# Patient Record
Sex: MALE | Race: WHITE | Employment: FULL TIME | URBAN - METROPOLITAN AREA
[De-identification: names, ages, dates, MRNs, and addresses within clinical notes are randomized per-mention and may not be internally consistent; named-entity substitution may affect disease eponyms.]

---

## 2017-01-18 PROBLEM — Z96.641 S/P HIP REPLACEMENT, RIGHT: Status: ACTIVE | Noted: 2017-01-18

## 2017-01-18 PROBLEM — M70.61 GREATER TROCHANTERIC BURSITIS, RIGHT: Status: ACTIVE | Noted: 2017-01-18

## 2017-02-16 PROBLEM — Z96.641 S/P HIP REPLACEMENT, RIGHT: Status: RESOLVED | Noted: 2017-01-18 | Resolved: 2017-02-16

## 2017-02-18 PROCEDURE — 81001 URINALYSIS AUTO W/SCOPE: CPT | Performed by: INTERNAL MEDICINE

## 2017-04-14 PROBLEM — M25.551 HIP PAIN, RIGHT: Status: ACTIVE | Noted: 2017-04-14

## 2018-02-15 PROBLEM — D69.6 THROMBOCYTOPENIA: Status: ACTIVE | Noted: 2018-02-15

## 2018-02-15 PROBLEM — IMO0002 TOBACCO-RELATED DISORDER: Status: ACTIVE | Noted: 2018-02-15

## 2018-02-15 PROBLEM — M25.551 HIP PAIN, RIGHT: Status: RESOLVED | Noted: 2017-04-14 | Resolved: 2018-02-15

## 2018-02-15 PROBLEM — R73.01 IFG (IMPAIRED FASTING GLUCOSE): Status: ACTIVE | Noted: 2018-02-15

## 2018-02-15 PROBLEM — D69.6 THROMBOCYTOPENIA (HCC): Status: ACTIVE | Noted: 2018-02-15

## 2018-04-24 PROBLEM — M51.36 LUMBAR DEGENERATIVE DISC DISEASE: Status: ACTIVE | Noted: 2018-04-24

## 2018-04-24 PROBLEM — M51.369 LUMBAR DEGENERATIVE DISC DISEASE: Status: ACTIVE | Noted: 2018-04-24

## 2018-05-03 PROBLEM — M54.16 LUMBAR RADICULITIS: Status: ACTIVE | Noted: 2018-05-03

## 2019-03-01 PROCEDURE — 81001 URINALYSIS AUTO W/SCOPE: CPT | Performed by: INTERNAL MEDICINE

## 2019-03-07 PROBLEM — M51.369 LUMBAR DEGENERATIVE DISC DISEASE: Status: RESOLVED | Noted: 2018-04-24 | Resolved: 2019-03-07

## 2019-03-07 PROBLEM — M51.36 LUMBAR DEGENERATIVE DISC DISEASE: Status: RESOLVED | Noted: 2018-04-24 | Resolved: 2019-03-07

## 2019-03-25 PROBLEM — N18.30 CKD (CHRONIC KIDNEY DISEASE) STAGE 3, GFR 30-59 ML/MIN (HCC): Status: ACTIVE | Noted: 2019-03-25

## 2020-03-18 PROBLEM — M70.61 GREATER TROCHANTERIC BURSITIS, RIGHT: Status: RESOLVED | Noted: 2017-01-18 | Resolved: 2020-03-18

## 2020-03-18 PROBLEM — M54.16 LUMBAR RADICULITIS: Status: RESOLVED | Noted: 2018-05-03 | Resolved: 2020-03-18

## 2020-03-18 PROBLEM — I77.9 CAROTID ARTERY DISEASE (HCC): Status: ACTIVE | Noted: 2020-03-18

## 2020-03-18 PROBLEM — I70.0 AORTO-ILIAC ATHEROSCLEROSIS (HCC): Status: ACTIVE | Noted: 2020-03-18

## 2020-03-18 PROBLEM — I70.8 AORTO-ILIAC ATHEROSCLEROSIS: Status: ACTIVE | Noted: 2020-03-18

## 2020-03-18 PROBLEM — I70.8 AORTO-ILIAC ATHEROSCLEROSIS (HCC): Status: ACTIVE | Noted: 2020-03-18

## 2020-03-18 PROBLEM — I77.9 CAROTID ARTERY DISEASE: Status: ACTIVE | Noted: 2020-03-18

## 2020-03-18 PROBLEM — I70.0 AORTO-ILIAC ATHEROSCLEROSIS: Status: ACTIVE | Noted: 2020-03-18

## 2021-05-12 PROBLEM — N18.30 STAGE 3 CHRONIC KIDNEY DISEASE, UNSPECIFIED WHETHER STAGE 3A OR 3B CKD (HCC): Status: ACTIVE | Noted: 2021-05-12

## 2021-05-12 PROBLEM — I77.9 CAROTID ARTERY DISEASE (HCC): Status: RESOLVED | Noted: 2020-03-18 | Resolved: 2021-05-12

## 2021-05-12 PROBLEM — I77.9 CAROTID ARTERY DISEASE: Status: RESOLVED | Noted: 2020-03-18 | Resolved: 2021-05-12

## 2021-05-12 PROBLEM — N18.30 CKD (CHRONIC KIDNEY DISEASE) STAGE 3, GFR 30-59 ML/MIN (HCC): Status: RESOLVED | Noted: 2019-03-25 | Resolved: 2021-05-12

## 2021-05-12 PROBLEM — I65.23 ATHEROSCLEROSIS OF BOTH CAROTID ARTERIES: Status: ACTIVE | Noted: 2021-05-12

## 2021-05-25 ENCOUNTER — HOSPITAL ENCOUNTER (OUTPATIENT)
Age: 71
Discharge: HOME | End: 2021-05-25
Payer: MEDICARE

## 2021-05-25 ENCOUNTER — APPOINTMENT (OUTPATIENT)
Dept: GENERAL RADIOLOGY | Age: 71
End: 2021-05-25
Attending: NURSE PRACTITIONER
Payer: MEDICARE

## 2021-05-25 VITALS
OXYGEN SATURATION: 99 % | RESPIRATION RATE: 14 BRPM | DIASTOLIC BLOOD PRESSURE: 77 MMHG | SYSTOLIC BLOOD PRESSURE: 159 MMHG | HEART RATE: 80 BPM | BODY MASS INDEX: 26.41 KG/M2 | TEMPERATURE: 98 F | WEIGHT: 195 LBS | HEIGHT: 72 IN

## 2021-05-25 DIAGNOSIS — S99.921A INJURY OF RIGHT FOOT, INITIAL ENCOUNTER: Primary | ICD-10-CM

## 2021-05-25 DIAGNOSIS — S82.61XA CLOSED AVULSION FRACTURE OF LATERAL MALLEOLUS OF RIGHT FIBULA, INITIAL ENCOUNTER: ICD-10-CM

## 2021-05-25 PROCEDURE — 73610 X-RAY EXAM OF ANKLE: CPT | Performed by: NURSE PRACTITIONER

## 2021-05-25 PROCEDURE — 73630 X-RAY EXAM OF FOOT: CPT | Performed by: NURSE PRACTITIONER

## 2021-05-25 PROCEDURE — 29515 APPLICATION SHORT LEG SPLINT: CPT | Performed by: NURSE PRACTITIONER

## 2021-05-25 RX ORDER — HYDROCODONE BITARTRATE AND ACETAMINOPHEN 5; 325 MG/1; MG/1
1-2 TABLET ORAL EVERY 6 HOURS PRN
Qty: 15 TABLET | Refills: 0 | Status: SHIPPED | OUTPATIENT
Start: 2021-05-25 | End: 2021-06-01

## 2021-05-25 NOTE — ED QUICK NOTES
Computer not working in the room. Obtained information from patient verbally and checked it to the computer.

## 2021-05-25 NOTE — ED PROVIDER NOTES
Patient Seen in: Immediate [address]      History   Patient presents with:  Fall  Leg or Foot Injury    Stated Complaint: fell at home / right foot    HPI/Subjective:    This 58-year-old male presents to immediate care for right ankle and foot Physical Exam     ED Triage Vitals [05/25/21 0825]   /77   Pulse 80   Resp 14   Temp 98 °F (36.7 °C)   Temp src Temporal   SpO2 99 %   O2 Device None (Room air)       Current:/77   Pulse 80   Temp 98 °F (36.7 °C) (Temporal)   Resp 14   Ht 182. 9 ankle mortise is intact. The subtalar joint is intact. There is joint space narrowing and marginal osteophyte formation at the tibiotalar joint consistent with osteoarthritis. CONCLUSION:  1.  Small ossicle adjacent to the inferior tip of fibul applied by immediate care staff. Location: left lower extremity   procedure: The area of the splint was appropriately positioned. Post-procedure: Good position. Capillary refill less than 2 seconds. Sensation remains intact.   Patient tolerated the proc

## 2021-05-25 NOTE — ED INITIAL ASSESSMENT (HPI)
Patient here for evaluation of right foot and ankle pain x 2 days following a fall getting up from the couch. He states his great toe caught on the carpet and he twisted the foot/ankle and fell onto carpet.  He has been icing the foot but has not taken anyt

## 2021-12-26 ENCOUNTER — HOSPITAL ENCOUNTER (OUTPATIENT)
Age: 71
Discharge: HOME | End: 2021-12-26
Payer: MEDICARE

## 2021-12-26 ENCOUNTER — APPOINTMENT (OUTPATIENT)
Dept: GENERAL RADIOLOGY | Age: 71
End: 2021-12-26
Attending: NURSE PRACTITIONER
Payer: MEDICARE

## 2021-12-26 VITALS
OXYGEN SATURATION: 99 % | TEMPERATURE: 97 F | DIASTOLIC BLOOD PRESSURE: 73 MMHG | RESPIRATION RATE: 17 BRPM | HEIGHT: 72 IN | WEIGHT: 195 LBS | BODY MASS INDEX: 26.41 KG/M2 | SYSTOLIC BLOOD PRESSURE: 154 MMHG | HEART RATE: 65 BPM

## 2021-12-26 DIAGNOSIS — M79.646 FINGER PAIN: ICD-10-CM

## 2021-12-26 DIAGNOSIS — L03.011 CELLULITIS OF FINGER OF RIGHT HAND: Primary | ICD-10-CM

## 2021-12-26 PROCEDURE — 73140 X-RAY EXAM OF FINGER(S): CPT | Performed by: NURSE PRACTITIONER

## 2021-12-26 PROCEDURE — 99213 OFFICE O/P EST LOW 20 MIN: CPT | Performed by: NURSE PRACTITIONER

## 2021-12-26 RX ORDER — METHYLPREDNISOLONE 4 MG/1
TABLET ORAL
Qty: 1 EACH | Refills: 0 | Status: SHIPPED | OUTPATIENT
Start: 2021-12-26

## 2021-12-26 RX ORDER — CEPHALEXIN 500 MG/1
500 CAPSULE ORAL 4 TIMES DAILY
Qty: 40 CAPSULE | Refills: 0 | Status: SHIPPED | OUTPATIENT
Start: 2021-12-26 | End: 2022-01-05

## 2021-12-26 NOTE — ED PROVIDER NOTES
Patient Seen in: Immediate [address] [place] [address]      History   Patient presents with:  Pain    Stated Complaint: finger pain/problem    Subjective:   HPI  Patient is a 29-year-old male past medical history of hypertension, hyperlipidemia, gout, osteoart Social History    Tobacco Use      Smoking status: Current Every Day Smoker        Packs/day: 1.00        Years: 35.00        Pack years: 35        Types: Cigarettes      Smokeless tobacco: Never Used    Vaping Use      Vaping Use: Never use refill takes less than 2 seconds. Coloration: Skin is not pale. Findings: No erythema or rash. Neurological:      Mental Status: He is alert.    Psychiatric:         Mood and Affect: Mood normal.         Behavior: Behavior normal. immediate medical attention for fever worsening symptoms. I also discussed with patient that radiate allergy finding of a hyperdense focus at the medial margin of the right    Impression; cellulitis -possibly secondary to a paronychia that has extended.

## 2023-03-02 ENCOUNTER — HOSPITAL ENCOUNTER (OUTPATIENT)
Age: 73
Discharge: HOME | End: 2023-03-02
Payer: MEDICARE

## 2023-03-02 VITALS
TEMPERATURE: 98 F | RESPIRATION RATE: 18 BRPM | HEART RATE: 62 BPM | SYSTOLIC BLOOD PRESSURE: 137 MMHG | OXYGEN SATURATION: 99 % | DIASTOLIC BLOOD PRESSURE: 71 MMHG

## 2023-03-02 DIAGNOSIS — H61.23 BILATERAL IMPACTED CERUMEN: Primary | ICD-10-CM

## 2023-03-02 PROCEDURE — 99213 OFFICE O/P EST LOW 20 MIN: CPT | Performed by: NURSE PRACTITIONER

## 2023-03-02 NOTE — ED INITIAL ASSESSMENT (HPI)
Pt c/o bilateral ear clogging and stuffiness for 2 days. Pt states he tried hydrogen peroxide no relief.

## 2024-01-01 ENCOUNTER — TELEPHONE (OUTPATIENT)
Age: 74
End: 2024-01-01

## 2024-05-13 ENCOUNTER — HOSPITAL ENCOUNTER (INPATIENT)
Facility: HOSPITAL | Age: 74
LOS: 2 days | Discharge: HOME | DRG: 809 | End: 2024-05-15
Attending: EMERGENCY MEDICINE | Admitting: HOSPITALIST

## 2024-05-13 DIAGNOSIS — D61.818 PANCYTOPENIA (HCC): Primary | ICD-10-CM

## 2024-05-13 LAB
ALBUMIN SERPL-MCNC: 3.8 G/DL (ref 3.4–5)
ALBUMIN/GLOB SERPL: 1.4 {RATIO} (ref 1–2)
ALP LIVER SERPL-CCNC: 106 U/L
ALT SERPL-CCNC: 16 U/L
ANION GAP SERPL CALC-SCNC: 6 MMOL/L (ref 0–18)
ANTIBODY SCREEN: NEGATIVE
APTT PPP: 34.1 SECONDS (ref 23–36)
AST SERPL-CCNC: 13 U/L (ref 15–37)
BASOPHILS # BLD AUTO: 0.01 X10(3) UL (ref 0–0.2)
BASOPHILS NFR BLD AUTO: 0.3 %
BILIRUB SERPL-MCNC: 0.4 MG/DL (ref 0.1–2)
BUN BLD-MCNC: 53 MG/DL (ref 9–23)
CALCIUM BLD-MCNC: 9.4 MG/DL (ref 8.5–10.1)
CHLORIDE SERPL-SCNC: 119 MMOL/L (ref 98–112)
CO2 SERPL-SCNC: 19 MMOL/L (ref 21–32)
CREAT BLD-MCNC: 2.37 MG/DL
EGFRCR SERPLBLD CKD-EPI 2021: 28 ML/MIN/1.73M2 (ref 60–?)
EOSINOPHIL # BLD AUTO: 0.05 X10(3) UL (ref 0–0.7)
EOSINOPHIL NFR BLD AUTO: 1.7 %
ERYTHROCYTE [DISTWIDTH] IN BLOOD BY AUTOMATED COUNT: 19.8 %
GLOBULIN PLAS-MCNC: 2.8 G/DL (ref 2.8–4.4)
GLUCOSE BLD-MCNC: 98 MG/DL (ref 70–99)
HCT VFR BLD AUTO: 16.6 %
HGB BLD-MCNC: 5.3 G/DL
IMM GRANULOCYTES # BLD AUTO: 0.01 X10(3) UL (ref 0–1)
IMM GRANULOCYTES NFR BLD: 0.3 %
INR BLD: 1.17 (ref 0.8–1.2)
LYMPHOCYTES # BLD AUTO: 1.34 X10(3) UL (ref 1–4)
LYMPHOCYTES NFR BLD AUTO: 44.7 %
MCH RBC QN AUTO: 34.2 PG (ref 26–34)
MCHC RBC AUTO-ENTMCNC: 31.9 G/DL (ref 31–37)
MCV RBC AUTO: 107.1 FL
MONOCYTES # BLD AUTO: 0.08 X10(3) UL (ref 0.1–1)
MONOCYTES NFR BLD AUTO: 2.7 %
NEUTROPHILS # BLD AUTO: 1.51 X10 (3) UL (ref 1.5–7.7)
NEUTROPHILS # BLD AUTO: 1.51 X10(3) UL (ref 1.5–7.7)
NEUTROPHILS NFR BLD AUTO: 50.3 %
NT-PROBNP SERPL-MCNC: 1563 PG/ML (ref ?–125)
OSMOLALITY SERPL CALC.SUM OF ELEC: 312 MOSM/KG (ref 275–295)
PLATELET # BLD AUTO: 25 10(3)UL (ref 150–450)
PLATELET MORPHOLOGY: NORMAL
PLATELETS.RETICULATED NFR BLD AUTO: 45.4 % (ref 0–7)
POTASSIUM SERPL-SCNC: 4.9 MMOL/L (ref 3.5–5.1)
PROT SERPL-MCNC: 6.6 G/DL (ref 6.4–8.2)
PROTHROMBIN TIME: 15 SECONDS (ref 11.6–14.8)
RBC # BLD AUTO: 1.55 X10(6)UL
RH BLOOD TYPE: NEGATIVE
SODIUM SERPL-SCNC: 144 MMOL/L (ref 136–145)
TROPONIN I SERPL HS-MCNC: 8 NG/L
WBC # BLD AUTO: 3 X10(3) UL (ref 4–11)

## 2024-05-13 PROCEDURE — 80053 COMPREHEN METABOLIC PANEL: CPT

## 2024-05-13 PROCEDURE — 85730 THROMBOPLASTIN TIME PARTIAL: CPT | Performed by: EMERGENCY MEDICINE

## 2024-05-13 PROCEDURE — 84484 ASSAY OF TROPONIN QUANT: CPT | Performed by: EMERGENCY MEDICINE

## 2024-05-13 PROCEDURE — 86850 RBC ANTIBODY SCREEN: CPT | Performed by: EMERGENCY MEDICINE

## 2024-05-13 PROCEDURE — 85610 PROTHROMBIN TIME: CPT | Performed by: EMERGENCY MEDICINE

## 2024-05-13 PROCEDURE — 86920 COMPATIBILITY TEST SPIN: CPT

## 2024-05-13 PROCEDURE — 86901 BLOOD TYPING SEROLOGIC RH(D): CPT | Performed by: EMERGENCY MEDICINE

## 2024-05-13 PROCEDURE — 36415 COLL VENOUS BLD VENIPUNCTURE: CPT

## 2024-05-13 PROCEDURE — 93005 ELECTROCARDIOGRAM TRACING: CPT

## 2024-05-13 PROCEDURE — 36430 TRANSFUSION BLD/BLD COMPNT: CPT

## 2024-05-13 PROCEDURE — 86900 BLOOD TYPING SEROLOGIC ABO: CPT | Performed by: EMERGENCY MEDICINE

## 2024-05-13 PROCEDURE — 30233N1 TRANSFUSION OF NONAUTOLOGOUS RED BLOOD CELLS INTO PERIPHERAL VEIN, PERCUTANEOUS APPROACH: ICD-10-PCS | Performed by: HOSPITALIST

## 2024-05-13 PROCEDURE — 99285 EMERGENCY DEPT VISIT HI MDM: CPT

## 2024-05-13 PROCEDURE — 83880 ASSAY OF NATRIURETIC PEPTIDE: CPT | Performed by: EMERGENCY MEDICINE

## 2024-05-13 PROCEDURE — 93010 ELECTROCARDIOGRAM REPORT: CPT

## 2024-05-13 PROCEDURE — 85025 COMPLETE CBC W/AUTO DIFF WBC: CPT

## 2024-05-13 RX ORDER — SODIUM CHLORIDE 9 MG/ML
INJECTION, SOLUTION INTRAVENOUS CONTINUOUS
Status: DISCONTINUED | OUTPATIENT
Start: 2024-05-13 | End: 2024-05-15

## 2024-05-13 RX ORDER — SODIUM CHLORIDE 9 MG/ML
INJECTION, SOLUTION INTRAVENOUS ONCE
Status: DISCONTINUED | OUTPATIENT
Start: 2024-05-13 | End: 2024-05-14

## 2024-05-13 RX ORDER — POLYETHYLENE GLYCOL 3350 17 G/17G
17 POWDER, FOR SOLUTION ORAL DAILY PRN
Status: DISCONTINUED | OUTPATIENT
Start: 2024-05-13 | End: 2024-05-15

## 2024-05-13 RX ORDER — METOCLOPRAMIDE HYDROCHLORIDE 5 MG/ML
5 INJECTION INTRAMUSCULAR; INTRAVENOUS EVERY 8 HOURS PRN
Status: DISCONTINUED | OUTPATIENT
Start: 2024-05-13 | End: 2024-05-15

## 2024-05-13 RX ORDER — METOPROLOL SUCCINATE 50 MG/1
50 TABLET, EXTENDED RELEASE ORAL DAILY
COMMUNITY

## 2024-05-13 RX ORDER — SENNOSIDES 8.6 MG
17.2 TABLET ORAL NIGHTLY PRN
Status: DISCONTINUED | OUTPATIENT
Start: 2024-05-13 | End: 2024-05-15

## 2024-05-13 RX ORDER — ONDANSETRON 2 MG/ML
4 INJECTION INTRAMUSCULAR; INTRAVENOUS EVERY 6 HOURS PRN
Status: DISCONTINUED | OUTPATIENT
Start: 2024-05-13 | End: 2024-05-15

## 2024-05-13 RX ORDER — ACETAMINOPHEN 500 MG
500 TABLET ORAL EVERY 4 HOURS PRN
Status: DISCONTINUED | OUTPATIENT
Start: 2024-05-13 | End: 2024-05-15

## 2024-05-13 RX ORDER — BISACODYL 10 MG
10 SUPPOSITORY, RECTAL RECTAL
Status: DISCONTINUED | OUTPATIENT
Start: 2024-05-13 | End: 2024-05-15

## 2024-05-13 RX ORDER — ENEMA 19; 7 G/133ML; G/133ML
1 ENEMA RECTAL ONCE AS NEEDED
Status: DISCONTINUED | OUTPATIENT
Start: 2024-05-13 | End: 2024-05-15

## 2024-05-13 RX ORDER — MELATONIN
3 NIGHTLY PRN
Status: DISCONTINUED | OUTPATIENT
Start: 2024-05-13 | End: 2024-05-15

## 2024-05-13 NOTE — ED QUICK NOTES
Orders for admission, patient is aware of plan and ready to go upstairs. Any questions, please call ED RN Bibi at extension [phone]    Vaccinated? N/a  Type of COVID test sent:  COVID Suspicion level:       Titratable drug(s) infusing:   Rate: n/a    LOC at time of transport: A&Ox4    Other pertinent information: Per MD, okay to transport if blood not available     CIWA score=  NIH score=

## 2024-05-13 NOTE — H&P
.CC:   Chief Complaint   Patient presents with    Difficulty Breathing    Abnormal Labs        PCP: Salvador Cohn MD    History of Present Illness: Patient is a 73 year old male with PMH sig for gout on allopurinol, htn, ckd who presented with abn labs including low counts on cbc.   Pt began to notice worsening sob with exertion, fatigue that started about 2 months ago and has been progressive. No abd pain, no n/v/d/c, no urinary complaints. No melena/rectal bleeding. No fevers/chills/night sweats. Does think he has lost about 25 lbs over past 1.5 years but does note significant stress with taking care of his wife.       PMH  Past Medical History:    Atherosclerosis of abdominal aorta (HCC)    Cancer (HCC)    skin cancer-squamous cell    Dermatitis    Resolved per note 3/7/19     Essential hypertension    Gout    Gout, unspecified    Resolved as stable per note 3/7/19    Greater trochanteric bursitis, right    Resolved last addressed 9/4/14    High cholesterol    HTN (hypertension)    Hyperlipidemia    Lumbar radiculitis    Resolved per note 3/7/19    Osteoarthritis    right hip    Status post total replacement of right hip        PSH  Past Surgical History:   Procedure Laterality Date    Colonoscopy      Colonoscopy N/A 6/27/2016    Procedure: COLONOSCOPY;  Surgeon: Elvis Warren MD;  Location:  ENDOSCOPY    Hip replacement surgery      Other      wisdom teeth extraction        ALL:  Allergies   Allergen Reactions    Radiology Contrast Iodinated Dyes HIVES        Home Medications:  No outpatient medications have been marked as taking for the 5/13/24 encounter (Hospital Encounter).         Soc Hx  Social History     Tobacco Use    Smoking status: Every Day     Current packs/day: 1.00     Average packs/day: 1 pack/day for 35.0 years (35.0 ttl pk-yrs)     Types: Cigarettes    Smokeless tobacco: Never   Substance Use Topics    Alcohol use: Yes     Alcohol/week: 0.0 standard drinks of alcohol     Comment:  rarely        Fam Hx  Family History   Problem Relation Age of Onset    Cancer Other     Stroke Other     Stroke Mother        Review of Systems  Comprehensive ROS reviewed and negative except for what's stated above.       OBJECTIVE:  /32   Pulse 59   Temp 98.1 °F (36.7 °C) (Oral)   Resp 22   Ht 6' (1.829 m)   Wt 175 lb (79.4 kg)   SpO2 100%   BMI 23.73 kg/m²     Gen- NAD, appears stated age  HEENT- NCAT, anicteric sclera, MMM, OP clear  Lymph- no cervical LAD  CV- RRR no murmurs.+LLE >right  Lungs- CTAB, good respiratory effort  Abd- soft, ntnd, no organomegaly, BS+  Derm- no rashes  MSK- good muscle strength and tone, no joint swelling  Neuro- A&OX3, no focal deficits      Diagnostic Data:    CBC/Chem  Recent Labs   Lab 05/13/24  1632   WBC 3.0*   HGB 5.3*   .1*   INR 1.17       Recent Labs   Lab 05/13/24  1632      K 4.9   *   CO2 19.0*   BUN 53*   CREATSERUM 2.37*   GLU 98   CA 9.4       Recent Labs   Lab 05/13/24  1632   ALT 16   AST 13*   ALB 3.8       No results for input(s): \"TROP\" in the last 168 hours.    Additional Diagnostics: personally reviewed EKG-  Nsr, no st/t abn      ASSESSMENT / PLAN:     72 yo man with h/o ckd, htn, gout who presents with worsening pancytopenia, has had progressively worsening sob.     Dyspnea/sob  - suspect progressive sx are related to worsening anemia  - lung exam clear, EKG okay. Bnp modestly elevated but could relate to ckd  - consider further cardiopulmonary w/u pending clinical picture    Pancytopenia  - concerning for bone marrow process, hematology consulted, likely bone marrow biopsy tomorrow per ED discussion with them.  - transfuse 2 units prbc and repeat hgb  - had previous w/u as outpt by hematology including CT CAP, multiple lab tests. Protein-albumin gap normal.    Lit on ckd 3  - sees nephrology as outpt, consider their involvement here  - possibly prerenal darrius in setting of severe anemia  - transfuse and reassess    Unequal leg  swelling L>R  - check LE dopplers    SCDs only due to thrombocytopenia.        Diandra Shah MD  [facility] Hospitalist  Pager [phone]  Answering Service number: [phone]

## 2024-05-13 NOTE — ED INITIAL ASSESSMENT (HPI)
C/o fatigue and SOB w/exertion; pt's PCP through [facility] told pt to come in for some abnormal labs; pt not sure which lab was low; suspects Hgb

## 2024-05-13 NOTE — ED PROVIDER NOTES
Patient Seen in: [facility] Emergency Department      History     Chief Complaint   Patient presents with    Difficulty Breathing    Abnormal Labs     Stated Complaint: SOB and weakness, 99% on RA    Subjective:   HPI    73-year-old male with a past medical history as below including hypertension, hyperlipidemia, gout and CKD presents due to abnormal labs with low hemoglobin drawn 2 days ago.  Patient states he saw his PCP for his annual physical and noted that he has been having generalized fatigue and increasing dyspnea on exertion over the past few weeks to months.  He states he gets short of breath and developed some chest tightness going up a flight of stairs.  He states he feels fatigued but denies feeling dizzy or lightheaded.  He denies any rectal bleeding or melena.  Denies abdominal pain, nausea or vomiting.  Denies fever or chills.    Objective:   Past Medical History:    Atherosclerosis of abdominal aorta (HCC)    Cancer (HCC)    skin cancer-squamous cell    Dermatitis    Resolved per note 3/7/19     Essential hypertension    Gout    Gout, unspecified    Resolved as stable per note 3/7/19    Greater trochanteric bursitis, right    Resolved last addressed 9/4/14    High cholesterol    HTN (hypertension)    Hyperlipidemia    Lumbar radiculitis    Resolved per note 3/7/19    Osteoarthritis    right hip    Status post total replacement of right hip              No pertinent past surgical history.              No pertinent social history.            Review of Systems    Positive for stated complaint: SOB and weakness, 99% on RA  Other systems are as noted in HPI.  Constitutional and vital signs reviewed.      All other systems reviewed and negative except as noted above.    Physical Exam     ED Triage Vitals [05/13/24 1618]   /66   Pulse 63   Resp 16   Temp 98.1 °F (36.7 °C)   Temp src Oral   SpO2 100 %   O2 Device None (Room air)       Current Vitals:   Vital Signs  BP: 111/32  Pulse: 57  Resp:  18  Temp: 98.1 °F (36.7 °C)  Temp src: Oral  MAP (mmHg): (!) 53    Oxygen Therapy  SpO2: 100 %  O2 Device: None (Room air)            Physical Exam  Vitals and nursing note reviewed.   Constitutional:       General: He is not in acute distress.     Appearance: He is well-developed. He is not ill-appearing.   HENT:      Head: Normocephalic and atraumatic.      Mouth/Throat:      Mouth: Mucous membranes are moist.   Eyes:      General: No scleral icterus.     Extraocular Movements: Extraocular movements intact.   Cardiovascular:      Rate and Rhythm: Normal rate and regular rhythm.   Pulmonary:      Effort: Pulmonary effort is normal.      Breath sounds: Normal breath sounds.   Abdominal:      Palpations: Abdomen is soft.      Tenderness: There is no abdominal tenderness.   Musculoskeletal:      Cervical back: Neck supple.   Skin:     General: Skin is warm and dry.      Capillary Refill: Capillary refill takes less than 2 seconds.   Neurological:      Mental Status: He is alert and oriented to person, place, and time.      GCS: GCS eye subscore is 4. GCS verbal subscore is 5. GCS motor subscore is 6.   Psychiatric:         Mood and Affect: Mood normal.         Behavior: Behavior normal.               ED Course     Labs Reviewed   COMP METABOLIC PANEL (14) - Abnormal; Notable for the following components:       Result Value    Chloride 119 (*)     CO2 19.0 (*)     BUN 53 (*)     Creatinine 2.37 (*)     Calculated Osmolality 312 (*)     eGFR-Cr 28 (*)     AST 13 (*)     All other components within normal limits   PROTHROMBIN TIME (PT) - Abnormal; Notable for the following components:    PT 15.0 (*)     All other components within normal limits   PRO BETA NATRIURETIC PEPTIDE - Abnormal; Notable for the following components:    Pro-Beta Natriuretic Peptide 1,563 (*)     All other components within normal limits   RBC MORPHOLOGY SCAN - Abnormal; Notable for the following components:    RBC Morphology See morphology below  (*)     All other components within normal limits   CBC W/ DIFFERENTIAL - Abnormal; Notable for the following components:    WBC 3.0 (*)     RBC 1.55 (*)     HGB 5.3 (*)     HCT 16.6 (*)     PLT 25.0 (*)     Immature Platelet Fraction 45.4 (*)     .1 (*)     MCH 34.2 (*)     Monocyte Absolute 0.08 (*)     All other components within normal limits   PTT, ACTIVATED - Normal   TROPONIN I HIGH SENSITIVITY - Normal   CBC WITH DIFFERENTIAL WITH PLATELET    Narrative:     The following orders were created for panel order CBC With Differential With Platelet.  Procedure                               Abnormality         Status                     ---------                               -----------         ------                     CBC W/ DIFFERENTIAL[[id number]]          Abnormal            Final result                 Please view results for these tests on the individual orders.   SCAN SLIDE   TYPE AND SCREEN    Narrative:     The following orders were created for panel order Type and screen.  Procedure                               Abnormality         Status                     ---------                               -----------         ------                     ABORH (Blood Type)[[id number]]                               Final result               Antibody Screen[[id number]]                                  Final result                 Please view results for these tests on the individual orders.   ABORH (BLOOD TYPE)   ANTIBODY SCREEN   PREPARE RBC   RAINBOW DRAW LAVENDER   RAINBOW DRAW LIGHT GREEN   RAINBOW DRAW BLUE   RAINBOW DRAW GOLD     EKG    Rate, intervals and axes as noted on EKG Report.  Rate: 68  Rhythm: Sinus Rhythm  Reading: Normal sinus rhythm, no ST/T wave changes                            MDM   73-year-old male with a past medical history as below including hypertension, hyperlipidemia, gout and CKD presents due to abnormal labs with low hemoglobin drawn 2 days ago.     Chart review shows patient saw  his PCP on 5/11/2024 when he noted increasing fatigue.  Labs show WBC 2.87, hemoglobin 5.1 and platelet count of 28    Differential includes but is not limited to pancytopenia, aplastic anemia, MDS,  autoimmune disorder, malignancy    CBC today are similar to previous with pancytopenia.  Labs also show worsening of CKD.  Will admit for PRBC transfusion and further workup.  Discussed with hematology Dr. Coombs who recommends keeping patient n.p.o. after midnight for possible bone marrow biopsy tomorrow.  Discussed with hospitalist Dr. Carrera.      Admission disposition: 5/13/2024  5:36 PM                                        Medical Decision Making  Amount and/or Complexity of Data Reviewed  External Data Reviewed: labs and notes.     Details: See MDM  Labs: ordered. Decision-making details documented in ED Course.  Discussion of management or test interpretation with external provider(s): Heme-onc, hospitalist    Risk  Decision regarding hospitalization.        Disposition and Plan     Clinical Impression:  1. Pancytopenia (HCC)         Disposition:  Admit  5/13/2024  5:36 pm    Follow-up:  No follow-up provider specified.        Medications Prescribed:  Current Discharge Medication List                            Hospital Problems       Present on Admission  Date Reviewed: 2/8/2022            ICD-10-CM Noted POA    * (Principal) Pancytopenia (HCC) D61.818 5/13/2024 Unknown

## 2024-05-14 ENCOUNTER — APPOINTMENT (OUTPATIENT)
Dept: ULTRASOUND IMAGING | Facility: HOSPITAL | Age: 74
DRG: 809 | End: 2024-05-14
Attending: STUDENT IN AN ORGANIZED HEALTH CARE EDUCATION/TRAINING PROGRAM

## 2024-05-14 ENCOUNTER — APPOINTMENT (OUTPATIENT)
Dept: CT IMAGING | Facility: HOSPITAL | Age: 74
DRG: 809 | End: 2024-05-14
Attending: INTERNAL MEDICINE

## 2024-05-14 ENCOUNTER — APPOINTMENT (OUTPATIENT)
Dept: ULTRASOUND IMAGING | Facility: HOSPITAL | Age: 74
DRG: 809 | End: 2024-05-14
Attending: HOSPITALIST

## 2024-05-14 LAB
ANION GAP SERPL CALC-SCNC: 7 MMOL/L (ref 0–18)
ATRIAL RATE: 68 BPM
BASOPHILS # BLD AUTO: 0.02 X10(3) UL (ref 0–0.2)
BASOPHILS NFR BLD AUTO: 0.8 %
BILIRUB UR QL STRIP.AUTO: NEGATIVE
BUN BLD-MCNC: 53 MG/DL (ref 9–23)
CALCIUM BLD-MCNC: 8.9 MG/DL (ref 8.5–10.1)
CHLORIDE SERPL-SCNC: 117 MMOL/L (ref 98–112)
CLARITY UR REFRACT.AUTO: CLEAR
CO2 SERPL-SCNC: 20 MMOL/L (ref 21–32)
COLOR UR AUTO: COLORLESS
CREAT BLD-MCNC: 2.17 MG/DL
CREAT UR-SCNC: 63.3 MG/DL
EGFRCR SERPLBLD CKD-EPI 2021: 31 ML/MIN/1.73M2 (ref 60–?)
EOSINOPHIL # BLD AUTO: 0.1 X10(3) UL (ref 0–0.7)
EOSINOPHIL NFR BLD AUTO: 4 %
ERYTHROCYTE [DISTWIDTH] IN BLOOD BY AUTOMATED COUNT: 19.8 %
GLUCOSE BLD-MCNC: 104 MG/DL (ref 70–99)
GLUCOSE UR STRIP.AUTO-MCNC: NORMAL MG/DL
HCT VFR BLD AUTO: 20.7 %
HCT VFR BLD AUTO: 20.9 %
HCT VFR BLD AUTO: 22.9 %
HGB BLD-MCNC: 6.8 G/DL
HGB BLD-MCNC: 6.8 G/DL
HGB BLD-MCNC: 7.9 G/DL
IMM GRANULOCYTES # BLD AUTO: 0.01 X10(3) UL (ref 0–1)
IMM GRANULOCYTES NFR BLD: 0.4 %
KETONES UR STRIP.AUTO-MCNC: NEGATIVE MG/DL
LEUKOCYTE ESTERASE UR QL STRIP.AUTO: NEGATIVE
LYMPHOCYTES # BLD AUTO: 1.06 X10(3) UL (ref 1–4)
LYMPHOCYTES NFR BLD AUTO: 42.7 %
MCH RBC QN AUTO: 32.5 PG (ref 26–34)
MCHC RBC AUTO-ENTMCNC: 32.5 G/DL (ref 31–37)
MCV RBC AUTO: 100 FL
MONOCYTES # BLD AUTO: 0.06 X10(3) UL (ref 0.1–1)
MONOCYTES NFR BLD AUTO: 2.4 %
NEUTROPHILS # BLD AUTO: 1.23 X10 (3) UL (ref 1.5–7.7)
NEUTROPHILS # BLD AUTO: 1.23 X10(3) UL (ref 1.5–7.7)
NEUTROPHILS NFR BLD AUTO: 49.7 %
NITRITE UR QL STRIP.AUTO: NEGATIVE
OSMOLALITY SERPL CALC.SUM OF ELEC: 313 MOSM/KG (ref 275–295)
OSMOLALITY UR: 535 MOSM/KG (ref 300–1300)
P AXIS: 55 DEGREES
P-R INTERVAL: 138 MS
PH UR STRIP.AUTO: 5.5 [PH] (ref 5–8)
PLATELET # BLD AUTO: 15 10(3)UL (ref 150–450)
POTASSIUM SERPL-SCNC: 4.2 MMOL/L (ref 3.5–5.1)
PROT UR STRIP.AUTO-MCNC: NEGATIVE MG/DL
Q-T INTERVAL: 388 MS
QRS DURATION: 78 MS
QTC CALCULATION (BEZET): 412 MS
R AXIS: 62 DEGREES
RBC # BLD AUTO: 2.09 X10(6)UL
RBC UR QL AUTO: NEGATIVE
SODIUM SERPL-SCNC: 110 MMOL/L
SODIUM SERPL-SCNC: 144 MMOL/L (ref 136–145)
SP GR UR STRIP.AUTO: 1.01 (ref 1–1.03)
T AXIS: 27 DEGREES
UROBILINOGEN UR STRIP.AUTO-MCNC: NORMAL MG/DL
VENTRICULAR RATE: 68 BPM
WBC # BLD AUTO: 2.5 X10(3) UL (ref 4–11)

## 2024-05-14 PROCEDURE — 88342 IMHCHEM/IMCYTCHM 1ST ANTB: CPT | Performed by: INTERNAL MEDICINE

## 2024-05-14 PROCEDURE — 85014 HEMATOCRIT: CPT | Performed by: HOSPITALIST

## 2024-05-14 PROCEDURE — 88313 SPECIAL STAINS GROUP 2: CPT | Performed by: INTERNAL MEDICINE

## 2024-05-14 PROCEDURE — 88305 TISSUE EXAM BY PATHOLOGIST: CPT | Performed by: INTERNAL MEDICINE

## 2024-05-14 PROCEDURE — 81003 URINALYSIS AUTO W/O SCOPE: CPT | Performed by: STUDENT IN AN ORGANIZED HEALTH CARE EDUCATION/TRAINING PROGRAM

## 2024-05-14 PROCEDURE — 88264 CHROMOSOME ANALYSIS 20-25: CPT | Performed by: INTERNAL MEDICINE

## 2024-05-14 PROCEDURE — 77012 CT SCAN FOR NEEDLE BIOPSY: CPT | Performed by: INTERNAL MEDICINE

## 2024-05-14 PROCEDURE — 82570 ASSAY OF URINE CREATININE: CPT | Performed by: STUDENT IN AN ORGANIZED HEALTH CARE EDUCATION/TRAINING PROGRAM

## 2024-05-14 PROCEDURE — 88184 FLOWCYTOMETRY/ TC 1 MARKER: CPT | Performed by: RADIOLOGY

## 2024-05-14 PROCEDURE — 07DR3ZX EXTRACTION OF ILIAC BONE MARROW, PERCUTANEOUS APPROACH, DIAGNOSTIC: ICD-10-PCS | Performed by: RADIOLOGY

## 2024-05-14 PROCEDURE — 38222 DX BONE MARROW BX & ASPIR: CPT | Performed by: INTERNAL MEDICINE

## 2024-05-14 PROCEDURE — 83935 ASSAY OF URINE OSMOLALITY: CPT | Performed by: STUDENT IN AN ORGANIZED HEALTH CARE EDUCATION/TRAINING PROGRAM

## 2024-05-14 PROCEDURE — 80048 BASIC METABOLIC PNL TOTAL CA: CPT | Performed by: HOSPITALIST

## 2024-05-14 PROCEDURE — 88185 FLOWCYTOMETRY/TC ADD-ON: CPT | Performed by: RADIOLOGY

## 2024-05-14 PROCEDURE — 85018 HEMOGLOBIN: CPT | Performed by: HOSPITALIST

## 2024-05-14 PROCEDURE — 76770 US EXAM ABDO BACK WALL COMP: CPT | Performed by: STUDENT IN AN ORGANIZED HEALTH CARE EDUCATION/TRAINING PROGRAM

## 2024-05-14 PROCEDURE — 079T3ZX DRAINAGE OF BONE MARROW, PERCUTANEOUS APPROACH, DIAGNOSTIC: ICD-10-PCS | Performed by: RADIOLOGY

## 2024-05-14 PROCEDURE — 99152 MOD SED SAME PHYS/QHP 5/>YRS: CPT | Performed by: INTERNAL MEDICINE

## 2024-05-14 PROCEDURE — 36430 TRANSFUSION BLD/BLD COMPNT: CPT

## 2024-05-14 PROCEDURE — 85025 COMPLETE CBC W/AUTO DIFF WBC: CPT | Performed by: HOSPITALIST

## 2024-05-14 PROCEDURE — 81455 SO/HL 51/>GSAP DNA/DNA&RNA: CPT | Performed by: INTERNAL MEDICINE

## 2024-05-14 PROCEDURE — 88314 HISTOCHEMICAL STAINS ADD-ON: CPT | Performed by: INTERNAL MEDICINE

## 2024-05-14 PROCEDURE — 84300 ASSAY OF URINE SODIUM: CPT | Performed by: STUDENT IN AN ORGANIZED HEALTH CARE EDUCATION/TRAINING PROGRAM

## 2024-05-14 PROCEDURE — 88291 CYTO/MOLECULAR REPORT: CPT | Performed by: INTERNAL MEDICINE

## 2024-05-14 PROCEDURE — 88237 TISSUE CULTURE BONE MARROW: CPT | Performed by: INTERNAL MEDICINE

## 2024-05-14 PROCEDURE — 93971 EXTREMITY STUDY: CPT | Performed by: HOSPITALIST

## 2024-05-14 PROCEDURE — 85097 BONE MARROW INTERPRETATION: CPT | Performed by: INTERNAL MEDICINE

## 2024-05-14 PROCEDURE — 88341 IMHCHEM/IMCYTCHM EA ADD ANTB: CPT | Performed by: INTERNAL MEDICINE

## 2024-05-14 RX ORDER — METOPROLOL SUCCINATE 50 MG/1
50 TABLET, EXTENDED RELEASE ORAL
Status: DISCONTINUED | OUTPATIENT
Start: 2024-05-14 | End: 2024-05-15

## 2024-05-14 RX ORDER — ALLOPURINOL 100 MG/1
100 TABLET ORAL NIGHTLY
Status: DISCONTINUED | OUTPATIENT
Start: 2024-05-14 | End: 2024-05-15

## 2024-05-14 RX ORDER — SODIUM CHLORIDE 9 MG/ML
INJECTION, SOLUTION INTRAVENOUS CONTINUOUS
Status: DISCONTINUED | OUTPATIENT
Start: 2024-05-14 | End: 2024-05-14 | Stop reason: HOSPADM

## 2024-05-14 RX ORDER — NALOXONE HYDROCHLORIDE 0.4 MG/ML
80 INJECTION, SOLUTION INTRAMUSCULAR; INTRAVENOUS; SUBCUTANEOUS AS NEEDED
Status: DISCONTINUED | OUTPATIENT
Start: 2024-05-14 | End: 2024-05-14 | Stop reason: HOSPADM

## 2024-05-14 RX ORDER — MIDAZOLAM HYDROCHLORIDE 1 MG/ML
1 INJECTION INTRAMUSCULAR; INTRAVENOUS EVERY 5 MIN PRN
Status: DISCONTINUED | OUTPATIENT
Start: 2024-05-14 | End: 2024-05-14 | Stop reason: HOSPADM

## 2024-05-14 RX ORDER — FLUMAZENIL 0.1 MG/ML
0.2 INJECTION INTRAVENOUS AS NEEDED
Status: DISCONTINUED | OUTPATIENT
Start: 2024-05-14 | End: 2024-05-14 | Stop reason: HOSPADM

## 2024-05-14 RX ORDER — SODIUM CHLORIDE 9 MG/ML
INJECTION, SOLUTION INTRAVENOUS ONCE
Status: DISCONTINUED | OUTPATIENT
Start: 2024-05-14 | End: 2024-05-14

## 2024-05-14 RX ORDER — ATORVASTATIN CALCIUM 10 MG/1
10 TABLET, FILM COATED ORAL
Status: DISCONTINUED | OUTPATIENT
Start: 2024-05-14 | End: 2024-05-15

## 2024-05-14 RX ORDER — MIDAZOLAM HYDROCHLORIDE 1 MG/ML
INJECTION INTRAMUSCULAR; INTRAVENOUS
Status: COMPLETED
Start: 2024-05-14 | End: 2024-05-14

## 2024-05-14 NOTE — H&P
[facility]   part of [facility]   History & Physical    Guy White Patient Status:  Inpatient    1950 MRN [id number]   Location [facility] 0SW-A Attending Diandra Shah MD   Hosp Day # 1 PCP Salvador Cohn MD     Admitting Diagnosis:   Pancytopenia    History of Present Illness:   72 yo M pancytopenia presents for bone marrow biopsy    History   Past Medical History:  Past Medical History:    Atherosclerosis of abdominal aorta (HCC)    Cancer (HCC)    skin cancer-squamous cell    Dermatitis    Resolved per note 3/7/19     Essential hypertension    Gout    Gout, unspecified    Resolved as stable per note 3/7/19    Greater trochanteric bursitis, right    Resolved last addressed 14    High cholesterol    HTN (hypertension)    Hyperlipidemia    Lumbar radiculitis    Resolved per note 3/7/19    Osteoarthritis    right hip    Status post total replacement of right hip       Past Surgical History:  Past Surgical History:   Procedure Laterality Date    Colonoscopy      Colonoscopy N/A 2016    Procedure: COLONOSCOPY;  Surgeon: Elvis Warren MD;  Location:  ENDOSCOPY    Hip replacement surgery      Other      wisdom teeth extraction       Social History:  Social History     Tobacco Use    Smoking status: Every Day     Current packs/day: 1.00     Average packs/day: 1 pack/day for 35.0 years (35.0 ttl pk-yrs)     Types: Cigarettes    Smokeless tobacco: Never   Substance Use Topics    Alcohol use: Yes     Alcohol/week: 0.0 standard drinks of alcohol     Comment: rarely        Family History:  Family History   Problem Relation Age of Onset    Cancer Other     Stroke Other     Stroke Mother        Allergies/Medications:   Allergies:  Allergies   Allergen Reactions    Radiology Contrast Iodinated Dyes HIVES       Medications:  No current outpatient medications on file.    Physical Exam & Review of Systems:   Physical Exam:    /48 (BP Location: Right arm)   Pulse 60   Temp 98.3  °F (36.8 °C) (Oral)   Resp 17   Ht 72.01\"   Wt 175 lb   SpO2 97%   BMI 23.73 kg/m²     General: NAD  Neck: No JVD  Lungs: CTA bilat  Heart: RRR, S1, S2  Abdomen: Soft, NT/ND, BS+x4  Extremities: Warm, dry, no LE edema bilat  Pulses: 2+ bilat DP    Results:   Labs:  Recent Labs   Lab 05/13/24  1632 05/14/24  0218 05/14/24  0733   RBC 1.55* 2.09*  --    HGB 5.3* 6.8*  6.8* 7.9*   HCT 16.6* 20.9*  20.7* 22.9*   .1* 100.0  --    MCH 34.2* 32.5  --    MCHC 31.9 32.5  --    RDW 19.8 19.8  --    NEPRELIM 1.51 1.23*  --    WBC 3.0* 2.5*  --    PLT 25.0* 15.0*  --      Recent Labs   Lab 05/13/24 1632   PTP 15.0*   INR 1.17   PTT 34.1     Recent Labs   Lab 05/13/24  1632 05/14/24  0218   GLU 98 104*   BUN 53* 53*   CREATSERUM 2.37* 2.17*   CA 9.4 8.9    144   K 4.9 4.2   * 117*   CO2 19.0* 20.0*       Assessment/Plan:   Impression: 72 yo M w/ pancytopenia    I have discussed with the patient and/or legal representative the potential benefits, risks, and side effects of this procedure, the likelihood of the patient achieving goals; and the potential problems that might occur during recuperation.  I discussed reasonable alternatives to the procedure, including risks, benefits and side effects related to the alternatives, and risks related to not receiving this procedure.      Recommendations: CT guided bone marrow biopsy    Jayesh Gamez MD  5/14/2024  12:39 PM

## 2024-05-14 NOTE — IMAGING NOTE
Received pt to CT1.  Pt verified name and  as well as allergies.  Pt states NPO since MN.  Pt does not take blood thinners.  Lab results of PT/INR and PLT reviewed.  Informed consent obtained by Dr. Gamez.  Pt positioned prone on scanner.  CRM and pulse ox monitoring applied, alarms enabled. Sterile prep and 1% lido to area by Dr. Gamez. Specimens obtained and handed off to path tech.  Neopsorin/tegaderm dressing applied to site. CDI with drop of blood noted to site.  Pt positioned supine on bed.  Pt awake and alert, conversing with this RN and in no apparent distress.  SBAR given to inpatient RN.  Pt transported to room 0020 with belongings. Pt accompanied by this RN and transporter.

## 2024-05-14 NOTE — PROGRESS NOTES
.[facility] Hospitalist note    PCP: Salvador Cohn MD    Chief Complaint:  F/u sob, pancytopenia    SUBJECTIVE:  Feeling okay. Had total of 3 units prbc yesterday  Denies sob at rest    OBJECTIVE:  Temp:  [97 °F (36.1 °C)-98.5 °F (36.9 °C)] 98.4 °F (36.9 °C)  Pulse:  [57-78] 59  Resp:  [16-21] 18  BP: ()/(35-62) 123/59  SpO2:  [92 %-100 %] 100 %    Intake/Output:    Intake/Output Summary (Last 24 hours) at 5/14/2024 1817  Last data filed at 5/14/2024 1305  Gross per 24 hour   Intake 1468.25 ml   Output --   Net 1468.25 ml       Last 3 Weights   05/13/24 1930 175 lb (79.4 kg)   05/13/24 1618 175 lb (79.4 kg)   12/26/21 1037 195 lb (88.5 kg)   08/30/21 1541 192 lb (87.1 kg)       Exam  Gen: No acute distress  HEENT: anicteric sclera, MMM  Pulm: Lungs clear, normal respiratory effort  CV: Heart with regular rate and rhythm, no peripheral edema  Abd: Abdomen soft, nontender, nondistended, no organomegaly, bowel sounds present  MSK: Full range of motion in extremities, no clubbing, no cyanosis  Skin: no rashes or lesions  Neuro: A&OX3, no focal deficits    Data Review:         Labs:     Recent Labs   Lab 05/13/24 1632 05/14/24 0218 05/14/24  0733   WBC 3.0* 2.5*  --    HGB 5.3* 6.8*  6.8* 7.9*   .1* 100.0  --    PLT 25.0* 15.0*  --    NE 1.51 1.23*  --    LYMABS 1.34 1.06  --    INR 1.17  --   --        Recent Labs   Lab 05/13/24  1632 05/14/24  0218    144   K 4.9 4.2   * 117*   CO2 19.0* 20.0*   BUN 53* 53*   CREATSERUM 2.37* 2.17*   CA 9.4 8.9   GLU 98 104*       Recent Labs   Lab 05/13/24  1632   ALT 16   AST 13*   ALB 3.8       Recent Labs   Lab 05/13/24  1632   PBNP 1,563*       No results for input(s): \"CRP\", \"SERGEY\", \"LDH\", \"DDIMER\" in the last 168 hours.    No results for input(s): \"PGLU\" in the last 168 hours.    Meds:   Scheduled Medication:   allopurinol  100 mg Oral Nightly    atorvastatin  10 mg Oral After dinner    metoprolol succinate ER  50 mg Oral Daily Beta Blocker      Continuous Infusing Medication:   sodium chloride       PRN Medication:  acetaminophen    melatonin    ondansetron    metoclopramide    polyethylene glycol (PEG 3350)    sennosides    bisacodyl    fleet enema       Microbiology:    No results found for this visit on 05/13/24.    No results found for: \"COVID19\"     Assessment/Plan:     74 yo man with h/o ckd, htn, gout who presents with worsening pancytopenia, has had progressively worsening sob.      Dyspnea/sob  - suspect progressive sx are related to worsening anemia  - lung exam clear, EKG okay. Bnp modestly elevated but could relate to ckd  - consider further cardiopulmonary w/u pending clinical picture     Pancytopenia  - concerning for bone marrow process, bone marrow biopsy done by IR 5/14  - s/p 3 units prbc with improvement in hgb to 7.9  - had previous w/u as outpt by hematology including CT CAP, multiple lab tests. Protein-albumin gap normal.     Lit on ckd 3  - discussed with nephrology, suspect prerenal  - cont ivf  - ace/hydrochlorothiazide held  - US c/w medical renal dz     Unequal leg swelling L>R  - LE doppler LLE neg     SCDs only due to thrombocytopenia        Diandra Shah MD  [facility]  Pager: [phone]

## 2024-05-14 NOTE — CONSULTS
[facility] - Nephrology  Inpatient Consultation    Guy White Patient Status:  Inpatient    1950 MRN [id number]   Location [facility] 0-A Attending Diandra Shah MD   Hosp Day # 1 PCP Salvador Cohn MD     Reason for consult: ALLEN  Date of consultation: 2024     HISTORY OF PRESENT ILLNESS:     Guy White is a 73 year old male with a medical history notable for hypertension, CKD, gout, who presents for evaluation of pancytopenia. Nephrology consulted for ALLEN.    Noted to have progressive weakness, lethargy over the past couple of months. Admits to weight loss. Labs on admission with pancytopenia. Hematology following and now s/p bone marrow biopsy . Started on IVF with improvement of ALLEN. Nephrology consulted for further workup and management.     REVIEW OF SYSTEMS:     ROS as above, otherwise negative    HISTORY:     Past Medical History:    Atherosclerosis of abdominal aorta (HCC)    Cancer (HCC)    skin cancer-squamous cell    Dermatitis    Resolved per note 3/7/19     Essential hypertension    Gout    Gout, unspecified    Resolved as stable per note 3/7/19    Greater trochanteric bursitis, right    Resolved last addressed 14    High cholesterol    HTN (hypertension)    Hyperlipidemia    Lumbar radiculitis    Resolved per note 3/7/19    Osteoarthritis    right hip    Status post total replacement of right hip     Past Surgical History:   Procedure Laterality Date    Colonoscopy      Colonoscopy N/A 2016    Procedure: COLONOSCOPY;  Surgeon: Elvis Warren MD;  Location:  ENDOSCOPY    Hip replacement surgery      Other      wisdom teeth extraction     Family History   Problem Relation Age of Onset    Cancer Other     Stroke Other     Stroke Mother       reports that he has been smoking cigarettes. He has a 35 pack-year smoking history. He has never used smokeless tobacco. He reports current alcohol use. He reports that he does not use drugs.  Allergies    Allergen Reactions    Radiology Contrast Iodinated Dyes HIVES       MEDICATIONS:       Current Facility-Administered Medications:     sodium chloride 0.9% infusion, , Intravenous, Once    allopurinol (Zyloprim) tab 100 mg, 100 mg, Oral, Nightly    atorvastatin (Lipitor) tab 10 mg, 10 mg, Oral, After dinner    metoprolol succinate ER (Toprol XL) 24 hr tab 50 mg, 50 mg, Oral, Daily Beta Blocker    sodium chloride 0.9% infusion, , Intravenous, Continuous    acetaminophen (Tylenol Extra Strength) tab 500 mg, 500 mg, Oral, Q4H PRN    melatonin tab 3 mg, 3 mg, Oral, Nightly PRN    ondansetron (Zofran) 4 MG/2ML injection 4 mg, 4 mg, Intravenous, Q6H PRN    metoclopramide (Reglan) 5 mg/mL injection 5 mg, 5 mg, Intravenous, Q8H PRN    polyethylene glycol (PEG 3350) (Miralax) 17 g oral packet 17 g, 17 g, Oral, Daily PRN    sennosides (Senokot) tab 17.2 mg, 17.2 mg, Oral, Nightly PRN    bisacodyl (Dulcolax) 10 MG rectal suppository 10 mg, 10 mg, Rectal, Daily PRN    fleet enema (Fleet) 7-19 GM/118ML rectal enema 133 mL, 1 enema, Rectal, Once PRN    sodium chloride 0.9% infusion, , Intravenous, Once    Medications Prior to Admission   Medication Sig Dispense Refill Last Dose    metoprolol succinate ER 50 MG Oral Tablet 24 Hr Take 1 tablet (50 mg total) by mouth daily.   5/13/2024 at 0830    Lisinopril-hydroCHLOROthiazide 20-12.5 MG Oral Tab Take 1 tablet by mouth 2 (two) times daily. (Patient taking differently: Take 2 tablets by mouth daily.) 180 tablet 3 5/13/2024 at 0830    atorvastatin 10 MG Oral Tab Take 1 tablet (10 mg total) by mouth After dinner. 90 tablet 3 5/12/2024 at 2100    allopurinol 100 MG Oral Tab Take 1 tablet (100 mg total) by mouth daily. (Patient taking differently: Take 1 tablet (100 mg total) by mouth at bedtime.) 90 tablet 3 5/12/2024 at 2100    Multiple Vitamin (MULTI VITAMIN MENS) Oral Tab Take 1 tablet by mouth daily.   Past Month    Psyllium-Calcium (METAMUCIL PLUS CALCIUM) Oral Cap Take by mouth.    Past Month        PHYSICAL EXAM:     Vital Signs: /62   Pulse 70   Temp 98.3 °F (36.8 °C) (Oral)   Resp 17   Ht 6' 0.01\" (1.829 m)   Wt 175 lb (79.4 kg)   SpO2 98%   BMI 23.73 kg/m²   Temp (24hrs), Av.9 °F (36.6 °C), Min:97 °F (36.1 °C), Max:98.5 °F (36.9 °C)       Intake/Output Summary (Last 24 hours) at 2024 1645  Last data filed at 2024 1305  Gross per 24 hour   Intake 1468.25 ml   Output --   Net 1468.25 ml     Wt Readings from Last 3 Encounters:   24 175 lb (79.4 kg)   21 195 lb (88.5 kg)   21 192 lb (87.1 kg)       General: no acute distress  HEENT: normocephalic, atraumatic  CV: RRR  Respiratory: no respiratory distress  Abdomen: soft, non-tender  Extremities: no edema bilaterally  Skin: no rashes on visible skin  Neuro: alert and oriented x3    LABORATORY DATA:     Lab Results   Component Value Date     (H) 2024    BUN 53 (H) 2024    BUNCREA 19.0 2021    CREATSERUM 2.17 (H) 2024    ANIONGAP 7 2024    GFR 49 (L) 2016    CA 8.9 2024    OSMOCALC 313 (H) 2024    ALKPHO 106 2024    AST 13 (L) 2024    ALT 16 2024    BILT 0.4 2024    TP 6.6 2024    ALB 3.8 2024    GLOBULIN 2.8 2024    AGRATIO 2.1 01/15/2016     2024    K 4.2 2024     (H) 2024    CO2 20.0 (L) 2024     Lab Results   Component Value Date    WBC 2.5 (L) 2024    RBC 2.09 (L) 2024    HGB 7.9 (L) 2024    HCT 22.9 (L) 2024    PLT 15.0 (LL) 2024    .0 2024    MCH 32.5 2024    MCHC 32.5 2024    RDW 19.8 2024    NEPRELIM 1.23 (L) 2024    NEPERCENT 49.7 2024    LYPERCENT 42.7 2024    MOPERCENT 2.4 2024    EOPERCENT 4.0 2024    BAPERCENT 0.8 2024    NE 1.23 (L) 2024    LYMABS 1.06 2024    MOABSO 0.06 (L) 2024    EOABSO 0.10 2024    BAABSO 0.02 2024     Lab  Results   Component Value Date    CREUR 63.30 05/14/2024     Lab Results   Component Value Date    COLORUR Colorless (A) 05/14/2024    CLARITY Clear 05/14/2024    SPECGRAVITY 1.013 05/14/2024    GLUUR Normal 05/14/2024    BILUR Negative 05/14/2024    KETUR Negative 05/14/2024    BLOODURINE Negative 05/14/2024    PHURINE 5.5 05/14/2024    PROUR Negative 05/14/2024    UROBILINOGEN Normal 05/14/2024    NITRITE Negative 05/14/2024    LEUUR Negative 05/14/2024    NMIC Microscopic not indicated 05/14/2024    WBCUR None Seen 03/01/2019    RBCUR 0-2 03/01/2019    EPIUR None Seen 03/01/2019    BACUR None seen 05/08/2020    HYLUR Present (A) 02/18/2017     IMAGING:     Reviewed    ASSESSMENT:     # Non-oliguric ALLEN  -Elevated creatinine (baseline 1.5-1.7) likely pre-renal in etiology secondary to volume contraction in setting of severe anemia/hydrochlorothiazide use.  -Urinalysis without hematuria/proteinuria  -Renal ultrasound with medical renal disease, right sided cysts.    # HTN/Volume Status  -Home medications: lisinopril-hydrochlorothiazide 20-12.5mg daily, metoprolol 50mg daily.    # Metabolic acidosis    # Pancytopenia    PLAN:     -Continue IVF currently  -Hold home lisinopril-hydrochlorothiazide  -Anemia management per Hematology - biopsy pending  -Avoid nephrotoxins and renally dose medications for creatinine clearance  -Monitor intake and output daily  -Daily weights            We will continue to follow.    Thank you for allowing us to participate in the care of this patient.         DO Winston Vale [facility] - Nephrology

## 2024-05-14 NOTE — PLAN OF CARE
NURSING ADMISSION NOTE      Patient admitted via Cart  Oriented to room.  Safety precautions initiated.  Bed in low position.  Call light in reach.    Received patient alert and oriented x 4. On room air. Received w/ blood transfusion from ED. No fever. Up steady on his feet.   2126: first Blood transfusion done. VS stable. No fever.  2147: 2nd unit of blood transfusion started.   2202: VS stable, no fever, T97.9  0130: Blood transfusion done. No blood transfusion reaction noted. No fever.   0300: Rpt Hgb 6.8, Plt 15. J Luis HATCH.   0400: 3rd unit of blood transfusion started.  0415: No blood transfusion reaction noted. No fever.   0700: Blood transfusion done, no fever.     Problem: CARDIOVASCULAR - ADULT  Goal: Absence of cardiac arrhythmias or at baseline  Description: INTERVENTIONS:  - Continuous cardiac monitoring, monitor vital signs, obtain 12 lead EKG if indicated  - Evaluate effectiveness of antiarrhythmic and heart rate control medications as ordered  - Initiate emergency measures for life threatening arrhythmias  - Monitor electrolytes and administer replacement therapy as ordered  Outcome: Progressing     Problem: METABOLIC/FLUID AND ELECTROLYTES - ADULT  Goal: Electrolytes maintained within normal limits  Description: INTERVENTIONS:  - Monitor labs and rhythm and assess patient for signs and symptoms of electrolyte imbalances  - Administer electrolyte replacement as ordered  - Monitor response to electrolyte replacements, including rhythm and repeat lab results as appropriate  - Fluid restriction as ordered  - Instruct patient on fluid and nutrition restrictions as appropriate  Outcome: Progressing  Goal: Hemodynamic stability and optimal renal function maintained  Description: INTERVENTIONS:  - Monitor labs and assess for signs and symptoms of volume excess or deficit  - Monitor intake, output and patient weight  - Monitor urine specific gravity, serum osmolarity and serum sodium as indicated or  ordered  - Monitor response to interventions for patient's volume status, including labs, urine output, blood pressure (other measures as available)  - Encourage oral intake as appropriate  - Instruct patient on fluid and nutrition restrictions as appropriate  Outcome: Progressing     Problem: HEMATOLOGIC - ADULT  Goal: Maintains hematologic stability  Description: INTERVENTIONS  - Assess for signs and symptoms of bleeding or hemorrhage  - Monitor labs and vital signs for trends  - Administer supportive blood products/factors, fluids and medications as ordered and appropriate  - Administer supportive blood products/factors as ordered and appropriate  Outcome: Progressing

## 2024-05-14 NOTE — IMAGING NOTE
Received order for Bone Marrow Bx and spoke to Dorina HILL at bedside. Pt NPO since midnight, labs resulted and pt is alert and oriented and consent will be signed in department. Will review case with Dr Gamez and determine time.   Reviewed case with Dr Gamez, and will plan to do around noon. Dorina also aware of plan.

## 2024-05-14 NOTE — PROCEDURES
[facility]   part of [facility]  Procedure Note    Guy White Patient Status:  Inpatient    1950 MRN [id number]   Location [facility] 0SW-A Attending Diandra Shah MD   Hosp Day # 1 PCP Salvador Cohn MD     Procedure: CT bone marrow biopsy  L iliac    Pre-Procedure Diagnosis:  Pancytopenia    Post-Procedure Diagnosis: Same    Anesthesia:  Local and Sedation    Findings:  11g aspirate and core of L iliac bone under CT guidance    Specimens: As above    Blood Loss:  Minimal    Tourniquet Time: None  Complications:  None  Drains:  None    Secondary Diagnosis:  None    Jayesh Gamez MD  2024

## 2024-05-14 NOTE — CONSULTS
[facility]  Report of Consultation    Guy White Patient Status:  Inpatient    1950 MRN [id number]   Location [facility] 0SW-A Attending Diandra Shah MD   Hosp Day # 1 PCP Salvador Cohn MD     Reason for Consultation:  Pancytopenia    History of Present Illness:  This is a 73 year old with a history of gout, CKD, HTN, and HLD who present for further evaluation of pancytopenia.     Per chart review, the patient had previously seen Dr. Alfaro in 2024 at which time he was being evaluated for thrombocytosis and bandemia with negative EDVIN-2 reflex and BCR-ABL, hemoglobin: 10.2, platelets: 64. He had further labs on 24 that showed wbc: 2.87, hemoglobin: 5.1, MCV: 112, and platelets: 28, iron was elevated, B12/Folate were within range. Labs on admission showed hemoglobin: 5.3, platelets: 25, wbc: 3, AMC: 0.08. He notes fatigue and dyspnea on exertion more so over the last 2 months, has lost some weight due to stressors in life, no recent travel, no recent infections.     History:  Past Medical History:    Atherosclerosis of abdominal aorta (HCC)    Cancer (HCC)    skin cancer-squamous cell    Dermatitis    Resolved per note 3/7/19     Essential hypertension    Gout    Gout, unspecified    Resolved as stable per note 3/7/19    Greater trochanteric bursitis, right    Resolved last addressed 14    High cholesterol    HTN (hypertension)    Hyperlipidemia    Lumbar radiculitis    Resolved per note 3/7/19    Osteoarthritis    right hip    Status post total replacement of right hip     Past Surgical History:   Procedure Laterality Date    Colonoscopy      Colonoscopy N/A 2016    Procedure: COLONOSCOPY;  Surgeon: Elvis Warren MD;  Location:  ENDOSCOPY    Hip replacement surgery      Other      wisdom teeth extraction     Family History   Problem Relation Age of Onset    Cancer Other     Stroke Other     Stroke Mother       reports that he has been smoking cigarettes. He has a 35  pack-year smoking history. He has never used smokeless tobacco. He reports current alcohol use. He reports that he does not use drugs.    Allergies:  Allergies   Allergen Reactions    Radiology Contrast Iodinated Dyes HIVES       Medications:    Current Facility-Administered Medications:     sodium chloride 0.9% infusion, , Intravenous, Once    sodium chloride 0.9% infusion, , Intravenous, Continuous    acetaminophen (Tylenol Extra Strength) tab 500 mg, 500 mg, Oral, Q4H PRN    melatonin tab 3 mg, 3 mg, Oral, Nightly PRN    ondansetron (Zofran) 4 MG/2ML injection 4 mg, 4 mg, Intravenous, Q6H PRN    metoclopramide (Reglan) 5 mg/mL injection 5 mg, 5 mg, Intravenous, Q8H PRN    polyethylene glycol (PEG 3350) (Miralax) 17 g oral packet 17 g, 17 g, Oral, Daily PRN    sennosides (Senokot) tab 17.2 mg, 17.2 mg, Oral, Nightly PRN    bisacodyl (Dulcolax) 10 MG rectal suppository 10 mg, 10 mg, Rectal, Daily PRN    fleet enema (Fleet) 7-19 GM/118ML rectal enema 133 mL, 1 enema, Rectal, Once PRN    sodium chloride 0.9% infusion, , Intravenous, Once    Review of Systems:  A 10-point review of systems was done with pertinent positives and negatives per the HPI.    Physical Exam:   Blood pressure 99/46, pulse 64, temperature 98.1 °F (36.7 °C), temperature source Oral, resp. rate 18, height 6' 0.01\" (1.829 m), weight 175 lb (79.4 kg), SpO2 95%.  General: Patient is alert and oriented x 3, not in acute distress.  Vital Signs: BP 99/46   Pulse 64   Temp 98.1 °F (36.7 °C) (Oral)   Resp 18   Ht 6' 0.01\" (1.829 m)   Wt 175 lb (79.4 kg)   SpO2 95%   BMI 23.73 kg/m²   HEENT: EOMs intact. PERRL. Oropharynx is clear.   Neck: No palpable lymphadenopathy. Neck is supple.  Chest: Clear to auscultation.  Heart: Regular rate and rhythm.   Abdomen: Soft, non tender with good bowel sounds.  Extremities: Pedal pulses are present. No edema.  Neurological: Grossly intact.   Lymphatics: There is no palpable lymphadenopathy throughout in the  cervical or supraclavicular, regions.    Laboratory Data:    Lab Results   Component Value Date    WBC 2.5 05/14/2024    HGB 6.8 05/14/2024    HGB 6.8 05/14/2024    HCT 20.7 05/14/2024    HCT 20.9 05/14/2024    PLT 15.0 05/14/2024    CREATSERUM 2.17 05/14/2024    BUN 53 05/14/2024     05/14/2024    K 4.2 05/14/2024     05/14/2024    CO2 20.0 05/14/2024     05/14/2024    CA 8.9 05/14/2024    ALB 3.8 05/13/2024    ALKPHO 106 05/13/2024    BILT 0.4 05/13/2024    TP 6.6 05/13/2024    AST 13 05/13/2024    ALT 16 05/13/2024    PTT 34.1 05/13/2024    INR 1.17 05/13/2024    PTP 15.0 05/13/2024       Imaging:  N/A    Impression and Plan:    This is a 73 year old with a history of gout, CKD, HTN, and HLD who present for further evaluation of pancytopenia.     Pancytopenia: History as above; In brief, the patient had previously seen Dr. Alfaro in 1/2024 at which time he was being evaluated for thrombocytosis and bandemia with negative EDVIN-2 reflex and BCR-ABL, hemoglobin: 10.2, platelets: 64. He had further labs on 5/13/24 that showed wbc: 2.87, hemoglobin: 5.1, MCV: 112, and platelets: 28, iron was elevated, B12/Folate were within range. Labs on admission showed hemoglobin: 5.3, platelets: 25, wbc: 3, AMC: 0.08. He notes fatigue and dyspnea on exertion more so over the last 2 months, has lost some weight due to stressors in life, no recent travel, no recent infections.   - the patient has had pancytopenia for a while now but worsened over the last few months, denies any recent infections or bleeding noted, no blasts noted on peripheral smear  - I discussed that previous workup with Dr. Alfaro was unremarkable however given the degree of pancytopenia, would recommend bone marrow biopsy to evaluate further etiologies such as MDS  - keep hemoglobin > 7 and platelets > 10, likely give 1 unit platelets post-procedure    I will continue to follow while inpatient. Please do not hesitate to contact me directly with  any specific questions or concerns.    Hugh Coombs MD  [facility]  Department of Oncology and Hematology  5/14/2024  4:11 AM

## 2024-05-15 VITALS
OXYGEN SATURATION: 99 % | TEMPERATURE: 98 F | WEIGHT: 175.06 LBS | DIASTOLIC BLOOD PRESSURE: 51 MMHG | HEART RATE: 60 BPM | BODY MASS INDEX: 23.71 KG/M2 | SYSTOLIC BLOOD PRESSURE: 100 MMHG | HEIGHT: 72.01 IN | RESPIRATION RATE: 16 BRPM

## 2024-05-15 LAB
ANION GAP SERPL CALC-SCNC: 4 MMOL/L (ref 0–18)
BASOPHILS # BLD AUTO: 0.04 X10(3) UL (ref 0–0.2)
BASOPHILS NFR BLD AUTO: 1.1 %
BLOOD TYPE BARCODE: 9500
BUN BLD-MCNC: 46 MG/DL (ref 9–23)
CALCIUM BLD-MCNC: 8.7 MG/DL (ref 8.5–10.1)
CHLORIDE SERPL-SCNC: 119 MMOL/L (ref 98–112)
CO2 SERPL-SCNC: 18 MMOL/L (ref 21–32)
CREAT BLD-MCNC: 1.57 MG/DL
EGFRCR SERPLBLD CKD-EPI 2021: 46 ML/MIN/1.73M2 (ref 60–?)
EOSINOPHIL # BLD AUTO: 0.14 X10(3) UL (ref 0–0.7)
EOSINOPHIL NFR BLD AUTO: 3.8 %
ERYTHROCYTE [DISTWIDTH] IN BLOOD BY AUTOMATED COUNT: 21.2 %
GLUCOSE BLD-MCNC: 100 MG/DL (ref 70–99)
HCT VFR BLD AUTO: 22.4 %
HGB BLD-MCNC: 7.8 G/DL
IMM GRANULOCYTES # BLD AUTO: 0.04 X10(3) UL (ref 0–1)
IMM GRANULOCYTES NFR BLD: 1.1 %
LYMPHOCYTES # BLD AUTO: 1.22 X10(3) UL (ref 1–4)
LYMPHOCYTES NFR BLD AUTO: 33.5 %
MCH RBC QN AUTO: 32.4 PG (ref 26–34)
MCHC RBC AUTO-ENTMCNC: 34.8 G/DL (ref 31–37)
MCV RBC AUTO: 92.9 FL
MONOCYTES # BLD AUTO: 0.08 X10(3) UL (ref 0.1–1)
MONOCYTES NFR BLD AUTO: 2.2 %
NEUTROPHILS # BLD AUTO: 2.12 X10 (3) UL (ref 1.5–7.7)
NEUTROPHILS # BLD AUTO: 2.12 X10(3) UL (ref 1.5–7.7)
NEUTROPHILS NFR BLD AUTO: 58.3 %
OSMOLALITY SERPL CALC.SUM OF ELEC: 304 MOSM/KG (ref 275–295)
PLATELET # BLD AUTO: 19 10(3)UL (ref 150–450)
PLATELETS.RETICULATED NFR BLD AUTO: 42.4 % (ref 0–7)
POTASSIUM SERPL-SCNC: 4.8 MMOL/L (ref 3.5–5.1)
RBC # BLD AUTO: 2.41 X10(6)UL
SODIUM SERPL-SCNC: 141 MMOL/L (ref 136–145)
UNIT VOLUME: 250 ML
UNIT VOLUME: 250 ML
UNIT VOLUME: 350 ML
WBC # BLD AUTO: 3.6 X10(3) UL (ref 4–11)

## 2024-05-15 PROCEDURE — 36430 TRANSFUSION BLD/BLD COMPNT: CPT

## 2024-05-15 PROCEDURE — 85025 COMPLETE CBC W/AUTO DIFF WBC: CPT | Performed by: HOSPITALIST

## 2024-05-15 PROCEDURE — 30233R1 TRANSFUSION OF NONAUTOLOGOUS PLATELETS INTO PERIPHERAL VEIN, PERCUTANEOUS APPROACH: ICD-10-PCS | Performed by: HOSPITALIST

## 2024-05-15 PROCEDURE — 80048 BASIC METABOLIC PNL TOTAL CA: CPT | Performed by: HOSPITALIST

## 2024-05-15 NOTE — PROGRESS NOTES
[facility]  Progress Note    Guy White Patient Status:  Inpatient    1950 MRN [id number]   Location [facility] 0SW-A Attending Diandra Shah MD   Hosp Day # 2 PCP Salvador Cohn MD     Subjective:  Patient did complete bone marrow biopsy, feels better after pRBC    Objective:  Blood pressure 132/50, pulse 64, temperature 98.5 °F (36.9 °C), temperature source Oral, resp. rate 16, height 6' 0.01\" (1.829 m), weight 175 lb (79.4 kg), SpO2 97%.  Gen: NAD, alert  CV: RRR, no m/r/g  Lungs: CTA b/l  Abd: Normoactive bs, soft, nt/nd  Extrem: WWP, no edema  Skin: No acute changes    Labs:   Lab Results   Component Value Date    HGB 7.9 2024    HCT 22.9 2024       Imaging:  N/A    Assessment and Plan:  This is a 73 year old with a history of gout, CKD, HTN, and HLD who present for further evaluation of pancytopenia.      Pancytopenia: History as above; In brief, the patient had previously seen Dr. Alfaro in 2024 at which time he was being evaluated for thrombocytosis and bandemia with negative EDVIN-2 reflex and BCR-ABL, hemoglobin: 10.2, platelets: 64. He had further labs on 24 that showed wbc: 2.87, hemoglobin: 5.1, MCV: 112, and platelets: 28, iron was elevated, B12/Folate were within range. Labs on admission showed hemoglobin: 5.3, platelets: 25, wbc: 3, AMC: 0.08. He notes fatigue and dyspnea on exertion more so over the last 2 months, has lost some weight due to stressors in life, no recent travel, no recent infections.   - the patient has had pancytopenia for a while now but worsened over the last few months, denies any recent infections or bleeding noted, no blasts noted on peripheral smear    Plan  - s/p 3 units pRBC with repeat hemoglobin this AM: 7.9, platelets: 19  - will transfuse 1 unit of platelets to ensure platelets will not further decrease  - bone marrow biopsy completed, will schedule follow-up with Dr. Alfaro to review bone marrow and discuss treatment options  pending findings in 1 week  - ok to discharge from hematology standpoint after platelet transfusion       -I will continue to follow while inpatient. Please do not hesitate to contact me with any specific questions or concerns.    Hugh Coombs MD  [facility]  Department of Oncology and Hematology

## 2024-05-15 NOTE — PLAN OF CARE
Assumed care of patient at 0700  Denies chest pain, sob,lightheadedness, dizziness.   S/p 1 units plt transfusion. No complications with transfusion.   Ok to discharge per deena Phillips and kya.           Problem: CARDIOVASCULAR - ADULT  Goal: Absence of cardiac arrhythmias or at baseline  Description: INTERVENTIONS:  - Continuous cardiac monitoring, monitor vital signs, obtain 12 lead EKG if indicated  - Evaluate effectiveness of antiarrhythmic and heart rate control medications as ordered  - Initiate emergency measures for life threatening arrhythmias  - Monitor electrolytes and administer replacement therapy as ordered  Outcome: Progressing     Problem: METABOLIC/FLUID AND ELECTROLYTES - ADULT  Goal: Electrolytes maintained within normal limits  Description: INTERVENTIONS:  - Monitor labs and rhythm and assess patient for signs and symptoms of electrolyte imbalances  - Administer electrolyte replacement as ordered  - Monitor response to electrolyte replacements, including rhythm and repeat lab results as appropriate  - Fluid restriction as ordered  - Instruct patient on fluid and nutrition restrictions as appropriate  Outcome: Progressing  Goal: Hemodynamic stability and optimal renal function maintained  Description: INTERVENTIONS:  - Monitor labs and assess for signs and symptoms of volume excess or deficit  - Monitor intake, output and patient weight  - Monitor urine specific gravity, serum osmolarity and serum sodium as indicated or ordered  - Monitor response to interventions for patient's volume status, including labs, urine output, blood pressure (other measures as available)  - Encourage oral intake as appropriate  - Instruct patient on fluid and nutrition restrictions as appropriate  Outcome: Progressing     Problem: HEMATOLOGIC - ADULT  Goal: Maintains hematologic stability  Description: INTERVENTIONS  - Assess for signs and symptoms of bleeding or hemorrhage  - Monitor labs and vital signs for  trends  - Administer supportive blood products/factors, fluids and medications as ordered and appropriate  - Administer supportive blood products/factors as ordered and appropriate  Outcome: Progressing

## 2024-05-15 NOTE — PLAN OF CARE
Ok to discharge per Manuel Ya.   Pt has already been contacted for appt from [place]/ [place] office.        Problem: HEMATOLOGIC - ADULT  Goal: Maintains hematologic stability  Description: INTERVENTIONS  - Assess for signs and symptoms of bleeding or hemorrhage  - Monitor labs and vital signs for trends  - Administer supportive blood products/factors, fluids and medications as ordered and appropriate  - Administer supportive blood products/factors as ordered and appropriate  5/15/2024 1300 by Natalie Mart RN  Outcome: Adequate for Discharge  5/15/2024 1155 by Natalie Mart RN  Outcome: Progressing     Problem: METABOLIC/FLUID AND ELECTROLYTES - ADULT  Goal: Electrolytes maintained within normal limits  Description: INTERVENTIONS:  - Monitor labs and rhythm and assess patient for signs and symptoms of electrolyte imbalances  - Administer electrolyte replacement as ordered  - Monitor response to electrolyte replacements, including rhythm and repeat lab results as appropriate  - Fluid restriction as ordered  - Instruct patient on fluid and nutrition restrictions as appropriate  5/15/2024 1300 by Natalie Mart RN  Outcome: Adequate for Discharge  5/15/2024 1155 by Natalie Mart RN  Outcome: Progressing  Goal: Hemodynamic stability and optimal renal function maintained  Description: INTERVENTIONS:  - Monitor labs and assess for signs and symptoms of volume excess or deficit  - Monitor intake, output and patient weight  - Monitor urine specific gravity, serum osmolarity and serum sodium as indicated or ordered  - Monitor response to interventions for patient's volume status, including labs, urine output, blood pressure (other measures as available)  - Encourage oral intake as appropriate  - Instruct patient on fluid and nutrition restrictions as appropriate  5/15/2024 1300 by Natalie Mart RN  Outcome: Adequate for Discharge  5/15/2024 1155 by Natalie Mart RN  Outcome:  Progressing     Problem: CARDIOVASCULAR - ADULT  Goal: Absence of cardiac arrhythmias or at baseline  Description: INTERVENTIONS:  - Continuous cardiac monitoring, monitor vital signs, obtain 12 lead EKG if indicated  - Evaluate effectiveness of antiarrhythmic and heart rate control medications as ordered  - Initiate emergency measures for life threatening arrhythmias  - Monitor electrolytes and administer replacement therapy as ordered  5/15/2024 1300 by Natalie Mart, RN  Outcome: Adequate for Discharge  5/15/2024 1155 by Natalie Mart, RN  Outcome: Progressing

## 2024-05-15 NOTE — PROGRESS NOTES
[facility] Nephrology  Inpatient Follow-up    Guy White Patient Status:  Inpatient    1950 MRN [id number]   Location [address] Attending Hunter Ojeda MD   Hosp Day # 2 PCP Salvador Cohn MD       SUBJECTIVE:     Patient seen and examined at bedside. No acute complaints today.     MEDICATIONS:     Current Facility-Administered Medications   Medication Dose Route Frequency    allopurinol (Zyloprim) tab 100 mg  100 mg Oral Nightly    atorvastatin (Lipitor) tab 10 mg  10 mg Oral After dinner    metoprolol succinate ER (Toprol XL) 24 hr tab 50 mg  50 mg Oral Daily Beta Blocker    acetaminophen (Tylenol Extra Strength) tab 500 mg  500 mg Oral Q4H PRN    melatonin tab 3 mg  3 mg Oral Nightly PRN    ondansetron (Zofran) 4 MG/2ML injection 4 mg  4 mg Intravenous Q6H PRN    metoclopramide (Reglan) 5 mg/mL injection 5 mg  5 mg Intravenous Q8H PRN    polyethylene glycol (PEG 3350) (Miralax) 17 g oral packet 17 g  17 g Oral Daily PRN    sennosides (Senokot) tab 17.2 mg  17.2 mg Oral Nightly PRN    bisacodyl (Dulcolax) 10 MG rectal suppository 10 mg  10 mg Rectal Daily PRN    fleet enema (Fleet) 7-19 GM/118ML rectal enema 133 mL  1 enema Rectal Once PRN       PHYSICAL EXAM:     Vital Signs: /51 (BP Location: Right arm)   Pulse 60   Temp 98.2 °F (36.8 °C) (Oral)   Resp 16   Ht 6' 0.01\" (1.829 m)   Wt 175 lb 0.7 oz (79.4 kg)   SpO2 99%   BMI 23.74 kg/m²   Temp (24hrs), Av.3 °F (36.8 °C), Min:97.8 °F (36.6 °C), Max:98.6 °F (37 °C)       Intake/Output Summary (Last 24 hours) at 5/15/2024 1324  Last data filed at 5/15/2024 1300  Gross per 24 hour   Intake 1200 ml   Output --   Net 1200 ml     Wt Readings from Last 3 Encounters:   05/15/24 175 lb 0.7 oz (79.4 kg)   21 195 lb (88.5 kg)   21 192 lb (87.1 kg)       General: no acute distress  HEENT: normocephalic, atraumatic  CV: RRR  Respiratory: no distress  Abdomen: soft, non-tender  Extremities: no edema  bilaterally  Skin: no rashes on visible skin  Neuro: alert and oriented x3     LABORATORY DATA:     Lab Results   Component Value Date     (H) 05/15/2024    BUN 46 (H) 05/15/2024    BUNCREA 19.0 05/11/2021    CREATSERUM 1.57 (H) 05/15/2024    ANIONGAP 4 05/15/2024    GFR 49 (L) 08/19/2016    CA 8.7 05/15/2024    OSMOCALC 304 (H) 05/15/2024    ALKPHO 106 05/13/2024    AST 13 (L) 05/13/2024    ALT 16 05/13/2024    BILT 0.4 05/13/2024    TP 6.6 05/13/2024    ALB 3.8 05/13/2024    GLOBULIN 2.8 05/13/2024    AGRATIO 2.1 01/15/2016     05/15/2024    K 4.8 05/15/2024     (H) 05/15/2024    CO2 18.0 (L) 05/15/2024     Lab Results   Component Value Date    WBC 3.6 (L) 05/15/2024    RBC 2.41 (L) 05/15/2024    HGB 7.8 (L) 05/15/2024    HCT 22.4 (L) 05/15/2024    PLT 19.0 (LL) 05/15/2024    MCV 92.9 05/15/2024    MCH 32.4 05/15/2024    MCHC 34.8 05/15/2024    RDW 21.2 05/15/2024    NEPRELIM 2.12 05/15/2024    NEPERCENT 58.3 05/15/2024    LYPERCENT 33.5 05/15/2024    MOPERCENT 2.2 05/15/2024    EOPERCENT 3.8 05/15/2024    BAPERCENT 1.1 05/15/2024    NE 2.12 05/15/2024    LYMABS 1.22 05/15/2024    MOABSO 0.08 (L) 05/15/2024    EOABSO 0.14 05/15/2024    BAABSO 0.04 05/15/2024     Lab Results   Component Value Date    CREUR 63.30 05/14/2024     Lab Results   Component Value Date    COLORUR Colorless (A) 05/14/2024    CLARITY Clear 05/14/2024    SPECGRAVITY 1.013 05/14/2024    GLUUR Normal 05/14/2024    BILUR Negative 05/14/2024    KETUR Negative 05/14/2024    BLOODURINE Negative 05/14/2024    PHURINE 5.5 05/14/2024    PROUR Negative 05/14/2024    UROBILINOGEN Normal 05/14/2024    NITRITE Negative 05/14/2024    LEUUR Negative 05/14/2024    NMIC Microscopic not indicated 05/14/2024    WBCUR None Seen 03/01/2019    RBCUR 0-2 03/01/2019    EPIUR None Seen 03/01/2019    BACUR None seen 05/08/2020    HYLUR Present (A) 02/18/2017       IMAGING:     Reviewed    ASSESSMENT:      # Non-oliguric ALLEN - improved  -Elevated  creatinine (baseline 1.5-1.7) likely pre-renal in etiology secondary to volume contraction in setting of severe anemia/hydrochlorothiazide use.  -Urinalysis without hematuria/proteinuria  -Renal ultrasound with medical renal disease, right sided cysts.     # HTN/Volume Status  -Home medications: lisinopril-hydrochlorothiazide 20-12.5mg daily, metoprolol 50mg daily.     # Metabolic acidosis     # Pancytopenia     PLAN:      -Continue IVF currently  -Hold home lisinopril-hydrochlorothiazide  -Anemia management per Hematology - biopsy pending  -Avoid nephrotoxins and renally dose medications for creatinine clearance  -Monitor intake and output daily  -Daily weights        Okay for discharge from nephrology perspective - will follow-up outpatient.    Thank you for allowing us to participate in the care of this patient.       Godfrey Deluna DO   [facility] - Nephrology

## 2024-05-15 NOTE — PLAN OF CARE
PT A/O, AFEBRILE, 96% ON RA, SR, ITZEL LE EDEMA, DRESSING TO LT LOWER BACK D/I, DENIES PAIN, IVF INFUSING, LABS IN AM, INSTRUCTED PT ON POC    Problem: HEMATOLOGIC - ADULT  Goal: Maintains hematologic stability  Description: INTERVENTIONS  - Assess for signs and symptoms of bleeding or hemorrhage  - Monitor labs and vital signs for trends  - Administer supportive blood products/factors, fluids and medications as ordered and appropriate  - Administer supportive blood products/factors as ordered and appropriate  Outcome: Progressing

## 2024-05-16 LAB
BLOOD TYPE BARCODE: 6200
UNIT VOLUME: 201 ML

## 2024-05-22 LAB
CD10 CELLS NFR SPEC: <1 %
CD10/CD19: <1 %
CD19 CELLS NFR SPEC: 5 %
CD19+/CD200+: 3 %
CD2 CELLS NFR SPEC: 90 %
CD20 CELLS NFR SPEC: 5 %
CD200 CELLS: 8 %
CD3 CELLS NFR SPEC: 84 %
CD3+/TCRGD+: 6 %
CD3+CD4+ CELLS NFR SPEC: 49 %
CD3+CD4+ CELLS/CD3+CD8+ CLL SPEC: 1.6
CD3+CD8+ CELLS NFR SPEC: 31 %
CD3-/CD56+: 7 %
CD34 CELLS NFR SPEC: <1 %
CD38 CELLS NFR SPEC: 3 %
CD38+/CD19+: <1 %
CD45 CELLS NFR SPEC: 100 %
CD5 CELLS NFR SPEC: 80 %
CD5/CD19 CELLS: <1 %
CD7 CELLS NFR SPEC: 77 %
CELL SURF KAPPA/LAMBDA RATIO: 1.5
CELL SURF LAMBDA LIGHT CHAIN: 2 %
CELL SURFACE KAPPA LIGHT CHAIN: 3 %
TCR G-D CELLS NFR SPEC: 6 %

## 2024-05-23 LAB
CELLS ANALYZED LEUK/LYM: 20
CELLS COUNTED LEUK/LYM: 20
CELLS KARYOTYPED LEUK/LYM: 3
GTG BAND LEUK/LYM: 400

## 2024-06-04 ENCOUNTER — NURSE ONLY (OUTPATIENT)
Dept: HEMATOLOGY/ONCOLOGY | Age: 74
End: 2024-06-04
Attending: INTERNAL MEDICINE
Payer: MEDICARE

## 2024-06-04 ENCOUNTER — TELEPHONE (OUTPATIENT)
Dept: HEMATOLOGY/ONCOLOGY | Facility: HOSPITAL | Age: 74
End: 2024-06-04

## 2024-06-04 DIAGNOSIS — D61.818 PANCYTOPENIA (HCC): Primary | ICD-10-CM

## 2024-06-04 LAB
ANTIBODY SCREEN: NEGATIVE
RH BLOOD TYPE: NEGATIVE

## 2024-06-04 PROCEDURE — 86850 RBC ANTIBODY SCREEN: CPT

## 2024-06-04 PROCEDURE — 86900 BLOOD TYPING SEROLOGIC ABO: CPT

## 2024-06-04 PROCEDURE — 86901 BLOOD TYPING SEROLOGIC RH(D): CPT

## 2024-06-04 PROCEDURE — 36415 COLL VENOUS BLD VENIPUNCTURE: CPT

## 2024-06-04 NOTE — TELEPHONE ENCOUNTER
Dr. Alfaro's office calling to arrange 2 units PRBC for tomorrow. No availability at [facility].  Manuel at office to discuss with patient coming to  for TAX today and transfusion tomorrow.

## 2024-06-05 ENCOUNTER — OFFICE VISIT (OUTPATIENT)
Dept: HEMATOLOGY/ONCOLOGY | Age: 74
End: 2024-06-05
Attending: INTERNAL MEDICINE
Payer: MEDICARE

## 2024-06-05 VITALS
SYSTOLIC BLOOD PRESSURE: 113 MMHG | TEMPERATURE: 99 F | OXYGEN SATURATION: 100 % | DIASTOLIC BLOOD PRESSURE: 63 MMHG | HEART RATE: 58 BPM | RESPIRATION RATE: 16 BRPM

## 2024-06-05 DIAGNOSIS — D61.818 PANCYTOPENIA (HCC): Primary | ICD-10-CM

## 2024-06-05 PROCEDURE — 36430 TRANSFUSION BLD/BLD COMPNT: CPT

## 2024-06-05 NOTE — PATIENT INSTRUCTIONS
Post Transfusion Instructions for Out-Patients    Most recipients of blood transfusions do not experience any adverse effects.  You may resume your normal activities 4 to 6 hours after your blood transfusion.  Occasionally, reactions of blood transfusions may be delayed (for several weeks).    Symptoms of a transfusion reaction can include:    Faintness  Weakness  Nausea  Vomiting  Shortness of breath  Chest pain  Back pain  Hives  Rash  Itch  Flushing  Fever  Chills  Jaundice (yellowish tint to eyes/skin)  Dark or red urine    Though these symptoms may not be related to the blood transfusions, they should be reported to your physician.  Contact both your physician and the laboratory Blood Bank (see information below) so that your symptoms can be thoroughly evaluated.    Your physician's name: Dr Alfaro  Your physician's phone number: [phone]    If your physician is unavailable, contact the Emergency Department.    [facility] Blood Bank:  [phone]  [facility] Emergency Department:  [phone]    [facility] Blood Bank: [phone]  [facility] Emergency Department: [phone]

## 2024-06-05 NOTE — PROGRESS NOTES
Pt here for Blood transfusion. Pt denies any issues or concerns.      Ordering MD: Dominic  Order Exp: order good for today     Pt tolerated infusion without difficulty or complaint. Reviewed next appt date/time: N/A. Pt has appointment for his chemo injection @ [facility] this afternoon.      Education Record  Learner:  Patient  Disease / Diagnosis: Anemia, MDS  Barriers / Limitations:  None  Method:  Brief focused and Discussion  General Topics:  Side effects and symptom management, Plan of care reviewed, and Other:  Blood transfusion  Outcome:  Shows understanding    Consent signed. No premeds ordered. 2 units of PRBC transfused per MD order without incident. Printed AVS/pt instructions given. Discharged home in stable condition, no new complaints.

## 2024-06-06 LAB
BLOOD TYPE BARCODE: 9500
UNIT VOLUME: 279 ML

## 2024-06-18 ENCOUNTER — HOSPITAL ENCOUNTER (OUTPATIENT)
Facility: HOSPITAL | Age: 74
Setting detail: OBSERVATION
Discharge: HOME | End: 2024-06-19
Attending: EMERGENCY MEDICINE | Admitting: INTERNAL MEDICINE

## 2024-06-18 DIAGNOSIS — D64.9 SEVERE ANEMIA: ICD-10-CM

## 2024-06-18 DIAGNOSIS — D46.9 MDS (MYELODYSPLASTIC SYNDROME) (HCC): Primary | ICD-10-CM

## 2024-06-18 DIAGNOSIS — D69.6 SEVERE THROMBOCYTOPENIA (HCC): ICD-10-CM

## 2024-06-18 PROBLEM — E87.20 METABOLIC ACIDOSIS: Status: ACTIVE | Noted: 2024-06-18

## 2024-06-18 PROBLEM — R73.9 HYPERGLYCEMIA: Status: ACTIVE | Noted: 2024-06-18

## 2024-06-18 PROBLEM — R73.9 HYPERGLYCEMIA: Status: ACTIVE | Noted: 2024-01-01

## 2024-06-18 PROBLEM — E87.20 METABOLIC ACIDOSIS: Status: ACTIVE | Noted: 2024-01-01

## 2024-06-18 LAB
ALBUMIN SERPL-MCNC: 3.4 G/DL (ref 3.4–5)
ALBUMIN/GLOB SERPL: 1.2 {RATIO} (ref 1–2)
ALP LIVER SERPL-CCNC: 103 U/L
ALT SERPL-CCNC: 16 U/L
ANION GAP SERPL CALC-SCNC: 6 MMOL/L (ref 0–18)
ANTIBODY SCREEN: NEGATIVE
AST SERPL-CCNC: 8 U/L (ref 15–37)
BASOPHILS # BLD AUTO: 0.01 X10(3) UL (ref 0–0.2)
BASOPHILS NFR BLD AUTO: 1 %
BILIRUB SERPL-MCNC: 0.6 MG/DL (ref 0.1–2)
BUN BLD-MCNC: 33 MG/DL (ref 9–23)
CALCIUM BLD-MCNC: 9.4 MG/DL (ref 8.5–10.1)
CHLORIDE SERPL-SCNC: 115 MMOL/L (ref 98–112)
CO2 SERPL-SCNC: 18 MMOL/L (ref 21–32)
CREAT BLD-MCNC: 1.76 MG/DL
EGFRCR SERPLBLD CKD-EPI 2021: 40 ML/MIN/1.73M2 (ref 60–?)
EOSINOPHIL # BLD AUTO: 0.01 X10(3) UL (ref 0–0.7)
EOSINOPHIL NFR BLD AUTO: 1 %
ERYTHROCYTE [DISTWIDTH] IN BLOOD BY AUTOMATED COUNT: 18.7 %
GLOBULIN PLAS-MCNC: 2.9 G/DL (ref 2.8–4.4)
GLUCOSE BLD-MCNC: 115 MG/DL (ref 70–99)
HCT VFR BLD AUTO: 17.8 %
HGB BLD-MCNC: 5.8 G/DL
IMM GRANULOCYTES # BLD AUTO: 0 X10(3) UL (ref 0–1)
IMM GRANULOCYTES NFR BLD: 0 %
LYMPHOCYTES # BLD AUTO: 0.64 X10(3) UL (ref 1–4)
LYMPHOCYTES NFR BLD AUTO: 62.1 %
MCH RBC QN AUTO: 29.4 PG (ref 26–34)
MCHC RBC AUTO-ENTMCNC: 32.6 G/DL (ref 31–37)
MCV RBC AUTO: 90.4 FL
MONOCYTES # BLD AUTO: 0.07 X10(3) UL (ref 0.1–1)
MONOCYTES NFR BLD AUTO: 6.8 %
NEUTROPHILS # BLD AUTO: 0.3 X10 (3) UL (ref 1.5–7.7)
NEUTROPHILS # BLD AUTO: 0.3 X10(3) UL (ref 1.5–7.7)
NEUTROPHILS NFR BLD AUTO: 29.1 %
OSMOLALITY SERPL CALC.SUM OF ELEC: 296 MOSM/KG (ref 275–295)
PLATELET # BLD AUTO: 8 10(3)UL (ref 150–450)
PLATELET MORPHOLOGY: NORMAL
PLATELETS.RETICULATED NFR BLD AUTO: 45.5 % (ref 0–7)
POTASSIUM SERPL-SCNC: 4.7 MMOL/L (ref 3.5–5.1)
PROT SERPL-MCNC: 6.3 G/DL (ref 6.4–8.2)
RBC # BLD AUTO: 1.97 X10(6)UL
RH BLOOD TYPE: NEGATIVE
SODIUM SERPL-SCNC: 139 MMOL/L (ref 136–145)
WBC # BLD AUTO: 1 X10(3) UL (ref 4–11)

## 2024-06-18 PROCEDURE — 36430 TRANSFUSION BLD/BLD COMPNT: CPT

## 2024-06-18 PROCEDURE — 86900 BLOOD TYPING SEROLOGIC ABO: CPT | Performed by: EMERGENCY MEDICINE

## 2024-06-18 PROCEDURE — 36415 COLL VENOUS BLD VENIPUNCTURE: CPT

## 2024-06-18 PROCEDURE — 99285 EMERGENCY DEPT VISIT HI MDM: CPT

## 2024-06-18 PROCEDURE — 86901 BLOOD TYPING SEROLOGIC RH(D): CPT | Performed by: EMERGENCY MEDICINE

## 2024-06-18 PROCEDURE — 85025 COMPLETE CBC W/AUTO DIFF WBC: CPT | Performed by: EMERGENCY MEDICINE

## 2024-06-18 PROCEDURE — 86920 COMPATIBILITY TEST SPIN: CPT

## 2024-06-18 PROCEDURE — 86850 RBC ANTIBODY SCREEN: CPT | Performed by: EMERGENCY MEDICINE

## 2024-06-18 PROCEDURE — 80053 COMPREHEN METABOLIC PANEL: CPT | Performed by: EMERGENCY MEDICINE

## 2024-06-18 RX ORDER — SODIUM CHLORIDE 9 MG/ML
INJECTION, SOLUTION INTRAVENOUS ONCE
Status: COMPLETED | OUTPATIENT
Start: 2024-06-18 | End: 2024-06-19

## 2024-06-18 RX ORDER — ACETAMINOPHEN 500 MG
500 TABLET ORAL EVERY 4 HOURS PRN
Status: DISCONTINUED | OUTPATIENT
Start: 2024-06-18 | End: 2024-06-19

## 2024-06-18 RX ORDER — ALLOPURINOL 100 MG/1
100 TABLET ORAL NIGHTLY
Status: DISCONTINUED | OUTPATIENT
Start: 2024-06-18 | End: 2024-06-19

## 2024-06-18 RX ORDER — ERGOCALCIFEROL 1.25 MG/1
50000 CAPSULE ORAL WEEKLY
COMMUNITY
Start: 2024-06-12

## 2024-06-18 RX ORDER — ATORVASTATIN CALCIUM 10 MG/1
10 TABLET, FILM COATED ORAL
Status: DISCONTINUED | OUTPATIENT
Start: 2024-06-18 | End: 2024-06-19

## 2024-06-18 RX ORDER — METOPROLOL SUCCINATE 50 MG/1
50 TABLET, EXTENDED RELEASE ORAL
Status: DISCONTINUED | OUTPATIENT
Start: 2024-06-19 | End: 2024-06-19

## 2024-06-18 NOTE — H&P
[facility]   part of [facility]    History and Physical     Guy White Patient Status:  Observation    1950 MRN [id number]   Location [facility] 0SW-A Attending Fransisco Taylor MD   Hosp Day # 0 PCP Salvador Cohn MD     Chief Complaint:   Chief Complaint   Patient presents with    Abnormal Labs       History of Present Illness: Guy White is a 73 year old male with HTN/HL, CKD 3, MDS dependent on serial transfusions who presents to the ED after outpatient labs showed hemoglobin 5 and platelets 8. He denied complaints and is feeling well at baseline.  Is denied bleeding or any other abnormal symptoms.On arrival he was afebrile, hemodynamically stable saturating 100% on room air.  Labs notable for hemoglobin 5.8, platelets 8.  1 unit of packed RBCs and 1 unit of platelets was ordered.  Oncology was consulted and recommended patient be admitted for observation until cell lines normalized.    Following admission he denies any new or worsening symptoms & would like to go home ASAP    Past Medical History:  Past Medical History:    Atherosclerosis of abdominal aorta (HCC)    Cancer (HCC)    skin cancer-squamous cell    Dermatitis    Resolved per note 3/7/19     Essential hypertension    Gout    Gout, unspecified    Resolved as stable per note 3/7/19    Greater trochanteric bursitis, right    Resolved last addressed 14    High cholesterol    HTN (hypertension)    Hyperlipidemia    Lumbar radiculitis    Resolved per note 3/7/19    Osteoarthritis    right hip    Status post total replacement of right hip        Past Surgical History:   Past Surgical History:   Procedure Laterality Date    Colonoscopy      Colonoscopy N/A 2016    Procedure: COLONOSCOPY;  Surgeon: Elvis Warren MD;  Location:  ENDOSCOPY    Hip replacement surgery      Other      wisdom teeth extraction       Social History:  reports that he has quit smoking. His smoking use included cigarettes. He has a  35 pack-year smoking history. He has never used smokeless tobacco. He reports current alcohol use. He reports that he does not use drugs.    Family History:   Family History   Problem Relation Age of Onset    Cancer Other     Stroke Other     Stroke Mother        Allergies:   Allergies   Allergen Reactions    Radiology Contrast Iodinated Dyes HIVES       Medications:    No current facility-administered medications on file prior to encounter.     Current Outpatient Medications on File Prior to Encounter   Medication Sig Dispense Refill    ergocalciferol 1.25 MG (58417 UT) Oral Cap Take 1 capsule (50,000 Units total) by mouth once a week.      metoprolol succinate ER 50 MG Oral Tablet 24 Hr Take 1 tablet (50 mg total) by mouth daily.      atorvastatin 10 MG Oral Tab Take 1 tablet (10 mg total) by mouth After dinner. 90 tablet 3    allopurinol 100 MG Oral Tab Take 1 tablet (100 mg total) by mouth daily. (Patient taking differently: Take 1 tablet (100 mg total) by mouth at bedtime.) 90 tablet 3    Multiple Vitamin (MULTI VITAMIN MENS) Oral Tab Take 1 tablet by mouth daily. (Patient not taking: Reported on 6/18/2024)      Psyllium-Calcium (METAMUCIL PLUS CALCIUM) Oral Cap Take by mouth.         Review of Systems:   A comprehensive 14 point review of systems was completed.    Pertinent positives and negatives noted in the HPI.    Physical Exam:    /61 (BP Location: Right arm)   Pulse 63   Temp 97.9 °F (36.6 °C) (Oral)   Resp 20   Ht 6' (1.829 m)   Wt 170 lb (77.1 kg)   SpO2 97%   BMI 23.06 kg/m²     General: No acute distress. Comfortable, nontoxic   HEENT: Normocephalic atraumatic. Moist mucous membranes; Sclera anicteric.  Neck: Supple, no JVD  Respiratory: Clear to auscultation bilaterally. Reg resp rate & effort, no wheezes/crackles   Cardiovascular: S1, S2. Regular rate and rhythm. No murmurs appreciable   Chest and Back: No tenderness or deformity.  Abdomen: Soft, nontender, nondistended.  Positive  bowel sounds. No rebound, guarding or organomegaly.  Neurologic: No focal neurological deficits. CNII-XII grossly intact.  Musculoskeletal: Moves all extremities.  Extremities: No edema or cyanosis.  Integument: No rashes or lesions.   Psychiatric: Appropriate mood and affect.      Diagnostic Data:      Labs:  Recent Labs   Lab 06/18/24  1108   WBC 1.0*   HGB 5.8*   MCV 90.4   PLT 8.0*       Recent Labs   Lab 06/18/24  1108   *   BUN 33*   CREATSERUM 1.76*   CA 9.4   ALB 3.4      K 4.7   *   CO2 18.0*   ALKPHO 103   AST 8*   ALT 16   BILT 0.6   TP 6.3*       Estimated Creatinine Clearance: 40.8 mL/min (A) (based on SCr of 1.76 mg/dL (H)).    No results for input(s): \"PTP\", \"INR\" in the last 168 hours.    No results for input(s): \"TROP\", \"CK\" in the last 168 hours.    Imaging: Imaging data reviewed in Epic.      ASSESSMENT / PLAN:     Guy White Is a a 73 year old male who presents with acute on chronic pancytopenia    Problem List / Diagnoses    Acute on chronic pancytopenia  MDS    Plan    -Heme-onc consulted, will follow recommendations  - Transfuse to maintain platelets >20,000  - Transfuse to maintain goal hemoglobin >7   -1 unit platelets, 1 unit hemoglobin ordered in the ED; given severity of pancytopenia will order additional 1 unit PRBC and 1 unit platelets    DVT Ppx: SCDs; no heparin given thrombocytopenia  Code Status: Full-default    Dispo: Observation; YRIS 1 to 2 days pending improvement in cell lines, heme-onc evaluation and clearance    Plan of care discussed with patient and/or family at bedside.    SHWETA Ojeda MD  [facility]   [phone]

## 2024-06-18 NOTE — ED PROVIDER NOTES
Patient Seen in: [facility] Emergency Department      History     Chief Complaint   Patient presents with    Abnormal Labs     Stated Complaint: abnormal labs    Subjective:   HPI    Patient presents with abnormal labs.  He has a low hemoglobin and platelet count as of his lab work that was drawn yesterday.  He gets routine blood draws as he is getting treatment for MDS.  He has recently started treatment with [facility].  He states that he has had 2 other transfusions fairly recently but does not remember the exact dates.  He feels fatigued and gets out of breath with activity.  He does not have any bleeding.  He specifically denies any bloody or black stools.  He was called today to come in for transfusions.  He denies any fever or symptoms of infection.    Objective:   Past Medical History:    Atherosclerosis of abdominal aorta (HCC)    Cancer (HCC)    skin cancer-squamous cell    Dermatitis    Resolved per note 3/7/19     Essential hypertension    Gout    Gout, unspecified    Resolved as stable per note 3/7/19    Greater trochanteric bursitis, right    Resolved last addressed 9/4/14    High cholesterol    HTN (hypertension)    Hyperlipidemia    Lumbar radiculitis    Resolved per note 3/7/19    Osteoarthritis    right hip    Status post total replacement of right hip              Past Surgical History:   Procedure Laterality Date    Colonoscopy      Colonoscopy N/A 6/27/2016    Procedure: COLONOSCOPY;  Surgeon: Elvis Warren MD;  Location:  ENDOSCOPY    Hip replacement surgery      Other      wisdom teeth extraction                Social History     Socioeconomic History    Marital status:     Number of children: 1   Occupational History    Occupation: [employer]   Tobacco Use    Smoking status: Former     Current packs/day: 1.00     Average packs/day: 1 pack/day for 35.0 years (35.0 ttl pk-yrs)     Types: Cigarettes    Smokeless tobacco: Never   Vaping Use    Vaping status: Never Used   Substance  and Sexual Activity    Alcohol use: Yes     Alcohol/week: 0.0 standard drinks of alcohol     Comment: rarely    Drug use: No    Sexual activity: Yes     Partners: Female   Other Topics Concern     Service No    Sleep Concern No    Exercise Yes    Seat Belt Yes     Social Determinants of Health     Food Insecurity: No Food Insecurity (6/18/2024)    Food Insecurity     Food Insecurity: Never true   Transportation Needs: No Transportation Needs (6/18/2024)    Transportation Needs     Lack of Transportation: No   Housing Stability: Low Risk  (6/18/2024)    Housing Stability     Housing Instability: No              Review of Systems    Positive for stated complaint: abnormal labs  Other systems are as noted in HPI.  Constitutional and vital signs reviewed.      All other systems reviewed and negative except as noted above.    Physical Exam     ED Triage Vitals [06/18/24 1032]   /65   Pulse 71   Resp 16   Temp 97.6 °F (36.4 °C)   Temp src Temporal   SpO2 100 %   O2 Device None (Room air)       Current Vitals:   Vital Signs  BP: 130/55  Pulse: 57  Resp: 18  Temp: 97.8 °F (36.6 °C)  Temp src: Oral  MAP (mmHg): 77    Oxygen Therapy  SpO2: 100 %  O2 Device: None (Room air)  Pulse Oximetry Type: Continuous  Oximetry Probe Site Changed: No  Pulse Ox Probe Location: Left hand            Physical Exam    General: Alert and oriented x3 in no acute distress.  HEENT: Normocephalic, atraumatic, pupils equal round and reactive to light, oropharynx clear.  Neck: Supple.  Cardiovascular: Regular rate and rhythm, no murmurs.  Respiratory: Lungs clear to auscultation.  Extremities: No CCE.  Skin: Warm and dry.    ED Course     Labs Reviewed   COMP METABOLIC PANEL (14) - Abnormal; Notable for the following components:       Result Value    Glucose 115 (*)     Chloride 115 (*)     CO2 18.0 (*)     BUN 33 (*)     Creatinine 1.76 (*)     Calculated Osmolality 296 (*)     eGFR-Cr 40 (*)     AST 8 (*)     Total Protein 6.3 (*)      All other components within normal limits   RBC MORPHOLOGY SCAN - Abnormal; Notable for the following components:    RBC Morphology See morphology below (*)     Hypochromia 2+ (*)     All other components within normal limits   CBC W/ DIFFERENTIAL - Abnormal; Notable for the following components:    WBC 1.0 (*)     RBC 1.97 (*)     HGB 5.8 (*)     HCT 17.8 (*)     PLT 8.0 (*)     Immature Platelet Fraction 45.5 (*)     Neutrophil Absolute Prelim 0.30 (*)     Neutrophil Absolute 0.30 (*)     Lymphocyte Absolute 0.64 (*)     Monocyte Absolute 0.07 (*)     All other components within normal limits   CBC WITH DIFFERENTIAL WITH PLATELET    Narrative:     The following orders were created for panel order CBC With Differential With Platelet.  Procedure                               Abnormality         Status                     ---------                               -----------         ------                     CBC W/ DIFFERENTIAL[[id number]]          Abnormal            Final result                 Please view results for these tests on the individual orders.   SCAN SLIDE   TYPE AND SCREEN    Narrative:     The following orders were created for panel order Type and screen.  Procedure                               Abnormality         Status                     ---------                               -----------         ------                     ABORH (Blood Type)[[id number]]                               Final result               Antibody Screen[[id number]]                                  Final result                 Please view results for these tests on the individual orders.   ABORH (BLOOD TYPE)   ANTIBODY SCREEN   PREPARE RBC   PREPARE PLATELETS   PREPARE RBC   PREPARE PLATELETS   RAINBOW DRAW LAVENDER   RAINBOW DRAW LIGHT GREEN   RAINBOW DRAW BLUE           Patient was ordered for blood and platelet transfusions.         MDM      Patient presents with abnormal labs.  The patient has confirmed pancytopenia.  This  appears to be due to his MDS and he does not have any evidence of active bleeding.  I discussed his case with Dr. Zhao who was on-call for his oncologist.  He felt that the patient should be admitted.  The patient has reluctantly agreed to this.  He was ordered for blood and platelet transfusions.  He also has persistent metabolic acidosis and some worsening of his chronic renal insufficiency.  He is neutropenic but does not have a fever or symptoms of infection.  I discussed his case with Dr. Taylor from the [facility] service who has agreed to admit him.  Admission disposition: 6/18/2024 12:33 PM                                Medical Decision Making      Disposition and Plan     Clinical Impression:  1. MDS (myelodysplastic syndrome) (HCC)    2. Severe anemia    3. Severe thrombocytopenia (HCC)         Disposition:  Admit  6/18/2024 12:33 pm    Follow-up:  No follow-up provider specified.        Medications Prescribed:  Current Discharge Medication List                            Hospital Problems       Present on Admission  Date Reviewed: 2/8/2022            ICD-10-CM Noted POA    * (Principal) MDS (myelodysplastic syndrome) (HCC) D46.9 6/18/2024 Unknown    Hyperglycemia R73.9 6/18/2024 Yes    Metabolic acidosis E87.20 6/18/2024 Yes    Severe anemia D64.9 6/18/2024 Unknown    Severe thrombocytopenia (HCC) D69.6 6/18/2024 Unknown

## 2024-06-18 NOTE — PROGRESS NOTES
NURSING ADMISSION NOTE      Patient admitted via Ambulatory  Oriented to room.  Safety precautions initiated.  Bed in low position.  Call light in reach.    Admitted from ED.   Admission navigator completed by this RN.   Vitals obtained.  Assessment complete, see flowsheets.   A&ox4.   Room air, .  Regular diet.   Continent, BRP.  Ambulatory, independent after set up.       Platelet transfusion completed shortly after arriving to the unit. 1 UPRBC infusing now. Per MD, one more unit of each blood product. No recheck in between units of blood.

## 2024-06-18 NOTE — ED QUICK NOTES
Patient reports he was scheduled to get his blood drawn today, noted to have low hgb and platelets, sent to the ER to get prbc and platelets.  Patient denies complaints at this time, but reports he does get short of breath with too much activity and is slightly weak

## 2024-06-18 NOTE — ED INITIAL ASSESSMENT (HPI)
Pt had blood work done this morning and was sent here by oncology NP. Pt has hx of bone marrow cancer. Pt's hemoglobin is 5.8 and platelets are 9. Pt reports dyspnea on exertion but no dizziness. Pt denies headache, fever, cough.

## 2024-06-18 NOTE — ED QUICK NOTES
Orders for admission, patient is aware of plan and ready to go upstairs. Any questions, please call ED RN Tarah at extension [phone].     Patient Covid vaccination status: Fully vaccinated     COVID Test Ordered in ED: None    COVID Suspicion at Admission: N/A    Running Infusions:  Platelets at this time.     Mental Status/LOC at time of transport: A/Ox4    Other pertinent information:   CIWA score: N/A   NIH score:  N/A

## 2024-06-18 NOTE — ED QUICK NOTES
Rounding Completed, patient continues to feel well.      Plan of Care reviewed. Waiting for clean, inpatient bed..  Provided pretzels.    Bed is locked and in lowest position. Call light within reach.

## 2024-06-18 NOTE — ED QUICK NOTES
Patient to floor with RN and tech, remains on the monitor,  platelets continue to transfuse, patient denies complaints.  PRBC will be administered on the floor per ER MD.

## 2024-06-19 VITALS
TEMPERATURE: 99 F | BODY MASS INDEX: 23.03 KG/M2 | HEART RATE: 55 BPM | HEIGHT: 72 IN | SYSTOLIC BLOOD PRESSURE: 120 MMHG | RESPIRATION RATE: 18 BRPM | OXYGEN SATURATION: 98 % | DIASTOLIC BLOOD PRESSURE: 50 MMHG | WEIGHT: 170 LBS

## 2024-06-19 LAB
ANION GAP SERPL CALC-SCNC: 6 MMOL/L (ref 0–18)
BASOPHILS # BLD AUTO: 0.02 X10(3) UL (ref 0–0.2)
BASOPHILS NFR BLD AUTO: 1.9 %
BLOOD TYPE BARCODE: 9500
BLOOD TYPE BARCODE: 9500
BUN BLD-MCNC: 32 MG/DL (ref 9–23)
CALCIUM BLD-MCNC: 8.7 MG/DL (ref 8.5–10.1)
CHLORIDE SERPL-SCNC: 115 MMOL/L (ref 98–112)
CO2 SERPL-SCNC: 20 MMOL/L (ref 21–32)
CREAT BLD-MCNC: 1.34 MG/DL
EGFRCR SERPLBLD CKD-EPI 2021: 56 ML/MIN/1.73M2 (ref 60–?)
EOSINOPHIL # BLD AUTO: 0.05 X10(3) UL (ref 0–0.7)
EOSINOPHIL NFR BLD AUTO: 4.7 %
ERYTHROCYTE [DISTWIDTH] IN BLOOD BY AUTOMATED COUNT: 17.1 %
GLUCOSE BLD-MCNC: 84 MG/DL (ref 70–99)
HCT VFR BLD AUTO: 20.2 %
HGB BLD-MCNC: 7.1 G/DL
IMM GRANULOCYTES # BLD AUTO: 0.01 X10(3) UL (ref 0–1)
IMM GRANULOCYTES NFR BLD: 0.9 %
LYMPHOCYTES # BLD AUTO: 0.82 X10(3) UL (ref 1–4)
LYMPHOCYTES NFR BLD AUTO: 76.6 %
MCH RBC QN AUTO: 30.1 PG (ref 26–34)
MCHC RBC AUTO-ENTMCNC: 35.1 G/DL (ref 31–37)
MCV RBC AUTO: 85.6 FL
MONOCYTES # BLD AUTO: 0.02 X10(3) UL (ref 0.1–1)
MONOCYTES NFR BLD AUTO: 1.9 %
NEUTROPHILS # BLD AUTO: 0.15 X10 (3) UL (ref 1.5–7.7)
NEUTROPHILS # BLD AUTO: 0.15 X10(3) UL (ref 1.5–7.7)
NEUTROPHILS NFR BLD AUTO: 14 %
OSMOLALITY SERPL CALC.SUM OF ELEC: 298 MOSM/KG (ref 275–295)
PLATELET # BLD AUTO: 58 10(3)UL (ref 150–450)
PLATELETS.RETICULATED NFR BLD AUTO: 6.3 % (ref 0–7)
POTASSIUM SERPL-SCNC: 4.5 MMOL/L (ref 3.5–5.1)
RBC # BLD AUTO: 2.36 X10(6)UL
SODIUM SERPL-SCNC: 141 MMOL/L (ref 136–145)
UNIT VOLUME: 350 ML
WBC # BLD AUTO: 1.1 X10(3) UL (ref 4–11)

## 2024-06-19 PROCEDURE — 80048 BASIC METABOLIC PNL TOTAL CA: CPT | Performed by: HOSPITALIST

## 2024-06-19 PROCEDURE — 85025 COMPLETE CBC W/AUTO DIFF WBC: CPT | Performed by: HOSPITALIST

## 2024-06-19 PROCEDURE — 36430 TRANSFUSION BLD/BLD COMPNT: CPT

## 2024-06-19 RX ORDER — SODIUM CHLORIDE 9 MG/ML
INJECTION, SOLUTION INTRAVENOUS ONCE
Status: COMPLETED | OUTPATIENT
Start: 2024-06-19 | End: 2024-06-19

## 2024-06-19 NOTE — DISCHARGE SUMMARY
General Medicine Discharge Summary     Patient ID:  Guy White  73 year old  12/26/1950    Admit date: 6/18/2024    Discharge date and time: 6/19/2024     Attending Physician: No att. providers found     Primary Care Physician: Salvador Cohn MD     Discharge Diagnoses: MDS (myelodysplastic syndrome) (HCC) [D46.9]  Severe anemia [D64.9]  Severe thrombocytopenia (HCC) [D69.6]    Primary Diagnosis at discharge from Hospital: Other: MDS; no TCM follow-up needed    Please note that only [facility] DMG and EMG patients enrolled in the Medicare ACO, BCBS ACO and [facility] HMOs will be handled by the [facility] Care Management team.  For all other patients, please follow usual protocol for discharge care transition.    Secondary Diagnoses:    Acute on Chronic Pancytopenia  MDS    Risk of readmission: Guy White has Moderate Risk of readmission after discharge from the hospital.    Discharge Condition: stable    Disposition: home      Important Follow up:  - PCP within   - Consults:     Salvador Cohn MD  [address]  [address]  [facility] [address] [phone]    Schedule an appointment as soon as possible for a visit in 3 day(s)  A [facility] Coordinator will contact you to arrange follow up visit    Víctor Alfaro MD  [address]  [address]  [facility] [address] [phone]    Schedule an appointment as soon as possible for a visit in 1 week(s)      - Labs: n/a  - Radiology: n/a    Hospital Course:      For further details, please see daily progress notes. In brief,      Guy White Is a a 73 year old male who presents with acute on chronic pancytopenia     Problem List / Diagnoses     Acute on chronic pancytopenia  MDS     Plan     -Heme-onc consulted, plan discussed, cleared for discharge once Hgb > 7 and Plt > 20   - Transfuse to maintain platelets >20,000  - Transfuse to maintain goal hemoglobin >7   - s/p 2 units pRBC, 2 units plt with improvement  in Hgb to 7.1 and Plt 58   -- given history of transfusion dependence, borderline Hgb, will transfuse add'l 1u pRBC prior to discharge home       Consults: IP CONSULT TO ONCOLOGY    Operative Procedures:        Patient instructions:      All medications personally reviewed and reconciled on day of discharge.     Discharge Medication List as of 6/19/2024 10:41 AM        CONTINUE these medications which have NOT CHANGED    Details   ergocalciferol 1.25 MG (30591 UT) Oral Cap Take 1 capsule (50,000 Units total) by mouth once a week., Historical      metoprolol succinate ER 50 MG Oral Tablet 24 Hr Take 1 tablet (50 mg total) by mouth daily., Historical      atorvastatin 10 MG Oral Tab Take 1 tablet (10 mg total) by mouth After dinner., Normal, Disp-90 tablet, R-3      allopurinol 100 MG Oral Tab Take 1 tablet (100 mg total) by mouth daily., Normal, Disp-90 tablet, R-3      Multiple Vitamin (MULTI VITAMIN MENS) Oral Tab Take 1 tablet by mouth daily., Historical      Psyllium-Calcium (METAMUCIL PLUS CALCIUM) Oral Cap Take by mouth., Historical             Activity: activity as tolerated  Diet: regular diet  Wound Care: as directed  Code Status: Prior      Exam on day of discharge:     Vitals:    06/19/24 1032   BP:    Pulse:    Resp: 18   Temp: 98.8 °F (37.1 °C)       General: nad, comfortable, nontoxic   Heart: RRR, no murmurs appreciated   Lungs: clear bilaterally, reg resp rate & effort, no wheezes/crackles   Abdomen: soft, ntnd, no guarding/rebound   Extremities: WWP, no JOJO   Neuro: CN inact, no focal deficits      Total time coordinating care for discharge: 35 minutes    SHWETA Ojeda MD  [facility]  [phone]

## 2024-06-19 NOTE — PLAN OF CARE
Assumed care of 74 y/o male @1930  A/Ox4, RA-  NSR-Tele  Regular diet  Voids, up SBA  Denies pain  LAC PIV-SL  1 unit of RBC's transfused   Recheck @01:18 was 7.1  Safety precautions maintained and needs met

## 2024-06-19 NOTE — CONSULTS
[facility] HEMATOLOGY ONCOLOGY  Report of Consultation    Gyu White Patient Status:  Observation    1950 MRN [id number]   Location [facility] 0SW-A Attending Hunter Ojeda MD   Hosp Day # 0 PCP Salvador Cohn MD     ADMIT DATE AND TIME: 2024 10:36 AM    ADMIT DIAGNOSIS: MDS (myelodysplastic syndrome) (HCC) [D46.9]  Severe anemia [D64.9]  Severe thrombocytopenia (HCC) [D69.6]          REASON FOR CONSULTATION:     MDS  Pancytopenia      HISTORY OF PRESENTING ILLNESS     Guy White is a 73 year old male with new diagnosis of myelodysplastic syndrome with excess blast.  Started on Vidaza completed treatment on Friday.    Admitted by ER for pancytopenia requiring transfusion  No bleeding history  Review of system was positive for fatigue    Since admission receive transfusions follow packed cells and platelets   He feels better this morning with more energy          PERTINENT HISTORY:     History:  Past Medical History:    Atherosclerosis of abdominal aorta (HCC)    Cancer (HCC)    skin cancer-squamous cell    Dermatitis    Resolved per note 3/7/19     Essential hypertension    Gout    Gout, unspecified    Resolved as stable per note 3/7/19    Greater trochanteric bursitis, right    Resolved last addressed 14    High cholesterol    HTN (hypertension)    Hyperlipidemia    Lumbar radiculitis    Resolved per note 3/7/19    Osteoarthritis    right hip    Status post total replacement of right hip     Past Surgical History:   Procedure Laterality Date    Colonoscopy      Colonoscopy N/A 2016    Procedure: COLONOSCOPY;  Surgeon: Elvis Warren MD;  Location:  ENDOSCOPY    Hip replacement surgery      Other      wisdom teeth extraction     Family History   Problem Relation Age of Onset    Cancer Other     Stroke Other     Stroke Mother        SOCIAL HX   reports that he has quit smoking. His smoking use included cigarettes. He has a 35 pack-year smoking history.  He has never used smokeless tobacco. He reports current alcohol use. He reports that he does not use drugs.    Allergies:  Allergies   Allergen Reactions    Radiology Contrast Iodinated Dyes HIVES       Medications:    Pre-Admission Meds:  Current Outpatient Medications   Medication Instructions    allopurinol (ZYLOPRIM) 100 mg, Oral, Daily    atorvastatin (LIPITOR) 10 mg, Oral, After dinner    ergocalciferol (VITAMIN D2) 50,000 Units, Oral, Weekly    metoprolol succinate ER (TOPROL XL) 50 mg, Oral, Daily    Multiple Vitamin (MULTI VITAMIN MENS) Oral Tab 1 tablet, Daily    Psyllium-Calcium (METAMUCIL PLUS CALCIUM) Oral Cap Oral       Current Inpatient Meds:   allopurinol  100 mg Oral Nightly    atorvastatin  10 mg Oral After dinner    metoprolol succinate ER  50 mg Oral Daily Beta Blocker       Infusion      PRN    acetaminophen    traZODone    Review of Systems:  A 12-point review of systems was done with pertinent positives and negatives per the HPI.       Physical Exam:   Blood pressure 123/57, pulse 59, temperature 98.3 °F (36.8 °C), temperature source Oral, resp. rate 18, height 6' (1.829 m), weight 170 lb (77.1 kg), SpO2 96%.    Performance Status: 2   General: Patient is alert and oriented x 3, not in acute distress.   HEENT: No Icterus. Oropharynx is moist   Neck:  No palpable lymphadenopathy. Neck is supple.   Chest: Clear to auscultation.   Heart: Regular rate and rhythm.    Abdomen: Soft, non tender   Extremities: Pedal pulses are present. No edema.   Neurological: Grossly intact.    Lymphatics: There is no palpable lymphadenopathy           DIAGNOSTIC WORK UP:     Laboratory Data:      Recent Results (from the past 24 hour(s))   Comp Metabolic Panel (14)    Collection Time: 06/18/24 11:08 AM   Result Value Ref Range    Glucose 115 (H) 70 - 99 mg/dL    Sodium 139 136 - 145 mmol/L    Potassium 4.7 3.5 - 5.1 mmol/L    Chloride 115 (H) 98 - 112 mmol/L    CO2 18.0 (L) 21.0 - 32.0 mmol/L    Anion Gap 6 0 - 18  mmol/L    BUN 33 (H) 9 - 23 mg/dL    Creatinine 1.76 (H) 0.70 - 1.30 mg/dL    Calcium, Total 9.4 8.5 - 10.1 mg/dL    Calculated Osmolality 296 (H) 275 - 295 mOsm/kg    eGFR-Cr 40 (L) >=60 mL/min/1.73m2    AST 8 (L) 15 - 37 U/L    ALT 16 16 - 61 U/L    Alkaline Phosphatase 103 45 - 117 U/L    Bilirubin, Total 0.6 0.1 - 2.0 mg/dL    Total Protein 6.3 (L) 6.4 - 8.2 g/dL    Albumin 3.4 3.4 - 5.0 g/dL    Globulin  2.9 2.8 - 4.4 g/dL    A/G Ratio 1.2 1.0 - 2.0   RAINBOW DRAW LAVENDER    Collection Time: 06/18/24 11:08 AM   Result Value Ref Range    Hold Lavender Auto Resulted    RAINBOW DRAW LIGHT GREEN    Collection Time: 06/18/24 11:08 AM   Result Value Ref Range    Hold Lt Green Auto Resulted    RAINBOW DRAW BLUE    Collection Time: 06/18/24 11:08 AM   Result Value Ref Range    Hold Blue Auto Resulted    CBC W/ DIFFERENTIAL    Collection Time: 06/18/24 11:08 AM   Result Value Ref Range    WBC 1.0 (L) 4.0 - 11.0 x10(3) uL    RBC 1.97 (L) 3.80 - 5.80 x10(6)uL    HGB 5.8 (LL) 13.0 - 17.5 g/dL    HCT 17.8 (L) 39.0 - 53.0 %    PLT 8.0 (LL) 150.0 - 450.0 10(3)uL    Immature Platelet Fraction 45.5 (H) 0.0 - 7.0 %    MCV 90.4 80.0 - 100.0 fL    MCH 29.4 26.0 - 34.0 pg    MCHC 32.6 31.0 - 37.0 g/dL    RDW 18.7 %    Neutrophil Absolute Prelim 0.30 (LL) 1.50 - 7.70 x10 (3) uL    Neutrophil Absolute 0.30 (LL) 1.50 - 7.70 x10(3) uL    Lymphocyte Absolute 0.64 (L) 1.00 - 4.00 x10(3) uL    Monocyte Absolute 0.07 (L) 0.10 - 1.00 x10(3) uL    Eosinophil Absolute 0.01 0.00 - 0.70 x10(3) uL    Basophil Absolute 0.01 0.00 - 0.20 x10(3) uL    Immature Granulocyte Absolute 0.00 0.00 - 1.00 x10(3) uL    Neutrophil % 29.1 %    Lymphocyte % 62.1 %    Monocyte % 6.8 %    Eosinophil % 1.0 %    Basophil % 1.0 %    Immature Granulocyte % 0.0 %   ABORH (Blood Type)    Collection Time: 06/18/24 11:08 AM   Result Value Ref Range    ABO BLOOD TYPE O     RH BLOOD TYPE Negative    Antibody Screen    Collection Time: 06/18/24 11:08 AM   Result Value Ref  Range    Antibody Screen Negative    Scan slide    Collection Time: 06/18/24 11:08 AM   Result Value Ref Range    Slide Review Slide reviewed,morphology review added    RBC Morphology Scan    Collection Time: 06/18/24 11:08 AM   Result Value Ref Range    RBC Morphology See morphology below (A) Normal, Slide reviewed, see previous RBC morphology.    Platelet Morphology Normal Normal    Hypochromia 2+ (A)      Microcytosis 1+     Prepare platelets Once    Collection Time: 06/18/24  6:02 PM   Result Value Ref Range    Blood Product [id number]     Unit Number [id number]     UNIT ABO O     UNIT RH POS     Product Status Issued     Expiration Date 772613444932     Blood Type Barcode 5100     Unit Volume 198 ml   Prepare RBC Once    Collection Time: 06/18/24  8:30 PM   Result Value Ref Range    Blood Product [id number]     Unit Number [id number]     UNIT ABO O     UNIT RH NEG     Product Status Issued     Expiration Date 740330722539     Blood Type Barcode 9500     Unit Volume 350 ml   Prepare RBC Once    Collection Time: 06/19/24 12:30 AM   Result Value Ref Range    Blood Product [id number]     Unit Number [id number]     UNIT ABO O     UNIT RH NEG     Product Status Presumed Transfused     Expiration Date 666515226915     Blood Type Barcode 9500     Unit Volume 350 ml   Prepare platelets Once    Collection Time: 06/19/24 12:30 AM   Result Value Ref Range    Blood Product [id number]     Unit Number [id number]     UNIT ABO O     UNIT RH POS     Product Status Presumed Transfused     Expiration Date 243584605502     Blood Type Barcode 9500    Basic Metabolic Panel (8)    Collection Time: 06/19/24  1:18 AM   Result Value Ref Range    Glucose 84 70 - 99 mg/dL    Sodium 141 136 - 145 mmol/L    Potassium 4.5 3.5 - 5.1 mmol/L    Chloride 115 (H) 98 - 112 mmol/L    CO2 20.0 (L) 21.0 - 32.0 mmol/L    Anion Gap 6 0 - 18 mmol/L    BUN 32 (H) 9 - 23 mg/dL    Creatinine 1.34 (H) 0.70 - 1.30 mg/dL    Calcium, Total 8.7 8.5 -  10.1 mg/dL    Calculated Osmolality 298 (H) 275 - 295 mOsm/kg    eGFR-Cr 56 (L) >=60 mL/min/1.73m2   CBC W/ DIFFERENTIAL    Collection Time: 06/19/24  1:18 AM   Result Value Ref Range    WBC 1.1 (L) 4.0 - 11.0 x10(3) uL    RBC 2.36 (L) 3.80 - 5.80 x10(6)uL    HGB 7.1 (L) 13.0 - 17.5 g/dL    HCT 20.2 (L) 39.0 - 53.0 %    PLT 58.0 (L) 150.0 - 450.0 10(3)uL    Immature Platelet Fraction 6.3 0.0 - 7.0 %    MCV 85.6 80.0 - 100.0 fL    MCH 30.1 26.0 - 34.0 pg    MCHC 35.1 31.0 - 37.0 g/dL    RDW 17.1 %    Neutrophil Absolute Prelim 0.15 (LL) 1.50 - 7.70 x10 (3) uL    Neutrophil Absolute 0.15 (LL) 1.50 - 7.70 x10(3) uL    Lymphocyte Absolute 0.82 (L) 1.00 - 4.00 x10(3) uL    Monocyte Absolute 0.02 (L) 0.10 - 1.00 x10(3) uL    Eosinophil Absolute 0.05 0.00 - 0.70 x10(3) uL    Basophil Absolute 0.02 0.00 - 0.20 x10(3) uL    Immature Granulocyte Absolute 0.01 0.00 - 1.00 x10(3) uL    Neutrophil % 14.0 %    Lymphocyte % 76.6 %    Monocyte % 1.9 %    Eosinophil % 4.7 %    Basophil % 1.9 %    Immature Granulocyte % 0.9 %   Scan slide    Collection Time: 06/19/24  1:18 AM   Result Value Ref Range    Slide Review Slide reviewed, see previous RBC morphology.        Recent Labs   Lab 06/18/24  1108 06/19/24  0118   RBC 1.97* 2.36*   HGB 5.8* 7.1*   HCT 17.8* 20.2*   MCV 90.4 85.6   MCH 29.4 30.1   MCHC 32.6 35.1   RDW 18.7 17.1   NEPRELIM 0.30* 0.15*   WBC 1.0* 1.1*   PLT 8.0* 58.0*       CULTURE RESULTS  No results found for this visit on 06/18/24.      Imaging:            PROBLEM LIST:     Principal Problem:    MDS (myelodysplastic syndrome) (HCC)  Active Problems:    Metabolic acidosis    Hyperglycemia    Severe anemia    Severe thrombocytopenia (HCC)          ASSESSMENT AND PLAN:     Guy White is a 73 year old male with new diagnosis of MDS-refractory anemia with excess blast.    Patient of Dr. Alfaro started on Vidaza.  Cycle 1 completed last week.  Admitted for pancytopenia requiring transfusions  Hemoglobin was 5.8 and  platelets were 8000  Received 1 unit of platelets and they improved to 58  S/p PRBC and hemoglobin is 7.1    Clinically improved    Plan  Discharge to home today  Follow-up with Dr. Alfaro next week        Thank you for allowing me to participate in the care of your patient.    Vel Zhao MD      This note was prepared using Dragon [employer] voice recognition dictation software. As a result errors may occur. When identified these errors have been corrected. While every attempt is made to correct errors during dictation discrepancies may still exist. Please call me to clarify any errors.

## 2024-06-19 NOTE — PROGRESS NOTES
Discharge education provided and all questions answered. 1 more unit of prbc given on shift. Tolerated transfusion well with no adverse effects Updated on plan of care and condition update as well as follow up appointments. Verbalized understanding. PIV removed. Taken at time of discharge by wheelchair to lobby.

## 2024-06-20 LAB
BLOOD TYPE BARCODE: 5100
BLOOD TYPE BARCODE: 9500
BLOOD TYPE BARCODE: 9500
UNIT VOLUME: 198 ML
UNIT VOLUME: 350 ML
UNIT VOLUME: 350 ML

## 2024-07-05 ENCOUNTER — OFFICE VISIT (OUTPATIENT)
Dept: HEMATOLOGY/ONCOLOGY | Age: 74
End: 2024-07-05
Attending: INTERNAL MEDICINE
Payer: MEDICARE

## 2024-07-05 VITALS
TEMPERATURE: 97 F | HEART RATE: 53 BPM | DIASTOLIC BLOOD PRESSURE: 73 MMHG | RESPIRATION RATE: 18 BRPM | SYSTOLIC BLOOD PRESSURE: 147 MMHG | OXYGEN SATURATION: 100 %

## 2024-07-05 DIAGNOSIS — D61.818 PANCYTOPENIA (HCC): Primary | ICD-10-CM

## 2024-07-05 DIAGNOSIS — D46.9 MDS (MYELODYSPLASTIC SYNDROME) (HCC): ICD-10-CM

## 2024-07-05 LAB
ANTIBODY SCREEN: NEGATIVE
RH BLOOD TYPE: NEGATIVE

## 2024-07-05 PROCEDURE — 36430 TRANSFUSION BLD/BLD COMPNT: CPT

## 2024-07-05 PROCEDURE — 86901 BLOOD TYPING SEROLOGIC RH(D): CPT

## 2024-07-05 PROCEDURE — 86900 BLOOD TYPING SEROLOGIC ABO: CPT

## 2024-07-05 PROCEDURE — 86850 RBC ANTIBODY SCREEN: CPT

## 2024-07-05 NOTE — PROGRESS NOTES
Education Record    Learner:  Patient    Disease / Diagnosis: here for PRBC    Barriers / Limitations:  None    Method:  Brief focused, printed material and  reinforcement    General Topics:  Plan of care reviewed    Outcome: patient ambulatory. States he does notice he gets more short of breath with exertion. No other symptoms. Tolerated 1 unit PRBC. VSS.  Shows understanding. Discharged in stable condition.

## 2024-07-06 LAB
BLOOD TYPE BARCODE: 9500
UNIT VOLUME: 350 ML

## 2024-07-17 ENCOUNTER — NURSE ONLY (OUTPATIENT)
Dept: HEMATOLOGY/ONCOLOGY | Facility: HOSPITAL | Age: 74
End: 2024-07-17
Attending: INTERNAL MEDICINE
Payer: MEDICARE

## 2024-07-17 DIAGNOSIS — D46.9 MDS (MYELODYSPLASTIC SYNDROME) (HCC): Primary | ICD-10-CM

## 2024-07-17 DIAGNOSIS — D61.818 PANCYTOPENIA (HCC): ICD-10-CM

## 2024-07-17 LAB
ANTIBODY SCREEN: NEGATIVE
RH BLOOD TYPE: NEGATIVE

## 2024-07-17 PROCEDURE — 36415 COLL VENOUS BLD VENIPUNCTURE: CPT

## 2024-07-17 PROCEDURE — 86900 BLOOD TYPING SEROLOGIC ABO: CPT

## 2024-07-17 PROCEDURE — 86901 BLOOD TYPING SEROLOGIC RH(D): CPT

## 2024-07-17 PROCEDURE — 86920 COMPATIBILITY TEST SPIN: CPT

## 2024-07-17 PROCEDURE — 86850 RBC ANTIBODY SCREEN: CPT

## 2024-07-18 ENCOUNTER — OFFICE VISIT (OUTPATIENT)
Dept: HEMATOLOGY/ONCOLOGY | Facility: HOSPITAL | Age: 74
End: 2024-07-18
Attending: INTERNAL MEDICINE
Payer: MEDICARE

## 2024-07-18 VITALS
TEMPERATURE: 98 F | HEART RATE: 56 BPM | RESPIRATION RATE: 16 BRPM | SYSTOLIC BLOOD PRESSURE: 130 MMHG | DIASTOLIC BLOOD PRESSURE: 65 MMHG | BODY MASS INDEX: 24 KG/M2 | OXYGEN SATURATION: 100 % | WEIGHT: 173.5 LBS

## 2024-07-18 DIAGNOSIS — D61.818 PANCYTOPENIA (HCC): ICD-10-CM

## 2024-07-18 DIAGNOSIS — D46.9 MDS (MYELODYSPLASTIC SYNDROME) (HCC): Primary | ICD-10-CM

## 2024-07-18 PROCEDURE — 36430 TRANSFUSION BLD/BLD COMPNT: CPT

## 2024-07-18 NOTE — PATIENT INSTRUCTIONS
Post Transfusion Instructions for Out-Patients    Most recipients of blood transfusions do not experience any adverse effects.  You may resume your normal activities 4 to 6 hours after your blood transfusion.  Occasionally, reactions of blood transfusions may be delayed (for several weeks).    Symptoms of a transfusion reaction can include:    Faintness  Weakness  Nausea  Vomiting  Shortness of breath  Chest pain  Back pain  Hives  Rash  Itch  Flushing  Fever  Chills  Jaundice (yellowish tint to eyes/skin)  Dark or red urine    Though these symptoms may not be related to the blood transfusions, they should be reported to your physician.  Contact both your physician and the laboratory Blood Bank (see information below) so that your symptoms can be thoroughly evaluated.    Your physician's name: Dr Víctor Alfaro   Your physician's phone number: [phone]    If your physician is unavailable, contact the Emergency Department.    [facility] Blood Bank:  [phone]  [facility] Emergency Department:  [phone]    [facility] Blood Bank: [phone]  [facility] Emergency Department: [phone]

## 2024-07-19 LAB
BLOOD TYPE BARCODE: 9500
UNIT VOLUME: 350 ML

## 2024-07-28 ENCOUNTER — HOSPITAL ENCOUNTER (EMERGENCY)
Facility: HOSPITAL | Age: 74
Discharge: HOME | End: 2024-07-28
Attending: EMERGENCY MEDICINE
Payer: MEDICARE

## 2024-07-28 ENCOUNTER — APPOINTMENT (OUTPATIENT)
Dept: GENERAL RADIOLOGY | Facility: HOSPITAL | Age: 74
End: 2024-07-28
Payer: MEDICARE

## 2024-07-28 VITALS
WEIGHT: 175 LBS | TEMPERATURE: 98 F | DIASTOLIC BLOOD PRESSURE: 55 MMHG | BODY MASS INDEX: 23.7 KG/M2 | RESPIRATION RATE: 24 BRPM | SYSTOLIC BLOOD PRESSURE: 121 MMHG | HEART RATE: 60 BPM | OXYGEN SATURATION: 99 % | HEIGHT: 72 IN

## 2024-07-28 DIAGNOSIS — R07.81 PLEURITIC CHEST PAIN: Primary | ICD-10-CM

## 2024-07-28 DIAGNOSIS — D64.9 ACUTE ON CHRONIC ANEMIA: ICD-10-CM

## 2024-07-28 DIAGNOSIS — D46.9 MDS (MYELODYSPLASTIC SYNDROME) (HCC): ICD-10-CM

## 2024-07-28 LAB
ALBUMIN SERPL-MCNC: 4.3 G/DL (ref 3.2–4.8)
ALBUMIN/GLOB SERPL: 1.7 {RATIO} (ref 1–2)
ALP LIVER SERPL-CCNC: 113 U/L
ALT SERPL-CCNC: 10 U/L
ANION GAP SERPL CALC-SCNC: 7 MMOL/L (ref 0–18)
ANTIBODY SCREEN: NEGATIVE
AST SERPL-CCNC: 12 U/L (ref ?–34)
ATRIAL RATE: 64 BPM
BASOPHILS # BLD AUTO: 0.02 X10(3) UL (ref 0–0.2)
BASOPHILS NFR BLD AUTO: 1.2 %
BILIRUB SERPL-MCNC: 1.2 MG/DL (ref 0.2–1.1)
BUN BLD-MCNC: 24 MG/DL (ref 9–23)
CALCIUM BLD-MCNC: 9.6 MG/DL (ref 8.7–10.4)
CHLORIDE SERPL-SCNC: 106 MMOL/L (ref 98–112)
CO2 SERPL-SCNC: 22 MMOL/L (ref 21–32)
CREAT BLD-MCNC: 1.64 MG/DL
EGFRCR SERPLBLD CKD-EPI 2021: 44 ML/MIN/1.73M2 (ref 60–?)
EOSINOPHIL # BLD AUTO: 0 X10(3) UL (ref 0–0.7)
EOSINOPHIL NFR BLD AUTO: 0 %
ERYTHROCYTE [DISTWIDTH] IN BLOOD BY AUTOMATED COUNT: 15.2 %
GLOBULIN PLAS-MCNC: 2.5 G/DL (ref 2–3.5)
GLUCOSE BLD-MCNC: 110 MG/DL (ref 70–99)
HCT VFR BLD AUTO: 20.9 %
HGB BLD-MCNC: 6.7 G/DL
IMM GRANULOCYTES # BLD AUTO: 0.01 X10(3) UL (ref 0–1)
IMM GRANULOCYTES NFR BLD: 0.6 %
LYMPHOCYTES # BLD AUTO: 0.48 X10(3) UL (ref 1–4)
LYMPHOCYTES NFR BLD AUTO: 27.9 %
MCH RBC QN AUTO: 28.9 PG (ref 26–34)
MCHC RBC AUTO-ENTMCNC: 32.1 G/DL (ref 31–37)
MCV RBC AUTO: 90.1 FL
MONOCYTES # BLD AUTO: 0.16 X10(3) UL (ref 0.1–1)
MONOCYTES NFR BLD AUTO: 9.3 %
NEUTROPHILS # BLD AUTO: 1.05 X10 (3) UL (ref 1.5–7.7)
NEUTROPHILS # BLD AUTO: 1.05 X10(3) UL (ref 1.5–7.7)
NEUTROPHILS NFR BLD AUTO: 61 %
OSMOLALITY SERPL CALC.SUM OF ELEC: 285 MOSM/KG (ref 275–295)
P AXIS: 49 DEGREES
P-R INTERVAL: 124 MS
PLATELET # BLD AUTO: 28 10(3)UL (ref 150–450)
PLATELET MORPHOLOGY: NORMAL
PLATELETS.RETICULATED NFR BLD AUTO: 49.7 % (ref 0–7)
POTASSIUM SERPL-SCNC: 4.3 MMOL/L (ref 3.5–5.1)
PROT SERPL-MCNC: 6.8 G/DL (ref 5.7–8.2)
Q-T INTERVAL: 386 MS
QRS DURATION: 76 MS
QTC CALCULATION (BEZET): 398 MS
R AXIS: 44 DEGREES
RBC # BLD AUTO: 2.32 X10(6)UL
RH BLOOD TYPE: NEGATIVE
ROULEAUX BLD QL SMEAR: PRESENT
SODIUM SERPL-SCNC: 135 MMOL/L (ref 136–145)
T AXIS: 35 DEGREES
TROPONIN I SERPL HS-MCNC: 8 NG/L
VENTRICULAR RATE: 64 BPM
WBC # BLD AUTO: 1.7 X10(3) UL (ref 4–11)

## 2024-07-28 PROCEDURE — 93005 ELECTROCARDIOGRAM TRACING: CPT

## 2024-07-28 PROCEDURE — 86901 BLOOD TYPING SEROLOGIC RH(D): CPT | Performed by: EMERGENCY MEDICINE

## 2024-07-28 PROCEDURE — 93010 ELECTROCARDIOGRAM REPORT: CPT

## 2024-07-28 PROCEDURE — 86900 BLOOD TYPING SEROLOGIC ABO: CPT

## 2024-07-28 PROCEDURE — 86850 RBC ANTIBODY SCREEN: CPT | Performed by: EMERGENCY MEDICINE

## 2024-07-28 PROCEDURE — 80053 COMPREHEN METABOLIC PANEL: CPT

## 2024-07-28 PROCEDURE — 99285 EMERGENCY DEPT VISIT HI MDM: CPT

## 2024-07-28 PROCEDURE — 84484 ASSAY OF TROPONIN QUANT: CPT | Performed by: EMERGENCY MEDICINE

## 2024-07-28 PROCEDURE — 80053 COMPREHEN METABOLIC PANEL: CPT | Performed by: EMERGENCY MEDICINE

## 2024-07-28 PROCEDURE — 85025 COMPLETE CBC W/AUTO DIFF WBC: CPT | Performed by: EMERGENCY MEDICINE

## 2024-07-28 PROCEDURE — 85025 COMPLETE CBC W/AUTO DIFF WBC: CPT

## 2024-07-28 PROCEDURE — 86901 BLOOD TYPING SEROLOGIC RH(D): CPT

## 2024-07-28 PROCEDURE — 36415 COLL VENOUS BLD VENIPUNCTURE: CPT

## 2024-07-28 PROCEDURE — 84484 ASSAY OF TROPONIN QUANT: CPT

## 2024-07-28 PROCEDURE — 86900 BLOOD TYPING SEROLOGIC ABO: CPT | Performed by: EMERGENCY MEDICINE

## 2024-07-28 PROCEDURE — 36430 TRANSFUSION BLD/BLD COMPNT: CPT

## 2024-07-28 PROCEDURE — 71045 X-RAY EXAM CHEST 1 VIEW: CPT | Performed by: EMERGENCY MEDICINE

## 2024-07-28 PROCEDURE — 86850 RBC ANTIBODY SCREEN: CPT

## 2024-07-28 PROCEDURE — 86920 COMPATIBILITY TEST SPIN: CPT

## 2024-07-28 NOTE — ED QUICK NOTES
Rounding Completed    Plan of Care reviewed. Waiting for blood completion.  Elimination needs assessed.      Bed is locked and in lowest position. Call light within reach.

## 2024-07-28 NOTE — ED PROVIDER NOTES
Patient Seen in: [facility] Emergency Department      History     Chief Complaint   Patient presents with    Chest Pain Angina     Stated Complaint: Chest Pain    Subjective:   HPI    Patient here for evaluation of generalized weakness and some lightheadedness.  He has a history of MDS, follows with Dr. Alfaro from [facility].  He is concerned that he may need a blood transfusion.  Additionally, he reports a very specific spot of right-sided chest pain, sharp, worse with movement and coughing.  No swelling in his legs.  No PND no orthopnea no shortness of breath.    Symptoms not exertional.  No changes in his grades tolerance though that is admittedly low per the patient.     No fevers, chills or infectious symptoms      Objective:   Past Medical History:    Atherosclerosis of abdominal aorta (HCC)    Cancer (HCC)    skin cancer-squamous cell    Dermatitis    Resolved per note 3/7/19     Essential hypertension    Gout    Gout, unspecified    Resolved as stable per note 3/7/19    Greater trochanteric bursitis, right    Resolved last addressed 9/4/14    High cholesterol    HTN (hypertension)    Hyperlipidemia    Lumbar radiculitis    Resolved per note 3/7/19    Osteoarthritis    right hip    Status post total replacement of right hip              Past Surgical History:   Procedure Laterality Date    Colonoscopy      Colonoscopy N/A 6/27/2016    Procedure: COLONOSCOPY;  Surgeon: Elvis Warren MD;  Location:  ENDOSCOPY    Hip replacement surgery      Other      wisdom teeth extraction                Social History     Socioeconomic History    Marital status:     Number of children: 1   Occupational History    Occupation: [employer] Center   Tobacco Use    Smoking status: Former     Current packs/day: 1.00     Average packs/day: 1 pack/day for 35.0 years (35.0 ttl pk-yrs)     Types: Cigarettes    Smokeless tobacco: Never   Vaping Use    Vaping status: Never Used   Substance and Sexual Activity    Alcohol use: Yes      Alcohol/week: 0.0 standard drinks of alcohol     Comment: rarely    Drug use: No    Sexual activity: Yes     Partners: Female   Other Topics Concern     Service No    Sleep Concern No    Exercise Yes    Seat Belt Yes     Social Determinants of Health     Food Insecurity: No Food Insecurity (6/18/2024)    Food Insecurity     Food Insecurity: Never true   Transportation Needs: No Transportation Needs (6/18/2024)    Transportation Needs     Lack of Transportation: No   Housing Stability: Low Risk  (6/18/2024)    Housing Stability     Housing Instability: No              Review of Systems    Positive for stated Chief Complaint: Chest Pain Angina    Other systems are as noted in HPI.  Constitutional and vital signs reviewed.      All other systems reviewed and negative except as noted above.    Physical Exam     ED Triage Vitals [07/28/24 1222]   /55   Pulse 66   Resp 15   Temp 98.3 °F (36.8 °C)   Temp src Oral   SpO2 99 %   O2 Device None (Room air)       Current Vitals:   Vital Signs  BP: 121/55  Pulse: 60  Resp: 24  Temp: 97.9 °F (36.6 °C)  Temp src: Temporal  MAP (mmHg): 74    Oxygen Therapy  SpO2: 99 %  O2 Device: None (Room air)            Physical Exam     physical Exam   Constitutional: Awake, alert, well appearing  Head: Normocephalic and atraumatic.   Eyes: Conjunctivae are normal. Pupils are equal, round, and reactive to light.   Neck: Normal range of motion. No JVD  Cardiovascular: Normal rate, regular rhythm  Pulmonary/Chest: Normal effort.  No accessory muscle use.  No cyanosis.  Abdominal: Soft. Not distended.  Neurological: Pt is alert and oriented to person, place, and time. no cranial nerve deficits. Speech fluent      Lungs clear no murmurs.  No swelling in his legs no calf tenderness.    ED Course     Labs Reviewed   COMP METABOLIC PANEL (14) - Abnormal; Notable for the following components:       Result Value    Glucose 110 (*)     Sodium 135 (*)     BUN 24 (*)     Creatinine 1.64  (*)     eGFR-Cr 44 (*)     Bilirubin, Total 1.2 (*)     All other components within normal limits   RBC MORPHOLOGY SCAN - Abnormal; Notable for the following components:    RBC Morphology See morphology below (*)     Rouleaux Present (*)     All other components within normal limits   CBC W/ DIFFERENTIAL - Abnormal; Notable for the following components:    WBC 1.7 (*)     RBC 2.32 (*)     HGB 6.7 (*)     HCT 20.9 (*)     PLT 28.0 (*)     Immature Platelet Fraction 49.7 (*)     Neutrophil Absolute Prelim 1.05 (*)     Neutrophil Absolute 1.05 (*)     Lymphocyte Absolute 0.48 (*)     All other components within normal limits   TROPONIN I HIGH SENSITIVITY - Normal   CBC WITH DIFFERENTIAL WITH PLATELET    Narrative:     The following orders were created for panel order CBC With Differential With Platelet.  Procedure                               Abnormality         Status                     ---------                               -----------         ------                     CBC W/ DIFFERENTIAL[[id number]]          Abnormal            Final result                 Please view results for these tests on the individual orders.   SCAN SLIDE   TYPE AND SCREEN    Narrative:     The following orders were created for panel order Type and screen.  Procedure                               Abnormality         Status                     ---------                               -----------         ------                     ABORH (Blood Type)[[id number]]                               Final result               Antibody Screen[[id number]]                                  Final result                 Please view results for these tests on the individual orders.   ABORH (BLOOD TYPE)   ANTIBODY SCREEN   PREPARE RBC   RAINBOW DRAW LAVENDER   RAINBOW DRAW LIGHT GREEN   RAINBOW DRAW BLUE   RAINBOW DRAW GOLD     EKG    Rate, intervals and axes as noted on EKG Report.  Rate: 64  Rhythm: Sinus Rhythm  Reading: Sinus rhythm no acute ischemia                  XR CHEST AP PORTABLE  (CPT=71045)    Result Date: 7/28/2024  CONCLUSION:  Normal examination for a patient of this age.    LOCATION:  [facility]      Dictated by (CST): Trevon Zhong MD on 7/28/2024 at 12:56 PM     Finalized by (CST): Trevon Zhong MD on 7/28/2024 at 12:56 PM          CBC notable for pancytopenia with hemoglobin of 6.7 and plate count of 28.  Reconfirmed patient that he is not having any hematemesis, BRBPR or melena.  No nosebleeds no hemoptysis         MDM          Differential diagnoses considered: Anemia secondary to MDS, atypical chest pain, ACS, chest wall pain.     pancytopenia with hemoglobin of 6.7: Due to known MDS.  No signs of bleeding.  Discussed briefly Dr. Alfaro.  Okay with transfusion of 1 unit PRBCs and discharge.    Pleuritic chest pain: EKG nonischemic and troponin is negative, pain has been going on for over a day.  I discussed the low possibility of VTE.  Briefly discussed with Dr. Alfaro, does not believe that the patient is at significantly increased risk due to his underlying pathology and anticoagulation would be complicated in the setting of ongoing thrombocytopenia.    I discussed with the patient my overall low suspicion for thromboembolism.  Discussed that likely, finding a small clot would not result in anticoagulation due to underlying pathology.  He is hemodynamically stable has a negative troponin, has no shortness of breath or O2 requirement.  Patient was okay deferring evaluation for VTE understanding the risk of missed large PE.    I visualized the radiology studies, my independent interpretation: No pneumothorax or pneumonia chest x-ray    *Discussion of ongoing management of this patient's care included: n/a  *Comorbidities contributing to the complexity of decision making: n/a  *External charts reviewed: n/a  *Additional sources of history: n/a    Shared decision making was done by: patient, myself.      1630  On transfusion, she completed  uneventfully.  Will discharge home.                               Medical Decision Making      Disposition and Plan     Clinical Impression:  1. Pleuritic chest pain    2. MDS (myelodysplastic syndrome) (HCC)    3. Acute on chronic anemia         Disposition:  Discharge  7/28/2024  4:23 pm    Follow-up:  Víctor Alfaro MD  [address] [place] [id number]  [phone]    Follow up            Medications Prescribed:  Current Discharge Medication List

## 2024-07-28 NOTE — ED INITIAL ASSESSMENT (HPI)
Pt c/o right sided chest pain that started last night, pt states worse when he lays on that side. Pt also concerned about his hemoglobin, states that it has been low, last drawing was 7.6. Pt states getting fatigued when walking.

## 2024-07-29 LAB
BLOOD TYPE BARCODE: 9500
UNIT VOLUME: 350 ML

## 2024-08-06 ENCOUNTER — HOSPITAL ENCOUNTER (EMERGENCY)
Facility: HOSPITAL | Age: 74
Discharge: HOME | End: 2024-08-06
Attending: EMERGENCY MEDICINE
Payer: MEDICARE

## 2024-08-06 VITALS
RESPIRATION RATE: 21 BRPM | HEART RATE: 60 BPM | OXYGEN SATURATION: 100 % | SYSTOLIC BLOOD PRESSURE: 116 MMHG | TEMPERATURE: 99 F | DIASTOLIC BLOOD PRESSURE: 61 MMHG

## 2024-08-06 DIAGNOSIS — D46.9 MDS (MYELODYSPLASTIC SYNDROME) (HCC): ICD-10-CM

## 2024-08-06 DIAGNOSIS — D61.818 PANCYTOPENIA (HCC): Primary | ICD-10-CM

## 2024-08-06 DIAGNOSIS — Z51.89 ENCOUNTER FOR BLOOD TRANSFUSION: ICD-10-CM

## 2024-08-06 LAB
ANTIBODY SCREEN: NEGATIVE
RH BLOOD TYPE: NEGATIVE

## 2024-08-06 PROCEDURE — 99285 EMERGENCY DEPT VISIT HI MDM: CPT

## 2024-08-06 PROCEDURE — 36415 COLL VENOUS BLD VENIPUNCTURE: CPT

## 2024-08-06 PROCEDURE — 86901 BLOOD TYPING SEROLOGIC RH(D): CPT | Performed by: EMERGENCY MEDICINE

## 2024-08-06 PROCEDURE — 36430 TRANSFUSION BLD/BLD COMPNT: CPT

## 2024-08-06 PROCEDURE — 86900 BLOOD TYPING SEROLOGIC ABO: CPT | Performed by: EMERGENCY MEDICINE

## 2024-08-06 PROCEDURE — 86920 COMPATIBILITY TEST SPIN: CPT

## 2024-08-06 PROCEDURE — 86850 RBC ANTIBODY SCREEN: CPT | Performed by: EMERGENCY MEDICINE

## 2024-08-06 NOTE — ED INITIAL ASSESSMENT (HPI)
MDS- following for weekly labs, transfusing him, on treatment of vidaza- hgb 5.9, neutrapenic 700; sending for 1U PRBC. Received Vidaza this AM. Coming by car.

## 2024-08-06 NOTE — ED PROVIDER NOTES
Patient Seen in: [facility] Emergency Department      History     Chief Complaint   Patient presents with    Abnormal Result     Stated Complaint: hgb 5.9    Subjective:   73-year-old male, history of MDS, on Vidaza, most recent infusion this morning, sent by oncology for blood transfusion.  Had routine blood work drawn what appears to be yesterday with hemoglobin less than 6.  Also has chronic thrombocytopenia.  Patient states history of frequent blood transfusions, most recently about a week and a half ago.  Patient does feel fatigued with exertion when he goes for his walking as high new that he was getting anemic again.  He is no blood in his stools or urine.  No active bleeding.  Awake and alert, no other complaints.            Objective:   Past Medical History:    Atherosclerosis of abdominal aorta (HCC)    Cancer (HCC)    skin cancer-squamous cell    Dermatitis    Resolved per note 3/7/19     Essential hypertension    Gout    Gout, unspecified    Resolved as stable per note 3/7/19    Greater trochanteric bursitis, right    Resolved last addressed 9/4/14    High cholesterol    HTN (hypertension)    Hyperlipidemia    Lumbar radiculitis    Resolved per note 3/7/19    Osteoarthritis    right hip    Status post total replacement of right hip              Past Surgical History:   Procedure Laterality Date    Colonoscopy      Colonoscopy N/A 6/27/2016    Procedure: COLONOSCOPY;  Surgeon: Elvis Warren MD;  Location:  ENDOSCOPY    Hip replacement surgery      Other      wisdom teeth extraction                Social History     Socioeconomic History    Marital status:     Number of children: 1   Occupational History    Occupation: [employer]   Tobacco Use    Smoking status: Former     Current packs/day: 1.00     Average packs/day: 1 pack/day for 35.0 years (35.0 ttl pk-yrs)     Types: Cigarettes    Smokeless tobacco: Never   Vaping Use    Vaping status: Never Used   Substance and Sexual Activity     Alcohol use: Yes     Alcohol/week: 0.0 standard drinks of alcohol     Comment: rarely    Drug use: No    Sexual activity: Yes     Partners: Female   Other Topics Concern     Service No    Sleep Concern No    Exercise Yes    Seat Belt Yes     Social Determinants of Health     Food Insecurity: No Food Insecurity (6/18/2024)    Food Insecurity     Food Insecurity: Never true   Transportation Needs: No Transportation Needs (6/18/2024)    Transportation Needs     Lack of Transportation: No   Housing Stability: Low Risk  (6/18/2024)    Housing Stability     Housing Instability: No              Review of Systems   Constitutional:  Positive for fatigue.   Cardiovascular:  Negative for chest pain.   Gastrointestinal:  Negative for abdominal pain and blood in stool.   Neurological:  Positive for weakness.       Positive for stated Chief Complaint: Abnormal Result    Other systems are as noted in HPI.  Constitutional and vital signs reviewed.      All other systems reviewed and negative except as noted above.    Physical Exam     ED Triage Vitals [08/06/24 1030]   /59   Pulse 78   Resp 16   Temp 98.6 °F (37 °C)   Temp src Temporal   SpO2 98 %   O2 Device None (Room air)       Current Vitals:   Vital Signs  BP: 116/61  Pulse: 60  Resp: 21  Temp: 98.6 °F (37 °C)  Temp src: Temporal  MAP (mmHg): 78    Oxygen Therapy  SpO2: 100 %  O2 Device: None (Room air)            Physical Exam  Vitals and nursing note reviewed.   Constitutional:       General: He is not in acute distress.     Appearance: He is not toxic-appearing.   HENT:      Head: Normocephalic.   Cardiovascular:      Rate and Rhythm: Normal rate.      Pulses: Normal pulses.   Pulmonary:      Effort: Pulmonary effort is normal. No respiratory distress.   Abdominal:      Palpations: Abdomen is soft.   Musculoskeletal:         General: Normal range of motion.      Cervical back: Neck supple.   Skin:     General: Skin is warm and dry.      Coloration: Skin is  pale.   Neurological:      General: No focal deficit present.      Mental Status: He is alert and oriented to person, place, and time.      Cranial Nerves: No cranial nerve deficit.   Psychiatric:         Mood and Affect: Mood normal.               ED Course     Labs Reviewed   TYPE AND SCREEN    Narrative:     The following orders were created for panel order Type and screen.  Procedure                               Abnormality         Status                     ---------                               -----------         ------                     ABORH (Blood Type)[[id number]]                               Final result               Antibody Screen[[id number]]                                  Final result                 Please view results for these tests on the individual orders.   ABORH (BLOOD TYPE)   ANTIBODY SCREEN   PREPARE RBC   RAINBOW DRAW LAVENDER   RAINBOW DRAW LIGHT GREEN   RAINBOW DRAW BLUE          ED Course as of 08/06/24 1356  ------------------------------------------------------------  Time: 08/06 1155  Comment: Spoke with [facility] team out of the office, Pat who knows the patient well, states they usually set him up for outpatient blood transfusion but because it was less than 6 in the office they could not do so.  Agrees with a unit of PRBCs and discharged home.              [facility]      CRITICAL CARE:  A total of 35 minutes of critical care time (exclusive of billable procedures) was administered to manage the patient's critical lab values and cardiovascular instability due to his pancytopenia with significant anemia.  Chart review, documentation, discussion with hematology/oncology, telemetry monitoring.  Discussion with the patient.  Monitoring through a transfusion after obtaining informed consent.  This involved direct patient intervention, complex decision making, and/or extensive discussions with the patient, family, and clinical staff.    External chart review demonstrates telephone  encounters a recent visit with hematology oncology over the past couple of days    Differential diagnosis includes, but not limited to, anemia, blood loss, pancytopenia, medication side effect, symptomatic anemia    73-year-old male presents at the request of hematology oncology for blood transfusion.  He is very well-appearing and nontoxic overall.  Vital signs are stable.  Consented for 1 unit of PRBCs.  This was given and he feels great afterwards.  Like to be discharged home.  Further workup also considered and discussed.  Discussed over the phone with hematology oncology they are agreeable to this plan, sent him here because they could not arrange outpatient transfusion with their hemoglobin levels less than 6.  Okay with 1 unit of PRBCs and they will follow-up in the office.  Shared decision making utilized, return precaution provided, discharged home at his request cautiously after discussion of risk benefits alternatives    Patient was screened and evaluated during this visit.  As the treating physician attending to the patient, I determined within reasonable clinical confidence and prior to discharge, that an emergency medical condition was not or was no longer present.  There was no indication for further evaluation, treatment, or admission on an emergency basis.  Comprehensive verbal and written discharge and follow-up instructions were provided to help prevent relapse or worsening.  Patient was instructed to follow-up with their primary care provider for further evaluation and treatment, return immediately to ER for worsening, concerning, new, or changing/persisting symptoms. I discussed the case with the patient and they had no questions, complaints, or concerns.  Patient was comfortable going home.     Per the discharge paperwork, patients are encouraged to and given instructions on how to sign up for [facility], where they have access to their records, including any/all incidental findings.     This note  was prepared using Dragon Medical voice recognition dictation software. As a result errors may occur. When identified these errors have been corrected. While every attempt is made to correct errors during dictation discrepancies may still exist    Note to patient: The 21st Century Cures Act makes medical notes like these available to patients in the interest of transparency. However, this is a medical document intended as peer to peer communication. It is written in medical language and may contain abbreviations or verbiage that are unfamiliar. It may appear blunt or direct. Medical documents are intended to carry relevant information, facts as evident, and the clinical opinion of the practitioner.                                    Medical Decision Making      Disposition and Plan     Clinical Impression:  1. Pancytopenia (HCC)    2. MDS (myelodysplastic syndrome) (HCC)    3. Encounter for blood transfusion         Disposition:  Discharge  8/6/2024  1:40 pm    Follow-up:  Salvador Cohn MD  [address]  [address]  [place] [id number] [phone]    Follow up      Víctor Alfaro MD  [address]  [address]  [facility] [id number] [phone]    Follow up            Medications Prescribed:  Current Discharge Medication List

## 2024-08-06 NOTE — ED QUICK NOTES
The previous PCT had been on hold for [facility] [place] [place] hematologist Víctor Alfaro for 30 minutes. This PCT tried a different number and requested a call back on behalf of the patient.

## 2024-08-07 LAB
BLOOD TYPE BARCODE: 9500
UNIT VOLUME: 350 ML

## 2024-08-14 ENCOUNTER — OFFICE VISIT (OUTPATIENT)
Dept: HEMATOLOGY/ONCOLOGY | Facility: HOSPITAL | Age: 74
End: 2024-08-14
Attending: INTERNAL MEDICINE
Payer: MEDICARE

## 2024-08-14 VITALS
HEART RATE: 63 BPM | DIASTOLIC BLOOD PRESSURE: 72 MMHG | HEIGHT: 72.01 IN | SYSTOLIC BLOOD PRESSURE: 131 MMHG | TEMPERATURE: 98 F | RESPIRATION RATE: 16 BRPM | OXYGEN SATURATION: 100 % | WEIGHT: 167.81 LBS | BODY MASS INDEX: 22.73 KG/M2

## 2024-08-14 DIAGNOSIS — D61.818 PANCYTOPENIA (HCC): ICD-10-CM

## 2024-08-14 DIAGNOSIS — D46.9 MDS (MYELODYSPLASTIC SYNDROME) (HCC): Primary | ICD-10-CM

## 2024-08-14 LAB
ANTIBODY SCREEN: NEGATIVE
RH BLOOD TYPE: NEGATIVE

## 2024-08-14 PROCEDURE — 86850 RBC ANTIBODY SCREEN: CPT

## 2024-08-14 PROCEDURE — 86901 BLOOD TYPING SEROLOGIC RH(D): CPT

## 2024-08-14 PROCEDURE — 36430 TRANSFUSION BLD/BLD COMPNT: CPT

## 2024-08-14 PROCEDURE — 86920 COMPATIBILITY TEST SPIN: CPT

## 2024-08-14 PROCEDURE — 36415 COLL VENOUS BLD VENIPUNCTURE: CPT

## 2024-08-14 PROCEDURE — 86900 BLOOD TYPING SEROLOGIC ABO: CPT

## 2024-08-14 NOTE — PROGRESS NOTES
Post Transfusion Instructions for Out-Patients    Most recipients of blood transfusions do not experience any adverse effects.  You may resume your normal activities 4 to 6 hours after your blood transfusion.  Occasionally, reactions of blood transfusions may be delayed (for several weeks).    Symptoms of a transfusion reaction can include:    Faintness  Weakness  Nausea  Vomiting  Shortness of breath  Chest pain  Back pain  Hives  Rash  Itch  Flushing  Fever  Chills  Jaundice (yellowish tint to eyes/skin)  Dark or red urine    Though these symptoms may not be related to the blood transfusions, they should be reported to your physician.  Contact both your physician and the laboratory Blood Bank (see information below) so that your symptoms can be thoroughly evaluated.    Your physician's name: Dr Víctor Alfaro  Your physician's phone number: [phone]    If your physician is unavailable, contact the Emergency Department.    [facility] Blood Bank:  [phone]  [facility] Emergency Department:  [phone]    [facility] Blood Bank: [phone]  [facility] Emergency Department: [phone]

## 2024-08-15 LAB
BLOOD TYPE BARCODE: 9500
UNIT VOLUME: 350 ML

## 2024-08-23 ENCOUNTER — LAB ENCOUNTER (OUTPATIENT)
Dept: LAB | Facility: HOSPITAL | Age: 74
End: 2024-08-23
Attending: INTERNAL MEDICINE
Payer: MEDICARE

## 2024-08-23 DIAGNOSIS — D61.818 PANCYTOPENIA (HCC): ICD-10-CM

## 2024-08-23 DIAGNOSIS — D46.9 MDS (MYELODYSPLASTIC SYNDROME) (HCC): ICD-10-CM

## 2024-08-23 LAB
ANTIBODY SCREEN: NEGATIVE
RH BLOOD TYPE: NEGATIVE

## 2024-08-23 PROCEDURE — 86850 RBC ANTIBODY SCREEN: CPT

## 2024-08-23 PROCEDURE — 86901 BLOOD TYPING SEROLOGIC RH(D): CPT

## 2024-08-23 PROCEDURE — 86900 BLOOD TYPING SEROLOGIC ABO: CPT

## 2024-08-26 ENCOUNTER — OFFICE VISIT (OUTPATIENT)
Dept: HEMATOLOGY/ONCOLOGY | Facility: HOSPITAL | Age: 74
End: 2024-08-26
Attending: INTERNAL MEDICINE
Payer: MEDICARE

## 2024-08-26 VITALS
SYSTOLIC BLOOD PRESSURE: 115 MMHG | TEMPERATURE: 97 F | RESPIRATION RATE: 16 BRPM | HEART RATE: 62 BPM | DIASTOLIC BLOOD PRESSURE: 59 MMHG | OXYGEN SATURATION: 99 %

## 2024-08-26 DIAGNOSIS — D46.9 MDS (MYELODYSPLASTIC SYNDROME) (HCC): Primary | ICD-10-CM

## 2024-08-26 DIAGNOSIS — D61.818 PANCYTOPENIA (HCC): ICD-10-CM

## 2024-08-26 PROCEDURE — 36430 TRANSFUSION BLD/BLD COMPNT: CPT

## 2024-08-26 NOTE — PROGRESS NOTES
Education Record    Learner:  Patient    Disease / Diagnosis: MDS    Barriers / Limitations:  None   Comments:    Method:  Discussion   Comments:    General Topics:  Plan of care reviewed   Comments:    Outcome:  Shows understanding   Comments:    Patient here for 1 unit prbcs per MD Alfaro. Patient tolerated without issue and left in stable condition.

## 2024-09-09 ENCOUNTER — HOSPITAL ENCOUNTER (EMERGENCY)
Facility: HOSPITAL | Age: 74
Discharge: HOME | End: 2024-09-09
Attending: EMERGENCY MEDICINE
Payer: MEDICARE

## 2024-09-09 VITALS
HEART RATE: 71 BPM | RESPIRATION RATE: 19 BRPM | BODY MASS INDEX: 21.67 KG/M2 | TEMPERATURE: 98 F | OXYGEN SATURATION: 99 % | SYSTOLIC BLOOD PRESSURE: 160 MMHG | WEIGHT: 160 LBS | HEIGHT: 72 IN | DIASTOLIC BLOOD PRESSURE: 70 MMHG

## 2024-09-09 DIAGNOSIS — D46.9 MDS (MYELODYSPLASTIC SYNDROME) (HCC): ICD-10-CM

## 2024-09-09 DIAGNOSIS — D64.9 ACUTE ON CHRONIC ANEMIA: Primary | ICD-10-CM

## 2024-09-09 LAB
ANTIBODY SCREEN: NEGATIVE
BASOPHILS # BLD AUTO: 0.02 X10(3) UL (ref 0–0.2)
BASOPHILS NFR BLD AUTO: 0.5 %
EOSINOPHIL # BLD AUTO: 0 X10(3) UL (ref 0–0.7)
EOSINOPHIL NFR BLD AUTO: 0 %
ERYTHROCYTE [DISTWIDTH] IN BLOOD BY AUTOMATED COUNT: 14.9 %
HCT VFR BLD AUTO: 17.3 %
HGB BLD-MCNC: 5.8 G/DL
IMM GRANULOCYTES # BLD AUTO: 0.03 X10(3) UL (ref 0–1)
IMM GRANULOCYTES NFR BLD: 0.7 %
LYMPHOCYTES # BLD AUTO: 0.84 X10(3) UL (ref 1–4)
LYMPHOCYTES NFR BLD AUTO: 20.4 %
MCH RBC QN AUTO: 29.9 PG (ref 26–34)
MCHC RBC AUTO-ENTMCNC: 33.5 G/DL (ref 31–37)
MCV RBC AUTO: 89.2 FL
MONOCYTES # BLD AUTO: 0.15 X10(3) UL (ref 0.1–1)
MONOCYTES NFR BLD AUTO: 3.6 %
NEUTROPHILS # BLD AUTO: 3.08 X10 (3) UL (ref 1.5–7.7)
NEUTROPHILS # BLD AUTO: 3.08 X10(3) UL (ref 1.5–7.7)
NEUTROPHILS NFR BLD AUTO: 74.8 %
PLATELET # BLD AUTO: 74 10(3)UL (ref 150–450)
PLATELET MORPHOLOGY: NORMAL
PLATELETS.RETICULATED NFR BLD AUTO: 36.4 % (ref 0–7)
RBC # BLD AUTO: 1.94 X10(6)UL
RH BLOOD TYPE: NEGATIVE
WBC # BLD AUTO: 4.1 X10(3) UL (ref 4–11)

## 2024-09-09 PROCEDURE — 36415 COLL VENOUS BLD VENIPUNCTURE: CPT

## 2024-09-09 PROCEDURE — 86901 BLOOD TYPING SEROLOGIC RH(D): CPT | Performed by: EMERGENCY MEDICINE

## 2024-09-09 PROCEDURE — 85025 COMPLETE CBC W/AUTO DIFF WBC: CPT

## 2024-09-09 PROCEDURE — 86850 RBC ANTIBODY SCREEN: CPT

## 2024-09-09 PROCEDURE — 99285 EMERGENCY DEPT VISIT HI MDM: CPT

## 2024-09-09 PROCEDURE — 86850 RBC ANTIBODY SCREEN: CPT | Performed by: EMERGENCY MEDICINE

## 2024-09-09 PROCEDURE — 86901 BLOOD TYPING SEROLOGIC RH(D): CPT

## 2024-09-09 PROCEDURE — 86900 BLOOD TYPING SEROLOGIC ABO: CPT | Performed by: EMERGENCY MEDICINE

## 2024-09-09 PROCEDURE — 85025 COMPLETE CBC W/AUTO DIFF WBC: CPT | Performed by: EMERGENCY MEDICINE

## 2024-09-09 PROCEDURE — 86920 COMPATIBILITY TEST SPIN: CPT

## 2024-09-09 PROCEDURE — 86900 BLOOD TYPING SEROLOGIC ABO: CPT

## 2024-09-09 PROCEDURE — 36430 TRANSFUSION BLD/BLD COMPNT: CPT

## 2024-09-09 RX ORDER — PROCHLORPERAZINE MALEATE 10 MG
1 TABLET ORAL EVERY 6 HOURS PRN
COMMUNITY
Start: 2024-05-22

## 2024-09-09 NOTE — ED INITIAL ASSESSMENT (HPI)
Pt was at his oncologist office and was sent over for a blood transfusion d/t of Hgb 5.7. Pt has myelodysplastic syndrome. Pt c/o feeling weak and tired.

## 2024-09-10 LAB
BLOOD TYPE BARCODE: 9500
UNIT VOLUME: 350 ML

## 2024-09-11 LAB
BLOOD TYPE BARCODE: 9500
UNIT VOLUME: 350 ML

## 2024-09-15 ENCOUNTER — HOSPITAL ENCOUNTER (EMERGENCY)
Facility: HOSPITAL | Age: 74
Discharge: HOME | End: 2024-09-15
Attending: EMERGENCY MEDICINE
Payer: MEDICARE

## 2024-09-15 ENCOUNTER — APPOINTMENT (OUTPATIENT)
Dept: GENERAL RADIOLOGY | Facility: HOSPITAL | Age: 74
End: 2024-09-15
Attending: EMERGENCY MEDICINE
Payer: MEDICARE

## 2024-09-15 ENCOUNTER — APPOINTMENT (OUTPATIENT)
Dept: ULTRASOUND IMAGING | Facility: HOSPITAL | Age: 74
End: 2024-09-15
Attending: EMERGENCY MEDICINE
Payer: MEDICARE

## 2024-09-15 VITALS
TEMPERATURE: 99 F | RESPIRATION RATE: 20 BRPM | DIASTOLIC BLOOD PRESSURE: 78 MMHG | SYSTOLIC BLOOD PRESSURE: 105 MMHG | HEART RATE: 77 BPM | OXYGEN SATURATION: 100 %

## 2024-09-15 DIAGNOSIS — D69.6 THROMBOCYTOPENIA (HCC): ICD-10-CM

## 2024-09-15 DIAGNOSIS — D64.9 ANEMIA, UNSPECIFIED TYPE: Primary | ICD-10-CM

## 2024-09-15 LAB
ALBUMIN SERPL-MCNC: 3.2 G/DL (ref 3.2–4.8)
ALBUMIN/GLOB SERPL: 1.1 {RATIO} (ref 1–2)
ALP LIVER SERPL-CCNC: 95 U/L
ALT SERPL-CCNC: 21 U/L
ANION GAP SERPL CALC-SCNC: 8 MMOL/L (ref 0–18)
ANTIBODY SCREEN: NEGATIVE
AST SERPL-CCNC: 12 U/L (ref ?–34)
ATRIAL RATE: 73 BPM
BASOPHILS # BLD AUTO: 0.01 X10(3) UL (ref 0–0.2)
BASOPHILS NFR BLD AUTO: 0.7 %
BILIRUB SERPL-MCNC: 0.6 MG/DL (ref 0.2–1.1)
BUN BLD-MCNC: 32 MG/DL (ref 9–23)
CALCIUM BLD-MCNC: 9.9 MG/DL (ref 8.7–10.4)
CHLORIDE SERPL-SCNC: 105 MMOL/L (ref 98–112)
CO2 SERPL-SCNC: 22 MMOL/L (ref 21–32)
CREAT BLD-MCNC: 1.52 MG/DL
EGFRCR SERPLBLD CKD-EPI 2021: 48 ML/MIN/1.73M2 (ref 60–?)
EOSINOPHIL # BLD AUTO: 0 X10(3) UL (ref 0–0.7)
EOSINOPHIL NFR BLD AUTO: 0 %
ERYTHROCYTE [DISTWIDTH] IN BLOOD BY AUTOMATED COUNT: 13.6 %
GLOBULIN PLAS-MCNC: 2.9 G/DL (ref 2–3.5)
GLUCOSE BLD-MCNC: 146 MG/DL (ref 70–99)
HCT VFR BLD AUTO: 18.6 %
HGB BLD-MCNC: 6.2 G/DL
IMM GRANULOCYTES # BLD AUTO: 0.04 X10(3) UL (ref 0–1)
IMM GRANULOCYTES NFR BLD: 2.8 %
LYMPHOCYTES # BLD AUTO: 0.25 X10(3) UL (ref 1–4)
LYMPHOCYTES NFR BLD AUTO: 17.2 %
MCH RBC QN AUTO: 29.8 PG (ref 26–34)
MCHC RBC AUTO-ENTMCNC: 33.3 G/DL (ref 31–37)
MCV RBC AUTO: 89.4 FL
MONOCYTES # BLD AUTO: 0.08 X10(3) UL (ref 0.1–1)
MONOCYTES NFR BLD AUTO: 5.5 %
NEUTROPHILS # BLD AUTO: 1.07 X10 (3) UL (ref 1.5–7.7)
NEUTROPHILS # BLD AUTO: 1.07 X10(3) UL (ref 1.5–7.7)
NEUTROPHILS NFR BLD AUTO: 73.8 %
OSMOLALITY SERPL CALC.SUM OF ELEC: 290 MOSM/KG (ref 275–295)
P AXIS: 49 DEGREES
P-R INTERVAL: 118 MS
PLATELET # BLD AUTO: 17 10(3)UL (ref 150–450)
PLATELET MORPHOLOGY: NORMAL
PLATELETS.RETICULATED NFR BLD AUTO: 36.6 % (ref 0–7)
POTASSIUM SERPL-SCNC: 4.1 MMOL/L (ref 3.5–5.1)
PROT SERPL-MCNC: 6.1 G/DL (ref 5.7–8.2)
Q-T INTERVAL: 374 MS
QRS DURATION: 80 MS
QTC CALCULATION (BEZET): 412 MS
R AXIS: 39 DEGREES
RBC # BLD AUTO: 2.08 X10(6)UL
RH BLOOD TYPE: NEGATIVE
SODIUM SERPL-SCNC: 135 MMOL/L (ref 136–145)
T AXIS: 15 DEGREES
VENTRICULAR RATE: 73 BPM
WBC # BLD AUTO: 1.5 X10(3) UL (ref 4–11)

## 2024-09-15 PROCEDURE — 86920 COMPATIBILITY TEST SPIN: CPT

## 2024-09-15 PROCEDURE — 93010 ELECTROCARDIOGRAM REPORT: CPT

## 2024-09-15 PROCEDURE — 80053 COMPREHEN METABOLIC PANEL: CPT | Performed by: EMERGENCY MEDICINE

## 2024-09-15 PROCEDURE — 86850 RBC ANTIBODY SCREEN: CPT | Performed by: EMERGENCY MEDICINE

## 2024-09-15 PROCEDURE — 99285 EMERGENCY DEPT VISIT HI MDM: CPT

## 2024-09-15 PROCEDURE — 86900 BLOOD TYPING SEROLOGIC ABO: CPT | Performed by: EMERGENCY MEDICINE

## 2024-09-15 PROCEDURE — 93005 ELECTROCARDIOGRAM TRACING: CPT

## 2024-09-15 PROCEDURE — 85025 COMPLETE CBC W/AUTO DIFF WBC: CPT | Performed by: EMERGENCY MEDICINE

## 2024-09-15 PROCEDURE — 71046 X-RAY EXAM CHEST 2 VIEWS: CPT | Performed by: EMERGENCY MEDICINE

## 2024-09-15 PROCEDURE — 86901 BLOOD TYPING SEROLOGIC RH(D): CPT | Performed by: EMERGENCY MEDICINE

## 2024-09-15 PROCEDURE — 73080 X-RAY EXAM OF ELBOW: CPT | Performed by: EMERGENCY MEDICINE

## 2024-09-15 PROCEDURE — 36430 TRANSFUSION BLD/BLD COMPNT: CPT

## 2024-09-15 PROCEDURE — 93971 EXTREMITY STUDY: CPT | Performed by: EMERGENCY MEDICINE

## 2024-09-15 PROCEDURE — 36415 COLL VENOUS BLD VENIPUNCTURE: CPT

## 2024-09-15 NOTE — ED PROVIDER NOTES
Patient Seen in: [facility]      History     Chief Complaint   Patient presents with    Difficulty Breathing     Stated Complaint: SOB, \"I think I need a blood transfusion\", last transfused last week    Subjective:   HPI    73-year-old male with history of myelodysplastic disease presents reporting significant fatigue and dyspnea on exertion.  He was seen in this emergency room 6 days ago and received 2 units of packed red blood cell transfusion.  He reports he has an appointment every Tuesday to go to the cancer center to get his hemoglobin level checked but he was feeling so weak today he did not think he could wait 2 more days so he called his oncology office and they directed him to the emergency room.  He is also reporting pain in the posterior aspect of the left elbow for the last week or so.  He does report that when he was here for his transfusion last week he had an IV in the left arm but he says his pains all in the back of the elbow and arm.  Denies any other possible traumas.    Objective:   Past Medical History:    Atherosclerosis of abdominal aorta (HCC)    Cancer (HCC)    skin cancer-squamous cell    Dermatitis    Resolved per note 3/7/19     Essential hypertension    Gout    Gout, unspecified    Resolved as stable per note 3/7/19    Greater trochanteric bursitis, right    Resolved last addressed 9/4/14    High cholesterol    HTN (hypertension)    Hyperlipidemia    Lumbar radiculitis    Resolved per note 3/7/19    MDS (myelodysplastic syndrome) (HCC)    Osteoarthritis    right hip    Status post total replacement of right hip              Past Surgical History:   Procedure Laterality Date    Colonoscopy      Colonoscopy N/A 6/27/2016    Procedure: COLONOSCOPY;  Surgeon: Elvis Warren MD;  Location:  ENDOSCOPY    Hip replacement surgery      Other      wisdom teeth extraction                Social History     Socioeconomic History    Marital status:     Number of  children: 1   Occupational History    Occupation: [employer]   Tobacco Use    Smoking status: Former     Current packs/day: 1.00     Average packs/day: 1 pack/day for 35.0 years (35.0 ttl pk-yrs)     Types: Cigarettes    Smokeless tobacco: Never   Vaping Use    Vaping status: Never Used   Substance and Sexual Activity    Alcohol use: Yes     Alcohol/week: 0.0 standard drinks of alcohol     Comment: rarely    Drug use: No    Sexual activity: Yes     Partners: Female   Other Topics Concern     Service No    Sleep Concern No    Exercise Yes    Seat Belt Yes     Social Determinants of Health     Food Insecurity: No Food Insecurity (6/18/2024)    Food Insecurity     Food Insecurity: Never true   Transportation Needs: No Transportation Needs (6/18/2024)    Transportation Needs     Lack of Transportation: No   Housing Stability: Low Risk  (6/18/2024)    Housing Stability     Housing Instability: No              Review of Systems    Positive for stated Chief Complaint: Difficulty Breathing    Other systems are as noted in HPI.  Constitutional and vital signs reviewed.      All other systems reviewed and negative except as noted above.    Physical Exam     ED Triage Vitals [09/15/24 1341]   /65   Pulse 69   Resp 16   Temp 98.3 °F (36.8 °C)   Temp src Oral   SpO2 97 %   O2 Device None (Room air)       Current Vitals:   Vital Signs  BP: 126/55  Pulse: 79  Resp: 18  Temp: 98.7 °F (37.1 °C)  Temp src: Oral  MAP (mmHg): 84    Oxygen Therapy  SpO2: 98 %  O2 Device: None (Room air)            Physical Exam    General:  Vitals as listed.  No acute distress   HEENT: Pale conjunctiva  Neck: supple, no rigidity   Lungs: good air exchange and clear   Heart: regular rate rhythm and no murmur   Abdomen: Soft and nontender.  No abdominal masses.  No peritoneal signs   Extremities: no edema, normal peripheral pulses   Neuro: Alert oriented and nonfocal       ED Course     Labs Reviewed   CBC WITH DIFFERENTIAL WITH PLATELET -  Abnormal; Notable for the following components:       Result Value    WBC 1.5 (*)     RBC 2.08 (*)     HGB 6.2 (*)     HCT 18.6 (*)     PLT 17.0 (*)     Immature Platelet Fraction 36.6 (*)     Neutrophil Absolute Prelim 1.07 (*)     Neutrophil Absolute 1.07 (*)     Lymphocyte Absolute 0.25 (*)     Monocyte Absolute 0.08 (*)     All other components within normal limits   COMP METABOLIC PANEL (14) - Abnormal; Notable for the following components:    Glucose 146 (*)     Sodium 135 (*)     BUN 32 (*)     Creatinine 1.52 (*)     eGFR-Cr 48 (*)     All other components within normal limits   RBC MORPHOLOGY SCAN - Abnormal; Notable for the following components:    RBC Morphology See morphology below (*)     All other components within normal limits   SCAN SLIDE   TYPE AND SCREEN    Narrative:     The following orders were created for panel order Type and screen.  Procedure                               Abnormality         Status                     ---------                               -----------         ------                     ABORH (Blood Type)[[id number]]                               Final result               Antibody Screen[[id number]]                                  Final result                 Please view results for these tests on the individual orders.   ABORH (BLOOD TYPE)   ANTIBODY SCREEN   PREPARE RBC   RAINBOW DRAW BLUE     EKG    Rate, intervals and axes as noted on EKG Report.  Rate: 73  Rhythm: Sinus Rhythm  Reading: No ischemia                 XR ELBOW, COMPLETE (MIN 3 VIEWS), LEFT (CPT=73080)    Result Date: 9/15/2024  PROCEDURE:  XR ELBOW, COMPLETE (MIN 3 VIEWS), LEFT (CPT=73080)  TECHNIQUE:  Three views were obtained.  COMPARISON:  None.  INDICATIONS:  SOB, I think I need a blood transfusion, last transfused last week  PATIENT STATED HISTORY: (As transcribed by Technologist)  Patient stated having left elbow swelling after a blood transfusion a couple days ago.               CONCLUSION:    Negative for fracture or malalignment.  Normal mineralization.  Joint spaces are preserved.  No joint effusion or focal soft tissue swelling.   LOCATION:  [facility]    Dictated by (CST): Rosibel Donahue MD on 9/15/2024 at 2:48 PM     Finalized by (CST): Rosibel Donahue MD on 9/15/2024 at 2:49 PM       XR CHEST PA + LAT CHEST (CPT=71046)    Result Date: 9/15/2024  PROCEDURE:  XR CHEST PA + LAT CHEST (CPT=71046)  INDICATIONS:  SOB, I think I need a blood transfusion, last transfused last week  COMPARISON:  [facility] , XR, XR CHEST AP PORTABLE  (CPT=71045), 7/28/2024, 12:35 PM.  TECHNIQUE:  PA and lateral chest radiographs were obtained.  PATIENT STATED HISTORY: (As transcribed by Technologist)  Patient stated having shortness of breath today.               CONCLUSION:   Stable cardiac and mediastinal contours.  Pulmonary hyperexpansion.  Confluent opacification of the right lung base most in keeping with small pleural effusion and passive atelectasis.  No pneumothorax.   LOCATION:  [facility]   Dictated by (CST): Rosibel Donahue MD on 9/15/2024 at 2:46 PM     Finalized by (CST): Rosibel Donahue MD on 9/15/2024 at 2:47 PM               MDM      73-year-old male with MDS presents reporting significant fatigue.  Was in this emergency room 6 days ago and received 2 unit packed RBC transfusion.  Due for blood test on Tuesday but says he could not wait 2 more days    Additional history obtained by Dr. Alfaro oncology documentation reports that last week outpatient labs showed a hemoglobin of 5.7 and duly oncology sent the patient to the emergency room for transfusion.    Differential includes but is not limited to anemia, URI, a life threat.    CBC, CMP, type and screen, EKG, chest x-ray ordered for further evaluation.    Hemoglobin returned at 6.2 with platelets at 17.  I discussed these findings with the patient's oncologist, Dr. Alfaro.  He is requesting that the patient received 2 units of packed RBC in the emergency room and be discharged home.   He does not feel there is any benefit to hospitalization.  There is no evidence of active bleeding and Dr. Alfaro is comfortable with no platelet transfusion given that his levels are above 10.    My independent interpretation of chest x-ray is that there is no cardiomegaly.    Radiology reports that there are no acute abnormalities on x-ray of the left elbow    Blood transfusion well-tolerated.  No arrhythmia.  No dyspnea or fever.  Okay for discharge home and instructed to follow-up with hematology.      Critical CARE time:  A total of 45 minutes of critical care time (exclusive of billable procedures) was administered to manage the patient's critical lab values due to his symptomatic anemia requiring blood transfusion in the ER.  This involved direct patient intervention, complex decision making, and/or extensive discussions with the patient, family, and clinical staff.                                       Medical Decision Making      Disposition and Plan     Clinical Impression:  1. Anemia, unspecified type    2. Thrombocytopenia (HCC)         Disposition:  Discharge  9/15/2024  8:50 pm    Follow-up:  Víctor Alfaro MD  [address] [place] [address]  [address] [place] [id number] [phone]    Schedule an appointment as soon as possible for a visit            Medications Prescribed:  Current Discharge Medication List

## 2024-09-15 NOTE — ED INITIAL ASSESSMENT (HPI)
Patient states c/o shortness of breath worse with exertion for a couple months.Patient states c/o left elbow pain with no known injury. Patient has history of blood transfusions.

## 2024-09-16 NOTE — ED QUICK NOTES
Pt cleared for discharge by Von HATCH. This RN provided pt with discharge teaching, pt verbalized understanding of information. VSS prior to dispo, peripheral line removed. No further questions regarding discharge. States he will follow up with hemo/oncologist. Left via wheelchair pushed by Juan Alberto HILL with friend to drive him home.

## 2024-09-16 NOTE — ED QUICK NOTES
Rounding Completed. Plan of Care reviewed. Waiting for blood transfusion to be completed.  Elimination needs assessed.  VS taken.    Bed is locked and in lowest position. Call light within reach.

## 2024-09-17 LAB
BLOOD TYPE BARCODE: 9500
UNIT VOLUME: 350 ML

## 2024-09-20 PROCEDURE — 88185 FLOWCYTOMETRY/TC ADD-ON: CPT | Performed by: PATHOLOGY

## 2024-09-20 PROCEDURE — 88184 FLOWCYTOMETRY/ TC 1 MARKER: CPT | Performed by: PATHOLOGY

## 2024-09-23 ENCOUNTER — LAB REQUISITION (OUTPATIENT)
Age: 74
End: 2024-09-23
Payer: MEDICARE

## 2024-09-23 DIAGNOSIS — D46.9 MYELOID DYSPLASIA (HCC): ICD-10-CM

## 2024-09-26 ENCOUNTER — NURSE ONLY (OUTPATIENT)
Dept: HEMATOLOGY/ONCOLOGY | Facility: HOSPITAL | Age: 74
End: 2024-09-26
Attending: INTERNAL MEDICINE
Payer: MEDICARE

## 2024-09-26 DIAGNOSIS — D61.818 PANCYTOPENIA (HCC): ICD-10-CM

## 2024-09-26 DIAGNOSIS — D46.9 MDS (MYELODYSPLASTIC SYNDROME) (HCC): ICD-10-CM

## 2024-09-26 LAB
ANTIBODY SCREEN: NEGATIVE
RH BLOOD TYPE: NEGATIVE

## 2024-09-26 PROCEDURE — 86901 BLOOD TYPING SEROLOGIC RH(D): CPT

## 2024-09-26 PROCEDURE — 36415 COLL VENOUS BLD VENIPUNCTURE: CPT

## 2024-09-26 PROCEDURE — 86850 RBC ANTIBODY SCREEN: CPT

## 2024-09-26 PROCEDURE — 86900 BLOOD TYPING SEROLOGIC ABO: CPT

## 2024-09-27 ENCOUNTER — OFFICE VISIT (OUTPATIENT)
Dept: HEMATOLOGY/ONCOLOGY | Facility: HOSPITAL | Age: 74
End: 2024-09-27
Attending: INTERNAL MEDICINE
Payer: MEDICARE

## 2024-09-27 VITALS
OXYGEN SATURATION: 100 % | WEIGHT: 159.81 LBS | TEMPERATURE: 98 F | DIASTOLIC BLOOD PRESSURE: 58 MMHG | RESPIRATION RATE: 16 BRPM | HEIGHT: 72.01 IN | BODY MASS INDEX: 21.65 KG/M2 | SYSTOLIC BLOOD PRESSURE: 115 MMHG | HEART RATE: 66 BPM

## 2024-09-27 DIAGNOSIS — D46.9 MDS (MYELODYSPLASTIC SYNDROME) (HCC): Primary | ICD-10-CM

## 2024-09-27 DIAGNOSIS — D61.818 PANCYTOPENIA (HCC): ICD-10-CM

## 2024-09-27 PROCEDURE — 36430 TRANSFUSION BLD/BLD COMPNT: CPT

## 2024-09-27 NOTE — PROGRESS NOTES
Education Record    Learner:  Patient    Pt here for: Blood transfusion    Barriers / Limitations:  None    Method:  Brief focused, printed material and  reinforcement    General Topics:  Plan of care reviewed    Outcome: Pt tolerated transfusion with no c/o. No pre-meds with blood. No appts at this time. Patient left in stable condition.

## 2024-09-27 NOTE — PATIENT INSTRUCTIONS
Post Transfusion Instructions for Out-Patients    Most recipients of blood transfusions do not experience any adverse effects.  You may resume your normal activities 4 to 6 hours after your blood transfusion.  Occasionally, reactions of blood transfusions may be delayed (for several weeks).    Symptoms of a transfusion reaction can include:    Faintness  Weakness  Nausea  Vomiting  Shortness of breath  Chest pain  Back pain  Hives  Rash  Itch  Flushing  Fever  Chills  Jaundice (yellowish tint to eyes/skin)  Dark or red urine    Though these symptoms may not be related to the blood transfusions, they should be reported to your physician.  Contact both your physician and the laboratory Blood Bank (see information below) so that your symptoms can be thoroughly evaluated.    Your physician's name: Víctor Alfaro MD  Your physician's phone number: [phone]    If your physician is unavailable, contact the Emergency Department.    [facility] Blood Bank:  [phone]  [facility] Emergency Department:  [phone]    [facility] Blood Bank: [phone]  [facility] Emergency Department: [phone]

## 2024-09-30 LAB
CD10 CELLS NFR SPEC: <1 %
CD10/CD19: <1 %
CD19 CELLS NFR SPEC: 4 %
CD19+/CD200+: 2 %
CD2 CELLS NFR SPEC: 96 %
CD20 CELLS NFR SPEC: 4 %
CD200 CELLS: 7 %
CD3 CELLS NFR SPEC: 90 %
CD3+/TCRGD+: 7 %
CD3+CD4+ CELLS NFR SPEC: 54 %
CD3+CD4+ CELLS/CD3+CD8+ CLL SPEC: 1.7
CD3+CD8+ CELLS NFR SPEC: 32 %
CD3-/CD56+: 7 %
CD34 CELLS NFR SPEC: <1 %
CD38 CELLS NFR SPEC: 8 %
CD38+/CD19+: <1 %
CD45 CELLS NFR SPEC: 100 %
CD5 CELLS NFR SPEC: 85 %
CD5/CD19 CELLS: <1 %
CD7 CELLS NFR SPEC: 85 %
CELL SURF KAPPA/LAMBDA RATIO: 1.5
CELL SURF LAMBDA LIGHT CHAIN: 2 %
CELL SURFACE KAPPA LIGHT CHAIN: 3 %
TCR G-D CELLS NFR SPEC: 7 %

## 2024-10-02 ENCOUNTER — APPOINTMENT (OUTPATIENT)
Dept: CV DIAGNOSTICS | Facility: HOSPITAL | Age: 74
End: 2024-10-02
Attending: HOSPITALIST
Payer: MEDICARE

## 2024-10-02 ENCOUNTER — HOSPITAL ENCOUNTER (INPATIENT)
Facility: HOSPITAL | Age: 74
LOS: 13 days | Discharge: HOME | End: 2024-10-15
Attending: EMERGENCY MEDICINE | Admitting: HOSPITALIST
Payer: MEDICARE

## 2024-10-02 ENCOUNTER — APPOINTMENT (OUTPATIENT)
Dept: GENERAL RADIOLOGY | Facility: HOSPITAL | Age: 74
End: 2024-10-02
Attending: EMERGENCY MEDICINE
Payer: MEDICARE

## 2024-10-02 DIAGNOSIS — R07.9 EXERTIONAL CHEST PAIN: Primary | ICD-10-CM

## 2024-10-02 DIAGNOSIS — J18.9 COMMUNITY ACQUIRED PNEUMONIA OF RIGHT LUNG, UNSPECIFIED PART OF LUNG: ICD-10-CM

## 2024-10-02 DIAGNOSIS — D46.9 MDS (MYELODYSPLASTIC SYNDROME) (HCC): ICD-10-CM

## 2024-10-02 DIAGNOSIS — I50.9 ACUTE ON CHRONIC CONGESTIVE HEART FAILURE, UNSPECIFIED HEART FAILURE TYPE (HCC): ICD-10-CM

## 2024-10-02 DIAGNOSIS — D69.6 SEVERE THROMBOCYTOPENIA (HCC): ICD-10-CM

## 2024-10-02 LAB
ALBUMIN SERPL-MCNC: 3.4 G/DL (ref 3.2–4.8)
ALBUMIN/GLOB SERPL: 1 {RATIO} (ref 1–2)
ALP LIVER SERPL-CCNC: 112 U/L
ALT SERPL-CCNC: 17 U/L
ANION GAP SERPL CALC-SCNC: 5 MMOL/L (ref 0–18)
ANTIBODY SCREEN: NEGATIVE
AST SERPL-CCNC: 14 U/L (ref ?–34)
BASOPHILS # BLD AUTO: 0.02 X10(3) UL (ref 0–0.2)
BASOPHILS NFR BLD AUTO: 0.6 %
BILIRUB SERPL-MCNC: 0.7 MG/DL (ref 0.2–1.1)
BUN BLD-MCNC: 26 MG/DL (ref 9–23)
CALCIUM BLD-MCNC: 10.5 MG/DL (ref 8.7–10.4)
CHLORIDE SERPL-SCNC: 104 MMOL/L (ref 98–112)
CO2 SERPL-SCNC: 22 MMOL/L (ref 21–32)
CREAT BLD-MCNC: 1.41 MG/DL
EGFRCR SERPLBLD CKD-EPI 2021: 53 ML/MIN/1.73M2 (ref 60–?)
EOSINOPHIL # BLD AUTO: 0 X10(3) UL (ref 0–0.7)
EOSINOPHIL NFR BLD AUTO: 0 %
ERYTHROCYTE [DISTWIDTH] IN BLOOD BY AUTOMATED COUNT: 14.4 %
FLUAV + FLUBV RNA SPEC NAA+PROBE: NEGATIVE
FLUAV + FLUBV RNA SPEC NAA+PROBE: NEGATIVE
GLOBULIN PLAS-MCNC: 3.4 G/DL (ref 2–3.5)
GLUCOSE BLD-MCNC: 151 MG/DL (ref 70–99)
HCT VFR BLD AUTO: 24.2 %
HGB BLD-MCNC: 8 G/DL
IMM GRANULOCYTES # BLD AUTO: 0.05 X10(3) UL (ref 0–1)
IMM GRANULOCYTES NFR BLD: 1.4 %
LACTATE SERPL-SCNC: 1 MMOL/L (ref 0.5–2)
LYMPHOCYTES # BLD AUTO: 0.59 X10(3) UL (ref 1–4)
LYMPHOCYTES NFR BLD AUTO: 17.1 %
MCH RBC QN AUTO: 29.6 PG (ref 26–34)
MCHC RBC AUTO-ENTMCNC: 33.1 G/DL (ref 31–37)
MCV RBC AUTO: 89.6 FL
MONOCYTES # BLD AUTO: 0.27 X10(3) UL (ref 0.1–1)
MONOCYTES NFR BLD AUTO: 7.8 %
NEUTROPHILS # BLD AUTO: 2.52 X10 (3) UL (ref 1.5–7.7)
NEUTROPHILS # BLD AUTO: 2.52 X10(3) UL (ref 1.5–7.7)
NEUTROPHILS NFR BLD AUTO: 73.1 %
NT-PROBNP SERPL-MCNC: 1675 PG/ML (ref ?–125)
OSMOLALITY SERPL CALC.SUM OF ELEC: 280 MOSM/KG (ref 275–295)
PLATELET # BLD AUTO: 113 10(3)UL (ref 150–450)
PLATELETS.RETICULATED NFR BLD AUTO: 37.8 % (ref 0–7)
POTASSIUM SERPL-SCNC: 3.8 MMOL/L (ref 3.5–5.1)
PROCALCITONIN SERPL-MCNC: 0.79 NG/ML (ref ?–0.05)
PROT SERPL-MCNC: 6.8 G/DL (ref 5.7–8.2)
RBC # BLD AUTO: 2.7 X10(6)UL
RH BLOOD TYPE: NEGATIVE
RSV RNA SPEC NAA+PROBE: NEGATIVE
SARS-COV-2 RNA RESP QL NAA+PROBE: NOT DETECTED
SODIUM SERPL-SCNC: 131 MMOL/L (ref 136–145)
TROPONIN I SERPL HS-MCNC: 11 NG/L
TROPONIN I SERPL HS-MCNC: 11 NG/L
WBC # BLD AUTO: 3.5 X10(3) UL (ref 4–11)

## 2024-10-02 PROCEDURE — 86900 BLOOD TYPING SEROLOGIC ABO: CPT | Performed by: EMERGENCY MEDICINE

## 2024-10-02 PROCEDURE — 71045 X-RAY EXAM CHEST 1 VIEW: CPT | Performed by: EMERGENCY MEDICINE

## 2024-10-02 PROCEDURE — 86920 COMPATIBILITY TEST SPIN: CPT

## 2024-10-02 PROCEDURE — 84484 ASSAY OF TROPONIN QUANT: CPT | Performed by: EMERGENCY MEDICINE

## 2024-10-02 PROCEDURE — 96365 THER/PROPH/DIAG IV INF INIT: CPT

## 2024-10-02 PROCEDURE — 83605 ASSAY OF LACTIC ACID: CPT | Performed by: EMERGENCY MEDICINE

## 2024-10-02 PROCEDURE — 99285 EMERGENCY DEPT VISIT HI MDM: CPT

## 2024-10-02 PROCEDURE — 83880 ASSAY OF NATRIURETIC PEPTIDE: CPT | Performed by: EMERGENCY MEDICINE

## 2024-10-02 PROCEDURE — 87040 BLOOD CULTURE FOR BACTERIA: CPT | Performed by: EMERGENCY MEDICINE

## 2024-10-02 PROCEDURE — 36415 COLL VENOUS BLD VENIPUNCTURE: CPT

## 2024-10-02 PROCEDURE — 0241U SARS-COV-2/FLU A AND B/RSV BY PCR (GENEXPERT): CPT | Performed by: EMERGENCY MEDICINE

## 2024-10-02 PROCEDURE — 93010 ELECTROCARDIOGRAM REPORT: CPT

## 2024-10-02 PROCEDURE — 93306 TTE W/DOPPLER COMPLETE: CPT | Performed by: HOSPITALIST

## 2024-10-02 PROCEDURE — 80053 COMPREHEN METABOLIC PANEL: CPT | Performed by: EMERGENCY MEDICINE

## 2024-10-02 PROCEDURE — 86850 RBC ANTIBODY SCREEN: CPT | Performed by: EMERGENCY MEDICINE

## 2024-10-02 PROCEDURE — 86901 BLOOD TYPING SEROLOGIC RH(D): CPT | Performed by: EMERGENCY MEDICINE

## 2024-10-02 PROCEDURE — 84145 PROCALCITONIN (PCT): CPT | Performed by: HOSPITALIST

## 2024-10-02 PROCEDURE — 84484 ASSAY OF TROPONIN QUANT: CPT | Performed by: HOSPITALIST

## 2024-10-02 PROCEDURE — 93005 ELECTROCARDIOGRAM TRACING: CPT

## 2024-10-02 PROCEDURE — 85025 COMPLETE CBC W/AUTO DIFF WBC: CPT | Performed by: EMERGENCY MEDICINE

## 2024-10-02 PROCEDURE — 96375 TX/PRO/DX INJ NEW DRUG ADDON: CPT

## 2024-10-02 RX ORDER — MORPHINE SULFATE 2 MG/ML
1 INJECTION, SOLUTION INTRAMUSCULAR; INTRAVENOUS EVERY 2 HOUR PRN
Status: DISCONTINUED | OUTPATIENT
Start: 2024-10-02 | End: 2024-10-04

## 2024-10-02 RX ORDER — ACETAMINOPHEN 500 MG
500 TABLET ORAL EVERY 4 HOURS PRN
Status: DISCONTINUED | OUTPATIENT
Start: 2024-10-02 | End: 2024-10-15

## 2024-10-02 RX ORDER — ATORVASTATIN CALCIUM 10 MG/1
10 TABLET, FILM COATED ORAL
Status: DISCONTINUED | OUTPATIENT
Start: 2024-10-02 | End: 2024-10-15

## 2024-10-02 RX ORDER — POTASSIUM CHLORIDE 1500 MG/1
40 TABLET, EXTENDED RELEASE ORAL ONCE
Status: COMPLETED | OUTPATIENT
Start: 2024-10-02 | End: 2024-10-02

## 2024-10-02 RX ORDER — METOPROLOL SUCCINATE 50 MG/1
50 TABLET, EXTENDED RELEASE ORAL
Status: DISCONTINUED | OUTPATIENT
Start: 2024-10-03 | End: 2024-10-15

## 2024-10-02 RX ORDER — MORPHINE SULFATE 2 MG/ML
2 INJECTION, SOLUTION INTRAMUSCULAR; INTRAVENOUS EVERY 2 HOUR PRN
Status: DISCONTINUED | OUTPATIENT
Start: 2024-10-02 | End: 2024-10-04

## 2024-10-02 RX ORDER — AZITHROMYCIN 250 MG/1
500 TABLET, FILM COATED ORAL DAILY
Status: COMPLETED | OUTPATIENT
Start: 2024-10-03 | End: 2024-10-04

## 2024-10-02 RX ORDER — MORPHINE SULFATE 4 MG/ML
4 INJECTION, SOLUTION INTRAMUSCULAR; INTRAVENOUS EVERY 2 HOUR PRN
Status: DISCONTINUED | OUTPATIENT
Start: 2024-10-02 | End: 2024-10-04

## 2024-10-02 RX ORDER — ALLOPURINOL 100 MG/1
100 TABLET ORAL DAILY
Status: DISCONTINUED | OUTPATIENT
Start: 2024-10-03 | End: 2024-10-15

## 2024-10-02 RX ORDER — HEPARIN SODIUM 5000 [USP'U]/ML
5000 INJECTION, SOLUTION INTRAVENOUS; SUBCUTANEOUS EVERY 8 HOURS SCHEDULED
Status: DISCONTINUED | OUTPATIENT
Start: 2024-10-02 | End: 2024-10-03

## 2024-10-02 NOTE — ED PROVIDER NOTES
Patient Seen in: [facility] Emergency Department      History     Chief Complaint   Patient presents with    Chest Pain Angina     Stated Complaint: chest pain for a couple days    Subjective:   HPI  ED History source : wife  73-year-old male comes to the hospital complaint of having difficulty with exertional chest pain and shortness of breath that is worsened over the last 5 days.  He has a history of having MDS and did have some similar symptoms consistent with this prior to him needing a transfusion Wel[place] week ago.  He is denying any headaches.  He says he feels that the symptoms are better after he rests for a while but with movement it returns.  He denies any known history of coronary artery disease or stenting.  He is denying any fevers.  Occasional cough.  Denies any abdominal pains.  He denies any nausea or vomiting.  He has no other complaints at this time.    Objective:     Past Medical History:    Atherosclerosis of abdominal aorta (HCC)    Cancer (HCC)    skin cancer-squamous cell    Dermatitis    Resolved per note 3/7/19     Essential hypertension    Gout    Gout, unspecified    Resolved as stable per note 3/7/19    Greater trochanteric bursitis, right    Resolved last addressed 9/4/14    High cholesterol    HTN (hypertension)    Hyperlipidemia    Lumbar radiculitis    Resolved per note 3/7/19    MDS (myelodysplastic syndrome) (HCC)    Osteoarthritis    right hip    Status post total replacement of right hip              Past Surgical History:   Procedure Laterality Date    Colonoscopy      Colonoscopy N/A 6/27/2016    Procedure: COLONOSCOPY;  Surgeon: Elvis Warren MD;  Location:  ENDOSCOPY    Hip replacement surgery      Other      wisdom teeth extraction                Social History     Socioeconomic History    Marital status:     Number of children: 1   Occupational History    Occupation: [employer]   Tobacco Use    Smoking status: Former     Current packs/day: 1.00      Average packs/day: 1 pack/day for 35.0 years (35.0 ttl pk-yrs)     Types: Cigarettes    Smokeless tobacco: Never   Vaping Use    Vaping status: Never Used   Substance and Sexual Activity    Alcohol use: Yes     Alcohol/week: 0.0 standard drinks of alcohol     Comment: rarely    Drug use: No    Sexual activity: Yes     Partners: Female   Other Topics Concern     Service No    Sleep Concern No    Exercise Yes    Seat Belt Yes     Social Determinants of Health     Food Insecurity: No Food Insecurity (6/18/2024)    Food Insecurity     Food Insecurity: Never true   Transportation Needs: No Transportation Needs (6/18/2024)    Transportation Needs     Lack of Transportation: No   Housing Stability: Low Risk  (6/18/2024)    Housing Stability     Housing Instability: No              Physical Exam     ED Triage Vitals [10/02/24 1023]   /56   Pulse 89   Resp 20   Temp 96.8 °F (36 °C)   Temp src Temporal   SpO2 97 %   O2 Device None (Room air)       Current Vitals:   Vital Signs  BP: 138/60  Pulse: 75  Resp: 25  Temp: 96.8 °F (36 °C)  Temp src: Temporal  MAP (mmHg): 80    Oxygen Therapy  SpO2: 96 %  O2 Device: None (Room air)        Physical Exam  In general the patient appears short of breath  HEENT : NCAT, EOMI, PEERL,  neck supple, no JVD, trachea midline, No LAD  Heart: S1S2 normal. No murmurs, regular rate and rhythm  Lungs: Clear to auscultation bilaterally  Abdomen: Soft nontender nondistended normal active bowel sounds without rebound, guarding or masses noted  Back nontender without CVA tenderness  Extremity no clubbing, cyanosis or edema noted.  Full range of motion noted without tenderness  Neuro: No focal deficits noted    All measures to prevent infection transmission during my interaction with the patient were taken.  The patient was already wearing droplet mask on my arrival to the room.  Personal protective equipment including a droplet mask as well as gloves were worn throughout the duration of my  exam.  Hand washing was performed prior to and after the exam.  Stethoscope and equipment used during my examination was cleaned with a super Sani cloth germicidal wipe following the exam.    ED Course     Labs Reviewed   COMP METABOLIC PANEL (14) - Abnormal; Notable for the following components:       Result Value    Glucose 151 (*)     Sodium 131 (*)     BUN 26 (*)     Creatinine 1.41 (*)     Calcium, Total 10.5 (*)     eGFR-Cr 53 (*)     All other components within normal limits   CBC WITH DIFFERENTIAL WITH PLATELET - Abnormal; Notable for the following components:    WBC 3.5 (*)     RBC 2.70 (*)     HGB 8.0 (*)     HCT 24.2 (*)     .0 (*)     Immature Platelet Fraction 37.8 (*)     Lymphocyte Absolute 0.59 (*)     All other components within normal limits   TROPONIN I HIGH SENSITIVITY - Normal   SARS-COV-2/FLU A AND B/RSV BY PCR (GENEXPERT) - Normal    Narrative:     This test is intended for the qualitative detection and differentiation of SARS-CoV-2, influenza A, influenza B, and respiratory syncytial virus (RSV) viral RNA in nasopharyngeal or nares swabs from individuals suspected of respiratory viral infection consistent with COVID-19 by their healthcare provider. Signs and symptoms of respiratory viral infection due to SARS-CoV-2, influenza, and RSV can be similar.    Test performed using the Xpert Xpress SARS-CoV-2/FLU/RSV (real time RT-PCR)  assay on the GeneXpert instrument, [employer], [place] [address].   This test is being used under the Food and Drug Administration's Emergency Use Authorization.    The authorized Fact Sheet for Healthcare Providers for this assay is available upon request from the laboratory.   SCAN SLIDE   PRO BETA NATRIURETIC PEPTIDE   LACTIC ACID, PLASMA   TYPE AND SCREEN    Narrative:     The following orders were created for panel order Type and screen.  Procedure                               Abnormality         Status                     ---------                                -----------         ------                     ABORH (Blood Type)[[id number]]                               Final result               Antibody Screen[[id number]]                                  Final result                 Please view results for these tests on the individual orders.   ABORH (BLOOD TYPE)   ANTIBODY SCREEN   RAINBOW DRAW BLUE   BLOOD CULTURE   BLOOD CULTURE     EKG    Rate, intervals and axes as noted on EKG Report.  Rate: 92  Rhythm: Sinus Rhythm  Reading: , QRS of 78, patient has a normal sinus rhythm with PVC noted without acute ischemic change.           ED Course as of 10/02/24 1216  ------------------------------------------------------------  Time: 10/02 1211  Comment: While the patient blood cultures x 2 and a lactic acid level sent.  The patient's white count was 3.5 thousand.  His hemoglobin was 8.  This is improved from prior.  COVID influenza and RSV test were negative.  The patient sodium was 131.  His BUN/creatinine are 26 and 1.41 respectively.  He had a chest x-ray done that I first interpreted showing cardiomegaly, right-sided effusion and infiltrate.  Read the radiology report as well.  IV antibiotics were given for his pneumonia.  Spoke with the hospitalist and send the patient admitted for further workup and care.       XR CHEST AP PORTABLE  (CPT=71045)    Result Date: 10/2/2024  PROCEDURE:  XR CHEST AP PORTABLE  (CPT=71045)  TECHNIQUE:  AP chest radiograph was obtained.  COMPARISON:  [facility] , XR, XR CHEST PA + LAT CHEST (CPT=71046), 9/15/2024, 2:20 PM.  [facility] , XR, XR CHEST AP PORTABLE  (CPT=71045), 7/28/2024, 12:35 PM.  INDICATIONS:  chest pain for a couple days  PATIENT STATED HISTORY: (As transcribed by Technologist)  Patient complains of right upper chest pain.              CONCLUSION:  There is significant enlargement in the previously noted right pleural effusion.  There is development of right lower lobe and right middle lobe consolidation.  There is slight  increased density to the right hilum which may represent is overlying disease/consolidation or lymphadenopathy. The heart size and vascularity are normal.  There is no CHF.  The left lung is clear. Degenerative change of the spine noted.   LOCATION:  [place] [id number]      Dictated by (CST): Trevon Zhong MD on 10/02/2024 at 11:52 AM     Finalized by (CST): Trevon Zhong MD on 10/02/2024 at 11:53 AM       US VENOUS DOPPLER ARM LEFT - DIAG IMG (CPT=93971)    Result Date: 9/15/2024  PROCEDURE:  US VENOUS DOPPLER ARM LEFT - DIAG IMG (CPT=93971)  COMPARISON:  None.  INDICATIONS:  eval for DVT, left upper extremity pain  TECHNIQUE:  Real time, grey scale, and duplex ultrasound was used to evaluate the upper extremity venous system. B-mode two-dimensional images of the vascular structures, Doppler spectral analysis, and color flow.  Doppler imaging were performed.  The following veins were imaged:  Subclavian, Jugular, Axillary, Brachial, Basilic, Cephalic, and the contralateral Subclavian and Jugular.  PATIENT STATED HISTORY: (As transcribed by Technologist)  Left elbow pain and lump for 3 days    FINDINGS:  EXTREMITY:  Left upper extremity THROMBI:  None visible. COMPRESSION:  Normal compressibility, phasicity, and augmentation of the subclavian, jugular, axillary, brachial, basilic, and cephalic veins. OTHER:  Negative.            CONCLUSION:  Unremarkable left upper extremity venous ultrasound examination.   LOCATION:  [place]   Dictated by (CST): Hosea Lawrence MD on 9/15/2024 at 4:56 PM     Finalized by (CST): Hosea Lawrence MD on 9/15/2024 at 4:56 PM       XR ELBOW, COMPLETE (MIN 3 VIEWS), LEFT (CPT=73080)    Result Date: 9/15/2024  PROCEDURE:  XR ELBOW, COMPLETE (MIN 3 VIEWS), LEFT (CPT=73080)  TECHNIQUE:  Three views were obtained.  COMPARISON:  None.  INDICATIONS:  SOB, I think I need a blood transfusion, last transfused last week  PATIENT STATED HISTORY: (As transcribed by Technologist)  Patient stated having left  elbow swelling after a blood transfusion a couple days ago.               CONCLUSION:   Negative for fracture or malalignment.  Normal mineralization.  Joint spaces are preserved.  No joint effusion or focal soft tissue swelling.   LOCATION:  [facility]    Dictated by (CST): Rosibel Donahue MD on 9/15/2024 at 2:48 PM     Finalized by (CST): Rosibel Donahue MD on 9/15/2024 at 2:49 PM       XR CHEST PA + LAT CHEST (CPT=71046)    Result Date: 9/15/2024  PROCEDURE:  XR CHEST PA + LAT CHEST (CPT=71046)  INDICATIONS:  SOB, I think I need a blood transfusion, last transfused last week  COMPARISON:  [facility] , XR, XR CHEST AP PORTABLE  (CPT=71045), 7/28/2024, 12:35 PM.  TECHNIQUE:  PA and lateral chest radiographs were obtained.  PATIENT STATED HISTORY: (As transcribed by Technologist)  Patient stated having shortness of breath today.               CONCLUSION:   Stable cardiac and mediastinal contours.  Pulmonary hyperexpansion.  Confluent opacification of the right lung base most in keeping with small pleural effusion and passive atelectasis.  No pneumothorax.   LOCATION:  [facility]   Dictated by (CST): Rosibel Donahue MD on 9/15/2024 at 2:46 PM     Finalized by (CST): Rosibel Donahue MD on 9/15/2024 at 2:47 PM        Medications   cefTRIAXone (Rocephin) 2 g in sodium chloride 0.9% 100 mL IVPB-ADDV (has no administration in time range)   azithromycin (Zithromax) 500 mg in sodium chloride 0.9% 250mL IVPB premix (has no administration in time range)            MDM      Differential diagnosis included coronary artery insufficiency, unstable angina, pneumonia, CHF but not limited to these.  Patient has findings consistent with CHF as well as pneumonia.  At this time the patient admitted for treatment for such and will need further evaluation for his chest pain in addition.  The patient be made to the hospital service at this time for further care.      This note was prepared using Dragon Medical voice recognition dictation software.  As a result  errors may occur.  When identified to these areas have been corrected.  While every attempt is made to correct errors during dictation discrepancies may still exist.  Please contact if there are any errors.                Admission disposition: 10/2/2024 12:16 PM           Medical Decision Making      Disposition and Plan     Clinical Impression:  1. Exertional chest pain    2. Acute on chronic congestive heart failure, unspecified heart failure type (HCC)    3. Community acquired pneumonia of right lung, unspecified part of lung         Disposition:  Admit  10/2/2024 12:16 pm    Follow-up:  No follow-up provider specified.        Medications Prescribed:  Current Discharge Medication List              Supplementary Documentation:         Hospital Problems       Present on Admission  Date Reviewed: 2/8/2022            ICD-10-CM Noted POA    * (Principal) Exertional chest pain R07.9 10/2/2024 Unknown

## 2024-10-02 NOTE — ED QUICK NOTES
Orders for admission, patient is aware of plan and ready to go upstairs. Any questions, please call ED [facility] at extension [phone].     Patient Covid vaccination status: Fully vaccinated     COVID Test Ordered in ED: SARS-CoV-2/Flu A and B/RSV by PCR (GeneXpert)    COVID Suspicion at Admission: N/A    Running Infusions:      Mental Status/LOC at time of transport: x4    Other pertinent information:   CIWA score: N/A   NIH score:  N/A

## 2024-10-02 NOTE — ED QUICK NOTES
Patient and daughter informed of the inpatient room they will be going to and informed them transport had been requested. Both verbalized understanding.

## 2024-10-02 NOTE — CONSULTS
[facility]    Guy Díazguadalupe Patient Status:  Inpatient    1950 MRN [id number]   Location [facility] PCP Salvador Cohn MD     Date of Admission: 10/2/2024    History of Present Illness: Pt is a 73 year old male consulted for dyspnea and abnormal CXR /effusion with h/o MDS on therapy with Vidaza, HTN, hyperlipidemia. Pt had last dose about 1 month ago. Notes about 1 week ago started developing dyspnea and R sided pleuritic pain. This gradually worsened and prompted evaluation in ED today. Does admit to cough that is non productive, no f/c/s and no sick contacts.     PMH:    Past Medical History:    Atherosclerosis of abdominal aorta (HCC)    Cancer (HCC)    skin cancer-squamous cell    Dermatitis    Resolved per note 3/7/19     Essential hypertension    Gout    Gout, unspecified    Resolved as stable per note 3/7/19    Greater trochanteric bursitis, right    Resolved last addressed 14    High blood pressure    High cholesterol    HTN (hypertension)    Hyperlipidemia    Lumbar radiculitis    Resolved per note 3/7/19    MDS (myelodysplastic syndrome) (HCC)    Osteoarthritis    right hip    Status post total replacement of right hip       Past Surgical History:   Procedure Laterality Date    Colonoscopy      Colonoscopy N/A 2016    Procedure: COLONOSCOPY;  Surgeon: Elvis Warren MD;  Location:  ENDOSCOPY    Hip replacement surgery      Other      wisdom teeth extraction       Allergies:   Allergies   Allergen Reactions    Radiology Contrast Iodinated Dyes HIVES       Medications:  Outpatient Medications:    No current facility-administered medications on file prior to encounter.     Current Outpatient Medications on File Prior to Encounter   Medication Sig Dispense Refill    prochlorperazine (COMPAZINE) 10 mg tablet Take 1 tablet (10 mg total) by mouth every 6 (six) hours as needed.      ergocalciferol 1.25 MG (07372 UT) Oral Cap Take 1 capsule (50,000 Units  total) by mouth once a week.      metoprolol succinate ER 50 MG Oral Tablet 24 Hr Take 1 tablet (50 mg total) by mouth daily.      allopurinol 100 MG Oral Tab Take 1 tablet (100 mg total) by mouth daily. (Patient taking differently: Take 1 tablet (100 mg total) by mouth at bedtime.) 90 tablet 3    Multiple Vitamin (MULTI VITAMIN MENS) Oral Tab Take 1 tablet by mouth daily.         Inpatient Medications:  Scheduled Medications:     [START ON 10/3/2024] cefTRIAXone  1 g Intravenous Q24H    [START ON 10/3/2024] azithromycin  500 mg Oral Daily    [START ON 10/3/2024] allopurinol  100 mg Oral Daily    atorvastatin  10 mg Oral After dinner    [START ON 10/3/2024] metoprolol succinate ER  50 mg Oral Daily Beta Blocker    heparin  5,000 Units Subcutaneous Q8H JODY    lidocaine-menthol  1 patch Transdermal Daily    potassium chloride  40 mEq Oral Once     Infusing Medications:    PRN Medications:    acetaminophen    morphINE **OR** morphINE **OR** morphINE    Social History:    History   Smoking Status    Former    Types: Cigarettes   Smokeless Tobacco    Never   Quit 2024, <1ppd  x 30 yrs.     History   Alcohol Use    0.0 standard drinks of alcohol/week     Comment: rarely       History   Drug Use No     Work:Retired [employer].   TB: None  Asbestos: None      Family History   Problem Relation Age of Onset    Cancer Other     Stroke Other     Stroke Mother       REVIEW OF SYSTEMS:   A comprehensive 10 point review of systems was completed.  Pertinent positives and negatives noted in the the HPI.    OBJECTIVE:  Temp (24hrs), Av.1 °F (36.2 °C), Min:96.8 °F (36 °C), Max:97.4 °F (36.3 °C)   /48 (BP Location: Right arm)   Pulse 98   Temp 97.4 °F (36.3 °C) (Oral)   Resp 20   Ht 182.9 cm (6')   Wt 154 lb 1.6 oz (69.9 kg)   SpO2 93%   BMI 20.90 kg/m²   O2: RA  General: NAD.   Neuro: Alert, no focal deficits.    HEENT: PERRL  Neck : No LAD  CV: RRR, nl S1, S2, no S4, S3 or murmur.   Lungs: Decreased R base and  dull at base and and lateral in R decub positioning.   Abd: Nontender, non distended.   Ext: No edema.   Skin: No rashes.     Labs:  Recent Labs   Lab 10/02/24  1040   WBC 3.5*   HGB 8.0*   HCT 24.2*   .0*     Recent Labs   Lab 10/02/24  1040   *   BUN 26*   CREATSERUM 1.41*   CA 10.5*   *   K 3.8      CO2 22.0   AST 14   ALT 17   ALB 3.4     No results for input(s): \"INR\", \"PTT\" in the last 168 hours.  No results for input(s): \"ABGPHT\", \"KPUYTF1G\", \"JFEJZ0T\", \"ABGHCO3\", \"SITE\", \"DEV\", \"THGB\" in the last 168 hours.  Recent Labs   Lab 10/02/24  1040 10/02/24  1509   TROPHS 11 11     No results for input(s): \"BNP\" in the last 168 hours.  COVID-19 Lab Results    COVID-19  Lab Results   Component Value Date    COVID19 Not Detected 10/02/2024       Pro-Calcitonin  Recent Labs   Lab 10/02/24  1509   PCT 0.79*       Cardiac  Recent Labs   Lab 10/02/24  1040   PBNP 1,675*       Creatinine Kinase  No results for input(s): \"CK\" in the last 168 hours.    Inflammatory Markers  No results for input(s): \"CRP\", \"SERGEY\", \"LDH\", \"DDIMER\" in the last 168 hours.      Imaging:  CXR 10/2/2024    There is significant enlargement in the previously noted right pleural effusion.  There is development of right lower lobe and right middle lobe consolidation.  There is slight increased density to the right hilum which may represent is overlying   disease/consolidation or lymphadenopathy.   The heart size and vascularity are normal.  There is no CHF.  The left lung is clear.   Degenerative change of the spine noted.     10/2/24      9/15/24    Chest images personally reviewed.     Assessment/Plan   Dyspnea secondary to R pleural effusion suspect pneumonia.     ID: Suspect CAP in immunocompromised pt.   Rocephin/Azithro 10/2/2024 start.   Deescalate pending cx.   PCT mildly elevated.   R sided pleural effusion ?parapneumonic vs empyema.   New onset vs 9/15. Not typical side effect of Vidaza.   Recommend R ultrasound  guided thoracentesis for diagnostic and therapeutic goal and send for labs and cx.   Will order for IR tomorrow.   Check labs.   MDS  Outpt f/u with onc.   Notes leukopenic.   Proph  Subcutaneous heparin. Hold after PM dose for potential thora tomorrow.       My best regards,         Meet Mulligan MD  [facility] Pulmonary, Critical Care and Sleep Medicine

## 2024-10-02 NOTE — H&P
[facility] H&P/Consult note       CC:   Chief Complaint   Patient presents with    Chest Pain Angina        PCP: Salvador Cohn MD    History of Present Illness: Patient is a 73 year old male with PMH sig for HTN, HLD, MDS on chemotherapy, here for chest pain /sob    Symptoms ongoing for 10 days but worse in the past 2 days. R sided chest wall pain, worse with movement / activity, alos with exertional sob. He initially thought was because of anemia (gets frequent transfsuions given his MDS) but hgb on admission in 8s. No bleeding reported  He deneid any recent illness but does report new cough. No fevers/ chills. No n//v. No abd pain. No left sided sampson pain      PMH  Past Medical History:    Atherosclerosis of abdominal aorta (HCC)    Cancer (HCC)    skin cancer-squamous cell    Dermatitis    Resolved per note 3/7/19     Essential hypertension    Gout    Gout, unspecified    Resolved as stable per note 3/7/19    Greater trochanteric bursitis, right    Resolved last addressed 9/4/14    High cholesterol    HTN (hypertension)    Hyperlipidemia    Lumbar radiculitis    Resolved per note 3/7/19    MDS (myelodysplastic syndrome) (HCC)    Osteoarthritis    right hip    Status post total replacement of right hip        PSH  Past Surgical History:   Procedure Laterality Date    Colonoscopy      Colonoscopy N/A 6/27/2016    Procedure: COLONOSCOPY;  Surgeon: Elvis Warren MD;  Location:  ENDOSCOPY    Hip replacement surgery      Other      wisdom teeth extraction        ALL:  Allergies   Allergen Reactions    Radiology Contrast Iodinated Dyes HIVES        Home Medications:  Outpatient Medications Marked as Taking for the 10/2/24 encounter (Hospital Encounter)   Medication Sig Dispense Refill    prochlorperazine (COMPAZINE) 10 mg tablet Take 1 tablet (10 mg total) by mouth every 6 (six) hours as needed.      metoprolol succinate ER 50 MG Oral Tablet 24 Hr Take 1 tablet (50 mg total) by mouth daily.       atorvastatin 10 MG Oral Tab Take 1 tablet (10 mg total) by mouth After dinner. 90 tablet 3    allopurinol 100 MG Oral Tab Take 1 tablet (100 mg total) by mouth daily. 90 tablet 3    Multiple Vitamin (MULTI VITAMIN MENS) Oral Tab Take 1 tablet by mouth daily.           Soc Hx  Social History     Tobacco Use    Smoking status: Former     Current packs/day: 1.00     Average packs/day: 1 pack/day for 35.0 years (35.0 ttl pk-yrs)     Types: Cigarettes    Smokeless tobacco: Never   Substance Use Topics    Alcohol use: Yes     Alcohol/week: 0.0 standard drinks of alcohol     Comment: rarely        Fam Hx  Family History   Problem Relation Age of Onset    Cancer Other     Stroke Other     Stroke Mother        Review of Systems  Comprehensive ROS reviewed and negative except for what's stated above.         OBJECTIVE:  /60   Pulse 75   Temp 96.8 °F (36 °C) (Temporal)   Resp 25   Ht 6' (1.829 m)   Wt 155 lb (70.3 kg)   SpO2 96%   BMI 21.02 kg/m²   General:  Alert, no distress, appears stated age.   Head:  Normocephalic    Eyes:  Sclera anicteric    Throat: MMM   Neck: Supple    Lungs:   Clear to auscultation bilaterally. Normal effort   Chest wall:  TTP in lwer chest posteriorly and anteriorly   Heart:  Regular rate and rhythm,    Abdomen:   Soft, NT/ND - did have some pain in RUQ but appears more rib pain than abdomen pain   Extremities: Atraumatic, no cyanosis or edema.   Skin: No visible rashes or lesions.    Neurologic: Normal strength, no focal deficit appreciated     Diagnostic Data:    CBC/Chem  Recent Labs   Lab 10/02/24  1040   WBC 3.5*   HGB 8.0*   MCV 89.6   .0*       Recent Labs   Lab 10/02/24  1040   *   K 3.8      CO2 22.0   BUN 26*   CREATSERUM 1.41*   *   CA 10.5*       Recent Labs   Lab 10/02/24  1040   ALT 17   AST 14   ALB 3.4       No results for input(s): \"TROP\" in the last 168 hours.    Additional Diagnostics: ECG: sinus    CXR: image personally reviewed      Radiology: XR CHEST AP PORTABLE  (CPT=71045)    Result Date: 10/2/2024  PROCEDURE:  XR CHEST AP PORTABLE  (CPT=71045)  TECHNIQUE:  AP chest radiograph was obtained.  COMPARISON:  [facility] , XR, XR CHEST PA + LAT CHEST (CPT=71046), 9/15/2024, 2:20 PM.  [facility] , XR, XR CHEST AP PORTABLE  (CPT=71045), 7/28/2024, 12:35 PM.  INDICATIONS:  chest pain for a couple days  PATIENT STATED HISTORY: (As transcribed by Technologist)  Patient complains of right upper chest pain.              CONCLUSION:  There is significant enlargement in the previously noted right pleural effusion.  There is development of right lower lobe and right middle lobe consolidation.  There is slight increased density to the right hilum which may represent is overlying disease/consolidation or lymphadenopathy. The heart size and vascularity are normal.  There is no CHF.  The left lung is clear. Degenerative change of the spine noted.   LOCATION:  [place] [id number]      Dictated by (CST): Trevon Zhong MD on 10/02/2024 at 11:52 AM     Finalized by (CST): Trevon Zhong MD on 10/02/2024 at 11:53 AM       US VENOUS DOPPLER ARM LEFT - DIAG IMG (CPT=93971)    Result Date: 9/15/2024  PROCEDURE:  US VENOUS DOPPLER ARM LEFT - DIAG IMG (CPT=93971)  COMPARISON:  None.  INDICATIONS:  eval for DVT, left upper extremity pain  TECHNIQUE:  Real time, grey scale, and duplex ultrasound was used to evaluate the upper extremity venous system. B-mode two-dimensional images of the vascular structures, Doppler spectral analysis, and color flow.  Doppler imaging were performed.  The following veins were imaged:  Subclavian, Jugular, Axillary, Brachial, Basilic, Cephalic, and the contralateral Subclavian and Jugular.  PATIENT STATED HISTORY: (As transcribed by Technologist)  Left elbow pain and lump for 3 days    FINDINGS:  EXTREMITY:  Left upper extremity THROMBI:  None visible. COMPRESSION:  Normal compressibility, phasicity, and augmentation of the subclavian, jugular,  axillary, brachial, basilic, and cephalic veins. OTHER:  Negative.            CONCLUSION:  Unremarkable left upper extremity venous ultrasound examination.   LOCATION:  [facility]   Dictated by (CST): Hosea Lawrence MD on 9/15/2024 at 4:56 PM     Finalized by (CST): Hosea Lawrence MD on 9/15/2024 at 4:56 PM       XR ELBOW, COMPLETE (MIN 3 VIEWS), LEFT (CPT=73080)    Result Date: 9/15/2024  PROCEDURE:  XR ELBOW, COMPLETE (MIN 3 VIEWS), LEFT (CPT=73080)  TECHNIQUE:  Three views were obtained.  COMPARISON:  None.  INDICATIONS:  SOB, I think I need a blood transfusion, last transfused last week  PATIENT STATED HISTORY: (As transcribed by Technologist)  Patient stated having left elbow swelling after a blood transfusion a couple days ago.               CONCLUSION:   Negative for fracture or malalignment.  Normal mineralization.  Joint spaces are preserved.  No joint effusion or focal soft tissue swelling.   LOCATION:  [facility]    Dictated by (CST): Rosibel Donahue MD on 9/15/2024 at 2:48 PM     Finalized by (CST): Rosibel Donahue MD on 9/15/2024 at 2:49 PM       XR CHEST PA + LAT CHEST (CPT=71046)    Result Date: 9/15/2024  PROCEDURE:  XR CHEST PA + LAT CHEST (CPT=71046)  INDICATIONS:  SOB, I think I need a blood transfusion, last transfused last week  COMPARISON:  [facility] , XR, XR CHEST AP PORTABLE  (CPT=71045), 7/28/2024, 12:35 PM.  TECHNIQUE:  PA and lateral chest radiographs were obtained.  PATIENT STATED HISTORY: (As transcribed by Technologist)  Patient stated having shortness of breath today.               CONCLUSION:   Stable cardiac and mediastinal contours.  Pulmonary hyperexpansion.  Confluent opacification of the right lung base most in keeping with small pleural effusion and passive atelectasis.  No pneumothorax.   LOCATION:  [facility]   Dictated by (CST): Rosible Donahue MD on 9/15/2024 at 2:46 PM     Finalized by (CST): Rosibel Donahue MD on 9/15/2024 at 2:47 PM          ASSESSMENT / PLAN:     Patient is a 73 year old male with PMH  sig for HTN, HLD, MDS on chemotherapy, here for chest pain /sob    Impression    -chest pain   -sob    -HTN  -HLD    -CKD 3, baseline Cr 1.5-1.7    -MDS on Vidaza  -pancytopenia from above   -hypercalcemia    Plan    *R chest pain / sob  -symptoms lkely due to R sided cxr findings. Less likely cardiac etiology but will trend trop and EKG. Tele. Check echo given increased cardiac sillhouete on imaging     *R sided pleural effusion / likely pna  -iv abx.   [facility] pulm will see  -may need thoracentesis     *HTN  -on bb, cont    *HLD  -statin    *MDS  -outpt fu  -trend counts daily here     scds    Further recommendations pending patient's clinical course. [facility]ist to continue to follow patient while in house    Patient and/or patient's family given opportunity to ask questions and note understanding and agreeing with therapeutic plan as outlined    [facility]  [phone]  Answering Service: [phone]

## 2024-10-02 NOTE — PLAN OF CARE
Received patient from ER.Patient is AXOX4.On room air.NSR on tele.C/o back pain and chest pain with movement,no pain at rest.On IV Antibiotics.Pulmonology consult notified.Admission navigator completed.Skin assessment done with PCT.Continent of bowel and bladder.Last BM 10/1.Plan of care updated.Call light within reach.  Problem: Patient/Family Goals  Goal: Patient/Family Long Term Goal  Description: Patient's Long Term Goal: stay healthy    Interventions:  - Medication management  -Dietary management  -Prompt follow up with physician  - See additional Care Plan goals for specific interventions  Outcome: Progressing  Goal: Patient/Family Short Term Goal  Description: Patient's Short Term Goal: Discharge home    Interventions:   - Pulmonology consult  -IV antbiotics  -2D echo   - See additional Care Plan goals for specific interventions  Outcome: Progressing     Problem: RESPIRATORY - ADULT  Goal: Achieves optimal ventilation and oxygenation  Description: INTERVENTIONS:  - Assess for changes in respiratory status  - Assess for changes in mentation and behavior  - Position to facilitate oxygenation and minimize respiratory effort  - Oxygen supplementation based on oxygen saturation or ABGs  - Provide Smoking Cessation handout, if applicable  - Encourage broncho-pulmonary hygiene including cough, deep breathe, Incentive Spirometry  - Assess the need for suctioning and perform as needed  - Assess and instruct to report SOB or any respiratory difficulty  - Respiratory Therapy support as indicated  - Manage/alleviate anxiety  - Monitor for signs/symptoms of CO2 retention  Outcome: Progressing

## 2024-10-02 NOTE — ED INITIAL ASSESSMENT (HPI)
Pt to ER via wheelchair, states intermittent CP and SOB for 2 days pain 8/10 with exertion. Denies nausea, vomiting, dizziness or lightheadedness. States starts in front of chest and radiates to rt shoulder. Is supposed to restart chemo on Friday related to MDS cancer.

## 2024-10-02 NOTE — H&P (VIEW-ONLY)
[facility]    Guy D[facility]azguadalupe Patient Status:  Inpatient    1950 MRN [id number]   Location [facility] PCP Salvador Cohn MD     Date of Admission: 10/2/2024    History of Present Illness: Pt is a 73 year old male consulted for dyspnea and abnormal CXR /effusion with h/o MDS on therapy with Vidaza, HTN, hyperlipidemia. Pt had last dose about 1 month ago. Notes about 1 week ago started developing dyspnea and R sided pleuritic pain. This gradually worsened and prompted evaluation in ED today. Does admit to cough that is non productive, no f/c/s and no sick contacts.     PMH:    Past Medical History:    Atherosclerosis of abdominal aorta (HCC)    Cancer (HCC)    skin cancer-squamous cell    Dermatitis    Resolved per note 3/7/19     Essential hypertension    Gout    Gout, unspecified    Resolved as stable per note 3/7/19    Greater trochanteric bursitis, right    Resolved last addressed 14    High blood pressure    High cholesterol    HTN (hypertension)    Hyperlipidemia    Lumbar radiculitis    Resolved per note 3/7/19    MDS (myelodysplastic syndrome) (HCC)    Osteoarthritis    right hip    Status post total replacement of right hip       Past Surgical History:   Procedure Laterality Date    Colonoscopy      Colonoscopy N/A 2016    Procedure: COLONOSCOPY;  Surgeon: Elvis Warren MD;  Location:  ENDOSCOPY    Hip replacement surgery      Other      wisdom teeth extraction       Allergies:   Allergies   Allergen Reactions    Radiology Contrast Iodinated Dyes HIVES       Medications:  Outpatient Medications:    No current facility-administered medications on file prior to encounter.     Current Outpatient Medications on File Prior to Encounter   Medication Sig Dispense Refill    prochlorperazine (COMPAZINE) 10 mg tablet Take 1 tablet (10 mg total) by mouth every 6 (six) hours as needed.      ergocalciferol 1.25 MG (97349 UT) Oral Cap Take 1 capsule (50,000 Units  total) by mouth once a week.      metoprolol succinate ER 50 MG Oral Tablet 24 Hr Take 1 tablet (50 mg total) by mouth daily.      allopurinol 100 MG Oral Tab Take 1 tablet (100 mg total) by mouth daily. (Patient taking differently: Take 1 tablet (100 mg total) by mouth at bedtime.) 90 tablet 3    Multiple Vitamin (MULTI VITAMIN MENS) Oral Tab Take 1 tablet by mouth daily.         Inpatient Medications:  Scheduled Medications:     [START ON 10/3/2024] cefTRIAXone  1 g Intravenous Q24H    [START ON 10/3/2024] azithromycin  500 mg Oral Daily    [START ON 10/3/2024] allopurinol  100 mg Oral Daily    atorvastatin  10 mg Oral After dinner    [START ON 10/3/2024] metoprolol succinate ER  50 mg Oral Daily Beta Blocker    heparin  5,000 Units Subcutaneous Q8H JODY    lidocaine-menthol  1 patch Transdermal Daily    potassium chloride  40 mEq Oral Once     Infusing Medications:    PRN Medications:    acetaminophen    morphINE **OR** morphINE **OR** morphINE    Social History:    History   Smoking Status    Former    Types: Cigarettes   Smokeless Tobacco    Never   Quit 2024, <1ppd  x 30 yrs.     History   Alcohol Use    0.0 standard drinks of alcohol/week     Comment: rarely       History   Drug Use No     Work:Retired [employer].   TB: None  Asbestos: None      Family History   Problem Relation Age of Onset    Cancer Other     Stroke Other     Stroke Mother       REVIEW OF SYSTEMS:   A comprehensive 10 point review of systems was completed.  Pertinent positives and negatives noted in the the HPI.    OBJECTIVE:  Temp (24hrs), Av.1 °F (36.2 °C), Min:96.8 °F (36 °C), Max:97.4 °F (36.3 °C)   /48 (BP Location: Right arm)   Pulse 98   Temp 97.4 °F (36.3 °C) (Oral)   Resp 20   Ht 182.9 cm (6')   Wt 154 lb 1.6 oz (69.9 kg)   SpO2 93%   BMI 20.90 kg/m²   O2: RA  General: NAD.   Neuro: Alert, no focal deficits.    HEENT: PERRL  Neck : No LAD  CV: RRR, nl S1, S2, no S4, S3 or murmur.   Lungs: Decreased R base and  dull at base and and lateral in R decub positioning.   Abd: Nontender, non distended.   Ext: No edema.   Skin: No rashes.     Labs:  Recent Labs   Lab 10/02/24  1040   WBC 3.5*   HGB 8.0*   HCT 24.2*   .0*     Recent Labs   Lab 10/02/24  1040   *   BUN 26*   CREATSERUM 1.41*   CA 10.5*   *   K 3.8      CO2 22.0   AST 14   ALT 17   ALB 3.4     No results for input(s): \"INR\", \"PTT\" in the last 168 hours.  No results for input(s): \"ABGPHT\", \"GQQBYU0V\", \"ALRVR9U\", \"ABGHCO3\", \"SITE\", \"DEV\", \"THGB\" in the last 168 hours.  Recent Labs   Lab 10/02/24  1040 10/02/24  1509   TROPHS 11 11     No results for input(s): \"BNP\" in the last 168 hours.  COVID-19 Lab Results    COVID-19  Lab Results   Component Value Date    COVID19 Not Detected 10/02/2024       Pro-Calcitonin  Recent Labs   Lab 10/02/24  1509   PCT 0.79*       Cardiac  Recent Labs   Lab 10/02/24  1040   PBNP 1,675*       Creatinine Kinase  No results for input(s): \"CK\" in the last 168 hours.    Inflammatory Markers  No results for input(s): \"CRP\", \"SERGEY\", \"LDH\", \"DDIMER\" in the last 168 hours.      Imaging:  CXR 10/2/2024    There is significant enlargement in the previously noted right pleural effusion.  There is development of right lower lobe and right middle lobe consolidation.  There is slight increased density to the right hilum which may represent is overlying   disease/consolidation or lymphadenopathy.   The heart size and vascularity are normal.  There is no CHF.  The left lung is clear.   Degenerative change of the spine noted.     10/2/24      9/15/24    Chest images personally reviewed.     Assessment/Plan   Dyspnea secondary to R pleural effusion suspect pneumonia.     ID: Suspect CAP in immunocompromised pt.   Rocephin/Azithro 10/2/2024 start.   Deescalate pending cx.   PCT mildly elevated.   R sided pleural effusion ?parapneumonic vs empyema.   New onset vs 9/15. Not typical side effect of Vidaza.   Recommend R ultrasound  guided thoracentesis for diagnostic and therapeutic goal and send for labs and cx.   Will order for IR tomorrow.   Check labs.   MDS  Outpt f/u with onc.   Notes leukopenic.   Proph  Subcutaneous heparin. Hold after PM dose for potential thora tomorrow.       My best regards,         Meet Mulligan MD  [facility] Pulmonary, Critical Care [facility] Sleep Medicine

## 2024-10-03 ENCOUNTER — APPOINTMENT (OUTPATIENT)
Dept: GENERAL RADIOLOGY | Facility: HOSPITAL | Age: 74
End: 2024-10-03
Attending: RADIOLOGY
Payer: MEDICARE

## 2024-10-03 ENCOUNTER — APPOINTMENT (OUTPATIENT)
Dept: ULTRASOUND IMAGING | Facility: HOSPITAL | Age: 74
End: 2024-10-03
Attending: INTERNAL MEDICINE
Payer: MEDICARE

## 2024-10-03 LAB
ALBUMIN SERPL-MCNC: 3.1 G/DL (ref 3.2–4.8)
ALBUMIN/GLOB SERPL: 1.2 {RATIO} (ref 1–2)
ALP LIVER SERPL-CCNC: 99 U/L
ALT SERPL-CCNC: 19 U/L
AMYLASE PLR-CCNC: 22 U/L
ANION GAP SERPL CALC-SCNC: 8 MMOL/L (ref 0–18)
AST SERPL-CCNC: 11 U/L (ref ?–34)
ATRIAL RATE: 92 BPM
BASOPHILS NFR PLR: 0 %
BILIRUB SERPL-MCNC: 0.4 MG/DL (ref 0.2–1.1)
BUN BLD-MCNC: 31 MG/DL (ref 9–23)
CALCIUM BLD-MCNC: 9.8 MG/DL (ref 8.7–10.4)
CHLORIDE SERPL-SCNC: 106 MMOL/L (ref 98–112)
CHOLEST PLR-MCNC: <50 MG/DL
CO2 SERPL-SCNC: 24 MMOL/L (ref 21–32)
COLOR FLD: YELLOW
CREAT BLD-MCNC: 1.21 MG/DL
EGFRCR SERPLBLD CKD-EPI 2021: 63 ML/MIN/1.73M2 (ref 60–?)
EOSINOPHIL NFR PLR: 0 %
ERYTHROCYTE [DISTWIDTH] IN BLOOD BY AUTOMATED COUNT: 14.4 %
GLOBULIN PLAS-MCNC: 2.5 G/DL (ref 2–3.5)
GLUCOSE BLD-MCNC: 118 MG/DL (ref 70–99)
GLUCOSE PLR-MCNC: 73 MG/DL
HCT VFR BLD AUTO: 19.8 %
HCT VFR PLR CALC: <5 %
HGB BLD-MCNC: 6.6 G/DL
HGB BLD-MCNC: 7.9 G/DL
INR BLD: 1.37 (ref 0.8–1.2)
LDH FLD L TO P-CCNC: 357 U/L
LYMPHOCYTES NFR PLR: 7 %
MAGNESIUM SERPL-MCNC: 1.7 MG/DL (ref 1.6–2.6)
MCH RBC QN AUTO: 30.1 PG (ref 26–34)
MCHC RBC AUTO-ENTMCNC: 33.3 G/DL (ref 31–37)
MCV RBC AUTO: 90.4 FL
MONOS+MACROS NFR PLR: 17 %
NEUTROPHILS NFR PLR: 76 %
OSMOLALITY SERPL CALC.SUM OF ELEC: 294 MOSM/KG (ref 275–295)
P AXIS: 31 DEGREES
P-R INTERVAL: 116 MS
PH PLR: 7.39 [PH] (ref 7.2–7.5)
PLATELET # BLD AUTO: 111 10(3)UL (ref 150–450)
PLATELETS.RETICULATED NFR BLD AUTO: 34.9 % (ref 0–7)
POTASSIUM SERPL-SCNC: 3.8 MMOL/L (ref 3.5–5.1)
POTASSIUM SERPL-SCNC: 3.8 MMOL/L (ref 3.5–5.1)
PROCALCITONIN SERPL-MCNC: 0.9 NG/ML (ref ?–0.05)
PROT PLR-MCNC: 3.8 G/DL
PROT SERPL-MCNC: 5.6 G/DL (ref 5.7–8.2)
PROTHROMBIN TIME: 17 SECONDS (ref 11.6–14.8)
Q-T INTERVAL: 340 MS
QRS DURATION: 78 MS
QTC CALCULATION (BEZET): 420 MS
R AXIS: 44 DEGREES
RBC # BLD AUTO: 2.19 X10(6)UL
RBC PLEURAL FLUID: 3000 /MM3
SODIUM SERPL-SCNC: 138 MMOL/L (ref 136–145)
T AXIS: 28 DEGREES
TOTAL CELLS COUNTED FLD: 100
TROPONIN I SERPL HS-MCNC: 9 NG/L
VENTRICULAR RATE: 92 BPM
WBC # BLD AUTO: 3.5 X10(3) UL (ref 4–11)
WBC # PLR: 3451 /MM3

## 2024-10-03 PROCEDURE — 30233N1 TRANSFUSION OF NONAUTOLOGOUS RED BLOOD CELLS INTO PERIPHERAL VEIN, PERCUTANEOUS APPROACH: ICD-10-PCS | Performed by: HOSPITALIST

## 2024-10-03 PROCEDURE — 89050 BODY FLUID CELL COUNT: CPT | Performed by: INTERNAL MEDICINE

## 2024-10-03 PROCEDURE — 0W993ZZ DRAINAGE OF RIGHT PLEURAL CAVITY, PERCUTANEOUS APPROACH: ICD-10-PCS | Performed by: RADIOLOGY

## 2024-10-03 PROCEDURE — 85014 HEMATOCRIT: CPT | Performed by: INTERNAL MEDICINE

## 2024-10-03 PROCEDURE — 83615 LACTATE (LD) (LDH) ENZYME: CPT | Performed by: INTERNAL MEDICINE

## 2024-10-03 PROCEDURE — 88305 TISSUE EXAM BY PATHOLOGIST: CPT | Performed by: INTERNAL MEDICINE

## 2024-10-03 PROCEDURE — 36430 TRANSFUSION BLD/BLD COMPNT: CPT

## 2024-10-03 PROCEDURE — 84484 ASSAY OF TROPONIN QUANT: CPT | Performed by: HOSPITALIST

## 2024-10-03 PROCEDURE — 85027 COMPLETE CBC AUTOMATED: CPT | Performed by: HOSPITALIST

## 2024-10-03 PROCEDURE — 88108 CYTOPATH CONCENTRATE TECH: CPT | Performed by: INTERNAL MEDICINE

## 2024-10-03 PROCEDURE — 82800 BLOOD PH: CPT | Performed by: INTERNAL MEDICINE

## 2024-10-03 PROCEDURE — 80053 COMPREHEN METABOLIC PANEL: CPT | Performed by: HOSPITALIST

## 2024-10-03 PROCEDURE — 85018 HEMOGLOBIN: CPT | Performed by: INTERNAL MEDICINE

## 2024-10-03 PROCEDURE — 87205 SMEAR GRAM STAIN: CPT | Performed by: INTERNAL MEDICINE

## 2024-10-03 PROCEDURE — 32555 ASPIRATE PLEURA W/ IMAGING: CPT | Performed by: INTERNAL MEDICINE

## 2024-10-03 PROCEDURE — 88184 FLOWCYTOMETRY/ TC 1 MARKER: CPT | Performed by: INTERNAL MEDICINE

## 2024-10-03 PROCEDURE — 84132 ASSAY OF SERUM POTASSIUM: CPT | Performed by: HOSPITALIST

## 2024-10-03 PROCEDURE — 83735 ASSAY OF MAGNESIUM: CPT | Performed by: HOSPITALIST

## 2024-10-03 PROCEDURE — 82945 GLUCOSE OTHER FLUID: CPT | Performed by: INTERNAL MEDICINE

## 2024-10-03 PROCEDURE — 88341 IMHCHEM/IMCYTCHM EA ADD ANTB: CPT | Performed by: INTERNAL MEDICINE

## 2024-10-03 PROCEDURE — 89051 BODY FLUID CELL COUNT: CPT | Performed by: INTERNAL MEDICINE

## 2024-10-03 PROCEDURE — 82465 ASSAY BLD/SERUM CHOLESTEROL: CPT | Performed by: INTERNAL MEDICINE

## 2024-10-03 PROCEDURE — 87070 CULTURE OTHR SPECIMN AEROBIC: CPT | Performed by: INTERNAL MEDICINE

## 2024-10-03 PROCEDURE — 82150 ASSAY OF AMYLASE: CPT | Performed by: INTERNAL MEDICINE

## 2024-10-03 PROCEDURE — 88342 IMHCHEM/IMCYTCHM 1ST ANTB: CPT | Performed by: INTERNAL MEDICINE

## 2024-10-03 PROCEDURE — 85610 PROTHROMBIN TIME: CPT | Performed by: INTERNAL MEDICINE

## 2024-10-03 PROCEDURE — 84145 PROCALCITONIN (PCT): CPT | Performed by: HOSPITALIST

## 2024-10-03 PROCEDURE — 88185 FLOWCYTOMETRY/TC ADD-ON: CPT | Performed by: INTERNAL MEDICINE

## 2024-10-03 PROCEDURE — 84157 ASSAY OF PROTEIN OTHER: CPT | Performed by: INTERNAL MEDICINE

## 2024-10-03 RX ORDER — ONDANSETRON 2 MG/ML
4 INJECTION INTRAMUSCULAR; INTRAVENOUS ONCE AS NEEDED
Status: ACTIVE | OUTPATIENT
Start: 2024-10-03 | End: 2024-10-03

## 2024-10-03 RX ORDER — POTASSIUM CHLORIDE 1500 MG/1
40 TABLET, EXTENDED RELEASE ORAL ONCE
Status: COMPLETED | OUTPATIENT
Start: 2024-10-03 | End: 2024-10-03

## 2024-10-03 RX ORDER — HYDROCODONE BITARTRATE AND ACETAMINOPHEN 5; 325 MG/1; MG/1
2 TABLET ORAL EVERY 4 HOURS PRN
Status: DISCONTINUED | OUTPATIENT
Start: 2024-10-03 | End: 2024-10-15

## 2024-10-03 RX ORDER — MAGNESIUM OXIDE 400 MG/1
400 TABLET ORAL ONCE
Status: COMPLETED | OUTPATIENT
Start: 2024-10-03 | End: 2024-10-03

## 2024-10-03 RX ORDER — HYDROCODONE BITARTRATE AND ACETAMINOPHEN 5; 325 MG/1; MG/1
1 TABLET ORAL EVERY 6 HOURS PRN
Status: DISCONTINUED | OUTPATIENT
Start: 2024-10-03 | End: 2024-10-15

## 2024-10-03 RX ORDER — SODIUM CHLORIDE 9 MG/ML
INJECTION, SOLUTION INTRAVENOUS ONCE
Status: COMPLETED | OUTPATIENT
Start: 2024-10-03 | End: 2024-10-03

## 2024-10-03 RX ORDER — HYDROCODONE BITARTRATE AND ACETAMINOPHEN 5; 325 MG/1; MG/1
2 TABLET ORAL EVERY 4 HOURS PRN
Status: DISCONTINUED | OUTPATIENT
Start: 2024-10-03 | End: 2024-10-03

## 2024-10-03 NOTE — CONSULTS
[facility] HEMATOLOGY ONCOLOGY  Report of Consultation    Guy White Patient Status:  Inpatient    1950 MRN [id number]   Location [facility] 2NE-A Attending Raman Hurtado, DO   Hosp Day # 1 PCP Salvador Cohn MD     ADMIT DATE AND TIME: 10/2/2024 10:25 AM    ADMIT DIAGNOSIS: Exertional chest pain [R07.9]  Community acquired pneumonia of right lung, unspecified part of lung [J18.9]  Acute on chronic congestive heart failure, unspecified heart failure type (HCC) [I50.9]          REASON FOR CONSULTATION:     MDS  Pneumonia      HISTORY OF PRESENTING ILLNESS     Guy White is a 73 year old male history of myelodysplastic syndrome currently on 5 azacitidine in the clinic.  Comes in to the ER with complaints of dyspnea on exertion and right-sided pleuritic chest pain.  X-ray showed effusion and was admitted for possible community-acquired pneumonia.    Regarding MDS  He was diagnosed in May 2024 after he presented with pancytopenia  Diagnosed with MDS refractory anemia with excess blast and started on 5-azacytidine in 2024    PERTINENT HISTORY:     History:  Past Medical History:    Atherosclerosis of abdominal aorta (HCC)    Cancer (HCC)    skin cancer-squamous cell    Dermatitis    Resolved per note 3/7/19     Essential hypertension    Gout    Gout, unspecified    Resolved as stable per note 3/7/19    Greater trochanteric bursitis, right    Resolved last addressed 14    High blood pressure    High cholesterol    HTN (hypertension)    Hyperlipidemia    Lumbar radiculitis    Resolved per note 3/7/19    MDS (myelodysplastic syndrome) (HCC)    Osteoarthritis    right hip    Status post total replacement of right hip     Past Surgical History:   Procedure Laterality Date    Colonoscopy      Colonoscopy N/A 2016    Procedure: COLONOSCOPY;  Surgeon: Elvis Warren MD;  Location:  ENDOSCOPY    Hip replacement surgery      Other      wisdom teeth extraction      Family History   Problem Relation Age of Onset    Cancer Other     Stroke Other     Stroke Mother        SOCIAL HX   reports that he has quit smoking. His smoking use included cigarettes. He has a 35 pack-year smoking history. He has never used smokeless tobacco. He reports current alcohol use. He reports that he does not use drugs.    Allergies:  Allergies   Allergen Reactions    Radiology Contrast Iodinated Dyes HIVES       Medications:    Pre-Admission Meds:  Current Outpatient Medications   Medication Instructions    allopurinol (ZYLOPRIM) 100 mg, Oral, Daily    ergocalciferol (VITAMIN D2) 50,000 Units, Oral, Weekly    metoprolol succinate ER (TOPROL XL) 50 mg, Oral, Daily    Multiple Vitamin (MULTI VITAMIN MENS) Oral Tab 1 tablet, Oral, Daily    prochlorperazine (COMPAZINE) 10 mg tablet 1 tablet, Oral, Every 6 hours PRN       Current Inpatient Meds:   cefTRIAXone  1 g Intravenous Q24H    azithromycin  500 mg Oral Daily    allopurinol  100 mg Oral Daily    atorvastatin  10 mg Oral After dinner    metoprolol succinate ER  50 mg Oral Daily Beta Blocker    [Held by provider] heparin  5,000 Units Subcutaneous Q8H JODY    lidocaine-menthol  1 patch Transdermal Daily       Infusion      PRN    acetaminophen    morphINE **OR** morphINE **OR** morphINE    Review of Systems:  A 12-point review of systems was done with pertinent positives and negatives per the HPI.       Physical Exam:   Blood pressure 125/61, pulse 71, temperature 99.5 °F (37.5 °C), temperature source Oral, resp. rate 22, height 6' (1.829 m), weight 154 lb 1.6 oz (69.9 kg), SpO2 93%.    Performance Status: 2   General: Patient is alert and oriented x 3, not in acute distress.   HEENT: No Icterus. Oropharynx is clear.    Neck:  No palpable lymphadenopathy. Neck is supple.   Chest: Clear to auscultation.   Heart: Regular rate and rhythm.    Abdomen: Soft, non tender with good bowel sounds.   Extremities: Pedal pulses are present. No  edema.   Neurological: Grossly intact.    Lymphatics: There is no palpable lymphadenopathy           DIAGNOSTIC WORK UP:     Laboratory Data:      Recent Results (from the past 24 hour(s))   EKG 12 Lead    Collection Time: 10/02/24 10:11 AM   Result Value Ref Range    Ventricular rate 92 BPM    Atrial rate 92 BPM    P-R Interval 116 ms    QRS Duration 78 ms    Q-T Interval 340 ms    QTC Calculation (Bezet) 420 ms    P Axis 31 degrees    R Axis 44 degrees    T Axis 28 degrees   Comp Metabolic Panel (14)    Collection Time: 10/02/24 10:40 AM   Result Value Ref Range    Glucose 151 (H) 70 - 99 mg/dL    Sodium 131 (L) 136 - 145 mmol/L    Potassium 3.8 3.5 - 5.1 mmol/L    Chloride 104 98 - 112 mmol/L    CO2 22.0 21.0 - 32.0 mmol/L    Anion Gap 5 0 - 18 mmol/L    BUN 26 (H) 9 - 23 mg/dL    Creatinine 1.41 (H) 0.70 - 1.30 mg/dL    Calcium, Total 10.5 (H) 8.7 - 10.4 mg/dL    Calculated Osmolality 280 275 - 295 mOsm/kg    eGFR-Cr 53 (L) >=60 mL/min/1.73m2    AST 14 <34 U/L    ALT 17 10 - 49 U/L    Alkaline Phosphatase 112 45 - 117 U/L    Bilirubin, Total 0.7 0.2 - 1.1 mg/dL    Total Protein 6.8 5.7 - 8.2 g/dL    Albumin 3.4 3.2 - 4.8 g/dL    Globulin  3.4 2.0 - 3.5 g/dL    A/G Ratio 1.0 1.0 - 2.0   CBC With Differential With Platelet    Collection Time: 10/02/24 10:40 AM   Result Value Ref Range    WBC 3.5 (L) 4.0 - 11.0 x10(3) uL    RBC 2.70 (L) 3.80 - 5.80 x10(6)uL    HGB 8.0 (L) 13.0 - 17.5 g/dL    HCT 24.2 (L) 39.0 - 53.0 %    .0 (L) 150.0 - 450.0 10(3)uL    Immature Platelet Fraction 37.8 (H) 0.0 - 7.0 %    MCV 89.6 80.0 - 100.0 fL    MCH 29.6 26.0 - 34.0 pg    MCHC 33.1 31.0 - 37.0 g/dL    RDW 14.4 %    Neutrophil Absolute Prelim 2.52 1.50 - 7.70 x10 (3) uL    Neutrophil Absolute 2.52 1.50 - 7.70 x10(3) uL    Lymphocyte Absolute 0.59 (L) 1.00 - 4.00 x10(3) uL    Monocyte Absolute 0.27 0.10 - 1.00 x10(3) uL    Eosinophil Absolute 0.00 0.00 - 0.70 x10(3) uL    Basophil Absolute 0.02 0.00 - 0.20 x10(3) uL     Immature Granulocyte Absolute 0.05 0.00 - 1.00 x10(3) uL    Neutrophil % 73.1 %    Lymphocyte % 17.1 %    Monocyte % 7.8 %    Eosinophil % 0.0 %    Basophil % 0.6 %    Immature Granulocyte % 1.4 %   Troponin I (High Sensitivity)    Collection Time: 10/02/24 10:40 AM   Result Value Ref Range    Troponin I (High Sensitivity) 11 <=53 ng/L   SARS-CoV-2/Flu A and B/RSV by PCR (GeneXpert)    Collection Time: 10/02/24 10:40 AM    Specimen: Nares; Other   Result Value Ref Range    SARS-CoV-2 (COVID-19) - (GeneXpert) Not Detected Not Detected    Influenza A by PCR Negative Negative    Influenza B by PCR Negative Negative    RSV by PCR Negative Negative   ABORH (Blood Type)    Collection Time: 10/02/24 10:40 AM   Result Value Ref Range    ABO BLOOD TYPE O     RH BLOOD TYPE Negative    Antibody Screen    Collection Time: 10/02/24 10:40 AM   Result Value Ref Range    Antibody Screen Negative    Scan slide    Collection Time: 10/02/24 10:40 AM   Result Value Ref Range    Slide Review       Slide reviewed, normal WBC, RBC, and platelet morphology.   Pro Beta Natriuretic Peptide    Collection Time: 10/02/24 10:40 AM   Result Value Ref Range    Pro-Beta Natriuretic Peptide 1,675 (H) <125 pg/mL   RAINBOW DRAW BLUE    Collection Time: 10/02/24 10:41 AM   Result Value Ref Range    Hold Blue Auto Resulted    Lactic Acid, Plasma    Collection Time: 10/02/24 12:15 PM   Result Value Ref Range    Lactic Acid 1.0 0.5 - 2.0 mmol/L   Troponin I (High Sensitivity)    Collection Time: 10/02/24  3:09 PM   Result Value Ref Range    Troponin I (High Sensitivity) 11 <=53 ng/L   Procalcitonin    Collection Time: 10/02/24  3:09 PM   Result Value Ref Range    Procalcitonin 0.79 (H) <0.05 ng/mL       Recent Labs   Lab 10/02/24  1040   RBC 2.70*   HGB 8.0*   HCT 24.2*   MCV 89.6   MCH 29.6   MCHC 33.1   RDW 14.4   NEPRELIM 2.52   WBC 3.5*   .0*       CULTURE RESULTS  No results found for this visit on 10/02/24.      Imaging:    XR CHEST AP  PORTABLE  (CPT=71045)    Result Date: 10/2/2024  CONCLUSION:  There is significant enlargement in the previously noted right pleural effusion.  There is development of right lower lobe and right middle lobe consolidation.  There is slight increased density to the right hilum which may represent is overlying disease/consolidation or lymphadenopathy. The heart size and vascularity are normal.  There is no CHF.  The left lung is clear. Degenerative change of the spine noted.   LOCATION:  [place] [id number]      Dictated by (CST): Trevon Zhong MD on 10/02/2024 at 11:52 AM     Finalized by (CST): Trevon Zhong MD on 10/02/2024 at 11:53 AM          PROBLEM LIST:     Principal Problem:    Exertional chest pain  Active Problems:    Acute on chronic congestive heart failure, unspecified heart failure type (HCC)    Community acquired pneumonia of right lung, unspecified part of lung          ASSESSMENT AND PLAN:     Guy White is a 73 year old male with MDS admitted with pleural effusion and possible pneumonia    Myelodysplastic syndrome  Diagnosed in May 2024  Currently on 5-azacytidine in the clinic  No treatment while in the hospital    White blood cell count 3.5 with hemoglobin 6.6 and platelet count 1/1/2011  Need PRBC  Ordered for today  Monitor hemoglobin levels    Community-acquired pneumonia/pleural effusion  On antibiotics  Consulted pulmonary  Thoracentesis planned    Will continue to monitor him by him in the hospital please call us if you have any questions or concerns    Thank you for allowing me to participate in the care of your patient.    Vel Zhao MD      This note was prepared using Dragon Medical voice recognition dictation software. As a result errors may occur. When identified these errors have been corrected. While every attempt is made to correct errors during dictation discrepancies may still exist. Please call me to clarify any errors.

## 2024-10-03 NOTE — PROGRESS NOTES
S: He continues to have back pain. He also has an intermittent cough which makes the pain worse.    Meds:   cefTRIAXone  1 g Intravenous Q24H    azithromycin  500 mg Oral Daily    allopurinol  100 mg Oral Daily    atorvastatin  10 mg Oral After dinner    metoprolol succinate ER  50 mg Oral Daily Beta Blocker    lidocaine-menthol  1 patch Transdermal Daily       Prn Meds:    acetaminophen    morphINE **OR** morphINE **OR** morphINE    Infusions:      OBJECTIVE:  Vitals:    10/03/24 0900 10/03/24 1056 10/03/24 1100 10/03/24 1115   BP:  117/62 110/56 109/53   Pulse: 91 73 74 71   Resp:  16 16 18   Temp:  98 °F (36.7 °C) 98 °F (36.7 °C)    TempSrc:  Oral Oral Oral   SpO2: 95% 93% 92% 93%   Weight:       Height:         O2: room air    Gen - Alert, oriented x 3, in no apparent distress  Lungs - diminished breath sounds b/l. At right base.   CV - regular rate & rhythm. Normal S1, S2. No murmurs   Abdomen - soft, nontender to palpation   Extremities - No cyanosis, clubbing, edema appreciated.      Labs:  Recent Labs   Lab 10/02/24  1040 10/03/24  0801   WBC 3.5* 3.5*   HGB 8.0* 6.6*   .0* 111.0*     Recent Labs   Lab 10/02/24  1040 10/02/24  1215 10/03/24  0801   *  --  138   K 3.8  --  3.8  3.8     --  106   CO2 22.0  --  24.0   BUN 26*  --  31*   CREATSERUM 1.41*  --  1.21   *  --  118*   ANIONGAP 5  --  8   ALB 3.4  --  3.1*   CA 10.5*  --  9.8   MG  --   --  1.7   TP 6.8  --  5.6*   LACTI  --  1.0  --      Recent Labs   Lab 10/02/24  1040 10/03/24  0801   ALKPHO 112 99   AST 14 11   ALT 17 19   BILT 0.7 0.4     Recent Labs   Lab 10/03/24  0814   INR 1.37*     Recent Labs   Lab 10/02/24  1040 10/02/24  1509 10/03/24  0801   PBNP 1,675*  --   --    TROPHS 11 11 9     Recent Labs   Lab 10/02/24  1509 10/03/24  0801   PCT 0.79* 0.90*       Recent Labs   Lab 10/02/24  1040   COVID19 Not Detected   INFAPCR Negative   INFBPCR Negative   RSV Negative       Imaging reviewed    ASSESSMENT  AND PLAN      Dyspnea - secondary to new R pleural effusion +/- pneumonia (PCT elevated). DDx of pleural effusion includes infection, malignancy (although effusion wasn't seen 2 weeks ago), inflammatory process  -antibiotics for pneumonia : ceftriaxone/azithro (10/2-)  -IR consulted for thoracentesis, fluid to be sent for routine analysis  MDS - pancytopenic on labs  -prbc transfusion today  -per [facility]  Proph  -Subcutaneous heparin on hold for thoracentesis  Dispo  -full code  -inpatient     We will continue to follow with you     Raman Charles M.D.  Pulmonary/Critical Care

## 2024-10-03 NOTE — IMAGING NOTE
Called and spoke to Lilly CTU RN regarding pt's procedure.    Instructed to keep pt NPO 6 hrs prior to procedure.     Please draw STAT PT/INR.     Pt is consentable.     RAD RN will call back with time of procedure.    RN verbalized understanding.

## 2024-10-03 NOTE — IMAGING NOTE
Patient post thoracentesis.1.5L removed by Dr. Fisher. Pt with tegaderm dressing to right lateral back area, as per flowsheet documentation. VSSWilner PATEL called to Lilly, inpatient RN. Pt in no apparent distress, awaiting transport back to inpatient room.

## 2024-10-03 NOTE — PROGRESS NOTES
CC: follow-up hospital admission sob    SUBJECTIVE:  Interval History:     Still with R sided chest wall pain  Plan for thora today  No bleeding    OBJECTIVE:  Scheduled Meds:    sodium chloride   Intravenous Once    cefTRIAXone  1 g Intravenous Q24H    azithromycin  500 mg Oral Daily    allopurinol  100 mg Oral Daily    atorvastatin  10 mg Oral After dinner    metoprolol succinate ER  50 mg Oral Daily Beta Blocker    [Held by provider] heparin  5,000 Units Subcutaneous Q8H JODY    lidocaine-menthol  1 patch Transdermal Daily     Continuous Infusions:   PRN Meds:   acetaminophen    morphINE **OR** morphINE **OR** morphINE    PHYSICAL EXAM  Vital signs: Temp:  [96.8 °F (36 °C)-100.3 °F (37.9 °C)] 99.5 °F (37.5 °C)  Pulse:  [] 91  Resp:  [18-36] 22  BP: (101-138)/(47-73) 125/61  SpO2:  [88 %-98 %] 95 %      GENERAL - NAD, AAO  EYES- sclera anicteric,   HENT- normocephalic, OP - dry  NECK - no JVD  CV- RRR  RESP - CTAB, normal resp effort  ABDOMEN- soft, NT/ND   EXT- no LE edema   PSYCH - normal mentation/ normal affect    Data Review:   Labs:   Recent Labs   Lab 10/02/24  1040 10/03/24  0801 10/03/24  0814   WBC 3.5* 3.5*  --    HGB 8.0* 6.6*  --    MCV 89.6 90.4  --    .0* 111.0*  --    INR  --   --  1.37*       Recent Labs   Lab 10/02/24  1040 10/03/24  0801   * 138   K 3.8 3.8  3.8    106   CO2 22.0 24.0   BUN 26* 31*   CREATSERUM 1.41* 1.21   CA 10.5* 9.8   MG  --  1.7   * 118*       Recent Labs   Lab 10/02/24  1040 10/03/24  0801   ALT 17 19   AST 14 11   ALB 3.4 3.1*       No results for input(s): \"PGLU\" in the last 168 hours.    ECHO    Conclusions:     1. Study data: Normal sinus, frequent PACs   2. Left ventricle: The cavity size was normal. Wall thickness was normal.      Systolic function was normal. The estimated ejection fraction was 60-65%,      by visual assessment. No diagnostic evidence for regional wall motion      abnormalities. Unable to assess LV diastolic function  due to heart      rhythm.   3. Left atrium: The left atrial volume was mildly increased.   4. Aortic valve: Transvalvular velocity was minimally increased. There was      no evidence for stenosis. There was mild regurgitation. The peak systolic      velocity was 2.39m/sec. The mean systolic gradient was 9mm Hg. The valve      area (VTI) was 2.66cm^2. The valve area (VTI) index was 1.39cm^2/m^2.   5. Mitral valve: There was mild regurgitation.   6. Pulmonary arteries: Systolic pressure was moderately increased, in the      range of 50mm Hg to 55mm Hg.   7. Pericardium, extracardiac: A trivial pericardial effusion was identified.      There was no evidence of hemodynamic compromise. There was a right      pleural effusion.   Impressions:  No previous study from [facility] was   available for comparison.       ASSESSMENT/PLAN:    Patient is a 73 year old male with PMH sig for HTN, HLD, MDS on chemotherapy, here for chest pain /sob     Impression     -chest pain   -sob     -HTN  -HLD     -CKD 3, baseline Cr 1.5-1.7     -MDS on Vidaza  -pancytopenia from above   -hypercalcemia     Plan     *R chest pain / sob  -symptoms lkely due to R sided cxr findings. Less likely cardiac etiology but will trend trop and EKG -neg  -echo as above, no large pericardial effusion     *R sided pleural effusion / likely pna  -iv abx.   Dw pulm will see - plan for thoracentesis      *HTN  -on bb, cont     *HLD  -statin     *MDS  -heme eval  -trend counts daily here - hgb lower today, transfusion per heme     scds  Hold heparin for procedure    Code - dw pt, full code    Will continue to follow while hospitalized. Please page me or the on-call hospitalist with questions or concerns.    Brenden Montero Hospitalist  [phone]  Answering Service: [phone]

## 2024-10-03 NOTE — PLAN OF CARE
Problem: Patient/Family Goals  Goal: Patient/Family Long Term Goal  Description: Patient's Long Term Goal: stay healthy    Interventions:  - Medication management  -Dietary management  -Prompt follow up with physician  - See additional Care Plan goals for specific interventions  Outcome: Progressing  Goal: Patient/Family Short Term Goal  Description: Patient's Short Term Goal: Discharge home    Interventions:   - Pulmonology consult  -IV antbiotics  -2D echo   - See additional Care Plan goals for specific interventions  Outcome: Progressing     Problem: RESPIRATORY - ADULT  Goal: Achieves optimal ventilation and oxygenation  Description: INTERVENTIONS:  - Assess for changes in respiratory status  - Assess for changes in mentation and behavior  - Position to facilitate oxygenation and minimize respiratory effort  - Oxygen supplementation based on oxygen saturation or ABGs  - Provide Smoking Cessation handout, if applicable  - Encourage broncho-pulmonary hygiene including cough, deep breathe, Incentive Spirometry  - Assess the need for suctioning and perform as needed  - Assess and instruct to report SOB or any respiratory difficulty  - Respiratory Therapy support as indicated  - Manage/alleviate anxiety  - Monitor for signs/symptoms of CO2 retention  Outcome: Progressing

## 2024-10-03 NOTE — PROCEDURES
[facility]  Pre-Procedure Note    Name: Guy White  MRN#: [id number]  : 1950    Procedure:  Ultrasound Guided Right Thoracentesis    Indication:  Symptomatic Right Pleural Effusion.  MDS, CHF, Pneumonia    Allergies:    Allergies   Allergen Reactions    Radiology Contrast Iodinated Dyes HIVES       Pertinent Medications:    Is patient on any Aspirin, Coumadin, or any other Anticoagulations/Antiplatelet medications?  no      Mental Status:  Alert and Oriented      Health Status: Acceptable for Procedure    Impression and Plans:    Symptomatic right pleural effusion for ultrasound guided thoracentesis.    I have reviewed the above information prior to procedure.    I have discussed the risks and benefits and alternatives with the patient.  The patient understands and agrees to proceed with plan of care.    Tino Fisher MD

## 2024-10-03 NOTE — PLAN OF CARE
Acquired patient around 0730. Alert and oriented x4. On Ra. Pt co GARCIA, however at rest not sob. Pt denies cp. SpO2 within normal limits. NSR w occasional PVCs on tele. Continent of bowel and bladder. Plan for Rt Thoracentesis. Hgb 6.6. 1 PRBC, IR aware. Call light within reach. Continue plan of care.     Problem: Patient/Family Goals  Goal: Patient/Family Long Term Goal  Description: Patient's Long Term Goal: stay healthy    Interventions:  - Medication management  -Dietary management  -Prompt follow up with physician  - See additional Care Plan goals for specific interventions  Outcome: Progressing  Goal: Patient/Family Short Term Goal  Description: Patient's Short Term Goal: Discharge home    Interventions:   - Pulmonology consult  -IV antbiotics  -2D echo   - See additional Care Plan goals for specific interventions  Outcome: Progressing     Problem: RESPIRATORY - ADULT  Goal: Achieves optimal ventilation and oxygenation  Description: INTERVENTIONS:  - Assess for changes in respiratory status  - Assess for changes in mentation and behavior  - Position to facilitate oxygenation and minimize respiratory effort  - Oxygen supplementation based on oxygen saturation or ABGs  - Provide Smoking Cessation handout, if applicable  - Encourage broncho-pulmonary hygiene including cough, deep breathe, Incentive Spirometry  - Assess the need for suctioning and perform as needed  - Assess and instruct to report SOB or any respiratory difficulty  - Respiratory Therapy support as indicated  - Manage/alleviate anxiety  - Monitor for signs/symptoms of CO2 retention  Outcome: Progressing

## 2024-10-03 NOTE — IMAGING NOTE
Spoke with Lilly-Floor RN re: time for pt's thoracentesis. She states hgb came back critical at 6.6. She is reaching out to the provider to obtain orders for transfusion. Informed transfusion to be started prior to thoracentesis. We will attempt thoracentesis later today as long as transfusion is completed. Lilly will keep documenting Radiology RN updated on progress to coordinate a time with US and Radiologist.She reports pt is not symptomatic and presently on room air.  RN verbalized understanding and denies further questions.

## 2024-10-04 ENCOUNTER — APPOINTMENT (OUTPATIENT)
Dept: CT IMAGING | Facility: HOSPITAL | Age: 74
End: 2024-10-04
Attending: INTERNAL MEDICINE
Payer: MEDICARE

## 2024-10-04 LAB
ALBUMIN SERPL-MCNC: 2.9 G/DL (ref 3.2–4.8)
ALBUMIN/GLOB SERPL: 1.1 {RATIO} (ref 1–2)
ALP LIVER SERPL-CCNC: 102 U/L
ALT SERPL-CCNC: 32 U/L
ANION GAP SERPL CALC-SCNC: 6 MMOL/L (ref 0–18)
AST SERPL-CCNC: 23 U/L (ref ?–34)
BILIRUB SERPL-MCNC: 0.5 MG/DL (ref 0.2–1.1)
BLOOD TYPE BARCODE: 9500
BUN BLD-MCNC: 30 MG/DL (ref 9–23)
CALCIUM BLD-MCNC: 10 MG/DL (ref 8.7–10.4)
CHLORIDE SERPL-SCNC: 108 MMOL/L (ref 98–112)
CO2 SERPL-SCNC: 22 MMOL/L (ref 21–32)
CREAT BLD-MCNC: 1.04 MG/DL
EGFRCR SERPLBLD CKD-EPI 2021: 76 ML/MIN/1.73M2 (ref 60–?)
ERYTHROCYTE [DISTWIDTH] IN BLOOD BY AUTOMATED COUNT: 14.6 %
GLOBULIN PLAS-MCNC: 2.6 G/DL (ref 2–3.5)
GLUCOSE BLD-MCNC: 94 MG/DL (ref 70–99)
HCT VFR BLD AUTO: 22.5 %
HGB BLD-MCNC: 7.7 G/DL
MAGNESIUM SERPL-MCNC: 1.7 MG/DL (ref 1.6–2.6)
MCH RBC QN AUTO: 29.8 PG (ref 26–34)
MCHC RBC AUTO-ENTMCNC: 34.2 G/DL (ref 31–37)
MCV RBC AUTO: 87.2 FL
OSMOLALITY SERPL CALC.SUM OF ELEC: 288 MOSM/KG (ref 275–295)
PLATELET # BLD AUTO: 130 10(3)UL (ref 150–450)
PLATELETS.RETICULATED NFR BLD AUTO: 31.4 % (ref 0–7)
POTASSIUM SERPL-SCNC: 4.3 MMOL/L (ref 3.5–5.1)
POTASSIUM SERPL-SCNC: 4.3 MMOL/L (ref 3.5–5.1)
PROT SERPL-MCNC: 5.5 G/DL (ref 5.7–8.2)
RBC # BLD AUTO: 2.58 X10(6)UL
SODIUM SERPL-SCNC: 136 MMOL/L (ref 136–145)
UNIT VOLUME: 350 ML
WBC # BLD AUTO: 3.9 X10(3) UL (ref 4–11)

## 2024-10-04 PROCEDURE — 80053 COMPREHEN METABOLIC PANEL: CPT | Performed by: HOSPITALIST

## 2024-10-04 PROCEDURE — 85027 COMPLETE CBC AUTOMATED: CPT | Performed by: HOSPITALIST

## 2024-10-04 PROCEDURE — 71250 CT THORAX DX C-: CPT | Performed by: INTERNAL MEDICINE

## 2024-10-04 PROCEDURE — 84132 ASSAY OF SERUM POTASSIUM: CPT | Performed by: HOSPITALIST

## 2024-10-04 PROCEDURE — 83735 ASSAY OF MAGNESIUM: CPT | Performed by: HOSPITALIST

## 2024-10-04 RX ORDER — MAGNESIUM OXIDE 400 MG/1
400 TABLET ORAL ONCE
Status: COMPLETED | OUTPATIENT
Start: 2024-10-04 | End: 2024-10-04

## 2024-10-04 RX ORDER — ENOXAPARIN SODIUM 100 MG/ML
40 INJECTION SUBCUTANEOUS NIGHTLY
Status: DISCONTINUED | OUTPATIENT
Start: 2024-10-04 | End: 2024-10-15

## 2024-10-04 NOTE — PROGRESS NOTES
CC: follow-up hospital admission sob    SUBJECTIVE:  Interval History:     Feels better with thoracentesis  Hasn't walked yet but doesn't appear as dyspneic just talking as he did before    OBJECTIVE:  Scheduled Meds:    cefTRIAXone  1 g Intravenous Q24H    allopurinol  100 mg Oral Daily    atorvastatin  10 mg Oral After dinner    metoprolol succinate ER  50 mg Oral Daily Beta Blocker    lidocaine-menthol  1 patch Transdermal Daily     Continuous Infusions:   PRN Meds:   HYDROcodone-acetaminophen    HYDROcodone-acetaminophen    acetaminophen    morphINE **OR** morphINE **OR** morphINE    PHYSICAL EXAM  Vital signs: Temp:  [97.7 °F (36.5 °C)-98.8 °F (37.1 °C)] 97.7 °F (36.5 °C)  Pulse:  [70-75] 75  Resp:  [15-25] 18  BP: ()/() 106/53  SpO2:  [92 %-98 %] 97 %      GENERAL - NAD, AAO  EYES- sclera anicteric,   HENT- normocephalic, OP - dry  NECK - no JVD  CV- RRR  RESP - decreased at bases, normal resp effort  ABDOMEN- soft, NT/ND   EXT- no LE edema   PSYCH - normal mentation/ normal affect    Data Review:   Labs:   Recent Labs   Lab 10/02/24  1040 10/03/24  0801 10/03/24  0814 10/03/24  1432 10/04/24  0651   WBC 3.5* 3.5*  --   --  3.9*   HGB 8.0* 6.6*  --  7.9* 7.7*   MCV 89.6 90.4  --   --  87.2   .0* 111.0*  --   --  130.0*   INR  --   --  1.37*  --   --        Recent Labs   Lab 10/02/24  1040 10/03/24  0801 10/04/24  0651   * 138 136   K 3.8 3.8  3.8 4.3  4.3    106 108   CO2 22.0 24.0 22.0   BUN 26* 31* 30*   CREATSERUM 1.41* 1.21 1.04   CA 10.5* 9.8 10.0   MG  --  1.7 1.7   * 118* 94       Recent Labs   Lab 10/02/24  1040 10/03/24  0801 10/04/24  0651   ALT 17 19 32   AST 14 11 23   ALB 3.4 3.1* 2.9*       No results for input(s): \"PGLU\" in the last 168 hours.    ECHO    Conclusions:     1. Study data: Normal sinus, frequent PACs   2. Left ventricle: The cavity size was normal. Wall thickness was normal.      Systolic function was normal. The estimated ejection fraction  was 60-65%,      by visual assessment. No diagnostic evidence for regional wall motion      abnormalities. Unable to assess LV diastolic function due to heart      rhythm.   3. Left atrium: The left atrial volume was mildly increased.   4. Aortic valve: Transvalvular velocity was minimally increased. There was      no evidence for stenosis. There was mild regurgitation. The peak systolic      velocity was 2.39m/sec. The mean systolic gradient was 9mm Hg. The valve      area (VTI) was 2.66cm^2. The valve area (VTI) index was 1.39cm^2/m^2.   5. Mitral valve: There was mild regurgitation.   6. Pulmonary arteries: Systolic pressure was moderately increased, in the      range of 50mm Hg to 55mm Hg.   7. Pericardium, extracardiac: A trivial pericardial effusion was identified.      There was no evidence of hemodynamic compromise. There was a right      pleural effusion.   Impressions:  No previous study from [facility] was   available for comparison.       ASSESSMENT/PLAN:    Patient is a 73 year old male with PMH sig for HTN, HLD, MDS on chemotherapy, here for chest pain /sob     Impression     -chest pain   -sob     -HTN  -HLD     -CKD 3, baseline Cr 1.5-1.7     -MDS on Vidaza  -pancytopenia from above   -hypercalcemia     Plan     *R chest pain / sob  -symptoms lkely due to R sided cxr findings. Less likely cardiac etiology but will trend trop and EKG -neg  -echo as above, no large pericardial effusion     *R sided pleural effusion / likely pna  -iv abx.   Sp thoracentesis 1.5L removed. Cx pending     *HTN  -on bb, cont     *HLD  -statin     *MDS  -heme eval  -transfused 1 unit today      scds  Resume lovenox    Code - dw pt, full code    Dispo - when ok with others    Will continue to follow while hospitalized. Please page me or the on-call hospitalist with questions or concerns.    Brenden Montero Hospitalist  [phone]  Answering Service: [phone]

## 2024-10-04 NOTE — PLAN OF CARE
Patient alert and oriented x4. Up with standby assist. NSR on tele. Maintaining o2 sats >90% on RA. Denies SOB. Continent of bowel/bladder. C/o moderate R flank pain, prn norco given with relief. R thora incision site, reinforced occlusive dressing. Reviewed POC with patient, verbalized understanding. Safety precautions put in place, bed alarm on.     Problem: Patient/Family Goals  Goal: Patient/Family Long Term Goal  Description: Patient's Long Term Goal: stay healthy    Interventions:  - Medication management  -Dietary management  -Prompt follow up with physician  - See additional Care Plan goals for specific interventions  Outcome: Progressing  Goal: Patient/Family Short Term Goal  Description: Patient's Short Term Goal: Discharge home    Interventions:   - Pulmonology consult  -IV antbiotics  -2D echo   - See additional Care Plan goals for specific interventions  Outcome: Progressing     Problem: RESPIRATORY - ADULT  Goal: Achieves optimal ventilation and oxygenation  Description: INTERVENTIONS:  - Assess for changes in respiratory status  - Assess for changes in mentation and behavior  - Position to facilitate oxygenation and minimize respiratory effort  - Oxygen supplementation based on oxygen saturation or ABGs  - Provide Smoking Cessation handout, if applicable  - Encourage broncho-pulmonary hygiene including cough, deep breathe, Incentive Spirometry  - Assess the need for suctioning and perform as needed  - Assess and instruct to report SOB or any respiratory difficulty  - Respiratory Therapy support as indicated  - Manage/alleviate anxiety  - Monitor for signs/symptoms of CO2 retention  Outcome: Progressing

## 2024-10-04 NOTE — PLAN OF CARE
Acquired patient around 0730. Alert and oriented x4. On Ra. Pt co GARCIA, however at rest not sob. Pt denies cp. SpO2 within normal limits. NSR on tele. Continent of bowel and bladder. Pending fluid cx. Call light within reach. Continue plan of care.     Problem: Patient/Family Goals  Goal: Patient/Family Long Term Goal  Description: Patient's Long Term Goal: stay healthy    Interventions:  - Medication management  -Dietary management  -Prompt follow up with physician  - See additional Care Plan goals for specific interventions  Outcome: Progressing  Goal: Patient/Family Short Term Goal  Description: Patient's Short Term Goal: Discharge home    Interventions:   - Pulmonology consult  -IV antbiotics  -2D echo   - See additional Care Plan goals for specific interventions  Outcome: Progressing     Problem: RESPIRATORY - ADULT  Goal: Achieves optimal ventilation and oxygenation  Description: INTERVENTIONS:  - Assess for changes in respiratory status  - Assess for changes in mentation and behavior  - Position to facilitate oxygenation and minimize respiratory effort  - Oxygen supplementation based on oxygen saturation or ABGs  - Provide Smoking Cessation handout, if applicable  - Encourage broncho-pulmonary hygiene including cough, deep breathe, Incentive Spirometry  - Assess the need for suctioning and perform as needed  - Assess and instruct to report SOB or any respiratory difficulty  - Respiratory Therapy support as indicated  - Manage/alleviate anxiety  - Monitor for signs/symptoms of CO2 retention  Outcome: Progressing

## 2024-10-04 NOTE — PROGRESS NOTES
S: He underwent right-sided thoracentesis yesterday; 1500cc fluid was removed. He is still having some right-sided back pain and shortness of breath.     Meds:   enoxaparin  40 mg Subcutaneous Nightly    [START ON 10/5/2024] cefTRIAXone  2 g Intravenous Q24H    allopurinol  100 mg Oral Daily    atorvastatin  10 mg Oral After dinner    metoprolol succinate ER  50 mg Oral Daily Beta Blocker    lidocaine-menthol  1 patch Transdermal Daily       Prn Meds:    HYDROcodone-acetaminophen    HYDROcodone-acetaminophen    acetaminophen    Infusions:      OBJECTIVE:  Vitals:    10/03/24 2304 10/04/24 0446 10/04/24 0900 10/04/24 1246   BP: 125/68 115/54 106/53 135/52   Pulse: 70 71 75 91   Resp: 18 18 18 18   Temp: 98.1 °F (36.7 °C) 98.2 °F (36.8 °C) 97.7 °F (36.5 °C)    TempSrc: Oral Oral Oral    SpO2: 94% 92% 97% 95%   Weight:       Height:         O2: room air    Gen - Alert, oriented x 3, in no apparent distress  Lungs - diminished breath sounds at right base.   CV - regular rate & rhythm. Normal S1, S2. No murmurs   Abdomen - soft, nontender to palpation   Extremities - No cyanosis, clubbing, edema appreciated.      Labs:  Recent Labs   Lab 10/02/24  1040 10/03/24  0801 10/03/24  1432 10/04/24  0651   WBC 3.5* 3.5*  --  3.9*   HGB 8.0* 6.6* 7.9* 7.7*   .0* 111.0*  --  130.0*     Recent Labs   Lab 10/02/24  1040 10/02/24  1215 10/03/24  0801 10/04/24  0651   *  --  138 136   K 3.8  --  3.8  3.8 4.3  4.3     --  106 108   CO2 22.0  --  24.0 22.0   BUN 26*  --  31* 30*   CREATSERUM 1.41*  --  1.21 1.04   *  --  118* 94   ANIONGAP 5  --  8 6   ALB 3.4  --  3.1* 2.9*   CA 10.5*  --  9.8 10.0   MG  --   --  1.7 1.7   TP 6.8  --  5.6* 5.5*   LACTI  --  1.0  --   --      Recent Labs   Lab 10/02/24  1040 10/03/24  0801 10/04/24  0651   ALKPHO 112 99 102   AST 14 11 23   ALT 17 19 32   BILT 0.7 0.4 0.5     Recent Labs   Lab 10/03/24  0814   INR 1.37*     Recent Labs   Lab 10/02/24  1040  10/02/24  1509 10/03/24  0801   PBNP 1,675*  --   --    TROPHS 11 11 9     Recent Labs   Lab 10/02/24  1509 10/03/24  0801   PCT 0.79* 0.90*       Recent Labs   Lab 10/02/24  1040   COVID19 Not Detected   INFAPCR Negative   INFBPCR Negative   RSV Negative       Imaging reviewed    ASSESSMENT AND PLAN      Dyspnea - secondary to new R pleural effusion +/- pneumonia (PCT elevated). Right sided thoracentesis was done 10/3, 1500cc fluid removed. Fluid is exudative. Post-procedure CXR still shows right pleural effusion  -continue ceftriaxone/azithro (10/2-)  -follow up pleural fluid cytology and cultures  -will order CT chest today.  MDS - pancytopenic on labs  -prbc transfusion prn   -per [facility]  Proph  -LMWH  Dispo  -full code  -inpatient     We will continue to follow with you     Raman Charles M.D.  Pulmonary/Critical Care

## 2024-10-04 NOTE — PROGRESS NOTES
[facility] HEMATOLOGY ONCOLOGY  Progress Note    Guy White Patient Status:  Inpatient    1950 MRN [id number]   Location [facility] 2NE-A Attending Raman Hurtado, DO   Hosp Day # 2 PCP Salvador Cohn MD     ADMISSION DATE AND TIME: 10/2/2024 10:25 AM    ADMIT DIAGNOSIS: Exertional chest pain [R07.9]  Community acquired pneumonia of right lung, unspecified part of lung [J18.9]  Acute on chronic congestive heart failure, unspecified heart failure type (HCC) [I50.9]    SUBJECTIVE:        OBJECTIVE:  Blood pressure 115/54, pulse 71, temperature 98.2 °F (36.8 °C), temperature source Oral, resp. rate 18, height 6' (1.829 m), weight 154 lb 1.6 oz (69.9 kg), SpO2 92%.    PHYSICAL EXAM:  General: afebrile, alert and oriented x 3, pleasant  HEENT: oropharynx  CV: Regular rate and rhythm  Lungs: CTA  Abdomen: soft, non distended and non tender  Extremity: no edema or cyanosis       LABS:  Recent Results (from the past 24 hour(s))   Hemoglobin    Collection Time: 10/03/24  2:32 PM   Result Value Ref Range    HGB 7.9 (L) 13.0 - 17.5 g/dL   Amylase, pleural fluid    Collection Time: 10/03/24  3:51 PM   Result Value Ref Range    Amylase Pleural Fld 22 U/L   Cell count with diff,pleural fld    Collection Time: 10/03/24  3:51 PM   Result Value Ref Range    Source Pleural FLD Pleural Fluid, Right     Body Fluid Color Yellow     Body Fluid Clarity Hazy     WBC, Pleural Fluid 3,451 /mm3    RBC Pleural 3,000 /mm3   Cholesterol, pleural fluid    Collection Time: 10/03/24  3:51 PM   Result Value Ref Range    Cholesterol Pleural Fld <50 mg/dL   Glucose, pleural fluid    Collection Time: 10/03/24  3:51 PM   Result Value Ref Range    Glucose Pleural Fld 73 mg/dL   Hematocrit,  pleural fluid    Collection Time: 10/03/24  3:51 PM   Result Value Ref Range    Hematocrit Pleural Fluid <5.0 %   LDH, pleural fluid    Collection Time: 10/03/24  3:51 PM   Result Value Ref Range    LDH Pleural Fld 357 U/L    Pleural Fluid Ph    Collection Time: 10/03/24  3:51 PM   Result Value Ref Range    Pleural Fluid pH 7.39 7.20 - 7.50    Sample Type Pleural Fluid    Total protein, pleural fluid    Collection Time: 10/03/24  3:51 PM   Result Value Ref Range    Total Protein Pleural Fld 3.8 g/dL   CELL COUNT/DIFF PLEURAL FLUID    Collection Time: 10/03/24  3:51 PM   Result Value Ref Range    Neutrophil Pleural 76 %    Lymphocyte Pleural 7 %    Mono/Mac/Histio Pleural 17 %    Eosinophil Pleural 0 %    Basophil Pleural 0 %    Total Cells Counted 100    Prepare RBC Once    Collection Time: 10/04/24 12:30 AM   Result Value Ref Range    Blood Product G6507A20     Unit Number [id number]     UNIT ABO O     UNIT RH NEG     Product Status Presumed Transfused     Expiration Date 202410162359     Blood Type Barcode 9500     Unit Volume 350 ml   CBC, Platelet; No Differential    Collection Time: 10/04/24  6:51 AM   Result Value Ref Range    WBC 3.9 (L) 4.0 - 11.0 x10(3) uL    RBC 2.58 (L) 3.80 - 5.80 x10(6)uL    HGB 7.7 (L) 13.0 - 17.5 g/dL    HCT 22.5 (L) 39.0 - 53.0 %    .0 (L) 150.0 - 450.0 10(3)uL    Immature Platelet Fraction 31.4 (H) 0.0 - 7.0 %    MCV 87.2 80.0 - 100.0 fL    MCH 29.8 26.0 - 34.0 pg    MCHC 34.2 31.0 - 37.0 g/dL    RDW 14.6 %   Magnesium    Collection Time: 10/04/24  6:51 AM   Result Value Ref Range    Magnesium 1.7 1.6 - 2.6 mg/dL   Comp Metabolic Panel (14)    Collection Time: 10/04/24  6:51 AM   Result Value Ref Range    Glucose 94 70 - 99 mg/dL    Sodium 136 136 - 145 mmol/L    Potassium 4.3 3.5 - 5.1 mmol/L    Chloride 108 98 - 112 mmol/L    CO2 22.0 21.0 - 32.0 mmol/L    Anion Gap 6 0 - 18 mmol/L    BUN 30 (H) 9 - 23 mg/dL    Creatinine 1.04 0.70 - 1.30 mg/dL    Calcium, Total 10.0 8.7 - 10.4 mg/dL    Calculated Osmolality 288 275 - 295 mOsm/kg    eGFR-Cr 76 >=60 mL/min/1.73m2    AST 23 <34 U/L    ALT 32 10 - 49 U/L    Alkaline Phosphatase 102 45 - 117 U/L    Bilirubin, Total 0.5 0.2 - 1.1 mg/dL     Total Protein 5.5 (L) 5.7 - 8.2 g/dL    Albumin 2.9 (L) 3.2 - 4.8 g/dL    Globulin  2.6 2.0 - 3.5 g/dL    A/G Ratio 1.1 1.0 - 2.0   Potassium    Collection Time: 10/04/24  6:51 AM   Result Value Ref Range    Potassium 4.3 3.5 - 5.1 mmol/L       CULTURES  Hospital Encounter on 10/02/24   1. Blood Culture     Status: None (Preliminary result)    Collection Time: 10/02/24 12:25 PM    Specimen: Blood,peripheral   Result Value Ref Range    Blood Culture Result No Growth 1 Day N/A       IMAGING:    US THORACENTESIS GUIDED RIGHT (CPT=32555)    Result Date: 10/3/2024  CONCLUSION:  Ultrasound-guided right thoracentesis was performed as detailed above.   LOCATION:  [place]    Dictated by (CST): Tino Fisher MD on 10/03/2024 at 4:09 PM     Finalized by (CST): Tino Fisher MD on 10/03/2024 at 4:12 PM       XR CHEST AP PORTABLE  (CPT=71045)    Result Date: 10/3/2024  CONCLUSION:  There is decrease in the size of the moderate right pleural effusion.  There is increased aeration of the right infrahilar lung slightly.  There is no pneumothorax.  The heart size and vascularity are normal.  The left lung is clear.  Unremarkable bony structures.   LOCATION:  [id number]      Dictated by (CST): Trevon Zhong MD on 10/03/2024 at 4:03 PM     Finalized by (CST): Trevon Zhong MD on 10/03/2024 at 4:04 PM          MEDICATIONS:  Medications reviewed.     cefTRIAXone  1 g Intravenous Q24H    azithromycin  500 mg Oral Daily    allopurinol  100 mg Oral Daily    atorvastatin  10 mg Oral After dinner    metoprolol succinate ER  50 mg Oral Daily Beta Blocker    lidocaine-menthol  1 patch Transdermal Daily         HYDROcodone-acetaminophen    HYDROcodone-acetaminophen    acetaminophen    morphINE **OR** morphINE **OR** morphINE    ASSESSMENT AND PLAN:     Principal Problem:    Exertional chest pain  Active Problems:    Acute on chronic congestive heart failure, unspecified heart failure type (HCC)    Community acquired pneumonia of  right lung, unspecified part of lung        Guy White is a 73 year old male with with MDS admitted with pleural effusion and possible pneumonia     Myelodysplastic syndrome  Diagnosed in May 2024  Currently on 5-azacytidine in the clinic  No treatment while in the hospital     White blood cell count 3.5 with hemoglobin 6.6 and platelet count 1/1/2011  Status post PRBC  Hemoglobin 7.7     Community-acquired pneumonia/pleural effusion  On antibiotics  Consulted pulmonary  Thoracentesis performed   Cyto pending      Will continue to monitor him by him in the hospital please call us if you have any questions or concerns     Thank you for allowing me to participate in the care of your patient.     Vel Zhao MD        This note was prepared using Dragon [employer] voice recognition dictation software. As a result errors may occur. When identified these errors have been corrected. While every attempt is made to correct errors during dictation discrepancies may still exist. Please call me to clarify any errors.         Vel Zhoa MD

## 2024-10-05 LAB
ALBUMIN SERPL-MCNC: 2.9 G/DL (ref 3.2–4.8)
ALBUMIN/GLOB SERPL: 1.2 {RATIO} (ref 1–2)
ALP LIVER SERPL-CCNC: 117 U/L
ALT SERPL-CCNC: 65 U/L
ANION GAP SERPL CALC-SCNC: 6 MMOL/L (ref 0–18)
AST SERPL-CCNC: 35 U/L (ref ?–34)
BILIRUB SERPL-MCNC: 0.4 MG/DL (ref 0.2–1.1)
BUN BLD-MCNC: 30 MG/DL (ref 9–23)
CALCIUM BLD-MCNC: 9.5 MG/DL (ref 8.7–10.4)
CHLORIDE SERPL-SCNC: 107 MMOL/L (ref 98–112)
CO2 SERPL-SCNC: 23 MMOL/L (ref 21–32)
CREAT BLD-MCNC: 1.11 MG/DL
EGFRCR SERPLBLD CKD-EPI 2021: 70 ML/MIN/1.73M2 (ref 60–?)
ERYTHROCYTE [DISTWIDTH] IN BLOOD BY AUTOMATED COUNT: 14.6 %
GLOBULIN PLAS-MCNC: 2.4 G/DL (ref 2–3.5)
GLUCOSE BLD-MCNC: 97 MG/DL (ref 70–99)
HCT VFR BLD AUTO: 20.9 %
HGB BLD-MCNC: 7 G/DL
MAGNESIUM SERPL-MCNC: 1.7 MG/DL (ref 1.6–2.6)
MCH RBC QN AUTO: 30.2 PG (ref 26–34)
MCHC RBC AUTO-ENTMCNC: 33.5 G/DL (ref 31–37)
MCV RBC AUTO: 90.1 FL
OSMOLALITY SERPL CALC.SUM OF ELEC: 288 MOSM/KG (ref 275–295)
PLATELET # BLD AUTO: 134 10(3)UL (ref 150–450)
PLATELETS.RETICULATED NFR BLD AUTO: 36.2 % (ref 0–7)
POTASSIUM SERPL-SCNC: 4.4 MMOL/L (ref 3.5–5.1)
PROT SERPL-MCNC: 5.3 G/DL (ref 5.7–8.2)
RBC # BLD AUTO: 2.32 X10(6)UL
SODIUM SERPL-SCNC: 136 MMOL/L (ref 136–145)
WBC # BLD AUTO: 3.7 X10(3) UL (ref 4–11)

## 2024-10-05 PROCEDURE — 83735 ASSAY OF MAGNESIUM: CPT | Performed by: HOSPITALIST

## 2024-10-05 PROCEDURE — 80053 COMPREHEN METABOLIC PANEL: CPT | Performed by: HOSPITALIST

## 2024-10-05 PROCEDURE — 85027 COMPLETE CBC AUTOMATED: CPT | Performed by: HOSPITALIST

## 2024-10-05 RX ORDER — MAGNESIUM OXIDE 400 MG/1
400 TABLET ORAL ONCE
Status: COMPLETED | OUTPATIENT
Start: 2024-10-05 | End: 2024-10-05

## 2024-10-05 NOTE — PLAN OF CARE
Received patient at 1930.  Alert and oriented x 4. Tele Rhythm NSR, oxygen maintained on room air. Breath sounds are clear, diminished at bases. Skin -occlusive dressing post thora. Last bowel movement 10/01. Patient voiding with no issues. Patient reports some shortness of breath with exertion only, no chest pain. Bed is locked and in low position. Call light and personal items within reach. Pt up stand by.  Reviewed plan of care and patient verbalizes understanding.           Problem: Patient/Family Goals  Goal: Patient/Family Long Term Goal  Description: Patient's Long Term Goal: stay healthy    Interventions:  - Medication management  -Dietary management  -Prompt follow up with physician  - See additional Care Plan goals for specific interventions  Outcome: Progressing  Goal: Patient/Family Short Term Goal  Description: Patient's Short Term Goal: Discharge home    Interventions:   - Pulmonology consult  -IV antbiotics  -2D echo   - See additional Care Plan goals for specific interventions  Outcome: Progressing     Problem: RESPIRATORY - ADULT  Goal: Achieves optimal ventilation and oxygenation  Description: INTERVENTIONS:  - Assess for changes in respiratory status  - Assess for changes in mentation and behavior  - Position to facilitate oxygenation and minimize respiratory effort  - Oxygen supplementation based on oxygen saturation or ABGs  - Provide Smoking Cessation handout, if applicable  - Encourage broncho-pulmonary hygiene including cough, deep breathe, Incentive Spirometry  - Assess the need for suctioning and perform as needed  - Assess and instruct to report SOB or any respiratory difficulty  - Respiratory Therapy support as indicated  - Manage/alleviate anxiety  - Monitor for signs/symptoms of CO2 retention  Outcome: Progressing

## 2024-10-05 NOTE — PLAN OF CARE
Received pt at shift change. AxOx4. Room air. Denies pain/SOB. Vitals stable. NSR on tele.   See flowsheets for additional assessments.   Pt updated on POC. Call light within reach. All needs met at this time.     Plan:  -Repeat CXR  -IV Rocephin daily  -Monitor Hgb - 7.0 this am  -Await cytology  -O2 desaturation screen    Problem: Patient/Family Goals  Goal: Patient/Family Long Term Goal  Description: Patient's Long Term Goal: stay healthy    Interventions:  - Medication management  -Dietary management  -Prompt follow up with physician  - See additional Care Plan goals for specific interventions  Outcome: Progressing  Goal: Patient/Family Short Term Goal  Description: Patient's Short Term Goal: Discharge home    Interventions:   - Pulmonology consult  -IV antbiotics  -2D echo   - See additional Care Plan goals for specific interventions  Outcome: Progressing     Problem: RESPIRATORY - ADULT  Goal: Achieves optimal ventilation and oxygenation  Description: INTERVENTIONS:  - Assess for changes in respiratory status  - Assess for changes in mentation and behavior  - Position to facilitate oxygenation and minimize respiratory effort  - Oxygen supplementation based on oxygen saturation or ABGs  - Provide Smoking Cessation handout, if applicable  - Encourage broncho-pulmonary hygiene including cough, deep breathe, Incentive Spirometry  - Assess the need for suctioning and perform as needed  - Assess and instruct to report SOB or any respiratory difficulty  - Respiratory Therapy support as indicated  - Manage/alleviate anxiety  - Monitor for signs/symptoms of CO2 retention  Outcome: Progressing

## 2024-10-05 NOTE — PROGRESS NOTES
CC: follow-up hospital admission sob    SUBJECTIVE:  Interval History:     No acute issues overnight  Breathing is ok  Hasn't ambulated much    OBJECTIVE:  Scheduled Meds:    enoxaparin  40 mg Subcutaneous Nightly    cefTRIAXone  2 g Intravenous Q24H    allopurinol  100 mg Oral Daily    atorvastatin  10 mg Oral After dinner    metoprolol succinate ER  50 mg Oral Daily Beta Blocker    lidocaine-menthol  1 patch Transdermal Daily     Continuous Infusions:   PRN Meds:   HYDROcodone-acetaminophen    HYDROcodone-acetaminophen    acetaminophen    PHYSICAL EXAM  Vital signs: Temp:  [97.6 °F (36.4 °C)-98.6 °F (37 °C)] 97.6 °F (36.4 °C)  Pulse:  [68-91] 77  Resp:  [15-19] 18  BP: ()/(50-64) 115/64  SpO2:  [93 %-98 %] 95 %      GENERAL - NAD, AAO  EYES- sclera anicteric,   HENT- normocephalic, OP - dry  NECK - no JVD  CV- RRR  RESP - decreased at bases, normal resp effort  ABDOMEN- soft, NT/ND   EXT- no LE edema   PSYCH - normal mentation/ normal affect    Data Review:   Labs:   Recent Labs   Lab 10/02/24  1040 10/03/24  0801 10/03/24  0814 10/03/24  1432 10/04/24  0651 10/05/24  0706   WBC 3.5* 3.5*  --   --  3.9* 3.7*   HGB 8.0* 6.6*  --  7.9* 7.7* 7.0*   MCV 89.6 90.4  --   --  87.2 90.1   .0* 111.0*  --   --  130.0* 134.0*   INR  --   --  1.37*  --   --   --        Recent Labs   Lab 10/02/24  1040 10/03/24  0801 10/04/24  0651 10/05/24  0706   * 138 136 136   K 3.8 3.8  3.8 4.3  4.3 4.4    106 108 107   CO2 22.0 24.0 22.0 23.0   BUN 26* 31* 30* 30*   CREATSERUM 1.41* 1.21 1.04 1.11   CA 10.5* 9.8 10.0 9.5   MG  --  1.7 1.7 1.7   * 118* 94 97       Recent Labs   Lab 10/02/24  1040 10/03/24  0801 10/04/24  0651 10/05/24  0706   ALT 17 19 32 65*   AST 14 11 23 35*   ALB 3.4 3.1* 2.9* 2.9*       No results for input(s): \"PGLU\" in the last 168 hours.    ECHO    Conclusions:     1. Study data: Normal sinus, frequent PACs   2. Left ventricle: The cavity size was normal. Wall thickness was  normal.      Systolic function was normal. The estimated ejection fraction was 60-65%,      by visual assessment. No diagnostic evidence for regional wall motion      abnormalities. Unable to assess LV diastolic function due to heart      rhythm.   3. Left atrium: The left atrial volume was mildly increased.   4. Aortic valve: Transvalvular velocity was minimally increased. There was      no evidence for stenosis. There was mild regurgitation. The peak systolic      velocity was 2.39m/sec. The mean systolic gradient was 9mm Hg. The valve      area (VTI) was 2.66cm^2. The valve area (VTI) index was 1.39cm^2/m^2.   5. Mitral valve: There was mild regurgitation.   6. Pulmonary arteries: Systolic pressure was moderately increased, in the      range of 50mm Hg to 55mm Hg.   7. Pericardium, extracardiac: A trivial pericardial effusion was identified.      There was no evidence of hemodynamic compromise. There was a right      pleural effusion.   Impressions:  No previous study from [facility] was   available for comparison.       ASSESSMENT/PLAN:    Patient is a 73 year old male with PMH sig for HTN, HLD, MDS on chemotherapy, here for chest pain /sob     Impression     -chest pain   -sob     -HTN  -HLD     -CKD 3, baseline Cr 1.5-1.7     -MDS on Vidaza  -pancytopenia from above   -hypercalcemia     Plan     *R chest pain / sob  -symptoms lkely due to R sided cxr findings. Less likely cardiac etiology but will trend trop and EKG -neg  -echo as above, no large pericardial effusion     *R sided pleural effusion / likely pna  -iv abx.   Sp thoracentesis 1.5L removed. Cx pending  CT with possible atelectadsis, tumor vs pna  -repeat cxr today  May need repeat thora     *HTN  -on bb, cont     *HLD  -statin     *MDS  -heme eval  -transfused 1 unit thus far      scds  Resume lovenox    Code - dw pt, full code    Dispo - likely next week    Will continue to follow while hospitalized. Please page me or the on-call  hospitalist with questions or concerns.    Brenden Montero Hospitalist  [phone]  Answering Service: [phone]

## 2024-10-05 NOTE — PROGRESS NOTES
S: Stable back pain. No new complaints.     Meds:   enoxaparin  40 mg Subcutaneous Nightly    cefTRIAXone  2 g Intravenous Q24H    allopurinol  100 mg Oral Daily    atorvastatin  10 mg Oral After dinner    metoprolol succinate ER  50 mg Oral Daily Beta Blocker    lidocaine-menthol  1 patch Transdermal Daily       Prn Meds:    HYDROcodone-acetaminophen    HYDROcodone-acetaminophen    acetaminophen    Infusions:      OBJECTIVE:  Vitals:    10/04/24 2300 10/05/24 0430 10/05/24 0600 10/05/24 0805   BP: 146/62 99/50 102/57 115/64   Pulse: 71 68 72 77   Resp: 16 15  18   Temp: 98.3 °F (36.8 °C) 98.6 °F (37 °C)  97.6 °F (36.4 °C)   TempSrc: Oral Oral  Oral   SpO2: 93% 96% 95% 95%   Weight:       Height:           Gen - Alert, oriented x 3, in no apparent distress  Lungs - diminished breath sounds at right base.   CV - regular rate & rhythm. Normal S1, S2. No murmurs   Abdomen - soft, nontender to palpation   Extremities - No cyanosis, clubbing, edema appreciated.      Labs:  Recent Labs   Lab 10/03/24  0801 10/03/24  1432 10/04/24  0651 10/05/24  0706   WBC 3.5*  --  3.9* 3.7*   HGB 6.6* 7.9* 7.7* 7.0*   .0*  --  130.0* 134.0*     Recent Labs   Lab 10/02/24  1215 10/03/24  0801 10/04/24  0651 10/05/24  0706   NA  --  138 136 136   K  --  3.8  3.8 4.3  4.3 4.4   CL  --  106 108 107   CO2  --  24.0 22.0 23.0   BUN  --  31* 30* 30*   CREATSERUM  --  1.21 1.04 1.11   GLU  --  118* 94 97   ANIONGAP  --  8 6 6   ALB  --  3.1* 2.9* 2.9*   CA  --  9.8 10.0 9.5   MG  --  1.7 1.7 1.7   TP  --  5.6* 5.5* 5.3*   LACTI 1.0  --   --   --      Recent Labs   Lab 10/03/24  0801 10/04/24  0651 10/05/24  0706   ALKPHO 99 102 117   AST 11 23 35*   ALT 19 32 65*   BILT 0.4 0.5 0.4     Recent Labs   Lab 10/03/24  0814   INR 1.37*     Recent Labs   Lab 10/02/24  1040 10/02/24  1509 10/03/24  0801   PBNP 1,675*  --   --    TROPHS 11 11 9     Recent Labs   Lab 10/02/24  1509 10/03/24  0801   PCT 0.79* 0.90*       Recent Labs    Lab 10/02/24  1040   COVID19 Not Detected   INFAPCR Negative   INFBPCR Negative   RSV Negative       Imaging reviewed    ASSESSMENT AND PLAN      Dyspnea - secondary to new R pleural effusion +/- pneumonia (PCT elevated). Right sided thoracentesis was done 10/3, 1500cc fluid removed. Fluid is exudative. Post-procedure CT with moderate right pleural effusion with RML consolidation concerning for infection vs atelectasis vs tumor  -continue ceftriaxone (10/2-). Completed course of azithromycin  -follow up pleural fluid cytology and cultures  -repeat CXR tomorrow; may need repeat thoracentesis Monday  MDS - pancytopenic on labs  -per [facility]  Proph  -LM[facility]  Dispo  -full code  -desat screen  -inpatient     We will continue to follow with you

## 2024-10-06 ENCOUNTER — APPOINTMENT (OUTPATIENT)
Dept: GENERAL RADIOLOGY | Facility: HOSPITAL | Age: 74
End: 2024-10-06
Attending: INTERNAL MEDICINE
Payer: MEDICARE

## 2024-10-06 LAB
ALBUMIN SERPL-MCNC: 2.7 G/DL (ref 3.2–4.8)
ALBUMIN/GLOB SERPL: 0.9 {RATIO} (ref 1–2)
ALP LIVER SERPL-CCNC: 134 U/L
ALT SERPL-CCNC: 75 U/L
ANION GAP SERPL CALC-SCNC: 7 MMOL/L (ref 0–18)
AST SERPL-CCNC: 38 U/L (ref ?–34)
BILIRUB SERPL-MCNC: 0.4 MG/DL (ref 0.2–1.1)
BUN BLD-MCNC: 24 MG/DL (ref 9–23)
CALCIUM BLD-MCNC: 9.7 MG/DL (ref 8.7–10.4)
CHLORIDE SERPL-SCNC: 105 MMOL/L (ref 98–112)
CO2 SERPL-SCNC: 22 MMOL/L (ref 21–32)
CREAT BLD-MCNC: 1.03 MG/DL
EGFRCR SERPLBLD CKD-EPI 2021: 77 ML/MIN/1.73M2 (ref 60–?)
ERYTHROCYTE [DISTWIDTH] IN BLOOD BY AUTOMATED COUNT: 14.7 %
GLOBULIN PLAS-MCNC: 2.9 G/DL (ref 2–3.5)
GLUCOSE BLD-MCNC: 93 MG/DL (ref 70–99)
HCT VFR BLD AUTO: 22.1 %
HGB BLD-MCNC: 7.3 G/DL
MAGNESIUM SERPL-MCNC: 1.7 MG/DL (ref 1.6–2.6)
MCH RBC QN AUTO: 29.9 PG (ref 26–34)
MCHC RBC AUTO-ENTMCNC: 33 G/DL (ref 31–37)
MCV RBC AUTO: 90.6 FL
OSMOLALITY SERPL CALC.SUM OF ELEC: 282 MOSM/KG (ref 275–295)
PLATELET # BLD AUTO: 127 10(3)UL (ref 150–450)
PLATELETS.RETICULATED NFR BLD AUTO: 39 % (ref 0–7)
POTASSIUM SERPL-SCNC: 4.4 MMOL/L (ref 3.5–5.1)
PROT SERPL-MCNC: 5.6 G/DL (ref 5.7–8.2)
RBC # BLD AUTO: 2.44 X10(6)UL
SODIUM SERPL-SCNC: 134 MMOL/L (ref 136–145)
WBC # BLD AUTO: 4 X10(3) UL (ref 4–11)

## 2024-10-06 PROCEDURE — 83735 ASSAY OF MAGNESIUM: CPT | Performed by: HOSPITALIST

## 2024-10-06 PROCEDURE — 71045 X-RAY EXAM CHEST 1 VIEW: CPT | Performed by: INTERNAL MEDICINE

## 2024-10-06 PROCEDURE — 80053 COMPREHEN METABOLIC PANEL: CPT | Performed by: HOSPITALIST

## 2024-10-06 PROCEDURE — 85027 COMPLETE CBC AUTOMATED: CPT | Performed by: HOSPITALIST

## 2024-10-06 RX ORDER — MAGNESIUM OXIDE 400 MG/1
400 TABLET ORAL ONCE
Status: COMPLETED | OUTPATIENT
Start: 2024-10-06 | End: 2024-10-06

## 2024-10-06 NOTE — PLAN OF CARE
Received patient at 1930.  Alert and oriented x 4. Tele Rhythm NSR, oxygen maintained on room air without issues. Breath sounds are clear, diminished at bases. Skin -intact. Patient voiding with no issues.  Bed is locked and in low position. Call light and personal items within reach. Pt up stand by.  Reviewed plan of care and patient verbalizes understanding.      Chest xray this AM.    Problem: Patient/Family Goals  Goal: Patient/Family Long Term Goal  Description: Patient's Long Term Goal: stay healthy    Interventions:  - Medication management  -Dietary management  -Prompt follow up with physician  - See additional Care Plan goals for specific interventions  Outcome: Progressing  Goal: Patient/Family Short Term Goal  Description: Patient's Short Term Goal: Discharge home    Interventions:   - Pulmonology consult  -IV antbiotics  -2D echo   - See additional Care Plan goals for specific interventions  Outcome: Progressing     Problem: RESPIRATORY - ADULT  Goal: Achieves optimal ventilation and oxygenation  Description: INTERVENTIONS:  - Assess for changes in respiratory status  - Assess for changes in mentation and behavior  - Position to facilitate oxygenation and minimize respiratory effort  - Oxygen supplementation based on oxygen saturation or ABGs  - Provide Smoking Cessation handout, if applicable  - Encourage broncho-pulmonary hygiene including cough, deep breathe, Incentive Spirometry  - Assess the need for suctioning and perform as needed  - Assess and instruct to report SOB or any respiratory difficulty  - Respiratory Therapy support as indicated  - Manage/alleviate anxiety  - Monitor for signs/symptoms of CO2 retention  Outcome: Progressing

## 2024-10-06 NOTE — PLAN OF CARE
Received pt at shift change. AxOx4. Room air. Denies pain/SOB. Vitals stable. NSR on tele.   See flowsheets for additional assessments.   Pt updated on POC. Call light within reach. All needs met at this time.     Plan:  -IV Rocephin daily  -Monitor Hgb - 7.3 this am  -Await cytology    Problem: Patient/Family Goals  Goal: Patient/Family Long Term Goal  Description: Patient's Long Term Goal: stay healthy    Interventions:  - Medication management  -Dietary management  -Prompt follow up with physician  - See additional Care Plan goals for specific interventions  Outcome: Progressing  Goal: Patient/Family Short Term Goal  Description: Patient's Short Term Goal: Discharge home    Interventions:   - Pulmonology consult  -IV antbiotics  -2D echo   - See additional Care Plan goals for specific interventions  Outcome: Progressing     Problem: RESPIRATORY - ADULT  Goal: Achieves optimal ventilation and oxygenation  Description: INTERVENTIONS:  - Assess for changes in respiratory status  - Assess for changes in mentation and behavior  - Position to facilitate oxygenation and minimize respiratory effort  - Oxygen supplementation based on oxygen saturation or ABGs  - Provide Smoking Cessation handout, if applicable  - Encourage broncho-pulmonary hygiene including cough, deep breathe, Incentive Spirometry  - Assess the need for suctioning and perform as needed  - Assess and instruct to report SOB or any respiratory difficulty  - Respiratory Therapy support as indicated  - Manage/alleviate anxiety  - Monitor for signs/symptoms of CO2 retention  Outcome: Progressing

## 2024-10-06 NOTE — PROGRESS NOTES
S: Stable back discomfort. No new complaints.     Meds:   enoxaparin  40 mg Subcutaneous Nightly    cefTRIAXone  2 g Intravenous Q24H    allopurinol  100 mg Oral Daily    atorvastatin  10 mg Oral After dinner    metoprolol succinate ER  50 mg Oral Daily Beta Blocker    lidocaine-menthol  1 patch Transdermal Daily       Prn Meds:    HYDROcodone-acetaminophen    HYDROcodone-acetaminophen    acetaminophen    Infusions:      OBJECTIVE:  Vitals:    10/05/24 2300 10/06/24 0300 10/06/24 0427 10/06/24 0829   BP: 146/66  138/57 126/51   Pulse: 83  83 72   Resp: 18  19 15   Temp: 97.4 °F (36.3 °C)  97.8 °F (36.6 °C) 97.3 °F (36.3 °C)   TempSrc: Oral  Oral Oral   SpO2: 96%  95% 96%   Weight:  152 lb 1.9 oz (69 kg)     Height:           Gen - Alert, oriented x 3, in no apparent distress  Lungs - diminished breath sounds at right base.   CV - regular rate & rhythm. Normal S1, S2. No murmurs   Abdomen - soft, nontender to palpation   Extremities - No cyanosis, clubbing, edema appreciated.      Labs:  Recent Labs   Lab 10/04/24  0651 10/05/24  0706 10/06/24  0826   WBC 3.9* 3.7* 4.0   HGB 7.7* 7.0* 7.3*   .0* 134.0* 127.0*     Recent Labs   Lab 10/02/24  1215 10/03/24  0801 10/04/24  0651 10/05/24  0706 10/06/24  0826   NA  --    < > 136 136 134*   K  --    < > 4.3  4.3 4.4 4.4   CL  --    < > 108 107 105   CO2  --    < > 22.0 23.0 22.0   BUN  --    < > 30* 30* 24*   CREATSERUM  --    < > 1.04 1.11 1.03   GLU  --    < > 94 97 93   ANIONGAP  --    < > 6 6 7   ALB  --    < > 2.9* 2.9* 2.7*   CA  --    < > 10.0 9.5 9.7   MG  --    < > 1.7 1.7 1.7   TP  --    < > 5.5* 5.3* 5.6*   LACTI 1.0  --   --   --   --     < > = values in this interval not displayed.     Recent Labs   Lab 10/04/24  0651 10/05/24  0706 10/06/24  0826   ALKPHO 102 117 134*   AST 23 35* 38*   ALT 32 65* 75*   BILT 0.5 0.4 0.4     Recent Labs   Lab 10/03/24  0814   INR 1.37*     Recent Labs   Lab 10/02/24  1040 10/02/24  1509 10/03/24  0801    PBNP 1,675*  --   --    TROPHS 11 11 9     Recent Labs   Lab 10/02/24  1509 10/03/24  0801   PCT 0.79* 0.90*       Recent Labs   Lab 10/02/24  1040   COVID19 Not Detected   INFAPCR Negative   INFBPCR Negative   RSV Negative       Imaging reviewed    ASSESSMENT AND PLAN      Dyspnea - secondary to new R pleural effusion +/- pneumonia (PCT elevated). Right sided thoracentesis was done 10/3, 1500cc fluid removed. Fluid is exudative. Post-procedure CT with moderate right pleural effusion with RML consolidation concerning for infection vs atelectasis vs tumor. CXR with interval worsening today.  -continue ceftriaxone (10/2- ). Completed course of azithromycin  -follow up pleural fluid cytology and cultures  -risks, benefits and alternatives to repeat diagnostic and therapeutic thoracentesis discussed with patient and he agreed with proceeding. Orders placed  MDS - pancytopenic on labs  -per [facility]  Proph  -LM[facility]  Dispo  -full code  -inpatient     We will continue to follow with you

## 2024-10-06 NOTE — PROGRESS NOTES
CC: follow-up hospital admission sob    SUBJECTIVE:  Interval History:     Brathing and pain is improved  In bed  Labs pending fortoday    OBJECTIVE:  Scheduled Meds:    enoxaparin  40 mg Subcutaneous Nightly    cefTRIAXone  2 g Intravenous Q24H    allopurinol  100 mg Oral Daily    atorvastatin  10 mg Oral After dinner    metoprolol succinate ER  50 mg Oral Daily Beta Blocker    lidocaine-menthol  1 patch Transdermal Daily     Continuous Infusions:   PRN Meds:   HYDROcodone-acetaminophen    HYDROcodone-acetaminophen    acetaminophen    PHYSICAL EXAM  Vital signs: Temp:  [97.4 °F (36.3 °C)-98 °F (36.7 °C)] 97.8 °F (36.6 °C)  Pulse:  [76-83] 83  Resp:  [15-19] 19  BP: (108-146)/(54-77) 138/57  SpO2:  [95 %-96 %] 95 %      GENERAL - NAD, AAO  EYES- sclera anicteric,   HENT- normocephalic, OP - dry  NECK - no JVD  CV- RRR  RESP - decreased at bases, normal resp effort  ABDOMEN- soft, NT/ND   EXT- no LE edema   PSYCH - normal mentation/ normal affect    Data Review:   Labs:   Recent Labs   Lab 10/02/24  1040 10/03/24  0801 10/03/24  0814 10/03/24  1432 10/04/24  0651 10/05/24  0706   WBC 3.5* 3.5*  --   --  3.9* 3.7*   HGB 8.0* 6.6*  --  7.9* 7.7* 7.0*   MCV 89.6 90.4  --   --  87.2 90.1   .0* 111.0*  --   --  130.0* 134.0*   INR  --   --  1.37*  --   --   --        Recent Labs   Lab 10/02/24  1040 10/03/24  0801 10/04/24  0651 10/05/24  0706   * 138 136 136   K 3.8 3.8  3.8 4.3  4.3 4.4    106 108 107   CO2 22.0 24.0 22.0 23.0   BUN 26* 31* 30* 30*   CREATSERUM 1.41* 1.21 1.04 1.11   CA 10.5* 9.8 10.0 9.5   MG  --  1.7 1.7 1.7   * 118* 94 97       Recent Labs   Lab 10/02/24  1040 10/03/24  0801 10/04/24  0651 10/05/24  0706   ALT 17 19 32 65*   AST 14 11 23 35*   ALB 3.4 3.1* 2.9* 2.9*       No results for input(s): \"PGLU\" in the last 168 hours.    ECHO    Conclusions:     1. Study data: Normal sinus, frequent PACs   2. Left ventricle: The cavity size was normal. Wall thickness was normal.       Systolic function was normal. The estimated ejection fraction was 60-65%,      by visual assessment. No diagnostic evidence for regional wall motion      abnormalities. Unable to assess LV diastolic function due to heart      rhythm.   3. Left atrium: The left atrial volume was mildly increased.   4. Aortic valve: Transvalvular velocity was minimally increased. There was      no evidence for stenosis. There was mild regurgitation. The peak systolic      velocity was 2.39m/sec. The mean systolic gradient was 9mm Hg. The valve      area (VTI) was 2.66cm^2. The valve area (VTI) index was 1.39cm^2/m^2.   5. Mitral valve: There was mild regurgitation.   6. Pulmonary arteries: Systolic pressure was moderately increased, in the      range of 50mm Hg to 55mm Hg.   7. Pericardium, extracardiac: A trivial pericardial effusion was identified.      There was no evidence of hemodynamic compromise. There was a right      pleural effusion.   Impressions:  No previous study from [facility] was   available for comparison.       ASSESSMENT/PLAN:    Patient is a 73 year old male with PMH sig for HTN, HLD, MDS on chemotherapy, here for chest pain /sob     Impression     -chest pain   -sob     -HTN  -HLD     -CKD 3, baseline Cr 1.5-1.7     -MDS on Vidaza  -pancytopenia from above   -hypercalcemia     Plan     *R chest pain / sob  -symptoms lkely due to R sided cxr findings. Less likely cardiac etiology but will trend trop and EKG -neg  -echo as above, no large pericardial effusion     *R sided pleural effusion / likely pna  -iv abx.   Sp thoracentesis 1.5L removed. Cx ngtd  CT with possible atelectadsis, tumor vs pna  -repeat cxr with enlarging consolidation and recurrent effusion  May need repeat thora / ?chest tube     *HTN  -on bb, cont     *HLD  -statin     *MDS  -heme eval  -transfused 1 unit thus far      scds  Resume lovenox    Code - dw pt, full code    Dispo - likely next week    Will continue to follow while  hospitalized. Please page me or the on-call hospitalist with questions or concerns.    Brenden Montero Hospitalist  [phone]  Answering Service: [phone]

## 2024-10-07 ENCOUNTER — APPOINTMENT (OUTPATIENT)
Dept: CT IMAGING | Facility: HOSPITAL | Age: 74
End: 2024-10-07
Attending: INTERNAL MEDICINE
Payer: MEDICARE

## 2024-10-07 ENCOUNTER — APPOINTMENT (OUTPATIENT)
Dept: GENERAL RADIOLOGY | Facility: HOSPITAL | Age: 74
End: 2024-10-07
Attending: RADIOLOGY
Payer: MEDICARE

## 2024-10-07 ENCOUNTER — APPOINTMENT (OUTPATIENT)
Dept: ULTRASOUND IMAGING | Facility: HOSPITAL | Age: 74
End: 2024-10-07
Attending: INTERNAL MEDICINE
Payer: MEDICARE

## 2024-10-07 LAB
ALBUMIN SERPL-MCNC: 3.2 G/DL (ref 3.2–4.8)
ALBUMIN/GLOB SERPL: 1.2 {RATIO} (ref 1–2)
ALP LIVER SERPL-CCNC: 145 U/L
ALT SERPL-CCNC: 77 U/L
ANION GAP SERPL CALC-SCNC: 6 MMOL/L (ref 0–18)
AST SERPL-CCNC: 31 U/L (ref ?–34)
BASOPHILS NFR PLR: 0 %
BILIRUB SERPL-MCNC: 0.5 MG/DL (ref 0.2–1.1)
BUN BLD-MCNC: 25 MG/DL (ref 9–23)
CALCIUM BLD-MCNC: 10 MG/DL (ref 8.7–10.4)
CHLORIDE SERPL-SCNC: 105 MMOL/L (ref 98–112)
CHOLEST PLR-MCNC: 53 MG/DL
CO2 SERPL-SCNC: 25 MMOL/L (ref 21–32)
COLOR FLD: YELLOW
CREAT BLD-MCNC: 1.12 MG/DL
EGFRCR SERPLBLD CKD-EPI 2021: 69 ML/MIN/1.73M2 (ref 60–?)
EOSINOPHIL NFR PLR: 0 %
ERYTHROCYTE [DISTWIDTH] IN BLOOD BY AUTOMATED COUNT: 14.6 %
GLOBULIN PLAS-MCNC: 2.6 G/DL (ref 2–3.5)
GLUCOSE BLD-MCNC: 100 MG/DL (ref 70–99)
GLUCOSE PLR-MCNC: 62 MG/DL
HCT VFR BLD AUTO: 23.2 %
HGB BLD-MCNC: 7.6 G/DL
INR BLD: 1.22 (ref 0.8–1.2)
LDH FLD L TO P-CCNC: 561 U/L
LYMPHOCYTES NFR PLR: 31 %
MAGNESIUM SERPL-MCNC: 1.8 MG/DL (ref 1.6–2.6)
MCH RBC QN AUTO: 30 PG (ref 26–34)
MCHC RBC AUTO-ENTMCNC: 32.8 G/DL (ref 31–37)
MCV RBC AUTO: 91.7 FL
MONOS+MACROS NFR PLR: 10 %
NEUTROPHILS NFR PLR: 59 %
OSMOLALITY SERPL CALC.SUM OF ELEC: 286 MOSM/KG (ref 275–295)
PLATELET # BLD AUTO: 152 10(3)UL (ref 150–450)
PLATELETS.RETICULATED NFR BLD AUTO: 40.5 % (ref 0–7)
POTASSIUM SERPL-SCNC: 4.7 MMOL/L (ref 3.5–5.1)
PROT PLR-MCNC: 3.7 G/DL
PROT SERPL-MCNC: 5.8 G/DL (ref 5.7–8.2)
PROTHROMBIN TIME: 15.5 SECONDS (ref 11.6–14.8)
RBC # BLD AUTO: 2.53 X10(6)UL
RBC PLEURAL FLUID: 6000 /MM3
SODIUM SERPL-SCNC: 136 MMOL/L (ref 136–145)
TOTAL CELLS COUNTED FLD: 100
WBC # BLD AUTO: 5.3 X10(3) UL (ref 4–11)
WBC # PLR: 428 /MM3

## 2024-10-07 PROCEDURE — 80053 COMPREHEN METABOLIC PANEL: CPT | Performed by: HOSPITALIST

## 2024-10-07 PROCEDURE — 87205 SMEAR GRAM STAIN: CPT | Performed by: HOSPITALIST

## 2024-10-07 PROCEDURE — 89051 BODY FLUID CELL COUNT: CPT | Performed by: HOSPITALIST

## 2024-10-07 PROCEDURE — 32555 ASPIRATE PLEURA W/ IMAGING: CPT | Performed by: INTERNAL MEDICINE

## 2024-10-07 PROCEDURE — 89050 BODY FLUID CELL COUNT: CPT | Performed by: HOSPITALIST

## 2024-10-07 PROCEDURE — 0W993ZZ DRAINAGE OF RIGHT PLEURAL CAVITY, PERCUTANEOUS APPROACH: ICD-10-PCS | Performed by: RADIOLOGY

## 2024-10-07 PROCEDURE — 83735 ASSAY OF MAGNESIUM: CPT | Performed by: HOSPITALIST

## 2024-10-07 PROCEDURE — 84157 ASSAY OF PROTEIN OTHER: CPT | Performed by: HOSPITALIST

## 2024-10-07 PROCEDURE — 71250 CT THORAX DX C-: CPT | Performed by: INTERNAL MEDICINE

## 2024-10-07 PROCEDURE — 83615 LACTATE (LD) (LDH) ENZYME: CPT | Performed by: HOSPITALIST

## 2024-10-07 PROCEDURE — 85027 COMPLETE CBC AUTOMATED: CPT | Performed by: HOSPITALIST

## 2024-10-07 PROCEDURE — 85610 PROTHROMBIN TIME: CPT | Performed by: HOSPITALIST

## 2024-10-07 PROCEDURE — 87070 CULTURE OTHR SPECIMN AEROBIC: CPT | Performed by: HOSPITALIST

## 2024-10-07 PROCEDURE — 88312 SPECIAL STAINS GROUP 1: CPT | Performed by: HOSPITALIST

## 2024-10-07 PROCEDURE — 82465 ASSAY BLD/SERUM CHOLESTEROL: CPT | Performed by: HOSPITALIST

## 2024-10-07 PROCEDURE — 88305 TISSUE EXAM BY PATHOLOGIST: CPT | Performed by: HOSPITALIST

## 2024-10-07 PROCEDURE — 82945 GLUCOSE OTHER FLUID: CPT | Performed by: HOSPITALIST

## 2024-10-07 PROCEDURE — 88108 CYTOPATH CONCENTRATE TECH: CPT | Performed by: HOSPITALIST

## 2024-10-07 RX ORDER — MAGNESIUM OXIDE 400 MG/1
400 TABLET ORAL ONCE
Status: COMPLETED | OUTPATIENT
Start: 2024-10-07 | End: 2024-10-07

## 2024-10-07 NOTE — PLAN OF CARE
Pt chief complaint upon admission: pleural effusions & PNA. Received pt at handoff 1900, pt in bed. A&Ox 4. RA. Rhythm NSR on tele. GI/ WNL. No complaint of chest pain but does report back pain. All medications given as ordered. Pt resting in bed w/ all safety measures in place and call light within reach.     Care completed over shift: Pt kept NPO after 0000 for thoracentesis planned 10/7.     Plan is to trend Hgb and replace if <7. Awaiting results from cytology.     Problem: Patient/Family Goals  Goal: Patient/Family Long Term Goal  Description: Patient's Long Term Goal: stay healthy    Interventions:  - Medication management  -Dietary management  -Prompt follow up with physician  - See additional Care Plan goals for specific interventions  Outcome: Progressing  Goal: Patient/Family Short Term Goal  Description: Patient's Short Term Goal: Discharge home    Interventions:   - Pulmonology consult  -IV antbiotics  -2D echo   - See additional Care Plan goals for specific interventions  Outcome: Progressing     Problem: RESPIRATORY - ADULT  Goal: Achieves optimal ventilation and oxygenation  Description: INTERVENTIONS:  - Assess for changes in respiratory status  - Assess for changes in mentation and behavior  - Position to facilitate oxygenation and minimize respiratory effort  - Oxygen supplementation based on oxygen saturation or ABGs  - Provide Smoking Cessation handout, if applicable  - Encourage broncho-pulmonary hygiene including cough, deep breathe, Incentive Spirometry  - Assess the need for suctioning and perform as needed  - Assess and instruct to report SOB or any respiratory difficulty  - Respiratory Therapy support as indicated  - Manage/alleviate anxiety  - Monitor for signs/symptoms of CO2 retention  Outcome: Progressing

## 2024-10-07 NOTE — PROGRESS NOTES
[facility]    Guy White Patient Status:  Inpatient    1950 MRN [id number]   Location [facility] 2NE-A Attending Brenden Whitaker, DO   Hosp Day # 5 PCP Salvador Cohn MD     SUBJECTIVE:underwent repeat thora today with ~300ml serosanguinous fluid removed.    OBJECTIVE:  /54 (BP Location: Right arm)   Pulse 66   Temp 98.3 °F (36.8 °C) (Oral)   Resp 16   Ht 182.9 cm (6')   Wt 151 lb 10.8 oz (68.8 kg)   SpO2 95%   BMI 20.57 kg/m²   O2 requirement: room air     I/O last 3 completed shifts:  In: 820 [P.O.:820]  Out: -   No intake/output data recorded.     Current Medications:   Current Facility-Administered Medications:     enoxaparin (Lovenox) 40 MG/0.4ML SUBQ injection 40 mg, 40 mg, Subcutaneous, Nightly    [COMPLETED] cefTRIAXone (Rocephin) 1 g in sodium chloride 0.9% 100 mL IVPB-ADDV, 1 g, Intravenous, Once **FOLLOWED BY** cefTRIAXone (Rocephin) 2 g in sodium chloride 0.9% 100 mL IVPB-ADDV, 2 g, Intravenous, Q24H    HYDROcodone-acetaminophen (Norco) 5-325 MG per tab 2 tablet, 2 tablet, Oral, Q4H PRN    HYDROcodone-acetaminophen (Norco) 5-325 MG per tab 1 tablet, 1 tablet, Oral, Q6H PRN    allopurinol (Zyloprim) tab 100 mg, 100 mg, Oral, Daily    atorvastatin (Lipitor) tab 10 mg, 10 mg, Oral, After dinner    metoprolol succinate ER (Toprol XL) 24 hr tab 50 mg, 50 mg, Oral, Daily Beta Blocker    acetaminophen (Tylenol Extra Strength) tab 500 mg, 500 mg, Oral, Q4H PRN    lidocaine-menthol 4-1 % patch 1 patch, 1 patch, Transdermal, Daily     General appearance: alert, appears stated age, and cooperative  Lungs: diminished breath sounds bilaterally  Heart: regular rate and rhythm  Abdomen: soft, non-tender; bowel sounds normal; no masses,  no organomegaly  Extremities: extremities normal, atraumatic, no cyanosis or edema     Lab Results   Component Value Date    WBC 5.3 10/07/2024    RBC 2.53 10/07/2024    HGB 7.6 10/07/2024    HCT 23.2 10/07/2024    MCV 91.7 10/07/2024    MCH  30.0 10/07/2024    MCHC 32.8 10/07/2024    RDW 14.6 10/07/2024    .0 10/07/2024     Lab Results   Component Value Date     10/07/2024    K 4.7 10/07/2024     10/07/2024    CO2 25.0 10/07/2024    BUN 25 10/07/2024    CREATSERUM 1.12 10/07/2024     10/07/2024    CA 10.0 10/07/2024    ALKPHO 145 10/07/2024    ALT 77 10/07/2024    AST 31 10/07/2024    BILT 0.5 10/07/2024    ALB 3.2 10/07/2024    TP 5.8 10/07/2024     Lab Results   Component Value Date    INR 1.22 (H) 10/07/2024    INR 1.37 (H) 10/03/2024    INR 1.17 05/13/2024          Imaging: CXRs reviewed.     ASSESSMENT/PLAN:  Dyspnea - secondary to new R pleural effusion +/- pneumonia (PCT elevated). Right sided thoracentesis was done 10/3, 1500cc fluid removed and again 10/7 with ~300cc removed. Fluid is exudative. Post-procedure CXR pending but suspect that there will be residual fluid given degree of loculations noted.  Previous CT with moderate right pleural effusion with RML consolidation concerning for infection vs atelectasis vs tumor  -continue ceftriaxone (10/2- ). Completed course of azithromycin  -pleural fluid cytology negative and cultures NGTD  -pH was normal pointing away from empyema.  However, may need further intervention with chest tube/MIST protocol if there is significant effusion left behind.  MDS - pancytopenic on labs  -per [facility]  Proph  -[facility]  Dispo -full code  -inpatient     Renan Pemberton MD  10/7/2024  11:46 AM

## 2024-10-07 NOTE — IMAGING NOTE
Patient received in holding.   All pertinent labs reviewed.  NPO since 0000  300 cc removed by DR Arreola  Puncture site to right mid back with tegaderm dressing in place, C/D/I.  Patient denies pain.   Report given to Malaika HILL.   Patient transported to [facility] accompanied by transporter

## 2024-10-07 NOTE — PROCEDURES
Complex rt pl eff w/ ext septations  8F  300 ml serosanguinous fluid removed, sample to lab.  Comp-none.

## 2024-10-07 NOTE — PLAN OF CARE
Assumed patient care around 0730 this AM. Patient alert and oriented x4. Spo2 maintained on RA. Lung sounds diminished bilaterally. Plan for right sided thoracentesis today. NSR on tele Patient continent of bowel and bladder. Denies pain at this time. Patient is up with standby assist in the room. Updated on POC. Needs met.     Approx 1255: Patient arrived to unit s/p right sided thoracentesis. Vital signs stable. Right mid back dressing from thoracentesis site dry, and intact. Small blood spot noted on dressing, marked with pen by this RN. Lung sounds diminished bilaterally. Denies pain at this time.     Problem: Patient/Family Goals  Goal: Patient/Family Long Term Goal  Description: Patient's Long Term Goal: stay healthy  10/7: Stay out of the hospital when discharged    Interventions:  - Medication management  -Dietary management  -Prompt follow up with physician  10/7:   -Attend my follow up appointments with Mds   -Follow my medication regimen at home  - See additional Care Plan goals for specific interventions  Outcome: Progressing  Goal: Patient/Family Short Term Goal  Description: Patient's Short Term Goal: Discharge home    Interventions:   - Pulmonology consult  -IV antbiotics  -2D echo   10/7:  -IV abx  -Tele monitoring  -Monitor labs  -R sided thoracentesis 10/7  -Ambulate as tolerated  - See additional Care Plan goals for specific interventions  Outcome: Progressing     Problem: RESPIRATORY - ADULT  Goal: Achieves optimal ventilation and oxygenation  Description: INTERVENTIONS:  - Assess for changes in respiratory status  - Assess for changes in mentation and behavior  - Position to facilitate oxygenation and minimize respiratory effort  - Oxygen supplementation based on oxygen saturation or ABGs  - Provide Smoking Cessation handout, if applicable  - Encourage broncho-pulmonary hygiene including cough, deep breathe, Incentive Spirometry  - Assess the need for suctioning and perform as needed  - Assess  and instruct to report SOB or any respiratory difficulty  - Respiratory Therapy support as indicated  - Manage/alleviate anxiety  - Monitor for signs/symptoms of CO2 retention  Outcome: Progressing     Problem: CARDIOVASCULAR - ADULT  Goal: Maintains optimal cardiac output and hemodynamic stability  Description: INTERVENTIONS:  - Monitor vital signs, rhythm, and trends  - Monitor for bleeding, hypotension and signs of decreased cardiac output  - Evaluate effectiveness of vasoactive medications to optimize hemodynamic stability  - Monitor arterial and/or venous puncture sites for bleeding and/or hematoma  - Assess quality of pulses, skin color and temperature  - Assess for signs of decreased coronary artery perfusion - ex. Angina  - Evaluate fluid balance, assess for edema, trend weights  Outcome: Progressing  Goal: Absence of cardiac arrhythmias or at baseline  Description: INTERVENTIONS:  - Continuous cardiac monitoring, monitor vital signs, obtain 12 lead EKG if indicated  - Evaluate effectiveness of antiarrhythmic and heart rate control medications as ordered  - Initiate emergency measures for life threatening arrhythmias  - Monitor electrolytes and administer replacement therapy as ordered  Outcome: Progressing

## 2024-10-07 NOTE — IMAGING NOTE
Malaika HILL aware to keep patient NPO, stat INR, hold any blood thinning medication in preparation for thoracentesis. ZO

## 2024-10-08 LAB
ALBUMIN SERPL-MCNC: 3 G/DL (ref 3.2–4.8)
ALBUMIN/GLOB SERPL: 1.2 {RATIO} (ref 1–2)
ALP LIVER SERPL-CCNC: 147 U/L
ALT SERPL-CCNC: 77 U/L
ANION GAP SERPL CALC-SCNC: 3 MMOL/L (ref 0–18)
ANTIBODY SCREEN: NEGATIVE
AST SERPL-CCNC: 33 U/L (ref ?–34)
BILIRUB SERPL-MCNC: 0.3 MG/DL (ref 0.2–1.1)
BUN BLD-MCNC: 28 MG/DL (ref 9–23)
CALCIUM BLD-MCNC: 9.3 MG/DL (ref 8.7–10.4)
CHLORIDE SERPL-SCNC: 107 MMOL/L (ref 98–112)
CO2 SERPL-SCNC: 25 MMOL/L (ref 21–32)
CREAT BLD-MCNC: 1.14 MG/DL
EGFRCR SERPLBLD CKD-EPI 2021: 68 ML/MIN/1.73M2 (ref 60–?)
ERYTHROCYTE [DISTWIDTH] IN BLOOD BY AUTOMATED COUNT: 14.6 %
GLOBULIN PLAS-MCNC: 2.5 G/DL (ref 2–3.5)
GLUCOSE BLD-MCNC: 111 MG/DL (ref 70–99)
HCT VFR BLD AUTO: 20.7 %
HGB BLD-MCNC: 6.9 G/DL
HGB BLD-MCNC: 7.8 G/DL
MAGNESIUM SERPL-MCNC: 1.9 MG/DL (ref 1.6–2.6)
MCH RBC QN AUTO: 30.3 PG (ref 26–34)
MCHC RBC AUTO-ENTMCNC: 33.3 G/DL (ref 31–37)
MCV RBC AUTO: 90.8 FL
OSMOLALITY SERPL CALC.SUM OF ELEC: 286 MOSM/KG (ref 275–295)
PLATELET # BLD AUTO: 119 10(3)UL (ref 150–450)
PLATELETS.RETICULATED NFR BLD AUTO: 38.2 % (ref 0–7)
POTASSIUM SERPL-SCNC: 4.2 MMOL/L (ref 3.5–5.1)
PROT SERPL-MCNC: 5.5 G/DL (ref 5.7–8.2)
RBC # BLD AUTO: 2.28 X10(6)UL
RH BLOOD TYPE: NEGATIVE
SODIUM SERPL-SCNC: 135 MMOL/L (ref 136–145)
WBC # BLD AUTO: 4.2 X10(3) UL (ref 4–11)

## 2024-10-08 PROCEDURE — 36430 TRANSFUSION BLD/BLD COMPNT: CPT

## 2024-10-08 PROCEDURE — 86920 COMPATIBILITY TEST SPIN: CPT

## 2024-10-08 PROCEDURE — 85027 COMPLETE CBC AUTOMATED: CPT | Performed by: HOSPITALIST

## 2024-10-08 PROCEDURE — 85018 HEMOGLOBIN: CPT | Performed by: STUDENT IN AN ORGANIZED HEALTH CARE EDUCATION/TRAINING PROGRAM

## 2024-10-08 PROCEDURE — 86901 BLOOD TYPING SEROLOGIC RH(D): CPT | Performed by: STUDENT IN AN ORGANIZED HEALTH CARE EDUCATION/TRAINING PROGRAM

## 2024-10-08 PROCEDURE — 80053 COMPREHEN METABOLIC PANEL: CPT | Performed by: HOSPITALIST

## 2024-10-08 PROCEDURE — 83735 ASSAY OF MAGNESIUM: CPT | Performed by: HOSPITALIST

## 2024-10-08 PROCEDURE — 86900 BLOOD TYPING SEROLOGIC ABO: CPT | Performed by: STUDENT IN AN ORGANIZED HEALTH CARE EDUCATION/TRAINING PROGRAM

## 2024-10-08 PROCEDURE — 86850 RBC ANTIBODY SCREEN: CPT | Performed by: STUDENT IN AN ORGANIZED HEALTH CARE EDUCATION/TRAINING PROGRAM

## 2024-10-08 RX ORDER — SODIUM CHLORIDE 9 MG/ML
INJECTION, SOLUTION INTRAVENOUS ONCE
Status: COMPLETED | OUTPATIENT
Start: 2024-10-08 | End: 2024-10-08

## 2024-10-08 NOTE — PLAN OF CARE
Assumed patient care around 0730 this AM. Spo2 maintained on RA. NSR on tele. Hemoglobin 6.9 today. Hospitalist notified. Orders received. Patient continent of bowel and bladder. Denies pain at this time. Patient is up with standby assist in the room. Updated on POC. Needs met.         Problem: Patient/Family Goals  Goal: Patient/Family Long Term Goal  Description: Patient's Long Term Goal: stay healthy  10/7: Stay out of the hospital when discharged    Interventions:  - Medication management  -Dietary management  -Prompt follow up with physician  10/7:   -Attend my follow up appointments with Mds   -Follow my medication regimen at home  - See additional Care Plan goals for specific interventions  Outcome: Progressing  Goal: Patient/Family Short Term Goal  Description: Patient's Short Term Goal: Discharge home    Interventions:   - Pulmonology consult  -IV antbiotics  -2D echo   10/7:  -IV abx  -Tele monitoring  -Monitor labs  -R sided thoracentesis 10/7  -Ambulate as tolerated  10/9:   -Tele monitoring  -Monitor labs  -Monitor hemoglobin  -Monitor thoracentesis site s/p right sided thoracentesis   -Ambulate as tolerated  -IV abx  - See additional Care Plan goals for specific interventions  Outcome: Progressing     Problem: RESPIRATORY - ADULT  Goal: Achieves optimal ventilation and oxygenation  Description: INTERVENTIONS:  - Assess for changes in respiratory status  - Assess for changes in mentation and behavior  - Position to facilitate oxygenation and minimize respiratory effort  - Oxygen supplementation based on oxygen saturation or ABGs  - Provide Smoking Cessation handout, if applicable  - Encourage broncho-pulmonary hygiene including cough, deep breathe, Incentive Spirometry  - Assess the need for suctioning and perform as needed  - Assess and instruct to report SOB or any respiratory difficulty  - Respiratory Therapy support as indicated  - Manage/alleviate anxiety  - Monitor for signs/symptoms of CO2  retention  Outcome: Progressing     Problem: CARDIOVASCULAR - ADULT  Goal: Maintains optimal cardiac output and hemodynamic stability  Description: INTERVENTIONS:  - Monitor vital signs, rhythm, and trends  - Monitor for bleeding, hypotension and signs of decreased cardiac output  - Evaluate effectiveness of vasoactive medications to optimize hemodynamic stability  - Monitor arterial and/or venous puncture sites for bleeding and/or hematoma  - Assess quality of pulses, skin color and temperature  - Assess for signs of decreased coronary artery perfusion - ex. Angina  - Evaluate fluid balance, assess for edema, trend weights  Outcome: Progressing  Goal: Absence of cardiac arrhythmias or at baseline  Description: INTERVENTIONS:  - Continuous cardiac monitoring, monitor vital signs, obtain 12 lead EKG if indicated  - Evaluate effectiveness of antiarrhythmic and heart rate control medications as ordered  - Initiate emergency measures for life threatening arrhythmias  - Monitor electrolytes and administer replacement therapy as ordered  Outcome: Progressing

## 2024-10-08 NOTE — PLAN OF CARE
Assumed care @ 1930. Patient is A&O x4. On tele-NSR. On RA. Normotensive. Denies SOB. Denies pain. Thoracentesis site-CDI. Call light within reach. All needs met at this time.

## 2024-10-08 NOTE — PAYOR COMM NOTE
--------------  CONTINUED STAY REVIEW    Payor: [employer]  Subscriber #:  [id number]  Authorization Number: [id number]    Admit date: 10/2/24  Admit time:  2:13 PM    REVIEW DOCUMENTATION:  10-8-24    Lungs: diminished breath sounds base - right  Heart: S1, S2 normal, no murmur, click, rub or gallop, regular rate and rhythm  Abdomen: soft, non-tender; bowel sounds normal; no masses,  no organomegaly  Extremities: extremities normal, atraumatic, no cyanosis or edema     Labs reviewed as noted below      ASSESSMENT/PLAN:     Dyspnea - secondary to new R pleural effusion +/- pneumonia (PCT elevated).   Loculated Pleural Effusion  -Right sided thoracentesis was done 10/3, 1500cc fluid removed and again 10/7 with ~300cc removed. Fluid is exudative.   -needs MIST2/Chest tube discussed w/ pt and he is in agreement  -resend culture for cyto and culture  PNA vs lung mass  -evidence of cavitation in RUL  -stop ceftriaxone (10/2-10/8 ), start zosyn given worsening cavitation. Completed course of azithromycin  -monitor cultures  -if no improvement w/ abx and mist 2 would favor Bronch w/ BAL for additional sampling  MDS - pancytopenic on labs  -per [facility]  Proph  -LMWH      Component  Ref Range & Units 10/8/24 0622   WBC  4.0 - 11.0 x10(3) uL 4.2   RBC  3.80 - 5.80 x10(6)uL 2.28 Low    HGB  13.0 - 17.5 g/dL 6.9 Low Panic    HCT  39.0 - 53.0 % 20.7 Low    PLT  150.0 - 450.0 10(3)uL 119.0 Low    Immature Platelet Fraction  0.0 - 7.0 % 38.2 High    MCV  80.0 - 100.0 fL 90.8   MCH  26.0 - 34.0 pg 30.3   MCHC  31.0 - 37.0 g/dL 33.3   RDW  % 14.6          Component  Ref Range & Units 10/8/24 0622   Glucose  70 - 99 mg/dL 111 High    Sodium  136 - 145 mmol/L 135 Low    Potassium  3.5 - 5.1 mmol/L 4.2   Chloride  98 - 112 mmol/L 107   CO2  21.0 - 32.0 mmol/L 25.0   Anion Gap  0 - 18 mmol/L 3   BUN  9 - 23 mg/dL 28 High    Creatinine  0.70 - 1.30 mg/dL 1.14   Calcium, Total  8.7 - 10.4 mg/dL 9.3   Calculated  Osmolality  275 - 295 mOsm/kg 286   eGFR-Cr  >=60 mL/min/1.73m2 68   AST  <34 U/L 33   ALT  10 - 49 U/L 77 High    Alkaline Phosphatase  45 - 117 U/L 147 High    Bilirubin, Total  0.2 - 1.1 mg/dL 0.3   Total Protein  5.7 - 8.2 g/dL 5.5 Low    Albumin  3.2 - 4.8 g/dL 3.0 Low    Globulin  2.0 - 3.5 g/dL 2.5   A/G Ratio  1.0 - 2.0 1.2         MEDICATIONS ADMINISTERED IN LAST 1 DAY:  Transfuse RBC       Date Action Dose Route User    10/8/2024 1315 Rate/Dose Change (none) Intravenous Malaika Trejo RN    10/8/2024 1259 New Bag (none) Intravenous Malaika Trejo RN          allopurinol (Zyloprim) tab 100 mg       Date Action Dose Route User    10/8/2024 0857 Given 100 mg Oral Malaika Trejo RN          atorvastatin (Lipitor) tab 10 mg       Date Action Dose Route User    10/7/2024 1742 Given 10 mg Oral Malaika Trejo RN          cefTRIAXone (Rocephin) 2 g in sodium chloride 0.9% 100 mL IVPB-ADDV       Date Action Dose Route User    10/8/2024 0857 New Bag 2 g Intravenous Malaika Trejo RN          enoxaparin (Lovenox) 40 MG/0.4ML SUBQ injection 40 mg       Date Action Dose Route User    10/7/2024 2018 Given 40 mg Subcutaneous (Left Lower Abdomen) Connie Lindsey RN          metoprolol succinate ER (Toprol XL) 24 hr tab 50 mg       Date Action Dose Route User    10/8/2024 0556 Given 50 mg Oral Connie Lindsey RN          sodium chloride 0.9% infusion       Date Action Dose Route User    10/8/2024 1259 New Bag (none) Intravenous Malaika Trejo RN            Vitals (last day)       Date/Time Temp Pulse Resp BP SpO2 Weight O2 Device O2 Flow Rate (L/min) Who    10/08/24 1513 97.9 °F (36.6 °C) 83 -- 145/66 99 % -- -- -- TP    10/08/24 1411 98.4 °F (36.9 °C) 73 16 117/54 98 % -- -- -- TP    10/08/24 0525 98.6 °F (37 °C) 70 16 127/65 96 % 151 lb 14.4 oz (68.9 kg) None (Room air) 0 L/min BR    10/08/24 0203 -- 89 -- -- 94 % -- -- -- BR    10/07/24 2322 98.9 °F (37.2 °C) 77 16 118/63 93 % -- None (Room air) 0 L/min BR     10/07/24 2209 -- 94 -- -- 96 % -- -- -- BR    10/07/24 1924 98.6 °F (37 °C) 76 16 110/60 97 % -- None (Room air) 0 L/min BR    10/07/24 1846 -- 78 16 123/57 95 % -- None (Room air) -- TP    10/07/24 1739 -- 74 14 117/62 97 % -- None (Room air) -- TP    10/07/24 1640 97.9 °F (36.6 °C) -- -- -- -- -- -- -- PG    10/07/24 1640 -- 70 16 106/55 93 % -- None (Room air) -- TP    10/07/24 1540 -- 72 16 104/51 95 % -- None (Room air) -- TP    10/07/24 1441 -- 69 16 138/56 98 % -- None (Room air) -- TP    10/07/24 1410 -- 71 16 123/112 93 % -- None (Room air) -- TP     Blood Transfusion Record       Product Unit Status Volume Start End            Transfuse RBC       24  [id number]  [id number] Transfusing  10/08/24 1259        24  [id number]  [id number] Completed 10/03/24 1414 341.25 mL 10/03/24 1057 10/03/24 1400

## 2024-10-08 NOTE — PROGRESS NOTES
Pulmonary Progress Note        NAME: Guy White - ROOM: [address] [place] [address]-[place] - MRN: [id number] - Age: 73 year old - : 1950        Last 24hrs: No events overnight, pt receiving blood, dismayed that he can't go home soon    OBJECTIVE:  Vitals:    10/08/24 0834 10/08/24 0943 10/08/24 1259 10/08/24 1315   BP: 131/66  119/58 120/54   BP Location: Right arm      Pulse: 67 69 71 71   Resp: 16  16 16   Temp: 98.3 °F (36.8 °C)  98.1 °F (36.7 °C) 97.5 °F (36.4 °C)   TempSrc: Oral  Oral Oral   SpO2: 97% 99% 94% 100%   Weight:       Height:           Oxygen Therapy  SpO2: 100 %  O2 Device: None (Room air)  O2 Flow Rate (L/min): 0 L/min  Pulse Oximetry Type: Continuous  Oximetry Probe Site Changed: No  Pulse Ox Probe Location: Left hand                  Intake/Output Summary (Last 24 hours) at 10/8/2024 1407  Last data filed at 10/8/2024 0943  Gross per 24 hour   Intake 836 ml   Output --   Net 836 ml       Scheduled Medication:   enoxaparin  40 mg Subcutaneous Nightly    cefTRIAXone  2 g Intravenous Q24H    allopurinol  100 mg Oral Daily    atorvastatin  10 mg Oral After dinner    metoprolol succinate ER  50 mg Oral Daily Beta Blocker    lidocaine-menthol  1 patch Transdermal Daily     Continuous Infusing Medication:    Lungs: diminished breath sounds base - right  Heart: S1, S2 normal, no murmur, click, rub or gallop, regular rate and rhythm  Abdomen: soft, non-tender; bowel sounds normal; no masses,  no organomegaly  Extremities: extremities normal, atraumatic, no cyanosis or edema    Labs reviewed as noted below      Imaging: CT chest from 10/4 and 10/7 reviewed    ASSESSMENT/PLAN:    Dyspnea - secondary to new R pleural effusion +/- pneumonia (PCT elevated).   Loculated Pleural Effusion  -Right sided thoracentesis was done 10/3, 1500cc fluid removed and again 10/7 with ~300cc removed. Fluid is exudative.   -needs MIST2/Chest tube discussed w/ pt and he is in agreement  -resend culture for cyto and culture  PNA vs lung  mass  -evidence of cavitation in RUL  -stop ceftriaxone (10/2-10/8 ), start zosyn given worsening cavitation. Completed course of azithromycin  -monitor cultures  -if no improvement w/ abx and mist 2 would favor Bronch w/ BAL for additional sampling  MDS - pancytopenic on labs  -per [facility]  Proph  -LMWH  Dispo -full code  -inpatient       [facility]  [facility]  Pulmonary and Critical Care

## 2024-10-08 NOTE — PROGRESS NOTES
CC: follow-up hospital admission sob    SUBJECTIVE:  Interval History:     Underwent thora today. Feels tired but improved from px.     OBJECTIVE:  Scheduled Meds:    enoxaparin  40 mg Subcutaneous Nightly    cefTRIAXone  2 g Intravenous Q24H    allopurinol  100 mg Oral Daily    atorvastatin  10 mg Oral After dinner    metoprolol succinate ER  50 mg Oral Daily Beta Blocker    lidocaine-menthol  1 patch Transdermal Daily     Continuous Infusions:   PRN Meds:   HYDROcodone-acetaminophen    HYDROcodone-acetaminophen    acetaminophen    PHYSICAL EXAM  Vital signs: Temp:  [97.9 °F (36.6 °C)-98.9 °F (37.2 °C)] 98.9 °F (37.2 °C)  Pulse:  [66-94] 89  Resp:  [14-16] 16  BP: (104-138)/() 118/63  SpO2:  [93 %-99 %] 94 %      GENERAL - NAD, AAO  EYES- sclera anicteric,   HENT- normocephalic, OP - dry  NECK - no JVD  CV- RRR  RESP - decreased at bases, normal resp effort  ABDOMEN- soft, NT/ND   EXT- no LE edema   PSYCH - normal mentation/ normal affect    Data Review:   Labs:   Recent Labs   Lab 10/03/24  0801 10/03/24  0814 10/03/24  1432 10/04/24  0651 10/05/24  0706 10/06/24  0826 10/07/24  0618 10/07/24  0918   WBC 3.5*  --   --  3.9* 3.7* 4.0 5.3  --    HGB 6.6*  --  7.9* 7.7* 7.0* 7.3* 7.6*  --    MCV 90.4  --   --  87.2 90.1 90.6 91.7  --    .0*  --   --  130.0* 134.0* 127.0* 152.0  --    INR  --  1.37*  --   --   --   --   --  1.22*       Recent Labs   Lab 10/03/24  0801 10/04/24  0651 10/05/24  0706 10/06/24  0826 10/07/24  0618    136 136 134* 136   K 3.8  3.8 4.3  4.3 4.4 4.4 4.7    108 107 105 105   CO2 24.0 22.0 23.0 22.0 25.0   BUN 31* 30* 30* 24* 25*   CREATSERUM 1.21 1.04 1.11 1.03 1.12   CA 9.8 10.0 9.5 9.7 10.0   MG 1.7 1.7 1.7 1.7 1.8   * 94 97 93 100*       Recent Labs   Lab 10/03/24  0801 10/04/24  0651 10/05/24  0706 10/06/24  0826 10/07/24  0618   ALT 19 32 65* 75* 77*   AST 11 23 35* 38* 31   ALB 3.1* 2.9* 2.9* 2.7* 3.2       No results for input(s): \"PGLU\" in the last  168 hours.    ECHO    Conclusions:     1. Study data: Normal sinus, frequent PACs   2. Left ventricle: The cavity size was normal. Wall thickness was normal.      Systolic function was normal. The estimated ejection fraction was 60-65%,      by visual assessment. No diagnostic evidence for regional wall motion      abnormalities. Unable to assess LV diastolic function due to heart      rhythm.   3. Left atrium: The left atrial volume was mildly increased.   4. Aortic valve: Transvalvular velocity was minimally increased. There was      no evidence for stenosis. There was mild regurgitation. The peak systolic      velocity was 2.39m/sec. The mean systolic gradient was 9mm Hg. The valve      area (VTI) was 2.66cm^2. The valve area (VTI) index was 1.39cm^2/m^2.   5. Mitral valve: There was mild regurgitation.   6. Pulmonary arteries: Systolic pressure was moderately increased, in the      range of 50mm Hg to 55mm Hg.   7. Pericardium, extracardiac: A trivial pericardial effusion was identified.      There was no evidence of hemodynamic compromise. There was a right      pleural effusion.   Impressions:  No previous study from [facility] was   available for comparison.       ASSESSMENT/PLAN:    Patient is a 73 year old male with PMH sig for HTN, HLD, MDS on chemotherapy, here for chest pain /sob       #R sided pleural effusion / suspect pna  #R chest pain / sob  -symptoms lkely due to R sided cxr findings. Less likely cardiac etiology but will trend trop and EKG -neg  -echo as above, no large pericardial effusion  -iv abx, Rocephin (10/2-). Completed course of azithro.   Sp thoracentesis 10/3 1.5L removed. Cx ngtd. Repeat thora 10/7, 300 cc removed.   -Pulmonology following  -Moderate right sided effusion remains on CXR post-thora. Repeat CT ordered, further management based on findings.      #HTN  -on bb, cont     #HLD  -statin     #MDS  -heme eval  -transfused 1 unit thus far     #CKDII!  -Trend renal  function     DVT ppx: Scds, lovenox        Vitor Benitez DO  [facility]

## 2024-10-09 ENCOUNTER — APPOINTMENT (OUTPATIENT)
Dept: CT IMAGING | Facility: HOSPITAL | Age: 74
End: 2024-10-09
Attending: STUDENT IN AN ORGANIZED HEALTH CARE EDUCATION/TRAINING PROGRAM
Payer: MEDICARE

## 2024-10-09 LAB
ALBUMIN SERPL-MCNC: 2.9 G/DL (ref 3.2–4.8)
ALBUMIN/GLOB SERPL: 1.1 {RATIO} (ref 1–2)
ALP LIVER SERPL-CCNC: 139 U/L
ALT SERPL-CCNC: 71 U/L
ANION GAP SERPL CALC-SCNC: 6 MMOL/L (ref 0–18)
AST SERPL-CCNC: 34 U/L (ref ?–34)
BILIRUB SERPL-MCNC: 0.5 MG/DL (ref 0.2–1.1)
BLOOD TYPE BARCODE: 9500
BUN BLD-MCNC: 24 MG/DL (ref 9–23)
CALCIUM BLD-MCNC: 9.3 MG/DL (ref 8.7–10.4)
CHLORIDE SERPL-SCNC: 106 MMOL/L (ref 98–112)
CO2 SERPL-SCNC: 24 MMOL/L (ref 21–32)
CREAT BLD-MCNC: 1.14 MG/DL
EGFRCR SERPLBLD CKD-EPI 2021: 68 ML/MIN/1.73M2 (ref 60–?)
ERYTHROCYTE [DISTWIDTH] IN BLOOD BY AUTOMATED COUNT: 15.1 %
GLOBULIN PLAS-MCNC: 2.6 G/DL (ref 2–3.5)
GLUCOSE BLD-MCNC: 100 MG/DL (ref 70–99)
HCT VFR BLD AUTO: 23.7 %
HGB BLD-MCNC: 7.7 G/DL
INR BLD: 1.15 (ref 0.8–1.2)
MAGNESIUM SERPL-MCNC: 1.7 MG/DL (ref 1.6–2.6)
MCH RBC QN AUTO: 29.7 PG (ref 26–34)
MCHC RBC AUTO-ENTMCNC: 32.5 G/DL (ref 31–37)
MCV RBC AUTO: 91.5 FL
OSMOLALITY SERPL CALC.SUM OF ELEC: 286 MOSM/KG (ref 275–295)
PLATELET # BLD AUTO: 114 10(3)UL (ref 150–450)
PLATELETS.RETICULATED NFR BLD AUTO: 42.2 % (ref 0–7)
POTASSIUM SERPL-SCNC: 4.2 MMOL/L (ref 3.5–5.1)
PROT SERPL-MCNC: 5.5 G/DL (ref 5.7–8.2)
PROTHROMBIN TIME: 14.9 SECONDS (ref 11.6–14.8)
RBC # BLD AUTO: 2.59 X10(6)UL
SODIUM SERPL-SCNC: 136 MMOL/L (ref 136–145)
UNIT VOLUME: 250 ML
WBC # BLD AUTO: 3.8 X10(3) UL (ref 4–11)

## 2024-10-09 PROCEDURE — 0W9930Z DRAINAGE OF RIGHT PLEURAL CAVITY WITH DRAINAGE DEVICE, PERCUTANEOUS APPROACH: ICD-10-PCS | Performed by: STUDENT IN AN ORGANIZED HEALTH CARE EDUCATION/TRAINING PROGRAM

## 2024-10-09 PROCEDURE — 85610 PROTHROMBIN TIME: CPT | Performed by: STUDENT IN AN ORGANIZED HEALTH CARE EDUCATION/TRAINING PROGRAM

## 2024-10-09 PROCEDURE — 80053 COMPREHEN METABOLIC PANEL: CPT | Performed by: HOSPITALIST

## 2024-10-09 PROCEDURE — 32557 INSERT CATH PLEURA W/ IMAGE: CPT | Performed by: STUDENT IN AN ORGANIZED HEALTH CARE EDUCATION/TRAINING PROGRAM

## 2024-10-09 PROCEDURE — 99152 MOD SED SAME PHYS/QHP 5/>YRS: CPT | Performed by: STUDENT IN AN ORGANIZED HEALTH CARE EDUCATION/TRAINING PROGRAM

## 2024-10-09 PROCEDURE — 83735 ASSAY OF MAGNESIUM: CPT | Performed by: HOSPITALIST

## 2024-10-09 PROCEDURE — 85027 COMPLETE CBC AUTOMATED: CPT | Performed by: HOSPITALIST

## 2024-10-09 RX ORDER — MAGNESIUM OXIDE 400 MG/1
400 TABLET ORAL ONCE
Status: COMPLETED | OUTPATIENT
Start: 2024-10-09 | End: 2024-10-09

## 2024-10-09 RX ORDER — NALOXONE HYDROCHLORIDE 0.4 MG/ML
0.08 INJECTION, SOLUTION INTRAMUSCULAR; INTRAVENOUS; SUBCUTANEOUS AS NEEDED
Status: DISCONTINUED | OUTPATIENT
Start: 2024-10-09 | End: 2024-10-09 | Stop reason: HOSPADM

## 2024-10-09 RX ORDER — SODIUM CHLORIDE 9 MG/ML
INJECTION, SOLUTION INTRAVENOUS CONTINUOUS
Status: DISCONTINUED | OUTPATIENT
Start: 2024-10-09 | End: 2024-10-09 | Stop reason: HOSPADM

## 2024-10-09 RX ORDER — FLUMAZENIL 0.1 MG/ML
0.2 INJECTION INTRAVENOUS AS NEEDED
Status: DISCONTINUED | OUTPATIENT
Start: 2024-10-09 | End: 2024-10-09 | Stop reason: HOSPADM

## 2024-10-09 RX ORDER — DIPHENHYDRAMINE HCL 25 MG
50 CAPSULE ORAL ONCE
Status: DISCONTINUED | OUTPATIENT
Start: 2024-10-09 | End: 2024-10-09

## 2024-10-09 RX ORDER — MIDAZOLAM HYDROCHLORIDE 1 MG/ML
1 INJECTION INTRAMUSCULAR; INTRAVENOUS EVERY 5 MIN PRN
Status: DISCONTINUED | OUTPATIENT
Start: 2024-10-09 | End: 2024-10-09 | Stop reason: HOSPADM

## 2024-10-09 RX ORDER — MIDAZOLAM HYDROCHLORIDE 1 MG/ML
INJECTION INTRAMUSCULAR; INTRAVENOUS
Status: COMPLETED
Start: 2024-10-09 | End: 2024-10-09

## 2024-10-09 NOTE — DIETARY NOTE
[facility]   part of [facility]    NUTRITION ASSESSMENT    Unable to diagnose malnutrition criteria at this time.      NUTRITION INTERVENTION:    Meal and Snacks - Monitor and encourage adequate PO intake.   Medical Food Supplements - Ensure Plus High Protein Daily; at breakfast (provides: 350 kcal, 20 gm protein).       PATIENT STATUS:     10/9/24- 72 y/o male admitted w/ exertional chest pain. Pt chart reviewed d/t length of stay. Pt sound asleep at time of visit. Had R chest tube placed a few hours before visit. S/p R thoracentesis on 10/3 (1500 ml removed) and 10/7 (300 ml removed). Previous recorded PO intakes vary:% w/ 75% or greater most meals. Will add ONS daily to help maximize nutrition intakes.   PMH:former tobacco use, HTN, HLD, MDS on chemo, frequent transfusions.       ANTHROPOMETRICS:  Ht: 182.9 cm (6')  Wt: 69.7 kg (153 lb 10.6 oz).   BMI: Body mass index is 20.84 kg/m².  IBW: 81 kg      WEIGHT HISTORY:     Per EMR hx, pt may have lost about 22 lb (12.6%) in about 2.5 months, which is significant wt loss per standards.     Wt Readings from Last 10 Encounters:   10/09/24 69.7 kg (153 lb 10.6 oz)   09/27/24 72.5 kg (159 lb 12.8 oz)   09/09/24 72.6 kg (160 lb)   08/14/24 76.1 kg (167 lb 12.8 oz)   07/28/24 79.4 kg (175 lb)   07/18/24 78.7 kg (173 lb 8 oz)   06/18/24 77.1 kg (170 lb)   05/15/24 79.4 kg (175 lb 0.7 oz)   12/26/21 88.5 kg (195 lb)   08/30/21 87.1 kg (192 lb)        NUTRITION:  Diet:       Procedures    Regular/General diet Is Patient on Accuchecks? No      Food Allergies: No  Cultural/Ethnic/Jainism Preferences Addressed: Yes    Percent Meals Eaten (last 3 days)       Date/Time Percent Meals Eaten (%)    10/06/24 0900 100 %    10/06/24 1300 100 %    10/07/24 0800 0 %     Percent Meals Eaten (%): NPO at 10/07/24 0800    10/07/24 1659 100 %    10/07/24 1924 50 %    10/08/24 0943 95 %    10/09/24 0900 0 %     Percent Meals Eaten (%): NPO at 10/09/24 0900    10/09/24 1350 100  %    10/09/24 1353 100 %          GI system review: WNL Last BM Date: 10/08/24  Skin and wounds: no pressure ulcers per RN documentation. R chest tube.    NUTRITION RELATED PHYSICAL FINDINGS:     1. Body Fat/Muscle Mass: unable to assess at this time. Pt sound asleep.     2. Fluid Accumulation: none per RN documentation    NUTRITION PRESCRIPTION:  69.7 kg-153 lb (10/9) Actual Body Weight  Calories: 8899-5114 calories/day (25-30 kcal/kg)  Protein:  grams protein/day (1.2-1.5 grams protein per kg)  Fluid: ~1 ml/kcal or per MD discretion    NUTRITION DIAGNOSIS/PROBLEM:  Unintentional weight loss related to  physiologic causes increasing nutrient needs  as evidenced by documented/reported unintentional weight loss.      MONITOR AND EVALUATE/NUTRITION GOALS:  PO intake of 75% of meals TID - New  PO intake of 75% of oral nutrition supplement/s - New  Weight stable within 1 to 2 lbs during admission - New      MEDICATIONS:  IV abx, Mag Ox    LABS:  Glu:100, M.7    Pt is at Moderate nutrition risk    Sameera Hollingsworth MS, RD, LDN  Clinical Dietitian  Ext:[phone]

## 2024-10-09 NOTE — PLAN OF CARE
Received pt at shift change. AxOx4. Room air. Denies pain/SOB. Vitals stable. NSR on tele.   See flowsheets for additional assessments.   Pt updated on POC. Call light within reach. All needs met at this time.     Plan:  -Chest tube placement today  -IV Zosyn Q8  -Monitor Hgb    Problem: Patient/Family Goals  Goal: Patient/Family Long Term Goal  Description: Patient's Long Term Goal: stay healthy  10/7: Stay out of the hospital when discharged    Interventions:  - Medication management  -Dietary management  -Prompt follow up with physician  10/7:   -Attend my follow up appointments with Mds   -Follow my medication regimen at home  - See additional Care Plan goals for specific interventions  Outcome: Progressing  Goal: Patient/Family Short Term Goal  Description: Patient's Short Term Goal: Discharge home    Interventions:   - Pulmonology consult  -IV antbiotics  -2D echo   10/7:  -IV abx  -Tele monitoring  -Monitor labs  -R sided thoracentesis 10/7  -Ambulate as tolerated  10/9:   -Tele monitoring  -Monitor labs  -Monitor hemoglobin  -Monitor thoracentesis site s/p right sided thoracentesis   -Ambulate as tolerated  -IV abx  - See additional Care Plan goals for specific interventions  Outcome: Progressing     Problem: RESPIRATORY - ADULT  Goal: Achieves optimal ventilation and oxygenation  Description: INTERVENTIONS:  - Assess for changes in respiratory status  - Assess for changes in mentation and behavior  - Position to facilitate oxygenation and minimize respiratory effort  - Oxygen supplementation based on oxygen saturation or ABGs  - Provide Smoking Cessation handout, if applicable  - Encourage broncho-pulmonary hygiene including cough, deep breathe, Incentive Spirometry  - Assess the need for suctioning and perform as needed  - Assess and instruct to report SOB or any respiratory difficulty  - Respiratory Therapy support as indicated  - Manage/alleviate anxiety  - Monitor for signs/symptoms of CO2  retention  Outcome: Progressing     Problem: CARDIOVASCULAR - ADULT  Goal: Maintains optimal cardiac output and hemodynamic stability  Description: INTERVENTIONS:  - Monitor vital signs, rhythm, and trends  - Monitor for bleeding, hypotension and signs of decreased cardiac output  - Evaluate effectiveness of vasoactive medications to optimize hemodynamic stability  - Monitor arterial and/or venous puncture sites for bleeding and/or hematoma  - Assess quality of pulses, skin color and temperature  - Assess for signs of decreased coronary artery perfusion - ex. Angina  - Evaluate fluid balance, assess for edema, trend weights  Outcome: Progressing  Goal: Absence of cardiac arrhythmias or at baseline  Description: INTERVENTIONS:  - Continuous cardiac monitoring, monitor vital signs, obtain 12 lead EKG if indicated  - Evaluate effectiveness of antiarrhythmic and heart rate control medications as ordered  - Initiate emergency measures for life threatening arrhythmias  - Monitor electrolytes and administer replacement therapy as ordered  Outcome: Progressing

## 2024-10-09 NOTE — PROGRESS NOTES
CC: follow-up hospital admission sob    SUBJECTIVE:  Interval History:     No acute events. Denies CP.      OBJECTIVE:  Scheduled Meds:    piperacillin-tazobactam  3.375 g Intravenous Q8H    enoxaparin  40 mg Subcutaneous Nightly    allopurinol  100 mg Oral Daily    atorvastatin  10 mg Oral After dinner    metoprolol succinate ER  50 mg Oral Daily Beta Blocker    lidocaine-menthol  1 patch Transdermal Daily     Continuous Infusions:   PRN Meds:   HYDROcodone-acetaminophen    HYDROcodone-acetaminophen    acetaminophen    PHYSICAL EXAM  Vital signs: Temp:  [97.5 °F (36.4 °C)-98.7 °F (37.1 °C)] 97.8 °F (36.6 °C)  Pulse:  [67-83] 78  Resp:  [16-18] 16  BP: (117-145)/(54-66) 141/61  SpO2:  [94 %-100 %] 97 %      GENERAL - NAD, AAO  EYES- sclera anicteric,   HENT- normocephalic, OP - dry  NECK - no JVD  CV- RRR  RESP - decreased at bases, normal resp effort  ABDOMEN- soft, NT/ND   EXT- no LE edema   PSYCH - normal mentation/ normal affect    Data Review:   Labs:   Recent Labs   Lab 10/03/24  0814 10/03/24  1432 10/04/24  0651 10/05/24  0706 10/06/24  0826 10/07/24  0618 10/07/24  0918 10/08/24  0622 10/08/24  1706   WBC  --   --  3.9* 3.7* 4.0 5.3  --  4.2  --    HGB  --    < > 7.7* 7.0* 7.3* 7.6*  --  6.9* 7.8*   MCV  --   --  87.2 90.1 90.6 91.7  --  90.8  --    PLT  --   --  130.0* 134.0* 127.0* 152.0  --  119.0*  --    INR 1.37*  --   --   --   --   --  1.22*  --   --     < > = values in this interval not displayed.       Recent Labs   Lab 10/04/24  0651 10/05/24  0706 10/06/24  0826 10/07/24  0618 10/08/24  0622    136 134* 136 135*   K 4.3  4.3 4.4 4.4 4.7 4.2    107 105 105 107   CO2 22.0 23.0 22.0 25.0 25.0   BUN 30* 30* 24* 25* 28*   CREATSERUM 1.04 1.11 1.03 1.12 1.14   CA 10.0 9.5 9.7 10.0 9.3   MG 1.7 1.7 1.7 1.8 1.9   GLU 94 97 93 100* 111*       Recent Labs   Lab 10/04/24  0651 10/05/24  0706 10/06/24  0826 10/07/24  0618 10/08/24  0622   ALT 32 65* 75* 77* 77*   AST 23 35* 38* 31 33   ALB 2.9*  2.9* 2.7* 3.2 3.0*       No results for input(s): \"PGLU\" in the last 168 hours.    ECHO    Conclusions:     1. Study data: Normal sinus, frequent PACs   2. Left ventricle: The cavity size was normal. Wall thickness was normal.      Systolic function was normal. The estimated ejection fraction was 60-65%,      by visual assessment. No diagnostic evidence for regional wall motion      abnormalities. Unable to assess LV diastolic function due to heart      rhythm.   3. Left atrium: The left atrial volume was mildly increased.   4. Aortic valve: Transvalvular velocity was minimally increased. There was      no evidence for stenosis. There was mild regurgitation. The peak systolic      velocity was 2.39m/sec. The mean systolic gradient was 9mm Hg. The valve      area (VTI) was 2.66cm^2. The valve area (VTI) index was 1.39cm^2/m^2.   5. Mitral valve: There was mild regurgitation.   6. Pulmonary arteries: Systolic pressure was moderately increased, in the      range of 50mm Hg to 55mm Hg.   7. Pericardium, extracardiac: A trivial pericardial effusion was identified.      There was no evidence of hemodynamic compromise. There was a right      pleural effusion.   Impressions:  No previous study from [facility] was   available for comparison.       ASSESSMENT/PLAN:    Patient is a 73 year old male with PMH sig for HTN, HLD, MDS on chemotherapy, here for chest pain /sob       #R sided pleural effusion / suspect pna  #R chest pain / sob  -symptoms lkely due to R sided cxr findings. Less likely cardiac etiology but will trend trop and EKG -neg  -echo as above, no large pericardial effusion  Sp thoracentesis 10/3 1.5L removed. Cx ngtd. Repeat thora 10/7, 300 cc removed.   -Moderate right sided effusion remains on CXR post-thora. Repeat CT demonstrating moderate R pleural effusion and increased cavitation in RML.    -Pulm following  -iv abx, Completed course of azithro. Rocephin (10/2-). Changed to Zosyn. Plan for  chest tube.      #HTN  -on bb, cont     #HLD  -statin     #MDS  -heme eval  -transfused 1 unit thus far     #CKDII!  -Trend renal function     DVT ppx: Scds, lovekrupax        Vitor Benitez DO  [facility] Hospitalist

## 2024-10-09 NOTE — CDS QUERY
Dear Doctor Hurley:   Please further validate and/or specify documented diagnosis of acute on chronic heart failure  (   x ) Acute on Chronic Diastolic heart failure  (    ) Chronic Diastolic heart failure  (    ) Heart failure is ruled out  (    ) Other , please  specify_______________________________________________  CLINICAL INFORMATION FROM THE MEDICAL RECORD  73 year old male with PMH sig for HTN, HLD, MDS on chemotherapy, here for chest pain /sob     Clinical Indicators/Risk:   ED impression and admit dx: Acute on chronic congestive heart failure, unspecified heart failure type   CXR on 10/2/24 admit: The heart size and vascularity are normal.  There is no CHF.   HX of HTN  pBNP on admit 1,675  CXR with large pleural effusion Right  2d echo 1/2/24: Left ventricle: The cavity size was normal. Wall thickness was normal. Systolic function was normal. The estimated ejection fraction was 60-65%, by visual assessment. No diagnostic evidence for regional wall motion abnormalities. Unable to assess LV diastolic function due to heart rhythm.   Assessment; NO edema; Check echo given increased cardiac silhouette on imaging     Treatment: pBNP, CXR, 2d echo, toprol xl    Use of terms such as suspected, possible, or probable (associated with a specific diagnosis that is being evaluated, monitored, or treated as if it exists) are acceptable and can be coded in the inpatient setting, when documented at the time of discharge.     Please add any additional documentation to your progress note and continue to document this through discharge.    Thank you. For questions regarding this query, please contact Clinical : Yamilka Lacey  [phone]  This document is a permanent part of the medical record.

## 2024-10-09 NOTE — PLAN OF CARE
Pt chief complaint upon admission: pleural effusions & PNA. Received pt at handoff 1900, pt in bed. A&Ox 4. RA. Rhythm NSR on tele. GI/ WNL. No complaint of chest pain. All medications given as ordered. Pt resting in bed w/ all safety measures in place and call light within reach.      Care completed over shift: Pt kept NPO after 0000 for chest tube placement. Consents to be completed in IR.      Plan is to trend Hgb for chest tube placement.    Problem: Patient/Family Goals  Goal: Patient/Family Long Term Goal  Description: Patient's Long Term Goal: stay healthy  10/7: Stay out of the hospital when discharged    Interventions:  - Medication management  -Dietary management  -Prompt follow up with physician  10/7:   -Attend my follow up appointments with Mds   -Follow my medication regimen at home  - See additional Care Plan goals for specific interventions  Outcome: Progressing  Goal: Patient/Family Short Term Goal  Description: Patient's Short Term Goal: Discharge home    Interventions:   - Pulmonology consult  -IV antbiotics  -2D echo   10/7:  -IV abx  -Tele monitoring  -Monitor labs  -R sided thoracentesis 10/7  -Ambulate as tolerated  10/9:   -Tele monitoring  -Monitor labs  -Monitor hemoglobin  -Monitor thoracentesis site s/p right sided thoracentesis   -Ambulate as tolerated  -IV abx  - See additional Care Plan goals for specific interventions  Outcome: Progressing     Problem: RESPIRATORY - ADULT  Goal: Achieves optimal ventilation and oxygenation  Description: INTERVENTIONS:  - Assess for changes in respiratory status  - Assess for changes in mentation and behavior  - Position to facilitate oxygenation and minimize respiratory effort  - Oxygen supplementation based on oxygen saturation or ABGs  - Provide Smoking Cessation handout, if applicable  - Encourage broncho-pulmonary hygiene including cough, deep breathe, Incentive Spirometry  - Assess the need for suctioning and perform as needed  - Assess and  instruct to report SOB or any respiratory difficulty  - Respiratory Therapy support as indicated  - Manage/alleviate anxiety  - Monitor for signs/symptoms of CO2 retention  Outcome: Progressing     Problem: CARDIOVASCULAR - ADULT  Goal: Maintains optimal cardiac output and hemodynamic stability  Description: INTERVENTIONS:  - Monitor vital signs, rhythm, and trends  - Monitor for bleeding, hypotension and signs of decreased cardiac output  - Evaluate effectiveness of vasoactive medications to optimize hemodynamic stability  - Monitor arterial and/or venous puncture sites for bleeding and/or hematoma  - Assess quality of pulses, skin color and temperature  - Assess for signs of decreased coronary artery perfusion - ex. Angina  - Evaluate fluid balance, assess for edema, trend weights  Outcome: Progressing  Goal: Absence of cardiac arrhythmias or at baseline  Description: INTERVENTIONS:  - Continuous cardiac monitoring, monitor vital signs, obtain 12 lead EKG if indicated  - Evaluate effectiveness of antiarrhythmic and heart rate control medications as ordered  - Initiate emergency measures for life threatening arrhythmias  - Monitor electrolytes and administer replacement therapy as ordered  Outcome: Progressing

## 2024-10-09 NOTE — IMAGING NOTE
Guy is here for CT guided thoracentesis w/ chest tube placement. 0.9 NS IV initiated to maintain patency.    Informed consent obtained by Dr CANDIE Harrison. Patient positioned on scanner, tube placed.    Presently denies pain. Tolerated procedure well. Please see flowsheets for vital signs and/or additional information.    Transported pt to  2621 accompanied by Rad RN and transport tech. Bedside report given to Katiana CTU RN. Chest tube site/ drsg verified C/D/I.

## 2024-10-09 NOTE — INTERVAL H&P NOTE
The above referenced H&P was reviewed by Maki Harrison MD on 10/9/2024, the patient was examined and no significant changes have occurred in the patient's condition since the H&P was performed.  Risks, benefits, alternative treatments and consequences of no treatment were discussed.  We will proceed with procedure as planned.      Maki Harrison MD  10/9/2024  12:29 PM

## 2024-10-09 NOTE — IMAGING NOTE
Floor RN - Katiana notified to keep patient NPO for procedure.  Hold all blood thinners, saline lock IV, patient consentable, tentative procedure time pending Rad review.  HGB 7.7, Draw stat PT INR.  RN verbalized understanding.

## 2024-10-09 NOTE — PROCEDURES
[facility]   part of [facility]  Procedure Note    Guy White Patient Status:  Inpatient    1950 MRN [id number]   Location [facility] 2NE-A Attending Brenden Whitaker,    Hosp Day # 7 PCP Salvador Cohn MD     Procedure: Right chest tube placement    Pre-Procedure Diagnosis:  Pleural effusion loculated    Post-Procedure Diagnosis: Same    Anesthesia:  Local and Sedation    Findings:    Successful placement of 8.5fr right chest tube.  Serous yellow fluid.    Specimens: None    Blood Loss:  <5ml    Tourniquet Time: 0  Complications:  None  Drains:  8.5fr right chest pigtail    Secondary Diagnosis:  None    Maki Harrison MD  10/9/2024

## 2024-10-10 ENCOUNTER — APPOINTMENT (OUTPATIENT)
Dept: GENERAL RADIOLOGY | Facility: HOSPITAL | Age: 74
End: 2024-10-10
Attending: INTERNAL MEDICINE
Payer: MEDICARE

## 2024-10-10 LAB
ALBUMIN SERPL-MCNC: 3 G/DL (ref 3.2–4.8)
ALBUMIN/GLOB SERPL: 1.1 {RATIO} (ref 1–2)
ALP LIVER SERPL-CCNC: 134 U/L
ALT SERPL-CCNC: 58 U/L
ANION GAP SERPL CALC-SCNC: 6 MMOL/L (ref 0–18)
AST SERPL-CCNC: 24 U/L (ref ?–34)
BILIRUB SERPL-MCNC: 0.6 MG/DL (ref 0.2–1.1)
BUN BLD-MCNC: 22 MG/DL (ref 9–23)
CALCIUM BLD-MCNC: 9.4 MG/DL (ref 8.7–10.4)
CHLORIDE SERPL-SCNC: 104 MMOL/L (ref 98–112)
CO2 SERPL-SCNC: 24 MMOL/L (ref 21–32)
CREAT BLD-MCNC: 1.23 MG/DL
EGFRCR SERPLBLD CKD-EPI 2021: 62 ML/MIN/1.73M2 (ref 60–?)
ERYTHROCYTE [DISTWIDTH] IN BLOOD BY AUTOMATED COUNT: 14.7 %
GLOBULIN PLAS-MCNC: 2.7 G/DL (ref 2–3.5)
GLUCOSE BLD-MCNC: 131 MG/DL (ref 70–99)
HCT VFR BLD AUTO: 25.4 %
HGB BLD-MCNC: 8.2 G/DL
MAGNESIUM SERPL-MCNC: 1.8 MG/DL (ref 1.6–2.6)
MCH RBC QN AUTO: 29.5 PG (ref 26–34)
MCHC RBC AUTO-ENTMCNC: 32.3 G/DL (ref 31–37)
MCV RBC AUTO: 91.4 FL
NON GYNE INTERPRETATION: NEGATIVE
OSMOLALITY SERPL CALC.SUM OF ELEC: 283 MOSM/KG (ref 275–295)
PLATELET # BLD AUTO: 139 10(3)UL (ref 150–450)
PLATELETS.RETICULATED NFR BLD AUTO: 40.2 % (ref 0–7)
POTASSIUM SERPL-SCNC: 4.3 MMOL/L (ref 3.5–5.1)
PROT SERPL-MCNC: 5.7 G/DL (ref 5.7–8.2)
RBC # BLD AUTO: 2.78 X10(6)UL
SODIUM SERPL-SCNC: 134 MMOL/L (ref 136–145)
WBC # BLD AUTO: 4.6 X10(3) UL (ref 4–11)

## 2024-10-10 PROCEDURE — 83735 ASSAY OF MAGNESIUM: CPT | Performed by: HOSPITALIST

## 2024-10-10 PROCEDURE — 80053 COMPREHEN METABOLIC PANEL: CPT | Performed by: HOSPITALIST

## 2024-10-10 PROCEDURE — 71045 X-RAY EXAM CHEST 1 VIEW: CPT | Performed by: INTERNAL MEDICINE

## 2024-10-10 PROCEDURE — 85027 COMPLETE CBC AUTOMATED: CPT | Performed by: HOSPITALIST

## 2024-10-10 RX ORDER — MAGNESIUM OXIDE 400 MG/1
400 TABLET ORAL ONCE
Status: COMPLETED | OUTPATIENT
Start: 2024-10-10 | End: 2024-10-10

## 2024-10-10 NOTE — PLAN OF CARE
Received pt from previous shift- S/P right chest tube placement- chest tube to -20cm suction -no air leak.Site c/d/I -no drainage/crepitus noted. Pt denies any pain.  2200- call to bedside- per patient chest tube became dis-connected while trying to re-position himself -chest tube re-connected. Pt not in any respiratory distress-sat room air 94%  2340- MIST protocol started- tolerated well- will con't monitor output  Pt stable  Problem: RESPIRATORY - ADULT  Goal: Achieves optimal ventilation and oxygenation  Description: INTERVENTIONS:  - Assess for changes in respiratory status  - Assess for changes in mentation and behavior  - Position to facilitate oxygenation and minimize respiratory effort  - Oxygen supplementation based on oxygen saturation or ABGs  - Provide Smoking Cessation handout, if applicable  - Encourage broncho-pulmonary hygiene including cough, deep breathe, Incentive Spirometry  - Assess the need for suctioning and perform as needed  - Assess and instruct to report SOB or any respiratory difficulty  - Respiratory Therapy support as indicated  - Manage/alleviate anxiety  - Monitor for signs/symptoms of CO2 retention  Outcome: Progressing     Problem: CARDIOVASCULAR - ADULT  Goal: Maintains optimal cardiac output and hemodynamic stability  Description: INTERVENTIONS:  - Monitor vital signs, rhythm, and trends  - Monitor for bleeding, hypotension and signs of decreased cardiac output  - Evaluate effectiveness of vasoactive medications to optimize hemodynamic stability  - Monitor arterial and/or venous puncture sites for bleeding and/or hematoma  - Assess quality of pulses, skin color and temperature  - Assess for signs of decreased coronary artery perfusion - ex. Angina  - Evaluate fluid balance, assess for edema, trend weights  Outcome: Progressing  Goal: Absence of cardiac arrhythmias or at baseline  Description: INTERVENTIONS:  - Continuous cardiac monitoring, monitor vital signs, obtain 12 lead EKG  if indicated  - Evaluate effectiveness of antiarrhythmic and heart rate control medications as ordered  - Initiate emergency measures for life threatening arrhythmias  - Monitor electrolytes and administer replacement therapy as ordered  Outcome: Progressing     Problem: HEMATOLOGIC - ADULT  Goal: Free from bleeding injury  Description: (Example usage: patient with low platelets)  INTERVENTIONS:  - Avoid intramuscular injections, enemas and rectal medication administration  - Ensure safe mobilization of patient  - Hold pressure on venipuncture sites to achieve adequate hemostasis  - Assess for signs and symptoms of internal bleeding  - Monitor lab trends  - Patient is to report abnormal signs of bleeding to staff  - Avoid use of toothpicks and dental floss  - Use electric shaver for shaving  - Use soft bristle tooth brush  - Limit straining and forceful nose blowing  Outcome: Progressing

## 2024-10-10 NOTE — PROGRESS NOTES
Pulmonary Progress Note        NAME: Guy White - ROOM: [address] [place] [address]-A - MRN: [id number] - Age: 73 year old - : 1950        Last 24hrs: Started MIST2 overnight, excellent output thus far, having some back pain as well related to the chest tube    OBJECTIVE:  Vitals:    10/09/24 2300 10/10/24 0425 10/10/24 0520 10/10/24 0746   BP: 104/73 110/55  108/60   BP Location: Right arm Right arm  Right arm   Pulse: 83 78 71 65   Resp: 18 17  16   Temp: 98.4 °F (36.9 °C) 98.1 °F (36.7 °C)  97.4 °F (36.3 °C)   TempSrc: Oral Oral  Oral   SpO2: 93% 92% 95% 96%   Weight:   151 lb 4.8 oz (68.6 kg)    Height:           Oxygen Therapy  SpO2: 96 %  O2 Device: None (Room air)  O2 Flow Rate (L/min): 0 L/min  Pulse Oximetry Type: Continuous  Oximetry Probe Site Changed: No  Pulse Ox Probe Location: Left hand                  Intake/Output Summary (Last 24 hours) at 10/10/2024 1053  Last data filed at 10/10/2024 1036  Gross per 24 hour   Intake 1631 ml   Output 1510 ml   Net 121 ml       Scheduled Medication:   dornase jessenia (Pulmozyme) 5 mg in sterile water for injection (PF) 30 mL (MIST2) intrapleural syringe  5 mg Intrapleural Q12H    alteplase (Activase) 10 mg in sodium chloride 0.9% 30 mL (MIST2) intrapleural syringe  10 mg Intrapleural Q12H    piperacillin-tazobactam  3.375 g Intravenous Q8H    enoxaparin  40 mg Subcutaneous Nightly    allopurinol  100 mg Oral Daily    atorvastatin  10 mg Oral After dinner    metoprolol succinate ER  50 mg Oral Daily Beta Blocker    lidocaine-menthol  1 patch Transdermal Daily     Continuous Infusing Medication:    Lungs: diminished breath sounds base - right  Heart: S1, S2 normal, no murmur, click, rub or gallop, regular rate and rhythm  Abdomen: soft, non-tender; bowel sounds normal; no masses,  no organomegaly  Extremities: extremities normal, atraumatic, no cyanosis or edema    Labs reviewed as noted below      Imaging: chest x-ray reviewed- improved aeration of right  lung    ASSESSMENT/PLAN:    Dyspnea - secondary to new R pleural effusion +/- pneumonia (PCT elevated).   Loculated Pleural Effusion  -Right sided thoracentesis was done 10/3, 1500cc fluid removed and again 10/7 with ~300cc removed. Fluid is exudative.   -started on MIST 2 on 10/9  -resend fluid for cyto and culture  PNA vs lung mass  -evidence of cavitation in RUL  -stopped ceftriaxone (10/2-10/8 ), cont zosyn (10/8- ) given worsening cavitation. Completed course of azithromycin  -monitor cultures  -if no improvement w/ abx and mist 2 would favor Bronch w/ BAL for additional sampling  MDS - pancytopenic on labs  -per [facility]  Proph  -LMWH  Dispo -full code  -inpatient       Lalo Tellez  [facility]  Pulmonary and Critical Care

## 2024-10-10 NOTE — PROGRESS NOTES
CC: follow-up hospital admission sob    SUBJECTIVE:  Interval History:     No acute events. Plan for chest tube placement w/ IR today.     OBJECTIVE:  Scheduled Meds:    piperacillin-tazobactam  3.375 g Intravenous Q8H    enoxaparin  40 mg Subcutaneous Nightly    allopurinol  100 mg Oral Daily    atorvastatin  10 mg Oral After dinner    metoprolol succinate ER  50 mg Oral Daily Beta Blocker    lidocaine-menthol  1 patch Transdermal Daily     Continuous Infusions:   PRN Meds:   HYDROcodone-acetaminophen    HYDROcodone-acetaminophen    acetaminophen    PHYSICAL EXAM  Vital signs: Temp:  [97.4 °F (36.3 °C)-98.7 °F (37.1 °C)] 97.5 °F (36.4 °C)  Pulse:  [62-80] 70  Resp:  [14-29] 18  BP: ()/() 104/49  SpO2:  [94 %-100 %] 96 %      GENERAL - NAD, AAO  EYES- sclera anicteric,   HENT- NC/AT  NECK - no JVD  CV- RRR  RESP - decreased at bases, normal resp effort  ABDOMEN- soft, NT/ND   EXT- no LE edema   PSYCH - normal mentation/ normal affect    Data Review:   Labs:   Recent Labs   Lab 10/03/24  0814 10/03/24  1432 10/05/24  0706 10/06/24  0826 10/07/24  0618 10/07/24  0918 10/08/24  0622 10/08/24  1706 10/09/24  0706 10/09/24  0827   WBC  --    < > 3.7* 4.0 5.3  --  4.2  --  3.8*  --    HGB  --    < > 7.0* 7.3* 7.6*  --  6.9* 7.8* 7.7*  --    MCV  --    < > 90.1 90.6 91.7  --  90.8  --  91.5  --    PLT  --    < > 134.0* 127.0* 152.0  --  119.0*  --  114.0*  --    INR 1.37*  --   --   --   --  1.22*  --   --   --  1.15    < > = values in this interval not displayed.       Recent Labs   Lab 10/05/24  0706 10/06/24  0826 10/07/24  0618 10/08/24  0622 10/09/24  0706    134* 136 135* 136   K 4.4 4.4 4.7 4.2 4.2    105 105 107 106   CO2 23.0 22.0 25.0 25.0 24.0   BUN 30* 24* 25* 28* 24*   CREATSERUM 1.11 1.03 1.12 1.14 1.14   CA 9.5 9.7 10.0 9.3 9.3   MG 1.7 1.7 1.8 1.9 1.7   GLU 97 93 100* 111* 100*       Recent Labs   Lab 10/05/24  0706 10/06/24  0826 10/07/24  0618 10/08/24  0622 10/09/24  0706   ALT  65* 75* 77* 77* 71*   AST 35* 38* 31 33 34*   ALB 2.9* 2.7* 3.2 3.0* 2.9*       No results for input(s): \"PGLU\" in the last 168 hours.    ECHO    Conclusions:     1. Study data: Normal sinus, frequent PACs   2. Left ventricle: The cavity size was normal. Wall thickness was normal.      Systolic function was normal. The estimated ejection fraction was 60-65%,      by visual assessment. No diagnostic evidence for regional wall motion      abnormalities. Unable to assess LV diastolic function due to heart      rhythm.   3. Left atrium: The left atrial volume was mildly increased.   4. Aortic valve: Transvalvular velocity was minimally increased. There was      no evidence for stenosis. There was mild regurgitation. The peak systolic      velocity was 2.39m/sec. The mean systolic gradient was 9mm Hg. The valve      area (VTI) was 2.66cm^2. The valve area (VTI) index was 1.39cm^2/m^2.   5. Mitral valve: There was mild regurgitation.   6. Pulmonary arteries: Systolic pressure was moderately increased, in the      range of 50mm Hg to 55mm Hg.   7. Pericardium, extracardiac: A trivial pericardial effusion was identified.      There was no evidence of hemodynamic compromise. There was a right      pleural effusion.   Impressions:  No previous study from [facility] was   available for comparison.       ASSESSMENT/PLAN:    Patient is a 73 year old male with PMH sig for HTN, HLD, MDS on chemotherapy, here for chest pain /sob       #R sided pleural effusion / suspect pna  #R chest pain / sob  -symptoms lkely due to R sided cxr findings. Less likely cardiac etiology but will trend trop and EKG -neg  -echo as above, no large pericardial effusion  Sp thoracentesis 10/3 1.5L removed. Cx ngtd. Repeat thora 10/7, 300 cc removed.   -Moderate right sided effusion remains on CXR post-thora. Repeat CT demonstrating moderate R pleural effusion and increased cavitation in RML.    -Pulm following  -iv abx, Completed course of  raudel. Rocephin (10/2-). Zosyn (10/8-). Plan for chest tube today w/ IR.      #HTN  -on bb, cont     #HLD  -statin     #MDS  -heme eval  -transfused 1 unit thus far     #CKDII!  -Trend renal function     DVT ppx: Scds, lovenox        Vitor Benitez DO  [facility]

## 2024-10-10 NOTE — PLAN OF CARE
Patient is admitted for . Pt is AOX4.  VSS, afebrile and c/o severe right side CT pain. Gave Norco.  SpO2 maintained on RA. . SOB with ambulation. Denies any cough. Right side CT is -20cm suction and  draining serosangional drainage. MIST2 protocol . 450 ml out after MIST 2 treatment . Total output-1950 ml . Tele-NSR. Lovenox. Reg diet. Denies any n/v/d. Voids. Up with SBA.   IV ABX. Will continue to monitor. Pt is updated with plan of care.    RN called lab department to add  Pleural fluid lab orders on previous collected specimen.      Problem: Patient/Family Goals  Goal: Patient/Family Long Term Goal  Description: Patient's Long Term Goal: stay healthy  10/7: Stay out of the hospital when discharged    Interventions:  - Medication management  -Dietary management  -Prompt follow up with physician  10/7:   -Attend my follow up appointments with Mds   -Follow my medication regimen at home  - See additional Care Plan goals for specific interventions  Outcome: Progressing  Goal: Patient/Family Short Term Goal  Description: Patient's Short Term Goal: Discharge home    Interventions:   - Pulmonology consult  -IV antbiotics  -2D echo   10/7:  -IV abx  -Tele monitoring  -Monitor labs  -R sided thoracentesis 10/7  -Ambulate as tolerated  10/9:   -Tele monitoring  -Monitor labs  -Monitor hemoglobin  -Monitor thoracentesis site s/p right sided thoracentesis   -Ambulate as tolerated  -IV abx  - See additional Care Plan goals for specific interventions  Outcome: Progressing     Problem: RESPIRATORY - ADULT  Goal: Achieves optimal ventilation and oxygenation  Description: INTERVENTIONS:  - Assess for changes in respiratory status  - Assess for changes in mentation and behavior  - Position to facilitate oxygenation and minimize respiratory effort  - Oxygen supplementation based on oxygen saturation or ABGs  - Provide Smoking Cessation handout, if applicable  - Encourage broncho-pulmonary hygiene including cough, deep  breathe, Incentive Spirometry  - Assess the need for suctioning and perform as needed  - Assess and instruct to report SOB or any respiratory difficulty  - Respiratory Therapy support as indicated  - Manage/alleviate anxiety  - Monitor for signs/symptoms of CO2 retention  Outcome: Progressing     Problem: CARDIOVASCULAR - ADULT  Goal: Maintains optimal cardiac output and hemodynamic stability  Description: INTERVENTIONS:  - Monitor vital signs, rhythm, and trends  - Monitor for bleeding, hypotension and signs of decreased cardiac output  - Evaluate effectiveness of vasoactive medications to optimize hemodynamic stability  - Monitor arterial and/or venous puncture sites for bleeding and/or hematoma  - Assess quality of pulses, skin color and temperature  - Assess for signs of decreased coronary artery perfusion - ex. Angina  - Evaluate fluid balance, assess for edema, trend weights  Outcome: Progressing  Goal: Absence of cardiac arrhythmias or at baseline  Description: INTERVENTIONS:  - Continuous cardiac monitoring, monitor vital signs, obtain 12 lead EKG if indicated  - Evaluate effectiveness of antiarrhythmic and heart rate control medications as ordered  - Initiate emergency measures for life threatening arrhythmias  - Monitor electrolytes and administer replacement therapy as ordered  Outcome: Progressing     Problem: HEMATOLOGIC - ADULT  Goal: Free from bleeding injury  Description: (Example usage: patient with low platelets)  INTERVENTIONS:  - Avoid intramuscular injections, enemas and rectal medication administration  - Ensure safe mobilization of patient  - Hold pressure on venipuncture sites to achieve adequate hemostasis  - Assess for signs and symptoms of internal bleeding  - Monitor lab trends  - Patient is to report abnormal signs of bleeding to staff  - Avoid use of toothpicks and dental floss  - Use electric shaver for shaving  - Use soft bristle tooth brush  - Limit straining and forceful nose  blowing  Outcome: Progressing

## 2024-10-11 ENCOUNTER — APPOINTMENT (OUTPATIENT)
Dept: GENERAL RADIOLOGY | Facility: HOSPITAL | Age: 74
End: 2024-10-11
Attending: INTERNAL MEDICINE
Payer: MEDICARE

## 2024-10-11 ENCOUNTER — APPOINTMENT (OUTPATIENT)
Dept: CT IMAGING | Facility: HOSPITAL | Age: 74
End: 2024-10-11
Attending: INTERNAL MEDICINE
Payer: MEDICARE

## 2024-10-11 LAB
ALBUMIN SERPL-MCNC: 2.9 G/DL (ref 3.2–4.8)
ALBUMIN/GLOB SERPL: 1.2 {RATIO} (ref 1–2)
ALP LIVER SERPL-CCNC: 121 U/L
ALT SERPL-CCNC: 43 U/L
ANION GAP SERPL CALC-SCNC: 6 MMOL/L (ref 0–18)
AST SERPL-CCNC: 19 U/L (ref ?–34)
BILIRUB SERPL-MCNC: 0.4 MG/DL (ref 0.2–1.1)
BUN BLD-MCNC: 34 MG/DL (ref 9–23)
CALCIUM BLD-MCNC: 9.7 MG/DL (ref 8.7–10.4)
CHLORIDE SERPL-SCNC: 102 MMOL/L (ref 98–112)
CO2 SERPL-SCNC: 26 MMOL/L (ref 21–32)
CREAT BLD-MCNC: 1.7 MG/DL
EGFRCR SERPLBLD CKD-EPI 2021: 42 ML/MIN/1.73M2 (ref 60–?)
ERYTHROCYTE [DISTWIDTH] IN BLOOD BY AUTOMATED COUNT: 14.6 %
GLOBULIN PLAS-MCNC: 2.5 G/DL (ref 2–3.5)
GLUCOSE BLD-MCNC: 139 MG/DL (ref 70–99)
HCT VFR BLD AUTO: 24.6 %
HGB BLD-MCNC: 8 G/DL
MAGNESIUM SERPL-MCNC: 2 MG/DL (ref 1.6–2.6)
MCH RBC QN AUTO: 29.5 PG (ref 26–34)
MCHC RBC AUTO-ENTMCNC: 32.5 G/DL (ref 31–37)
MCV RBC AUTO: 90.8 FL
OSMOLALITY SERPL CALC.SUM OF ELEC: 288 MOSM/KG (ref 275–295)
PLATELET # BLD AUTO: 156 10(3)UL (ref 150–450)
PLATELETS.RETICULATED NFR BLD AUTO: 38.2 % (ref 0–7)
POTASSIUM SERPL-SCNC: 4.7 MMOL/L (ref 3.5–5.1)
PROT SERPL-MCNC: 5.4 G/DL (ref 5.7–8.2)
RBC # BLD AUTO: 2.71 X10(6)UL
SODIUM SERPL-SCNC: 134 MMOL/L (ref 136–145)
WBC # BLD AUTO: 5.1 X10(3) UL (ref 4–11)

## 2024-10-11 PROCEDURE — 83735 ASSAY OF MAGNESIUM: CPT | Performed by: HOSPITALIST

## 2024-10-11 PROCEDURE — 85027 COMPLETE CBC AUTOMATED: CPT | Performed by: HOSPITALIST

## 2024-10-11 PROCEDURE — 71250 CT THORAX DX C-: CPT | Performed by: INTERNAL MEDICINE

## 2024-10-11 PROCEDURE — 71045 X-RAY EXAM CHEST 1 VIEW: CPT | Performed by: INTERNAL MEDICINE

## 2024-10-11 PROCEDURE — 80053 COMPREHEN METABOLIC PANEL: CPT | Performed by: HOSPITALIST

## 2024-10-11 NOTE — PLAN OF CARE
A&Ox4. Pt states he has on and off pain along drain tube site.Currently denies pain. RA, diminished lung sounds on right, clear lung sounds on left. Dyspnea on exertion. NSR on tele. Denies any chest pain. Continent of bowel and bladder, last BM 10/09/24. Chest tube on suction -20. No airleak. Site C/D/I.     Bed is locked and in low position. Call light and personal items within reach.  Pt updated with plan of care     Approx 2330 attempted to give Dornase and met with resistance, unable to give medication. Messaged Dr. Oz Zaidi. No new orders.     Output for shift approx 50cc. Marked at 500    Problem: Patient/Family Goals  Goal: Patient/Family Long Term Goal  Description: Patient's Long Term Goal: stay healthy  10/7: Stay out of the hospital when discharged    Interventions:  - Medication management  -Dietary management  -Prompt follow up with physician  10/7:   -Attend my follow up appointments with Mds   -Follow my medication regimen at home  - See additional Care Plan goals for specific interventions  Outcome: Progressing  Goal: Patient/Family Short Term Goal  Description: Patient's Short Term Goal: Discharge home    Interventions:   - Pulmonology consult  -IV antbiotics  -2D echo   10/7:  -IV abx  -Tele monitoring  -Monitor labs  -R sided thoracentesis 10/7  -Ambulate as tolerated  10/9:   -Tele monitoring  -Monitor labs  -Monitor hemoglobin  -Monitor thoracentesis site s/p right sided thoracentesis   -Ambulate as tolerated  -IV abx  - See additional Care Plan goals for specific interventions  Outcome: Progressing     Problem: RESPIRATORY - ADULT  Goal: Achieves optimal ventilation and oxygenation  Description: INTERVENTIONS:  - Assess for changes in respiratory status  - Assess for changes in mentation and behavior  - Position to facilitate oxygenation and minimize respiratory effort  - Oxygen supplementation based on oxygen saturation or ABGs  - Provide Smoking Cessation handout, if applicable  -  Encourage broncho-pulmonary hygiene including cough, deep breathe, Incentive Spirometry  - Assess the need for suctioning and perform as needed  - Assess and instruct to report SOB or any respiratory difficulty  - Respiratory Therapy support as indicated  - Manage/alleviate anxiety  - Monitor for signs/symptoms of CO2 retention  Outcome: Progressing     Problem: CARDIOVASCULAR - ADULT  Goal: Maintains optimal cardiac output and hemodynamic stability  Description: INTERVENTIONS:  - Monitor vital signs, rhythm, and trends  - Monitor for bleeding, hypotension and signs of decreased cardiac output  - Evaluate effectiveness of vasoactive medications to optimize hemodynamic stability  - Monitor arterial and/or venous puncture sites for bleeding and/or hematoma  - Assess quality of pulses, skin color and temperature  - Assess for signs of decreased coronary artery perfusion - ex. Angina  - Evaluate fluid balance, assess for edema, trend weights  Outcome: Progressing  Goal: Absence of cardiac arrhythmias or at baseline  Description: INTERVENTIONS:  - Continuous cardiac monitoring, monitor vital signs, obtain 12 lead EKG if indicated  - Evaluate effectiveness of antiarrhythmic and heart rate control medications as ordered  - Initiate emergency measures for life threatening arrhythmias  - Monitor electrolytes and administer replacement therapy as ordered  Outcome: Progressing     Problem: HEMATOLOGIC - ADULT  Goal: Free from bleeding injury  Description: (Example usage: patient with low platelets)  INTERVENTIONS:  - Avoid intramuscular injections, enemas and rectal medication administration  - Ensure safe mobilization of patient  - Hold pressure on venipuncture sites to achieve adequate hemostasis  - Assess for signs and symptoms of internal bleeding  - Monitor lab trends  - Patient is to report abnormal signs of bleeding to staff  - Avoid use of toothpicks and dental floss  - Use electric shaver for shaving  - Use soft  bristle tooth brush  - Limit straining and forceful nose blowing  Outcome: Progressing

## 2024-10-11 NOTE — PLAN OF CARE
Patient is admitted for . Pt is AOX4.  VSS, afebrile and c/o severe right side CT pain. Gave Norco.  SpO2 maintained on RA. . SOB with ambulation. Denies any cough. Right side CT is -20cm suction. Total output-50 ml  during shift. MIST2 protocol. RN could not inject the dose 3 due to resistance and pain . Pulm ordered CT chest. Tele-NSR. Lovenox. Reg diet. Denies any n/v/d. Voids. Up with SBA.   IV ABX. Will continue to monitor. Pt is updated with plan of care.  Pulm notified for CT chest result. Pulm ordered to hold MIST 2 for tonight due to injection resistance and pain.       Problem: Patient/Family Goals  Goal: Patient/Family Long Term Goal  Description: Patient's Long Term Goal: stay healthy  10/7: Stay out of the hospital when discharged    Interventions:  - Medication management  -Dietary management  -Prompt follow up with physician  10/7:   -Attend my follow up appointments with Mds   -Follow my medication regimen at home  - See additional Care Plan goals for specific interventions  Outcome: Progressing  Goal: Patient/Family Short Term Goal  Description: Patient's Short Term Goal: Discharge home    Interventions:   - Pulmonology consult  -IV antbiotics  -2D echo   10/7:  -IV abx  -Tele monitoring  -Monitor labs  -R sided thoracentesis 10/7  -Ambulate as tolerated  10/9:   -Tele monitoring  -Monitor labs  -Monitor hemoglobin  -Monitor thoracentesis site s/p right sided thoracentesis   -Ambulate as tolerated  -IV abx  - See additional Care Plan goals for specific interventions  Outcome: Progressing     Problem: RESPIRATORY - ADULT  Goal: Achieves optimal ventilation and oxygenation  Description: INTERVENTIONS:  - Assess for changes in respiratory status  - Assess for changes in mentation and behavior  - Position to facilitate oxygenation and minimize respiratory effort  - Oxygen supplementation based on oxygen saturation or ABGs  - Provide Smoking Cessation handout, if applicable  - Encourage  broncho-pulmonary hygiene including cough, deep breathe, Incentive Spirometry  - Assess the need for suctioning and perform as needed  - Assess and instruct to report SOB or any respiratory difficulty  - Respiratory Therapy support as indicated  - Manage/alleviate anxiety  - Monitor for signs/symptoms of CO2 retention  Outcome: Progressing     Problem: CARDIOVASCULAR - ADULT  Goal: Maintains optimal cardiac output and hemodynamic stability  Description: INTERVENTIONS:  - Monitor vital signs, rhythm, and trends  - Monitor for bleeding, hypotension and signs of decreased cardiac output  - Evaluate effectiveness of vasoactive medications to optimize hemodynamic stability  - Monitor arterial and/or venous puncture sites for bleeding and/or hematoma  - Assess quality of pulses, skin color and temperature  - Assess for signs of decreased coronary artery perfusion - ex. Angina  - Evaluate fluid balance, assess for edema, trend weights  Outcome: Progressing  Goal: Absence of cardiac arrhythmias or at baseline  Description: INTERVENTIONS:  - Continuous cardiac monitoring, monitor vital signs, obtain 12 lead EKG if indicated  - Evaluate effectiveness of antiarrhythmic and heart rate control medications as ordered  - Initiate emergency measures for life threatening arrhythmias  - Monitor electrolytes and administer replacement therapy as ordered  Outcome: Progressing     Problem: HEMATOLOGIC - ADULT  Goal: Free from bleeding injury  Description: (Example usage: patient with low platelets)  INTERVENTIONS:  - Avoid intramuscular injections, enemas and rectal medication administration  - Ensure safe mobilization of patient  - Hold pressure on venipuncture sites to achieve adequate hemostasis  - Assess for signs and symptoms of internal bleeding  - Monitor lab trends  - Patient is to report abnormal signs of bleeding to staff  - Avoid use of toothpicks and dental floss  - Use electric shaver for shaving  - Use soft bristle tooth  brush  - Limit straining and forceful nose blowing  Outcome: Progressing

## 2024-10-11 NOTE — PAYOR COMM NOTE
- 10/11  CONTINUED STAY REVIEW    Payor: [employer] [place] [place]  Subscriber #:  [id number]  Authorization Number: [id number]    Admit date: 10/2/24  Admit time:  2:13 PM      MEDICATIONS ADMINISTERED IN LAST 1 DAY:  allopurinol (Zyloprim) tab 100 mg       Date Action Dose Route User    10/11/2024 0838 Given 100 mg Oral Dakota Martinez RN          atorvastatin (Lipitor) tab 10 mg       Date Action Dose Route User    10/10/2024 1724 Given 10 mg Oral Dakota Martinez RN          enoxaparin (Lovenox) 40 MG/0.4ML SUBQ injection 40 mg       Date Action Dose Route User    10/10/2024 2151 Given 40 mg Subcutaneous (Right Lower Abdomen) Jonna Matias RN          HYDROcodone-acetaminophen (Norco) 5-325 MG per tab 2 tablet       Date Action Dose Route User    10/11/2024 1418 Given 2 tablet Oral Dakota Martinez RN          HYDROcodone-acetaminophen (Norco) 5-325 MG per tab 1 tablet       Date Action Dose Route User    10/11/2024 1038 Given 1 tablet Oral Dakota Martinez RN    10/10/2024 1449 Given 1 tablet Oral Dakota Martinez RN          metoprolol succinate ER (Toprol XL) 24 hr tab 50 mg       Date Action Dose Route User    10/11/2024 0626 Given 50 mg Oral Jonna Matias RN          piperacillin-tazobactam (Zosyn) 3.375 g in dextrose 5% 100 mL IVPB-ADDV       Date Action Dose Route User    10/11/2024 0630 New Bag 3.375 g Intravenous Jonna Matias RN    10/10/2024 2352 New Bag 3.375 g Intravenous Jonna Matias RN    10/10/2024 1524 New Bag 3.375 g Intravenous Dakota Martinez RN            Vitals (last day)       Date/Time Temp Pulse Resp BP SpO2 Weight O2 Device O2 Flow Rate (L/min) Who    10/11/24 1212 -- 98 22 98/48 96 % -- None (Room air) 0 L/min JA    10/11/24 1143 -- 68 -- -- 96 % -- -- --     10/11/24 0747 97.6 °F (36.4 °C) 72 24 99/60 95 % -- None (Room air) 0 L/min     10/11/24 0737 -- 83 -- -- 97 % -- -- --     10/11/24 0624 97.8 °F (36.6 °C) 72 16 115/63 95 % -- None  (Room air) -- MV    10/11/24 0050 97.9 °F (36.6 °C) 77 18 110/62 95 % -- None (Room air) 0 L/min YN    10/10/24 1930 98.9 °F (37.2 °C) 70 19 101/65 96 % -- None (Room air) 0 L/min YN    10/10/24 1554 -- -- -- 110/59 94 % -- -- -- EG    10/10/24 1554 98.9 °F (37.2 °C) -- 18 -- -- -- None (Room air) --     10/10/24 1500 -- -- -- -- 94 % -- -- -- EG    10/10/24 1446 -- 110 -- 115/78 96 % -- -- -- EG    10/10/24 1207 97.7 °F (36.5 °C) -- 16 -- -- -- None (Room air) --     10/10/24 0746 97.4 °F (36.3 °C) 65 16 108/60 96 % -- None (Room air) --     10/10/24 0520 -- 71 -- -- 95 % 151 lb 4.8 oz (68.6 kg) -- --     10/10/24 0425 98.1 °F (36.7 °C) 78 17 110/55 92 % -- None (Room air) 0 L/min          Blood Transfusion Record       Product Unit Status Volume Start End            Transfuse RBC       24  [id number]  [id number] Stopped 320 mL 10/08/24 1259 10/08/24 1615       24  [id number]  [id number] Completed 10/03/24 1414 341.25 mL 10/03/24 1057 10/03/24 1400              10/11 HOSPITALIST NOTE  SUBJECTIVE:  Interval History:      Chest remains on suction.  Still endorsing some incisional pain with movement.  Feels fatigued      PHYSICAL EXAM  Vital signs: Temp:  [97.6 °F (36.4 °C)-98.9 °F (37.2 °C)] 97.6 °F (36.4 °C)  Pulse:  [] 72  Resp:  [16-24] 24  BP: ()/(59-78) 99/60  SpO2:  [94 %-97 %] 95 %    Recent Labs   Lab 10/07/24  0618 10/07/24  0918 10/08/24  0622 10/08/24  1706 10/09/24  0706 10/09/24  0827 10/10/24  0641 10/11/24  0545   WBC 5.3  --  4.2  --  3.8*  --  4.6 5.1   HGB 7.6*  --  6.9* 7.8* 7.7*  --  8.2* 8.0*   MCV 91.7  --  90.8  --  91.5  --  91.4 90.8   .0  --  119.0*  --  114.0*  --  139.0* 156.0   INR  --  1.22*  --   --   --  1.15  --   --                  Recent Labs   Lab 10/07/24  0618 10/08/24  0622 10/09/24  0706 10/10/24  0719 10/11/24  0559    135* 136 134* 134*   K 4.7 4.2 4.2 4.3 4.7    107 106 104 102   CO2 25.0 25.0 24.0 24.0 26.0   BUN 25* 28* 24* 22  34*   CREATSERUM 1.12 1.14 1.14 1.23 1.70*   CA 10.0 9.3 9.3 9.4 9.7   MG 1.8 1.9 1.7 1.8 2.0   * 111* 100* 131* 139*                 Recent Labs   Lab 10/07/24  0618 10/08/24  0622 10/09/24  0706 10/10/24  0719 10/11/24  0559   ALT 77* 77* 71* 58* 43   AST 31 33 34* 24 19   ALB 3.2 3.0* 2.9* 3.0* 2.9*          ASSESSMENT/PLAN:     Patient is a 73 year old male with PMH sig for HTN, HLD, MDS on chemotherapy, here for chest pain /sob        #R sided pleural effusion / suspect pna  #R chest pain / sob  -symptoms lkely due to R sided cxr findings. Less likely cardiac etiology but will trend trop and EKG -neg  -echo as above, no large pericardial effusion  Sp thoracentesis 10/3 1.5L removed. Cx ngtd. Repeat thora 10/7, 300 cc removed.   -Moderate right sided effusion remains on CXR post-thora. Repeat CT demonstrating moderate R pleural effusion and increased cavitation in RML.    -Pulm following  -iv abx, Completed course of azithro. Rocephin (10/2-). Zosyn (10/8-).   -Chest x-ray this a.m. pending  -Chest tube placed 10/9, monitor output. F/U studies.      #HTN  -on bb, cont     #HLD  -statin     #MDS  -heme eval  -transfused 1 unit thus far      #CKDII!  -Trend renal function     DVT ppx: Scds, lovenox    10/11 PULMONARY NOTE       S: Nurse reports that they attempted to give intrapleural dornase last night and it was met with resistance. He is still having some chest and back pain. He is not coughing. He complains of feeling very tired.      ASSESSMENT AND PLAN         Dyspnea - secondary to R pleural effusion +/- pneumonia    -continue antibiotics   -management of pleural effusion as below  Loculated right pleural effusion - thoracentesis was done 10/3 (1500cc fluid removed), 10/7 (300cc removed), followed by right sided chest tube insertion 10/9. Pleural fluid is exudative with benign cytology. Cultures ngtd.  -repeat CXR Today.  -continue intrapleural tpa and dornase via MIST2 protocol (started 10/9-).  Output was -2400 in last 24h  -antibiotics as below  Pneumonia - with right middle lobe cavitary lesion  -patient was initially on ceftriaxone (10/2-10/8 ), changed to zosyn (10/8- ) given worsening cavitation.   -completed course of azithromycin  -follow up cultures  -if no improvement could consider doing bronch w/ BAL    MDS  -per [facility]  Proph  -[facility]  Dispo   -full code  -inpatient

## 2024-10-11 NOTE — PROGRESS NOTES
CC: follow-up hospital admission sob    SUBJECTIVE:  Interval History:     Chest remains on suction.  Still endorsing some incisional pain with movement.  Feels fatigued.    OBJECTIVE:  Scheduled Meds:    dornase jessenia (Pulmozyme) 5 mg in sterile water for injection (PF) 30 mL (MIST2) intrapleural syringe  5 mg Intrapleural Q12H    alteplase (Activase) 10 mg in sodium chloride 0.9% 30 mL (MIST2) intrapleural syringe  10 mg Intrapleural Q12H    piperacillin-tazobactam  3.375 g Intravenous Q8H    enoxaparin  40 mg Subcutaneous Nightly    allopurinol  100 mg Oral Daily    atorvastatin  10 mg Oral After dinner    metoprolol succinate ER  50 mg Oral Daily Beta Blocker    lidocaine-menthol  1 patch Transdermal Daily     Continuous Infusions:   PRN Meds:   HYDROcodone-acetaminophen    HYDROcodone-acetaminophen    acetaminophen    PHYSICAL EXAM  Vital signs: Temp:  [97.6 °F (36.4 °C)-98.9 °F (37.2 °C)] 97.6 °F (36.4 °C)  Pulse:  [] 72  Resp:  [16-24] 24  BP: ()/(59-78) 99/60  SpO2:  [94 %-97 %] 95 %      GENERAL - NAD, AAO  EYES- sclera anicteric,   HENT- NC/AT  NECK - no JVD  CV- RRR, R chest tube.   RESP - decreased at bases, normal resp effort  ABDOMEN- soft, NT/ND   EXT- no LE edema   PSYCH - normal mentation/ normal affect    Data Review:   Labs:   Recent Labs   Lab 10/07/24  0618 10/07/24  0918 10/08/24  0622 10/08/24  1706 10/09/24  0706 10/09/24  0827 10/10/24  0641 10/11/24  0545   WBC 5.3  --  4.2  --  3.8*  --  4.6 5.1   HGB 7.6*  --  6.9* 7.8* 7.7*  --  8.2* 8.0*   MCV 91.7  --  90.8  --  91.5  --  91.4 90.8   .0  --  119.0*  --  114.0*  --  139.0* 156.0   INR  --  1.22*  --   --   --  1.15  --   --        Recent Labs   Lab 10/07/24  0618 10/08/24  0622 10/09/24  0706 10/10/24  0719 10/11/24  0559    135* 136 134* 134*   K 4.7 4.2 4.2 4.3 4.7    107 106 104 102   CO2 25.0 25.0 24.0 24.0 26.0   BUN 25* 28* 24* 22 34*   CREATSERUM 1.12 1.14 1.14 1.23 1.70*   CA 10.0 9.3 9.3 9.4 9.7    MG 1.8 1.9 1.7 1.8 2.0   * 111* 100* 131* 139*       Recent Labs   Lab 10/07/24  0618 10/08/24  0622 10/09/24  0706 10/10/24  0719 10/11/24  0559   ALT 77* 77* 71* 58* 43   AST 31 33 34* 24 19   ALB 3.2 3.0* 2.9* 3.0* 2.9*       No results for input(s): \"PGLU\" in the last 168 hours.    ECHO    Conclusions:     1. Study data: Normal sinus, frequent PACs   2. Left ventricle: The cavity size was normal. Wall thickness was normal.      Systolic function was normal. The estimated ejection fraction was 60-65%,      by visual assessment. No diagnostic evidence for regional wall motion      abnormalities. Unable to assess LV diastolic function due to heart      rhythm.   3. Left atrium: The left atrial volume was mildly increased.   4. Aortic valve: Transvalvular velocity was minimally increased. There was      no evidence for stenosis. There was mild regurgitation. The peak systolic      velocity was 2.39m/sec. The mean systolic gradient was 9mm Hg. The valve      area (VTI) was 2.66cm^2. The valve area (VTI) index was 1.39cm^2/m^2.   5. Mitral valve: There was mild regurgitation.   6. Pulmonary arteries: Systolic pressure was moderately increased, in the      range of 50mm Hg to 55mm Hg.   7. Pericardium, extracardiac: A trivial pericardial effusion was identified.      There was no evidence of hemodynamic compromise. There was a right      pleural effusion.   Impressions:  No previous study from [facility] was   available for comparison.       ASSESSMENT/PLAN:    Patient is a 73 year old male with PMH sig for HTN, HLD, MDS on chemotherapy, here for chest pain /sob       #R sided pleural effusion / suspect pna  #R chest pain / sob  -symptoms lkely due to R sided cxr findings. Less likely cardiac etiology but will trend trop and EKG -neg  -echo as above, no large pericardial effusion  Sp thoracentesis 10/3 1.5L removed. Cx ngtd. Repeat thora 10/7, 300 cc removed.   -Moderate right sided effusion  remains on CXR post-thora. Repeat CT demonstrating moderate R pleural effusion and increased cavitation in RML.    -Pulm following  -iv abx, Completed course of azithro. Rocephin (10/2-). Zosyn (10/8-).   -Chest x-ray this a.m. pending  -Chest tube placed 10/9, monitor output. F/U studies.      #HTN  -on bb, cont     #HLD  -statin     #MDS  -heme eval  -transfused 1 unit thus far     #CKDII!  -Trend renal function     DVT ppx: Scds, lovenox        Alisab Emmanuel [facility] Centerist

## 2024-10-11 NOTE — CM/SW NOTE
Pt discussed in rounds and chart was reviewed. Per RN, pt has a chest tube on suction. Awaiting further POC.    SW will continue to monitor for any anticipated DC needs.      to remain available for support and/or discharge planning.    DEEPALI Eddy  Discharge Planner  [phone]

## 2024-10-11 NOTE — PROGRESS NOTES
S: Nurse reports that they attempted to give intrapleural dornase last night and it was met with resistance. He is still having some chest and back pain. He is not coughing. He complains of feeling very tired.    Meds:   dornase jessenia (Pulmozyme) 5 mg in sterile water for injection (PF) 30 mL (MIST2) intrapleural syringe  5 mg Intrapleural Q12H    alteplase (Activase) 10 mg in sodium chloride 0.9% 30 mL (MIST2) intrapleural syringe  10 mg Intrapleural Q12H    piperacillin-tazobactam  3.375 g Intravenous Q8H    enoxaparin  40 mg Subcutaneous Nightly    allopurinol  100 mg Oral Daily    atorvastatin  10 mg Oral After dinner    metoprolol succinate ER  50 mg Oral Daily Beta Blocker    lidocaine-menthol  1 patch Transdermal Daily       Prn Meds:    HYDROcodone-acetaminophen    HYDROcodone-acetaminophen    acetaminophen    Infusions:      OBJECTIVE:  Vitals:    10/11/24 0050 10/11/24 0624 10/11/24 0737 10/11/24 0747   BP: 110/62 115/63  99/60   Pulse: 77 72 83 72   Resp: 18 16  24   Temp: 97.9 °F (36.6 °C) 97.8 °F (36.6 °C)  97.6 °F (36.4 °C)   TempSrc: Oral Oral  Oral   SpO2: 95% 95% 97% 95%   Weight:       Height:         O2: room air  Gen - Alert, oriented x 3, in no apparent distress  Lungs - diminished breath sounds at right base. No wheezing/crackles.  Right sided chest tube is in place.   CV - regular rate & rhythm. Normal S1, S2. No murmurs   Extremities - No cyanosis, clubbing, edema appreciated.        Labs:  Recent Labs   Lab 10/09/24  0706 10/10/24  0641 10/11/24  0545   WBC 3.8* 4.6 5.1   HGB 7.7* 8.2* 8.0*   .0* 139.0* 156.0     Recent Labs   Lab 10/09/24  0706 10/10/24  0719 10/11/24  0559    134* 134*   K 4.2 4.3 4.7    104 102   CO2 24.0 24.0 26.0   BUN 24* 22 34*   CREATSERUM 1.14 1.23 1.70*   * 131* 139*   ANIONGAP 6 6 6   ALB 2.9* 3.0* 2.9*   CA 9.3 9.4 9.7   MG 1.7 1.8 2.0   TP 5.5* 5.7 5.4*     Recent Labs   Lab 10/09/24  0706 10/10/24  0719 10/11/24  0559    ALKPHO 139* 134* 121*   AST 34* 24 19   ALT 71* 58* 43   BILT 0.5 0.6 0.4     Recent Labs   Lab 10/07/24  0918 10/09/24  0827   INR 1.22* 1.15         Imaging reviewed    ASSESSMENT AND PLAN         Dyspnea - secondary to R pleural effusion +/- pneumonia    -continue antibiotics   -management of pleural effusion as below  Loculated right pleural effusion - thoracentesis was done 10/3 (1500cc fluid removed), 10/7 (300cc removed), followed by right sided chest tube insertion 10/9. Pleural fluid is exudative with benign cytology. Cultures ngtd.  -repeat CXR Today.  -continue intrapleural tpa and dornase via MIST2 protocol (started 10/9-). Output was -2400 in last 24h  -antibiotics as below  Pneumonia - with right middle lobe cavitary lesion  -patient was initially on ceftriaxone (10/2-10/8 ), changed to zosyn (10/8- ) given worsening cavitation.   -completed course of azithromycin  -follow up cultures  -if no improvement could consider doing bronch w/ BAL    MDS  -per [facility]  Proph  -[facility]  Dispo   -full code  -inpatient     We will continue to follow with you        Raman Charles M.D.  Pulmonary/Critical Care

## 2024-10-11 NOTE — PROGRESS NOTES
CC: follow-up hospital admission sob    SUBJECTIVE:  Interval History:     Chest tube placed yesterday. Endorsing incisional pain.     OBJECTIVE:  Scheduled Meds:    dornase jessenia (Pulmozyme) 5 mg in sterile water for injection (PF) 30 mL (MIST2) intrapleural syringe  5 mg Intrapleural Q12H    alteplase (Activase) 10 mg in sodium chloride 0.9% 30 mL (MIST2) intrapleural syringe  10 mg Intrapleural Q12H    piperacillin-tazobactam  3.375 g Intravenous Q8H    enoxaparin  40 mg Subcutaneous Nightly    allopurinol  100 mg Oral Daily    atorvastatin  10 mg Oral After dinner    metoprolol succinate ER  50 mg Oral Daily Beta Blocker    lidocaine-menthol  1 patch Transdermal Daily     Continuous Infusions:   PRN Meds:   HYDROcodone-acetaminophen    HYDROcodone-acetaminophen    acetaminophen    PHYSICAL EXAM  Vital signs: Temp:  [97.4 °F (36.3 °C)-98.9 °F (37.2 °C)] 97.9 °F (36.6 °C)  Pulse:  [] 77  Resp:  [16-19] 18  BP: (101-115)/(59-78) 110/62  SpO2:  [94 %-96 %] 95 %      GENERAL - NAD, AAO  EYES- sclera anicteric,   HENT- NC/AT  NECK - no JVD  CV- RRR, R chest tube.   RESP - decreased at bases, normal resp effort  ABDOMEN- soft, NT/ND   EXT- no LE edema   PSYCH - normal mentation/ normal affect    Data Review:   Labs:   Recent Labs   Lab 10/06/24  0826 10/07/24  0618 10/07/24  0918 10/08/24  0622 10/08/24  1706 10/09/24  0706 10/09/24  0827 10/10/24  0641   WBC 4.0 5.3  --  4.2  --  3.8*  --  4.6   HGB 7.3* 7.6*  --  6.9* 7.8* 7.7*  --  8.2*   MCV 90.6 91.7  --  90.8  --  91.5  --  91.4   .0* 152.0  --  119.0*  --  114.0*  --  139.0*   INR  --   --  1.22*  --   --   --  1.15  --        Recent Labs   Lab 10/06/24  0826 10/07/24  0618 10/08/24  0622 10/09/24  0706 10/10/24  0719   * 136 135* 136 134*   K 4.4 4.7 4.2 4.2 4.3    105 107 106 104   CO2 22.0 25.0 25.0 24.0 24.0   BUN 24* 25* 28* 24* 22   CREATSERUM 1.03 1.12 1.14 1.14 1.23   CA 9.7 10.0 9.3 9.3 9.4   MG 1.7 1.8 1.9 1.7 1.8   GLU 93 100*  111* 100* 131*       Recent Labs   Lab 10/06/24  0826 10/07/24  0618 10/08/24  0622 10/09/24  0706 10/10/24  0719   ALT 75* 77* 77* 71* 58*   AST 38* 31 33 34* 24   ALB 2.7* 3.2 3.0* 2.9* 3.0*       No results for input(s): \"PGLU\" in the last 168 hours.    ECHO    Conclusions:     1. Study data: Normal sinus, frequent PACs   2. Left ventricle: The cavity size was normal. Wall thickness was normal.      Systolic function was normal. The estimated ejection fraction was 60-65%,      by visual assessment. No diagnostic evidence for regional wall motion      abnormalities. Unable to assess LV diastolic function due to heart      rhythm.   3. Left atrium: The left atrial volume was mildly increased.   4. Aortic valve: Transvalvular velocity was minimally increased. There was      no evidence for stenosis. There was mild regurgitation. The peak systolic      velocity was 2.39m/sec. The mean systolic gradient was 9mm Hg. The valve      area (VTI) was 2.66cm^2. The valve area (VTI) index was 1.39cm^2/m^2.   5. Mitral valve: There was mild regurgitation.   6. Pulmonary arteries: Systolic pressure was moderately increased, in the      range of 50mm Hg to 55mm Hg.   7. Pericardium, extracardiac: A trivial pericardial effusion was identified.      There was no evidence of hemodynamic compromise. There was a right      pleural effusion.   Impressions:  No previous study from [facility] was   available for comparison.       ASSESSMENT/PLAN:    Patient is a 73 year old male with PMH sig for HTN, HLD, MDS on chemotherapy, here for chest pain /sob       #R sided pleural effusion / suspect pna  #R chest pain / sob  -symptoms lkely due to R sided cxr findings. Less likely cardiac etiology but will trend trop and EKG -neg  -echo as above, no large pericardial effusion  Sp thoracentesis 10/3 1.5L removed. Cx ngtd. Repeat thora 10/7, 300 cc removed.   -Moderate right sided effusion remains on CXR post-thora. Repeat CT  demonstrating moderate R pleural effusion and increased cavitation in RML.    -Pulm following  -iv abx, Completed course of azithro. Rocephin (10/2-). Zosyn (10/8-).   -CXR this am demonstrates improvement in effusion  -Chest tube placed 10/9, monitor output. F/U studies.      #HTN  -on bb, cont     #HLD  -statin     #MDS  -heme eval  -transfused 1 unit thus far     #CKDII!  -Trend renal function     DVT ppx: Scds, lovenox        Vitor Benitez [facility]ist

## 2024-10-12 ENCOUNTER — APPOINTMENT (OUTPATIENT)
Dept: GENERAL RADIOLOGY | Facility: HOSPITAL | Age: 74
End: 2024-10-12
Attending: INTERNAL MEDICINE
Payer: MEDICARE

## 2024-10-12 LAB
ALBUMIN SERPL-MCNC: 2.7 G/DL (ref 3.2–4.8)
ALBUMIN/GLOB SERPL: 1 {RATIO} (ref 1–2)
ALP LIVER SERPL-CCNC: 114 U/L
ALT SERPL-CCNC: 38 U/L
ANION GAP SERPL CALC-SCNC: 4 MMOL/L (ref 0–18)
AST SERPL-CCNC: 21 U/L (ref ?–34)
BILIRUB SERPL-MCNC: 0.3 MG/DL (ref 0.2–1.1)
BUN BLD-MCNC: 34 MG/DL (ref 9–23)
CALCIUM BLD-MCNC: 10.1 MG/DL (ref 8.7–10.4)
CHLORIDE SERPL-SCNC: 104 MMOL/L (ref 98–112)
CO2 SERPL-SCNC: 26 MMOL/L (ref 21–32)
CREAT BLD-MCNC: 1.51 MG/DL
EGFRCR SERPLBLD CKD-EPI 2021: 48 ML/MIN/1.73M2 (ref 60–?)
ERYTHROCYTE [DISTWIDTH] IN BLOOD BY AUTOMATED COUNT: 14.6 %
GLOBULIN PLAS-MCNC: 2.7 G/DL (ref 2–3.5)
GLUCOSE BLD-MCNC: 88 MG/DL (ref 70–99)
HCT VFR BLD AUTO: 22.5 %
HGB BLD-MCNC: 7.2 G/DL
MAGNESIUM SERPL-MCNC: 2 MG/DL (ref 1.6–2.6)
MCH RBC QN AUTO: 29.1 PG (ref 26–34)
MCHC RBC AUTO-ENTMCNC: 32 G/DL (ref 31–37)
MCV RBC AUTO: 91.1 FL
OSMOLALITY SERPL CALC.SUM OF ELEC: 285 MOSM/KG (ref 275–295)
PLATELET # BLD AUTO: 143 10(3)UL (ref 150–450)
PLATELETS.RETICULATED NFR BLD AUTO: 37.7 % (ref 0–7)
POTASSIUM SERPL-SCNC: 4.5 MMOL/L (ref 3.5–5.1)
PROT SERPL-MCNC: 5.4 G/DL (ref 5.7–8.2)
RBC # BLD AUTO: 2.47 X10(6)UL
SODIUM SERPL-SCNC: 134 MMOL/L (ref 136–145)
WBC # BLD AUTO: 3.5 X10(3) UL (ref 4–11)

## 2024-10-12 PROCEDURE — 87070 CULTURE OTHR SPECIMN AEROBIC: CPT | Performed by: INTERNAL MEDICINE

## 2024-10-12 PROCEDURE — 88305 TISSUE EXAM BY PATHOLOGIST: CPT | Performed by: INTERNAL MEDICINE

## 2024-10-12 PROCEDURE — 71045 X-RAY EXAM CHEST 1 VIEW: CPT | Performed by: INTERNAL MEDICINE

## 2024-10-12 PROCEDURE — 83735 ASSAY OF MAGNESIUM: CPT | Performed by: HOSPITALIST

## 2024-10-12 PROCEDURE — 85027 COMPLETE CBC AUTOMATED: CPT | Performed by: HOSPITALIST

## 2024-10-12 PROCEDURE — 87205 SMEAR GRAM STAIN: CPT | Performed by: INTERNAL MEDICINE

## 2024-10-12 PROCEDURE — 88108 CYTOPATH CONCENTRATE TECH: CPT | Performed by: INTERNAL MEDICINE

## 2024-10-12 PROCEDURE — 80053 COMPREHEN METABOLIC PANEL: CPT | Performed by: HOSPITALIST

## 2024-10-12 NOTE — PLAN OF CARE
Patient is admitted for . Pt is AOX4.  VSS, afebrile and c/o severe right side CT pain. Gave Norco.  SpO2 maintained on RA. . SOB with ambulation. Denies any cough. Right side CT is -20cm suction. No output last night. CT clamped at 1000. CXR ordered at 1400. Tele-NSR. Lovenox. Reg diet. Denies any n/v/d. Voids. Up with SBA.   IV ABX. Will continue to monitor. Pt is updated with plan of care.      Problem: Patient/Family Goals  Goal: Patient/Family Long Term Goal  Description: Patient's Long Term Goal: stay healthy  10/7: Stay out of the hospital when discharged    Interventions:  - Medication management  -Dietary management  -Prompt follow up with physician  10/7:   -Attend my follow up appointments with Mds   -Follow my medication regimen at home  - See additional Care Plan goals for specific interventions  Outcome: Progressing  Goal: Patient/Family Short Term Goal  Description: Patient's Short Term Goal: Discharge home    Interventions:   - Pulmonology consult  -IV antbiotics  -2D echo   10/7:  -IV abx  -Tele monitoring  -Monitor labs  -R sided thoracentesis 10/7  -Ambulate as tolerated  10/9:   -Tele monitoring  -Monitor labs  -Monitor hemoglobin  -Monitor thoracentesis site s/p right sided thoracentesis   -Ambulate as tolerated  -IV abx  - See additional Care Plan goals for specific interventions  Outcome: Progressing     Problem: RESPIRATORY - ADULT  Goal: Achieves optimal ventilation and oxygenation  Description: INTERVENTIONS:  - Assess for changes in respiratory status  - Assess for changes in mentation and behavior  - Position to facilitate oxygenation and minimize respiratory effort  - Oxygen supplementation based on oxygen saturation or ABGs  - Provide Smoking Cessation handout, if applicable  - Encourage broncho-pulmonary hygiene including cough, deep breathe, Incentive Spirometry  - Assess the need for suctioning and perform as needed  - Assess and instruct to report SOB or any respiratory  difficulty  - Respiratory Therapy support as indicated  - Manage/alleviate anxiety  - Monitor for signs/symptoms of CO2 retention  Outcome: Progressing     Problem: CARDIOVASCULAR - ADULT  Goal: Maintains optimal cardiac output and hemodynamic stability  Description: INTERVENTIONS:  - Monitor vital signs, rhythm, and trends  - Monitor for bleeding, hypotension and signs of decreased cardiac output  - Evaluate effectiveness of vasoactive medications to optimize hemodynamic stability  - Monitor arterial and/or venous puncture sites for bleeding and/or hematoma  - Assess quality of pulses, skin color and temperature  - Assess for signs of decreased coronary artery perfusion - ex. Angina  - Evaluate fluid balance, assess for edema, trend weights  Outcome: Progressing  Goal: Absence of cardiac arrhythmias or at baseline  Description: INTERVENTIONS:  - Continuous cardiac monitoring, monitor vital signs, obtain 12 lead EKG if indicated  - Evaluate effectiveness of antiarrhythmic and heart rate control medications as ordered  - Initiate emergency measures for life threatening arrhythmias  - Monitor electrolytes and administer replacement therapy as ordered  Outcome: Progressing     Problem: HEMATOLOGIC - ADULT  Goal: Free from bleeding injury  Description: (Example usage: patient with low platelets)  INTERVENTIONS:  - Avoid intramuscular injections, enemas and rectal medication administration  - Ensure safe mobilization of patient  - Hold pressure on venipuncture sites to achieve adequate hemostasis  - Assess for signs and symptoms of internal bleeding  - Monitor lab trends  - Patient is to report abnormal signs of bleeding to staff  - Avoid use of toothpicks and dental floss  - Use electric shaver for shaving  - Use soft bristle tooth brush  - Limit straining and forceful nose blowing  Outcome: Progressing

## 2024-10-12 NOTE — PLAN OF CARE
Assumed patient care at 1930 Family at bedside.  Patient is resting in bed. Alert and oriented x 4.  Denies any complaints of pain or other discomfort at this time.  CT to Right chest , CDI dressing. Continuous wall suction -20 cm. Scant amount of serosanguinous drainage in tubing.  Vital signs are stable  Plan of care updated with patient for evening medications, IV antibiotic, fall prevention measures, MIST 2 protocol on hold for tonight.  Problem: Patient/Family Goals  Goal: Patient/Family Long Term Goal  Description: Patient's Long Term Goal: stay healthy  10/7: Stay out of the hospital when discharged    Interventions:  - Medication management  -Dietary management  -Prompt follow up with physician  10/7:   -Attend my follow up appointments with Mds   -Follow my medication regimen at home  - See additional Care Plan goals for specific interventions  Outcome: Progressing  Goal: Patient/Family Short Term Goal  Description: Patient's Short Term Goal: Discharge home    Interventions:   - Pulmonology consult  -IV antbiotics  -2D echo   10/7:  -IV abx  -Tele monitoring  -Monitor labs  -R sided thoracentesis 10/7  -Ambulate as tolerated  10/9:   -Tele monitoring  -Monitor labs  -Monitor hemoglobin  -Monitor thoracentesis site s/p right sided thoracentesis   -Ambulate as tolerated  -IV abx  - See additional Care Plan goals for specific interventions  Outcome: Progressing     Problem: RESPIRATORY - ADULT  Goal: Achieves optimal ventilation and oxygenation  Description: INTERVENTIONS:  - Assess for changes in respiratory status  - Assess for changes in mentation and behavior  - Position to facilitate oxygenation and minimize respiratory effort  - Oxygen supplementation based on oxygen saturation or ABGs  - Provide Smoking Cessation handout, if applicable  - Encourage broncho-pulmonary hygiene including cough, deep breathe, Incentive Spirometry  - Assess the need for suctioning and perform as needed  - Assess and instruct  to report SOB or any respiratory difficulty  - Respiratory Therapy support as indicated  - Manage/alleviate anxiety  - Monitor for signs/symptoms of CO2 retention  Outcome: Progressing     Problem: CARDIOVASCULAR - ADULT  Goal: Maintains optimal cardiac output and hemodynamic stability  Description: INTERVENTIONS:  - Monitor vital signs, rhythm, and trends  - Monitor for bleeding, hypotension and signs of decreased cardiac output  - Evaluate effectiveness of vasoactive medications to optimize hemodynamic stability  - Monitor arterial and/or venous puncture sites for bleeding and/or hematoma  - Assess quality of pulses, skin color and temperature  - Assess for signs of decreased coronary artery perfusion - ex. Angina  - Evaluate fluid balance, assess for edema, trend weights  Outcome: Progressing  Goal: Absence of cardiac arrhythmias or at baseline  Description: INTERVENTIONS:  - Continuous cardiac monitoring, monitor vital signs, obtain 12 lead EKG if indicated  - Evaluate effectiveness of antiarrhythmic and heart rate control medications as ordered  - Initiate emergency measures for life threatening arrhythmias  - Monitor electrolytes and administer replacement therapy as ordered  Outcome: Progressing     Problem: HEMATOLOGIC - ADULT  Goal: Free from bleeding injury  Description: (Example usage: patient with low platelets)  INTERVENTIONS:  - Avoid intramuscular injections, enemas and rectal medication administration  - Ensure safe mobilization of patient  - Hold pressure on venipuncture sites to achieve adequate hemostasis  - Assess for signs and symptoms of internal bleeding  - Monitor lab trends  - Patient is to report abnormal signs of bleeding to staff  - Avoid use of toothpicks and dental floss  - Use electric shaver for shaving  - Use soft bristle tooth brush  - Limit straining and forceful nose blowing  Outcome: Progressing

## 2024-10-12 NOTE — PROGRESS NOTES
CC: follow-up hospital admission sob    SUBJECTIVE:  Interval History:     Chest tube output decreased, clamped this morning. Patient states he feels a little better.     OBJECTIVE:  Scheduled Meds:    piperacillin-tazobactam  3.375 g Intravenous Q8H    enoxaparin  40 mg Subcutaneous Nightly    allopurinol  100 mg Oral Daily    atorvastatin  10 mg Oral After dinner    metoprolol succinate ER  50 mg Oral Daily Beta Blocker    lidocaine-menthol  1 patch Transdermal Daily     Continuous Infusions:   PRN Meds:   HYDROcodone-acetaminophen    HYDROcodone-acetaminophen    acetaminophen    PHYSICAL EXAM  Vital signs: Temp:  [97.7 °F (36.5 °C)-98.2 °F (36.8 °C)] 98.1 °F (36.7 °C)  Pulse:  [63-72] 72  Resp:  [19-20] 20  BP: ()/(56-61) 116/61  SpO2:  [95 %-98 %] 98 %      GENERAL - NAD, AAO  EYES- sclera anicteric,   HENT- NC/AT  NECK - no JVD  CV- RRR, R chest tube.   RESP - decreased at bases, normal resp effort  ABDOMEN- soft, NT/ND   EXT- no LE edema   PSYCH - normal mentation/ normal affect    Data Review:   Labs:   Recent Labs   Lab 10/07/24  0918 10/08/24  0622 10/08/24  1706 10/09/24  0706 10/09/24  0827 10/10/24  0641 10/11/24  0545 10/12/24  0618   WBC  --  4.2  --  3.8*  --  4.6 5.1 3.5*   HGB  --  6.9* 7.8* 7.7*  --  8.2* 8.0* 7.2*   MCV  --  90.8  --  91.5  --  91.4 90.8 91.1   PLT  --  119.0*  --  114.0*  --  139.0* 156.0 143.0*   INR 1.22*  --   --   --  1.15  --   --   --        Recent Labs   Lab 10/08/24  0622 10/09/24  0706 10/10/24  0719 10/11/24  0559 10/12/24  0618   * 136 134* 134* 134*   K 4.2 4.2 4.3 4.7 4.5    106 104 102 104   CO2 25.0 24.0 24.0 26.0 26.0   BUN 28* 24* 22 34* 34*   CREATSERUM 1.14 1.14 1.23 1.70* 1.51*   CA 9.3 9.3 9.4 9.7 10.1   MG 1.9 1.7 1.8 2.0 2.0   * 100* 131* 139* 88       Recent Labs   Lab 10/08/24  0622 10/09/24  0706 10/10/24  0719 10/11/24  0559 10/12/24  0618   ALT 77* 71* 58* 43 38   AST 33 34* 24 19 21   ALB 3.0* 2.9* 3.0* 2.9* 2.7*       No  results for input(s): \"PGLU\" in the last 168 hours.    ECHO    Conclusions:     1. Study data: Normal sinus, frequent PACs   2. Left ventricle: The cavity size was normal. Wall thickness was normal.      Systolic function was normal. The estimated ejection fraction was 60-65%,      by visual assessment. No diagnostic evidence for regional wall motion      abnormalities. Unable to assess LV diastolic function due to heart      rhythm.   3. Left atrium: The left atrial volume was mildly increased.   4. Aortic valve: Transvalvular velocity was minimally increased. There was      no evidence for stenosis. There was mild regurgitation. The peak systolic      velocity was 2.39m/sec. The mean systolic gradient was 9mm Hg. The valve      area (VTI) was 2.66cm^2. The valve area (VTI) index was 1.39cm^2/m^2.   5. Mitral valve: There was mild regurgitation.   6. Pulmonary arteries: Systolic pressure was moderately increased, in the      range of 50mm Hg to 55mm Hg.   7. Pericardium, extracardiac: A trivial pericardial effusion was identified.      There was no evidence of hemodynamic compromise. There was a right      pleural effusion.   Impressions:  No previous study from [facility] was   available for comparison.       ASSESSMENT/PLAN:    Patient is a 73 year old male with PMH sig for HTN, HLD, MDS on chemotherapy, here for chest pain /sob       #R sided pleural effusion / suspect pna  #R chest pain / sob  -symptoms lkely due to R sided cxr findings. Less likely cardiac etiology but will trend trop and EKG -neg  -echo as above, no large pericardial effusion  Sp thoracentesis 10/3 1.5L removed. Cx ngtd. Repeat thora 10/7, 300 cc removed.   -Moderate right sided effusion remains on CXR post-thora. Repeat CT demonstrating moderate R pleural effusion and increased cavitation in RML.    -Pulm following  -iv abx, Completed course of azithro. Rocephin (10/2-). Zosyn (10/8-).   -Chest tube clamped, repeat CXR later  today.      #HTN  -on bb, cont     #HLD  -statin     #MDS  -heme eval  -transfused 1 unit thus far     #CKDII!  -Trend renal function     DVT ppx: Scds, lovekrupax        Vitor Benitez DO  [facility] Centerist

## 2024-10-13 ENCOUNTER — APPOINTMENT (OUTPATIENT)
Dept: GENERAL RADIOLOGY | Facility: HOSPITAL | Age: 74
End: 2024-10-13
Attending: INTERNAL MEDICINE
Payer: MEDICARE

## 2024-10-13 LAB
ANION GAP SERPL CALC-SCNC: 3 MMOL/L (ref 0–18)
ANTIBODY SCREEN: NEGATIVE
BASOPHILS # BLD AUTO: 0.02 X10(3) UL (ref 0–0.2)
BASOPHILS NFR BLD AUTO: 0.5 %
BUN BLD-MCNC: 33 MG/DL (ref 9–23)
CALCIUM BLD-MCNC: 9.8 MG/DL (ref 8.7–10.4)
CHLORIDE SERPL-SCNC: 106 MMOL/L (ref 98–112)
CO2 SERPL-SCNC: 25 MMOL/L (ref 21–32)
CREAT BLD-MCNC: 1.34 MG/DL
EGFRCR SERPLBLD CKD-EPI 2021: 56 ML/MIN/1.73M2 (ref 60–?)
EOSINOPHIL # BLD AUTO: 0.08 X10(3) UL (ref 0–0.7)
EOSINOPHIL NFR BLD AUTO: 2.1 %
ERYTHROCYTE [DISTWIDTH] IN BLOOD BY AUTOMATED COUNT: 14.6 %
GLUCOSE BLD-MCNC: 93 MG/DL (ref 70–99)
HCT VFR BLD AUTO: 21 %
HGB BLD-MCNC: 6.6 G/DL
HGB BLD-MCNC: 8.4 G/DL
IMM GRANULOCYTES # BLD AUTO: 0.04 X10(3) UL (ref 0–1)
IMM GRANULOCYTES NFR BLD: 1.1 %
LYMPHOCYTES # BLD AUTO: 1.73 X10(3) UL (ref 1–4)
LYMPHOCYTES NFR BLD AUTO: 45.6 %
MCH RBC QN AUTO: 29.1 PG (ref 26–34)
MCHC RBC AUTO-ENTMCNC: 31.4 G/DL (ref 31–37)
MCV RBC AUTO: 92.5 FL
MONOCYTES # BLD AUTO: 0.76 X10(3) UL (ref 0.1–1)
MONOCYTES NFR BLD AUTO: 20.1 %
NEUTROPHILS # BLD AUTO: 1.16 X10 (3) UL (ref 1.5–7.7)
NEUTROPHILS # BLD AUTO: 1.16 X10(3) UL (ref 1.5–7.7)
NEUTROPHILS NFR BLD AUTO: 30.6 %
OSMOLALITY SERPL CALC.SUM OF ELEC: 285 MOSM/KG (ref 275–295)
PLATELET # BLD AUTO: 161 10(3)UL (ref 150–450)
PLATELETS.RETICULATED NFR BLD AUTO: 38.2 % (ref 0–7)
POTASSIUM SERPL-SCNC: 4.4 MMOL/L (ref 3.5–5.1)
RBC # BLD AUTO: 2.27 X10(6)UL
RH BLOOD TYPE: NEGATIVE
SODIUM SERPL-SCNC: 134 MMOL/L (ref 136–145)
WBC # BLD AUTO: 3.8 X10(3) UL (ref 4–11)

## 2024-10-13 PROCEDURE — 85018 HEMOGLOBIN: CPT | Performed by: HOSPITALIST

## 2024-10-13 PROCEDURE — 86850 RBC ANTIBODY SCREEN: CPT | Performed by: HOSPITALIST

## 2024-10-13 PROCEDURE — 86901 BLOOD TYPING SEROLOGIC RH(D): CPT | Performed by: HOSPITALIST

## 2024-10-13 PROCEDURE — 86900 BLOOD TYPING SEROLOGIC ABO: CPT | Performed by: HOSPITALIST

## 2024-10-13 PROCEDURE — 71045 X-RAY EXAM CHEST 1 VIEW: CPT | Performed by: INTERNAL MEDICINE

## 2024-10-13 PROCEDURE — 36430 TRANSFUSION BLD/BLD COMPNT: CPT

## 2024-10-13 PROCEDURE — 85025 COMPLETE CBC W/AUTO DIFF WBC: CPT | Performed by: STUDENT IN AN ORGANIZED HEALTH CARE EDUCATION/TRAINING PROGRAM

## 2024-10-13 PROCEDURE — 80048 BASIC METABOLIC PNL TOTAL CA: CPT | Performed by: STUDENT IN AN ORGANIZED HEALTH CARE EDUCATION/TRAINING PROGRAM

## 2024-10-13 PROCEDURE — 86920 COMPATIBILITY TEST SPIN: CPT

## 2024-10-13 RX ORDER — SODIUM CHLORIDE 9 MG/ML
INJECTION, SOLUTION INTRAVENOUS ONCE
Status: COMPLETED | OUTPATIENT
Start: 2024-10-13 | End: 2024-10-13

## 2024-10-13 NOTE — PROGRESS NOTES
CC: follow-up hospital admission sob    SUBJECTIVE:  Interval History:       Patient is doing well, getting one unit of PRBC currently , chest tube removed yesterfday and no SOB, saturating well on RA    OBJECTIVE:  Scheduled Meds:    piperacillin-tazobactam  3.375 g Intravenous Q8H    enoxaparin  40 mg Subcutaneous Nightly    allopurinol  100 mg Oral Daily    atorvastatin  10 mg Oral After dinner    metoprolol succinate ER  50 mg Oral Daily Beta Blocker    lidocaine-menthol  1 patch Transdermal Daily     Continuous Infusions:   PRN Meds:   HYDROcodone-acetaminophen    HYDROcodone-acetaminophen    acetaminophen    PHYSICAL EXAM  Vital signs: Temp:  [97.7 °F (36.5 °C)-99.1 °F (37.3 °C)] 97.7 °F (36.5 °C)  Pulse:  [62-81] 81  Resp:  [18-20] 19  BP: ()/(45-66) 134/59  SpO2:  [95 %-98 %] 97 %      GENERAL - NAD, AAO  EYES- sclera anicteric,   HENT- NC/AT  NECK - no JVD  CV- RRR  RESP - decreased at bases, normal resp effort  ABDOMEN- soft, NT/ND   EXT- no LE edema   PSYCH - normal mentation/ normal affect    Data Review:   Labs:   Recent Labs   Lab 10/07/24  0918 10/08/24  0622 10/09/24  0706 10/09/24  0827 10/10/24  0641 10/11/24  0545 10/12/24  0618 10/13/24  0711 10/13/24  1451   WBC  --    < > 3.8*  --  4.6 5.1 3.5* 3.8*  --    HGB  --    < > 7.7*  --  8.2* 8.0* 7.2* 6.6* 8.4*   MCV  --    < > 91.5  --  91.4 90.8 91.1 92.5  --    PLT  --    < > 114.0*  --  139.0* 156.0 143.0* 161.0  --    INR 1.22*  --   --  1.15  --   --   --   --   --     < > = values in this interval not displayed.       Recent Labs   Lab 10/08/24  0622 10/09/24  0706 10/10/24  0719 10/11/24  0559 10/12/24  0618 10/13/24  0711   * 136 134* 134* 134* 134*   K 4.2 4.2 4.3 4.7 4.5 4.4    106 104 102 104 106   CO2 25.0 24.0 24.0 26.0 26.0 25.0   BUN 28* 24* 22 34* 34* 33*   CREATSERUM 1.14 1.14 1.23 1.70* 1.51* 1.34*   CA 9.3 9.3 9.4 9.7 10.1 9.8   MG 1.9 1.7 1.8 2.0 2.0  --    * 100* 131* 139* 88 93       Recent Labs   Lab  10/08/24  0622 10/09/24  0706 10/10/24  0719 10/11/24  0559 10/12/24  0618   ALT 77* 71* 58* 43 38   AST 33 34* 24 19 21   ALB 3.0* 2.9* 3.0* 2.9* 2.7*       No results for input(s): \"PGLU\" in the last 168 hours.    ECHO    Conclusions:     1. Study data: Normal sinus, frequent PACs   2. Left ventricle: The cavity size was normal. Wall thickness was normal.      Systolic function was normal. The estimated ejection fraction was 60-65%,      by visual assessment. No diagnostic evidence for regional wall motion      abnormalities. Unable to assess LV diastolic function due to heart      rhythm.   3. Left atrium: The left atrial volume was mildly increased.   4. Aortic valve: Transvalvular velocity was minimally increased. There was      no evidence for stenosis. There was mild regurgitation. The peak systolic      velocity was 2.39m/sec. The mean systolic gradient was 9mm Hg. The valve      area (VTI) was 2.66cm^2. The valve area (VTI) index was 1.39cm^2/m^2.   5. Mitral valve: There was mild regurgitation.   6. Pulmonary arteries: Systolic pressure was moderately increased, in the      range of 50mm Hg to 55mm Hg.   7. Pericardium, extracardiac: A trivial pericardial effusion was identified.      There was no evidence of hemodynamic compromise. There was a right      pleural effusion.   Impressions:  No previous study from [facility] was   available for comparison.       ASSESSMENT/PLAN:    Patient is a 73 year old male with PMH sig for HTN, HLD, MDS on chemotherapy, here for chest pain /sob       #R sided pleural effusion / suspect pna  #R chest pain / sob  -symptoms lkely due to R sided cxr findings. Less likely cardiac etiology but will trend trop and EKG -neg  -echo as above, no large pericardial effusion  Sp thoracentesis 10/3 1.5L removed. Cx ngtd. Repeat thora 10/7, 300 cc removed.   -Moderate right sided effusion remains on CXR post-thora. Repeat CT demonstrating moderate R pleural effusion and  increased cavitation in RML.    -Pulm following  -iv abx, Completed course of azithro. Rocephin (10/2-). Zosyn (10/8-).   0chest tube removed, dc planning, still on zosyn, dc abx per pulm     #HTN  -on bb, cont     #HLD  -statin     #MDS  -heme eval  -transfused 2 unit thus far   -post transfusion H and H 8.4, appropriate response    #CKDII!  -Trend renal function  -stable at this time     DVT ppx: Scds, lovenox      Iveth Hurley MD   [facility]

## 2024-10-13 NOTE — PLAN OF CARE
Assumed care at 0730; Pt A/o x 4, stable VS and no complaints of pain. Saturating well on RA; Plan of care discussed with patient. 1 unit or PRBC to be given; Educated on fall risk and use of call light. Belongings and call light within reach. Bed at lowest position and locked.     Problem: Patient/Family Goals  Goal: Patient/Family Long Term Goal  Description: Patient's Long Term Goal: stay healthy  10/7: Stay out of the hospital when discharged    Interventions:  - Medication management  -Dietary management  -Prompt follow up with physician  10/7:   -Attend my follow up appointments with Mds   -Follow my medication regimen at home  - See additional Care Plan goals for specific interventions  Outcome: Progressing  Goal: Patient/Family Short Term Goal  Description: Patient's Short Term Goal: Discharge home    Interventions:   - Pulmonology consult  -IV antbiotics  -2D echo   10/7:  -IV abx  -Tele monitoring  -Monitor labs  -R sided thoracentesis 10/7  -Ambulate as tolerated  10/9:   -Tele monitoring  -Monitor labs  -Monitor hemoglobin  -Monitor thoracentesis site s/p right sided thoracentesis   -Ambulate as tolerated  -IV abx  - See additional Care Plan goals for specific interventions  Outcome: Progressing     Problem: RESPIRATORY - ADULT  Goal: Achieves optimal ventilation and oxygenation  Description: INTERVENTIONS:  - Assess for changes in respiratory status  - Assess for changes in mentation and behavior  - Position to facilitate oxygenation and minimize respiratory effort  - Oxygen supplementation based on oxygen saturation or ABGs  - Provide Smoking Cessation handout, if applicable  - Encourage broncho-pulmonary hygiene including cough, deep breathe, Incentive Spirometry  - Assess the need for suctioning and perform as needed  - Assess and instruct to report SOB or any respiratory difficulty  - Respiratory Therapy support as indicated  - Manage/alleviate anxiety  - Monitor for signs/symptoms of CO2  retention  Outcome: Progressing     Problem: CARDIOVASCULAR - ADULT  Goal: Maintains optimal cardiac output and hemodynamic stability  Description: INTERVENTIONS:  - Monitor vital signs, rhythm, and trends  - Monitor for bleeding, hypotension and signs of decreased cardiac output  - Evaluate effectiveness of vasoactive medications to optimize hemodynamic stability  - Monitor arterial and/or venous puncture sites for bleeding and/or hematoma  - Assess quality of pulses, skin color and temperature  - Assess for signs of decreased coronary artery perfusion - ex. Angina  - Evaluate fluid balance, assess for edema, trend weights  Outcome: Progressing  Goal: Absence of cardiac arrhythmias or at baseline  Description: INTERVENTIONS:  - Continuous cardiac monitoring, monitor vital signs, obtain 12 lead EKG if indicated  - Evaluate effectiveness of antiarrhythmic and heart rate control medications as ordered  - Initiate emergency measures for life threatening arrhythmias  - Monitor electrolytes and administer replacement therapy as ordered  Outcome: Progressing     Problem: HEMATOLOGIC - ADULT  Goal: Free from bleeding injury  Description: (Example usage: patient with low platelets)  INTERVENTIONS:  - Avoid intramuscular injections, enemas and rectal medication administration  - Ensure safe mobilization of patient  - Hold pressure on venipuncture sites to achieve adequate hemostasis  - Assess for signs and symptoms of internal bleeding  - Monitor lab trends  - Patient is to report abnormal signs of bleeding to staff  - Avoid use of toothpicks and dental floss  - Use electric shaver for shaving  - Use soft bristle tooth brush  - Limit straining and forceful nose blowing  Outcome: Progressing

## 2024-10-13 NOTE — PLAN OF CARE
Assumed patient care at 1930 Family at bedside.  Patient is resting in bed. Alert and oriented x 4.  Denies any complaints of pain or other discomfort at this time. CT site  with CDI dressing.  Vital signs are stable  Plan of care updated with patient for evening medications, IV antibiotic, fall prevention measures.  Problem: Patient/Family Goals  Goal: Patient/Family Long Term Goal  Description: Patient's Long Term Goal: stay healthy  10/7: Stay out of the hospital when discharged    Interventions:  - Medication management  -Dietary management  -Prompt follow up with physician  10/7:   -Attend my follow up appointments with Mds   -Follow my medication regimen at home  - See additional Care Plan goals for specific interventions  Outcome: Progressing  Goal: Patient/Family Short Term Goal  Description: Patient's Short Term Goal: Discharge home    Interventions:   - Pulmonology consult  -IV antbiotics  -2D echo   10/7:  -IV abx  -Tele monitoring  -Monitor labs  -R sided thoracentesis 10/7  -Ambulate as tolerated  10/9:   -Tele monitoring  -Monitor labs  -Monitor hemoglobin  -Monitor thoracentesis site s/p right sided thoracentesis   -Ambulate as tolerated  -IV abx  - See additional Care Plan goals for specific interventions  Outcome: Progressing     Problem: RESPIRATORY - ADULT  Ambulating to bathroom. Remains on room air.  Goal: Achieves optimal ventilation and oxygenation  Continuous pulse oximetry in progress  Oxygen saturation levels 94-96%  Denies shortness of breath with activity to bathroom  Plan for CXR today  Continue IV antibiotics.  Description: INTERVENTIONS:  - Assess for changes in respiratory status  - Assess for changes in mentation and behavior  - Position to facilitate oxygenation and minimize respiratory effort  - Oxygen supplementation based on oxygen saturation or ABGs  - Provide Smoking Cessation handout, if applicable  - Encourage broncho-pulmonary hygiene including cough, deep breathe, Incentive  Spirometry  - Assess the need for suctioning and perform as needed  - Assess and instruct to report SOB or any respiratory difficulty  - Respiratory Therapy support as indicated  - Manage/alleviate anxiety  - Monitor for signs/symptoms of CO2 retention  Outcome: Progressing     Problem: CARDIOVASCULAR - ADULT  Vital signs are stable  Goal: Maintains optimal cardiac output and hemodynamic stability  Description: INTERVENTIONS:  - Monitor vital signs, rhythm, and trends  - Monitor for bleeding, hypotension and signs of decreased cardiac output  - Evaluate effectiveness of vasoactive medications to optimize hemodynamic stability  - Monitor arterial and/or venous puncture sites for bleeding and/or hematoma  - Assess quality of pulses, skin color and temperature  - Assess for signs of decreased coronary artery perfusion - ex. Angina  - Evaluate fluid balance, assess for edema, trend weights  Outcome: Progressing  Goal: Absence of cardiac arrhythmias or at baseline  Description: INTERVENTIONS:  - Continuous cardiac monitoring, monitor vital signs, obtain 12 lead EKG if indicated  - Evaluate effectiveness of antiarrhythmic and heart rate control medications as ordered  - Initiate emergency measures for life threatening arrhythmias  - Monitor electrolytes and administer replacement therapy as ordered  Outcome: Progressing     Problem: HEMATOLOGIC - ADULT  Goal: Free from bleeding injury  Description: (Example usage: patient with low platelets)  INTERVENTIONS:  - Avoid intramuscular injections, enemas and rectal medication administration  - Ensure safe mobilization of patient  - Hold pressure on venipuncture sites to achieve adequate hemostasis  - Assess for signs and symptoms of internal bleeding  - Monitor lab trends  - Patient is to report abnormal signs of bleeding to staff  - Avoid use of toothpicks and dental floss  - Use electric shaver for shaving  - Use soft bristle tooth brush  - Limit straining and forceful nose  blowing  Outcome: Progressing

## 2024-10-13 NOTE — PROGRESS NOTES
[facility]    Guy White Patient Status:  Inpatient    1950 MRN [id number]   Location [facility] 2NE-A Attending Iveth Hurley MD   Hosp Day # 11 PCP Salvador Cohn MD     Pulm / Critical Care Progress Note     S: looks and feels better       Scheduled Medication:   sodium chloride   Intravenous Once    piperacillin-tazobactam  3.375 g Intravenous Q8H    enoxaparin  40 mg Subcutaneous Nightly    allopurinol  100 mg Oral Daily    atorvastatin  10 mg Oral After dinner    metoprolol succinate ER  50 mg Oral Daily Beta Blocker    lidocaine-menthol  1 patch Transdermal Daily     Continuous Infusing Medication:  PRN Medication:  HYDROcodone-acetaminophen    HYDROcodone-acetaminophen    acetaminophen       OBJECTIVE:  Vitals:    10/12/24 1907 10/12/24 2301 10/13/24 0500 10/13/24 0812   BP: 104/55 103/53 117/66 110/60   BP Location: Right arm Right arm Right arm Right arm   Pulse: 74 70 63 63   Resp:  20    Temp: 98.3 °F (36.8 °C) 99.1 °F (37.3 °C) 98.5 °F (36.9 °C) 97.9 °F (36.6 °C)   TempSrc: Oral Oral Oral Oral   SpO2:   95% 95%   Weight:       Height:            O2: ra      Wt Readings from Last 3 Encounters:   10/10/24 151 lb 4.8 oz (68.6 kg)   24 159 lb 12.8 oz (72.5 kg)   24 160 lb (72.6 kg)        I/O last 3 completed shifts:  In: 840 [P.O.:840]  Out: 0   No intake/output data recorded.     Physical Exam:   General: alert, cooperative,  No respiratory distress.   Head: Normocephalic, without obvious abnormality, atraumatic.   Lungs: ctab   Chest wall: No tenderness or deformity.   Heart: Regular rate and rhythm, normal S1S2, no murmur.   Abdomen: soft, non-tender, non-distended, positive BS.   Extremity: no edema     Recent Labs   Lab 10/11/24  0545 10/12/24  0618 10/13/24  0711   WBC 5.1 3.5* 3.8*   HGB 8.0* 7.2* 6.6*   HCT 24.6* 22.5* 21.0*   .0 143.0* 161.0     Recent Labs   Lab 10/07/24  0918 10/09/24  0827   INR 1.22* 1.15         Recent Labs   Lab  10/10/24  0719 10/11/24  0559 10/12/24  0618 10/13/24  0711   * 134* 134* 134*   K 4.3 4.7 4.5 4.4    102 104 106   CO2 24.0 26.0 26.0 25.0   BUN 22 34* 34* 33*   CREATSERUM 1.23 1.70* 1.51* 1.34*   * 139* 88 93   CA 9.4 9.7 10.1 9.8   AST 24 19 21  --    ALT 58* 43 38  --    ALKPHO 134* 121* 114  --    BILT 0.6 0.4 0.3  --    ALB 3.0* 2.9* 2.7*  --    TP 5.7 5.4* 5.4*  --        Creatinine, Serum (mg/dL)   Date Value   01/15/2016 1.20   05/14/2015 1.01   01/16/2013 1.18     Creatinine (mg/dL)   Date Value   10/13/2024 1.34 (H)   10/12/2024 1.51 (H)   10/11/2024 1.70 (H)   08/20/2021 1.42 (H)   05/17/2021 1.51 (H)   05/17/2021 1.51 (H)   ]         ASSESSMENT/PLAN:    Dyspnea - secondary to R pleural effusion +/- pneumonia    -continue antibiotics   -management of pleural effusion as below  -on room air.   Loculated right pleural effusion - thoracentesis was done 10/3 (1500cc fluid removed), 10/7 (300cc removed), followed by right sided chest tube insertion 10/9. Pleural fluid is exudative with benign cytology. Cultures ngtd.  -repeat Ct shows significant improvement   -chest tube removed 10/12  -antibiotics as below  Pneumonia - with right middle lobe cavitary lesion  -patient was initially on ceftriaxone (10/2-10/8 ), changed to zosyn (10/8- ) given worsening cavitation.   -completed course of azithromycin  -follow up cultures  MDS  -per [facility] oncology  Proph  -[facility]  Dispo   -full code  -inpatient   -discharge planning. Given anemia hold today but likely tomorrow          Joel Miller M.D.  [facility]  Pulmonary / Critical care  10/13/2024  8:34 AM

## 2024-10-14 ENCOUNTER — APPOINTMENT (OUTPATIENT)
Dept: GENERAL RADIOLOGY | Facility: HOSPITAL | Age: 74
End: 2024-10-14
Attending: INTERNAL MEDICINE
Payer: MEDICARE

## 2024-10-14 LAB
ANION GAP SERPL CALC-SCNC: 5 MMOL/L (ref 0–18)
BASOPHILS # BLD AUTO: 0.02 X10(3) UL (ref 0–0.2)
BASOPHILS NFR BLD AUTO: 0.4 %
BLOOD TYPE BARCODE: 9500
BUN BLD-MCNC: 27 MG/DL (ref 9–23)
CALCIUM BLD-MCNC: 10.4 MG/DL (ref 8.7–10.4)
CD10 CELLS NFR SPEC: <1 %
CD10/CD19: <1 %
CD19 CELLS NFR SPEC: 3 %
CD19+/CD200+: <1 %
CD2 CELLS NFR SPEC: 93 %
CD20 CELLS NFR SPEC: 3 %
CD200 CELLS: 4 %
CD3 CELLS NFR SPEC: 93 %
CD3+/TCRGD+: 1 %
CD3+CD4+ CELLS NFR SPEC: 63 %
CD3+CD4+ CELLS/CD3+CD8+ CLL SPEC: 2.2
CD3+CD8+ CELLS NFR SPEC: 29 %
CD3-/CD56+: 1 %
CD34 CELLS NFR SPEC: <1 %
CD38 CELLS NFR SPEC: 2 %
CD38+/CD19+: <1 %
CD45 CELLS NFR SPEC: 100 %
CD5 CELLS NFR SPEC: 93 %
CD5/CD19 CELLS: 1 %
CD7 CELLS NFR SPEC: 88 %
CELL SURF KAPPA/LAMBDA RATIO: 1
CELL SURF LAMBDA LIGHT CHAIN: 1 %
CELL SURFACE KAPPA LIGHT CHAIN: 1 %
CHLORIDE SERPL-SCNC: 108 MMOL/L (ref 98–112)
CO2 SERPL-SCNC: 25 MMOL/L (ref 21–32)
CREAT BLD-MCNC: 1.24 MG/DL
EGFRCR SERPLBLD CKD-EPI 2021: 61 ML/MIN/1.73M2 (ref 60–?)
EOSINOPHIL # BLD AUTO: 0.04 X10(3) UL (ref 0–0.7)
EOSINOPHIL NFR BLD AUTO: 0.9 %
ERYTHROCYTE [DISTWIDTH] IN BLOOD BY AUTOMATED COUNT: 14.7 %
GLUCOSE BLD-MCNC: 88 MG/DL (ref 70–99)
HCT VFR BLD AUTO: 25.9 %
HGB BLD-MCNC: 8.4 G/DL
IMM GRANULOCYTES # BLD AUTO: 0.04 X10(3) UL (ref 0–1)
IMM GRANULOCYTES NFR BLD: 0.9 %
LYMPHOCYTES # BLD AUTO: 2.6 X10(3) UL (ref 1–4)
LYMPHOCYTES NFR BLD AUTO: 56.4 %
MCH RBC QN AUTO: 29.4 PG (ref 26–34)
MCHC RBC AUTO-ENTMCNC: 32.4 G/DL (ref 31–37)
MCV RBC AUTO: 90.6 FL
MONOCYTES # BLD AUTO: 0.68 X10(3) UL (ref 0.1–1)
MONOCYTES NFR BLD AUTO: 14.8 %
NEUTROPHILS # BLD AUTO: 1.23 X10 (3) UL (ref 1.5–7.7)
NEUTROPHILS # BLD AUTO: 1.23 X10(3) UL (ref 1.5–7.7)
NEUTROPHILS NFR BLD AUTO: 26.6 %
NON GYNE INTERPRETATION: NEGATIVE
NON GYNE INTERPRETATION: NEGATIVE
OSMOLALITY SERPL CALC.SUM OF ELEC: 291 MOSM/KG (ref 275–295)
PLATELET # BLD AUTO: 189 10(3)UL (ref 150–450)
PLATELETS.RETICULATED NFR BLD AUTO: 38.7 % (ref 0–7)
POTASSIUM SERPL-SCNC: 4.4 MMOL/L (ref 3.5–5.1)
RBC # BLD AUTO: 2.86 X10(6)UL
SODIUM SERPL-SCNC: 138 MMOL/L (ref 136–145)
TCR G-D CELLS NFR SPEC: 1 %
UNIT VOLUME: 250 ML
WBC # BLD AUTO: 4.6 X10(3) UL (ref 4–11)

## 2024-10-14 PROCEDURE — 80048 BASIC METABOLIC PNL TOTAL CA: CPT | Performed by: HOSPITALIST

## 2024-10-14 PROCEDURE — 71045 X-RAY EXAM CHEST 1 VIEW: CPT | Performed by: INTERNAL MEDICINE

## 2024-10-14 PROCEDURE — 85025 COMPLETE CBC W/AUTO DIFF WBC: CPT | Performed by: HOSPITALIST

## 2024-10-14 NOTE — CM/SW NOTE
Pt discussed in rounds and chart was reviewed. Pt had chest tube removed on 10/12/24.    No anticipated DC needs identified at this time. Will monitor for any changes in needs.      to remain available for support and/or discharge planning.    DEEPALI Eddy  Discharge Planner  [phone]

## 2024-10-14 NOTE — PLAN OF CARE
Assumed patient care at 1930 Family at bedside.  Patient is resting in bed. Alert and oriented x 4.  Denies any complaints of pain or other discomfort at this time.  Previous CT site  with CDI dressing.  Vital signs are stable  Plan of care updated with patient for evening medications, IV antibiotic, fall prevention measures.  Problem: Patient/Family Goals  Goal: Patient/Family Long Term Goal  Description: Patient's Long Term Goal: stay healthy  10/7: Stay out of the hospital when discharged    Interventions:  - Medication management  -Dietary management  -Prompt follow up with physician  10/7:   -Attend my follow up appointments with Mds   -Follow my medication regimen at home  - See additional Care Plan goals for specific interventions  Outcome: Progressing  Goal: Patient/Family Short Term Goal  Description: Patient's Short Term Goal: Discharge home    Interventions:   - Pulmonology consult  -IV antbiotics  -2D echo   10/7:  -IV abx  -Tele monitoring  -Monitor labs  -R sided thoracentesis 10/7  -Ambulate as tolerated  10/9:   -Tele monitoring  -Monitor labs  -Monitor hemoglobin  -Monitor thoracentesis site s/p right sided thoracentesis   -Ambulate as tolerated  -IV abx  - See additional Care Plan goals for specific interventions  Outcome: Progressing     Problem: RESPIRATORY - ADULT  Ambulating to bathroom. Remains on room air.  Goal: Achieves optimal ventilation and oxygenation  Continuous pulse oximetry in progress  Oxygen saturation levels 94-96%  Denies shortness of breath with activity to bathroom  Plan for CXR today  Continue IV antibiotics.  Description: INTERVENTIONS:  - Assess for changes in respiratory status  - Assess for changes in mentation and behavior  - Position to facilitate oxygenation and minimize respiratory effort  - Oxygen supplementation based on oxygen saturation or ABGs  - Provide Smoking Cessation handout, if applicable  - Encourage broncho-pulmonary hygiene including cough, deep breathe,  Incentive Spirometry  - Assess the need for suctioning and perform as needed  - Assess and instruct to report SOB or any respiratory difficulty  - Respiratory Therapy support as indicated  - Manage/alleviate anxiety  - Monitor for signs/symptoms of CO2 retention  Outcome: Progressing     Problem: CARDIOVASCULAR - ADULT  Vital signs are stable  Goal: Maintains optimal cardiac output and hemodynamic stability  Description: INTERVENTIONS:  - Monitor vital signs, rhythm, and trends  - Monitor for bleeding, hypotension and signs of decreased cardiac output  - Evaluate effectiveness of vasoactive medications to optimize hemodynamic stability  - Monitor arterial and/or venous puncture sites for bleeding and/or hematoma  - Assess quality of pulses, skin color and temperature  - Assess for signs of decreased coronary artery perfusion - ex. Angina  - Evaluate fluid balance, assess for edema, trend weights  Outcome: Progressing  Goal: Absence of cardiac arrhythmias or at baseline  Description: INTERVENTIONS:  - Continuous cardiac monitoring, monitor vital signs, obtain 12 lead EKG if indicated  - Evaluate effectiveness of antiarrhythmic and heart rate control medications as ordered  - Initiate emergency measures for life threatening arrhythmias  - Monitor electrolytes and administer replacement therapy as ordered  Outcome: Progressing     Problem: HEMATOLOGIC - ADULT  Goal: Free from bleeding injury  Description: (Example usage: patient with low platelets)  INTERVENTIONS:  - Avoid intramuscular injections, enemas and rectal medication administration  - Ensure safe mobilization of patient  - Hold pressure on venipuncture sites to achieve adequate hemostasis  - Assess for signs and symptoms of internal bleeding  - Monitor lab trends  - Patient is to report abnormal signs of bleeding to staff  - Avoid use of toothpicks and dental floss  - Use electric shaver for shaving  - Use soft bristle tooth brush  - Limit straining and  forceful nose blowing  Outcome: Progressing

## 2024-10-14 NOTE — DISCHARGE SUMMARY
General Medicine Discharge Summary     Patient ID:  Guy White  73 year old  12/26/1950    Admit date: 10/2/2024    Discharge date and time: 10/15/2024    Attending Physician: Iveth Hurley MD     Primary Care Physician: Salvador Cohn MD     Reason for admission: see HPI    Discharge Diagnoses: Exertional chest pain [R07.9]  Community acquired pneumonia of right lung, unspecified part of lung [J18.9]  Acute on chronic congestive heart failure, unspecified heart failure type (HCC) [I50.9]    Discharged Condition: good    Exam: (see progress notes for full details)  No acute distress, alert and oriented    Hospital Course: Patient is a 73 year old male with PMH sig for HTN, HLD, MDS on chemotherapy, here for chest pain /sob     Symptoms ongoing for 10 days but worse in the past 2 days. R sided chest wall pain, worse with movement / activity, alos with exertional sob. He initially thought was because of anemia (gets frequent transfsuions given his MDS) but hgb on admission in 8s. No bleeding reported  He deneid any recent illness but does report new cough. No fevers/ chills. No n//v. No abd pain. No left sided sampson pain         #R sided pleural effusion / suspect pna  #R chest pain / sob  -symptoms lkely due to R sided cxr findings. Less likely cardiac etiology but will trend trop and EKG -neg  -echo as above, no large pericardial effusion  Sp thoracentesis 10/3 1.5L removed. Cx ngtd. Repeat thora 10/7, 300 cc removed.   -Moderate right sided effusion remains on CXR post-thora. Repeat CT demonstrating moderate R pleural effusion and increased cavitation in RML.    -Pulm following  -iv abx, Completed course of azithro. Rocephin (10/2-). Zosyn (10/8-).   0chest tube removed, dc planning, still on zosyn, dc abx per pulm  -Getting repeat chest x-ray today, awaiting pulmonology clearance for discharge and antibiotics on discharge  -DC on oral antibiotics, follow with pulmonology as an outpatient     #HTN  -on  bb, cont     #HLD  -statin     #MDS  -heme eval  -transfused 2 unit thus far   -post transfusion H and H 8.4, appropriate response  -Stable hemoglobin this morning okay to discharge from the standpoint  Check CBC on Thursday, transfusion as needed-with hematologist oncologist     #CKDII!  -Trend renal function  -stable at this time      Consults: IP CONSULT TO PULMONOLOGY  IP CONSULT TO ONCOLOGY    Operative Procedures:      Disposition: home    Patient Instructions:   Current Discharge Medication List        CONTINUE these medications which have NOT CHANGED    Details   prochlorperazine (COMPAZINE) 10 mg tablet Take 1 tablet (10 mg total) by mouth every 6 (six) hours as needed.      metoprolol succinate ER 50 MG Oral Tablet 24 Hr Take 1 tablet (50 mg total) by mouth daily.      allopurinol 100 MG Oral Tab Take 1 tablet (100 mg total) by mouth daily.      Multiple Vitamin (MULTI VITAMIN MENS) Oral Tab Take 1 tablet by mouth daily.           STOP taking these medications       ergocalciferol 1.25 MG (75697 UT) Oral Cap        atorvastatin 10 MG Oral Tab                I reconciled current and discharge medications on day of discharge    Follow-up with PCP, cardiology, pulmonology as well as hematology oncology    No orders of the defined types were placed in this encounter.      Follow-up with labs: CBC    Total Time Coordinating Care: Greater than 30 minutes    Patient had opportunity to ask questions and state understand and agree with therapeutic plan as outlined above.     Thank You,    Iveth gonzalez MD

## 2024-10-14 NOTE — CDS QUERY
Dear Doctor Xavi,   Could you please further define rise in creatinine levels  (   x) Acute Kidney Injury   (   )Rise in Creatinine level without clinical significance  (   ) Other: ______________________________________________________________    73 year old male with PMH sig for HTN, HLD, MDS on chemotherapy, here for chest pain /sob     Possible Risk Factors/Clinical Indicators:   Cre on admit 1.41 improved to 1.14 on 10/9/24 and increased to 1.70 in 48 hour period which more than 0.3 rise in 24 hours  Treatment: IVF, monitor labs    For questions regarding this query, please contact Clinical Documentation : Yamilka Lacey RN ext [phone] [email or web address]  THIS FORM IS A PERMANENT PART OF THE MEDICAL RECORD

## 2024-10-14 NOTE — PLAN OF CARE
Assumed care at 0730; Pt A/o x 4, stable VS and no complaints of pain. Saturating well on RA; Plan of care discussed with patient. Educated on fall risk and use of call light. Belongings and call light within reach. Bed and chair alarm activated; Bed at lowest position and locked.     Problem: Patient/Family Goals  Goal: Patient/Family Long Term Goal  Description: Patient's Long Term Goal: stay healthy  10/14: Stay out of the hospital when discharged    Interventions:  - Medication management  -Dietary management  -Prompt follow up with physician  -Attend my follow up appointments with Mds   -Follow my medication regimen at home  - See additional Care Plan goals for specific interventions  Outcome: Progressing  Goal: Patient/Family Short Term Goal  Description: Patient's Short Term Goal: Discharge home    Interventions:   - Pulmonology consult  -Tele monitoring  -Monitor labs  -Monitor hemoglobin  -Monitor thoracentesis site s/p right sided thoracentesis   -Ambulate as tolerated  -IV abx  - See additional Care Plan goals for specific interventions  Outcome: Progressing     Problem: RESPIRATORY - ADULT  Goal: Achieves optimal ventilation and oxygenation  Description: INTERVENTIONS:  - Assess for changes in respiratory status  - Assess for changes in mentation and behavior  - Position to facilitate oxygenation and minimize respiratory effort  - Oxygen supplementation based on oxygen saturation or ABGs  - Provide Smoking Cessation handout, if applicable  - Encourage broncho-pulmonary hygiene including cough, deep breathe, Incentive Spirometry  - Assess the need for suctioning and perform as needed  - Assess and instruct to report SOB or any respiratory difficulty  - Respiratory Therapy support as indicated  - Manage/alleviate anxiety  - Monitor for signs/symptoms of CO2 retention  Outcome: Progressing     Problem: CARDIOVASCULAR - ADULT  Goal: Maintains optimal cardiac output and hemodynamic stability  Description:  INTERVENTIONS:  - Monitor vital signs, rhythm, and trends  - Monitor for bleeding, hypotension and signs of decreased cardiac output  - Evaluate effectiveness of vasoactive medications to optimize hemodynamic stability  - Monitor arterial and/or venous puncture sites for bleeding and/or hematoma  - Assess quality of pulses, skin color and temperature  - Assess for signs of decreased coronary artery perfusion - ex. Angina  - Evaluate fluid balance, assess for edema, trend weights  Outcome: Progressing  Goal: Absence of cardiac arrhythmias or at baseline  Description: INTERVENTIONS:  - Continuous cardiac monitoring, monitor vital signs, obtain 12 lead EKG if indicated  - Evaluate effectiveness of antiarrhythmic and heart rate control medications as ordered  - Initiate emergency measures for life threatening arrhythmias  - Monitor electrolytes and administer replacement therapy as ordered  Outcome: Progressing     Problem: HEMATOLOGIC - ADULT  Goal: Free from bleeding injury  Description: (Example usage: patient with low platelets)  INTERVENTIONS:  - Avoid intramuscular injections, enemas and rectal medication administration  - Ensure safe mobilization of patient  - Hold pressure on venipuncture sites to achieve adequate hemostasis  - Assess for signs and symptoms of internal bleeding  - Monitor lab trends  - Patient is to report abnormal signs of bleeding to staff  - Avoid use of toothpicks and dental floss  - Use electric shaver for shaving  - Use soft bristle tooth brush  - Limit straining and forceful nose blowing  Outcome: Progressing

## 2024-10-14 NOTE — PROGRESS NOTES
[facility]    Guy White Patient Status:  Inpatient    1950 MRN [id number]   Location [facility] 2NE-A Attending Iveth Hurley MD   Hosp Day # 12 PCP Salvador Cohn MD     SUBJECTIVE: c/o R sided knee pain but otherwise doing OK     OBJECTIVE:  /67 (BP Location: Right arm)   Pulse 69   Temp 98.7 °F (37.1 °C) (Oral)   Resp 18   Ht 182.9 cm (6')   Wt 151 lb 4.8 oz (68.6 kg)   SpO2 100%   BMI 20.52 kg/m²   O2 requirement: on room air     I/O last 3 completed shifts:  In: 973.3 [P.O.:720; Blood:253.3]  Out: -   I/O this shift:  In: 240 [P.O.:240]  Out: 1 [Stool:1]     Current Medications:   Current Facility-Administered Medications:     piperacillin-tazobactam (Zosyn) 3.375 g in dextrose 5% 100 mL IVPB-ADDV, 3.375 g, Intravenous, Q8H    enoxaparin (Lovenox) 40 MG/0.4ML SUBQ injection 40 mg, 40 mg, Subcutaneous, Nightly    HYDROcodone-acetaminophen (Norco) 5-325 MG per tab 2 tablet, 2 tablet, Oral, Q4H PRN    HYDROcodone-acetaminophen (Norco) 5-325 MG per tab 1 tablet, 1 tablet, Oral, Q6H PRN    allopurinol (Zyloprim) tab 100 mg, 100 mg, Oral, Daily    atorvastatin (Lipitor) tab 10 mg, 10 mg, Oral, After dinner    metoprolol succinate ER (Toprol XL) 24 hr tab 50 mg, 50 mg, Oral, Daily Beta Blocker    acetaminophen (Tylenol Extra Strength) tab 500 mg, 500 mg, Oral, Q4H PRN    lidocaine-menthol 4-1 % patch 1 patch, 1 patch, Transdermal, Daily     General appearance: alert, appears stated age, and cooperative  Lungs: clear to auscultation bilaterally  Heart: S1, S2 normal, no murmur, click, rub or gallop, regular rate and rhythm  Abdomen: soft, non-tender; bowel sounds normal; no masses,  no organomegaly  Extremities: extremities normal, atraumatic, no cyanosis or edema, R knee erythematous, tender     Lab Results   Component Value Date    WBC 4.6 10/14/2024    RBC 2.86 10/14/2024    HGB 8.4 10/14/2024    HCT 25.9 10/14/2024    MCV 90.6 10/14/2024    MCH 29.4 10/14/2024    MCHC  32.4 10/14/2024    RDW 14.7 10/14/2024    .0 10/14/2024     Lab Results   Component Value Date     10/14/2024    K 4.4 10/14/2024     10/14/2024    CO2 25.0 10/14/2024    BUN 27 10/14/2024    CREATSERUM 1.24 10/14/2024    GLU 88 10/14/2024    CA 10.4 10/14/2024     Lab Results   Component Value Date    INR 1.15 10/09/2024    INR 1.22 (H) 10/07/2024    INR 1.37 (H) 10/03/2024          Imaging: CXR: personally reviewed. R small to mod effusion noted. L lung clear     ASSESSMENT/PLAN:   Dyspnea - secondary to R pleural effusion +/- pneumonia    -continue antibiotics   -bronchial hygiene  -on room air.   Loculated right pleural effusion - thoracentesis was done 10/3 (1500cc fluid removed), 10/7 (300cc removed), followed by right sided chest tube insertion 10/9. Pleural fluid is exudative with benign cytology. Cultures NGTD.  -repeat CT shows significant improvement   -chest tube removed 10/12  -antibiotics as below  Pneumonia - with right middle lobe cavitary lesion  -patient was initially on ceftriaxone (10/2-10/8 ), changed to zosyn (10/8- ) given worsening cavitation.   -completed course of azithromycin  -follow up cultures  MDS  -per [facility]  Proph  -LM[facility]  Dispo -full code  -discharge planning.     Renan Pemberton MD  10/14/2024  4:16 PM

## 2024-10-15 ENCOUNTER — APPOINTMENT (OUTPATIENT)
Dept: GENERAL RADIOLOGY | Facility: HOSPITAL | Age: 74
End: 2024-10-15
Attending: INTERNAL MEDICINE
Payer: MEDICARE

## 2024-10-15 VITALS
TEMPERATURE: 97 F | SYSTOLIC BLOOD PRESSURE: 123 MMHG | OXYGEN SATURATION: 96 % | BODY MASS INDEX: 19.94 KG/M2 | DIASTOLIC BLOOD PRESSURE: 60 MMHG | HEART RATE: 66 BPM | WEIGHT: 147.25 LBS | RESPIRATION RATE: 18 BRPM | HEIGHT: 72 IN

## 2024-10-15 LAB
ANION GAP SERPL CALC-SCNC: 2 MMOL/L (ref 0–18)
BASOPHILS # BLD AUTO: 0.03 X10(3) UL (ref 0–0.2)
BASOPHILS NFR BLD AUTO: 0.8 %
BUN BLD-MCNC: 26 MG/DL (ref 9–23)
CALCIUM BLD-MCNC: 10.1 MG/DL (ref 8.7–10.4)
CHLORIDE SERPL-SCNC: 109 MMOL/L (ref 98–112)
CO2 SERPL-SCNC: 26 MMOL/L (ref 21–32)
CREAT BLD-MCNC: 1.3 MG/DL
EGFRCR SERPLBLD CKD-EPI 2021: 58 ML/MIN/1.73M2 (ref 60–?)
EOSINOPHIL # BLD AUTO: 0.11 X10(3) UL (ref 0–0.7)
EOSINOPHIL NFR BLD AUTO: 2.9 %
ERYTHROCYTE [DISTWIDTH] IN BLOOD BY AUTOMATED COUNT: 14.7 %
GLUCOSE BLD-MCNC: 102 MG/DL (ref 70–99)
HCT VFR BLD AUTO: 24 %
HGB BLD-MCNC: 7.7 G/DL
IMM GRANULOCYTES # BLD AUTO: 0.02 X10(3) UL (ref 0–1)
IMM GRANULOCYTES NFR BLD: 0.5 %
LYMPHOCYTES # BLD AUTO: 2.14 X10(3) UL (ref 1–4)
LYMPHOCYTES NFR BLD AUTO: 56 %
MCH RBC QN AUTO: 29.2 PG (ref 26–34)
MCHC RBC AUTO-ENTMCNC: 32.1 G/DL (ref 31–37)
MCV RBC AUTO: 90.9 FL
MONOCYTES # BLD AUTO: 0.57 X10(3) UL (ref 0.1–1)
MONOCYTES NFR BLD AUTO: 14.9 %
NEUTROPHILS # BLD AUTO: 0.95 X10 (3) UL (ref 1.5–7.7)
NEUTROPHILS # BLD AUTO: 0.95 X10(3) UL (ref 1.5–7.7)
NEUTROPHILS NFR BLD AUTO: 24.9 %
OSMOLALITY SERPL CALC.SUM OF ELEC: 289 MOSM/KG (ref 275–295)
PLATELET # BLD AUTO: 187 10(3)UL (ref 150–450)
PLATELETS.RETICULATED NFR BLD AUTO: 37.1 % (ref 0–7)
POTASSIUM SERPL-SCNC: 4.5 MMOL/L (ref 3.5–5.1)
RBC # BLD AUTO: 2.64 X10(6)UL
SODIUM SERPL-SCNC: 137 MMOL/L (ref 136–145)
WBC # BLD AUTO: 3.8 X10(3) UL (ref 4–11)

## 2024-10-15 PROCEDURE — 85025 COMPLETE CBC W/AUTO DIFF WBC: CPT | Performed by: HOSPITALIST

## 2024-10-15 PROCEDURE — 80048 BASIC METABOLIC PNL TOTAL CA: CPT | Performed by: HOSPITALIST

## 2024-10-15 PROCEDURE — 97162 PT EVAL MOD COMPLEX 30 MIN: CPT

## 2024-10-15 PROCEDURE — 97116 GAIT TRAINING THERAPY: CPT

## 2024-10-15 NOTE — PHYSICAL THERAPY NOTE
PHYSICAL THERAPY EVALUATION - INPATIENT     Room Number: 2621/2621-A  Evaluation Date: 10/15/2024  Type of Evaluation: Initial  Physician Order: PT Eval and Treat    Presenting Problem: Exertional CP, CAP of R lung  Co-Morbidities : CHF,CAD, MDS CA  Reason for Therapy: Mobility Dysfunction and Discharge Planning    PHYSICAL THERAPY ASSESSMENT   Patient is a 73 year old male admitted 10/2/2024 for CAP on R lung, CHF.  Patient is currently functioning at baseline with bed mobility, transfers, gait, stair negotiation, and standing prolonged periods. Prior to admission, patient's baseline is ind in his mobilities s any use of an A.D. at home. Has good family support at home if needed from family    Patient will benefit from continued skilled PT Services with no additional skilled services and has goof family support as needed    PLAN  Patient has been evaluated and presents with no skilled Physical Therapy needs at this time.  Patient discharged from Physical Therapy services.  Please re-order if a new functional limitation presents during this admission.    PT Device Recommendation: Rolling walker    GOALS  Patient was able to achieve the following goals ...    Patient was able to transfer At previous, functional level  Safely and independently   Patient able to ambulate on level surfaces At previous, functional level  Safely and independently     HOME SITUATION  Type of Home: House  Home Layout: Two level  Stairs to Enter : 1        Stairs to Bedroom: 7         Lives With: Spouse    Drives: Yes         Prior Level of Gravel Switch: Prior to admission, patient's baseline is ind in his mobilities s any use of an A.D. at home. Has good family support at home if needed from family      SUBJECTIVE  I am trying to do my wife schedule for her appointments. I have goof family support at home    OBJECTIVE     Fall Risk: High fall risk    WEIGHT BEARING RESTRICTION     PAIN ASSESSMENT  Ratin          COGNITION  Overall  Cognitive Status:  WFL - within functional limits    RANGE OF MOTION AND STRENGTH ASSESSMENT  Upper extremity ROM and strength are within functional limits     Lower extremity ROM is within functional limits     Lower extremity strength is within functional limits     BALANCE  Static Sitting: Good  Dynamic Sitting: Good  Static Standing: Good  Dynamic Standing: Fair +    ACTIVITY TOLERANCE; Good     AM-PAC '6-Clicks' INPATIENT SHORT FORM - BASIC MOBILITY  How much difficulty does the patient currently have...  Patient Difficulty: Turning over in bed (including adjusting bedclothes, sheets and blankets)?: None   Patient Difficulty: Sitting down on and standing up from a chair with arms (e.g., wheelchair, bedside commode, etc.): None   Patient Difficulty: Moving from lying on back to sitting on the side of the bed?: None   How much help from another person does the patient currently need...   Help from Another: Moving to and from a bed to a chair (including a wheelchair)?: None   Help from Another: Need to walk in hospital room?: None   Help from Another: Climbing 3-5 steps with a railing?: None       AM-PAC Score:  Raw Score: 24   Approx Degree of Impairment: 0%   Standardized Score (AM-PAC Scale): 61.14   CMS Modifier (G-Code): CH    FUNCTIONAL ABILITY STATUS  Gait Assessment   Functional Mobility/Gait Assessment  Gait Assistance: Supervision  Distance (ft): 400  Assistive Device: None  Pattern: Within Functional Limits    Skilled Therapy Provided     Bed Mobility:  Rolling: mod I  Supine to sit: mod I   Sit to supine: mod I     Transfer Mobility:  Sit to stand: mod I   Stand to sit: mod I  Gait = s any use of an A.D. noted with slight abnormality likely due to arthritic deformity on B LE but noted with increase stability with distance and s any LOB    Therapist's comments:  Mobilities as above  Stated that he has not been up and walking as much since admission  However discussed with pt on the importance of  mobilities while in hosp with staff with good safety awareness  Instructed to walk several times while in hosp to avoid further debilitating effects of immobility  No SOB noted with ambulation  Nursing is aware of his visit  D/c as plan    Exercise/Education Provided:  Functional activity tolerated  Gait training    Patient End of Session: Needs met;Call light within reach;RN aware of session/findings;All patient questions and concerns addressed;In bed    Patient Evaluation Complexity Level:  History Moderate - 1 or 2 personal factors and/or co-morbidities   Examination of body systems Low -  addressing 1-2 elements   Clinical Presentation Low- Stable   Clinical Decision Making Low Complexity       PT Session Time: 20 minutes  Gait Training: 15 minutes  Therapeutic Activity: 5 minutes

## 2024-10-15 NOTE — DISCHARGE INSTRUCTIONS
Food Pantry by: [facility]  Next Steps:    Go to the nearest location to get services.   About: [facility]'s food pantry provides nutritious food items including fresh produce, high-protein food, dairy products, frozen meats, whole grain items and healthy non-perishables such as beans and vegetables. Personal-care items and cleaning products are also distributed when available.    This program provides:    - Food to meet basic nutritional needs  - Personal care items    Anyone in need of food from [facility] can come for food. You will have to register as a [facility] client during food pantry hours. No ID is necessary.  Eligibility: Anyone can access this program.  Nearest location: 6.03 miles away.  [facility]  [address] [place] [address]  Hours:  Tuesday:09:00 AM - 11:00 AM  Wednesday:05:00 PM - 07:30 PM  Thursday:09:00 AM - 11:00 AM  Saturday:08:30 AM - 11:30 AM  Food Pantry by: [facility]  Next Steps:    Go to the nearest location to get services.   About: The [facility] Food Pantry supports residents in the [facility] that do not have access to enough food for themselves or their family.    This program provides:    - Food to meet basic nutritional needs    Clients can check in during Pantry hours and shop for themselves from their frozen goods and shelves of non-perishable products. They typically have a variety of pastas, grains, canned vegetables, beans, and other canned, bagged, and dry goods. Those in need of food may visit the Pantry one time each week. You do not need to call ahead before shopping.  Eligibility: This program serves residents of [place], [place].  Nearest location: 3.99 miles away.  [facility]'s Office  [address] [place] [address]   [phone]   Note: [facility] Food Pantry entrance located on the east side of the Supervisor’s office. Closed from 11:30am-2:00pm.  Hours:  Monday:08:30 AM - 04:00  PM  Wednesday:08:30 AM - 04:00 PM  Friday:08:30 AM - 04:00 PM  Food Pantry by: [facility] Food Pantry  Next Steps:    Call [phone] to get more info.      Go to the nearest location to get services.   About: [facility] Food Pantry serves residents of [facility] in need of food assistance. All clients in these service areas are able to receive food once every thirty days unless there is an emergency situation. We provide healthy food options in a dignified and respectful manner through our Community Food & Nutrition Program. Home deliveries are available to the elderly and shut-in who qualify.    Services provided:    - Food pantry  - Home delivery    Required documents:    - ID ('s license, State ID, passport, or consular ID card)  - Current utility bill/lease in your name    If you are unable to  your food, you may have someone  your food on your behalf with a signed proxy form. Please call ahead to inform us of your need and instructions will be given for you and the person picking up your food. A signed proxy form is needed each time a food  is requested.   Eligibility: Household's gross monthly income must be within the guidelines established by the [place] Department of Human Services for their family size. Elderly and/or homebound persons may qualify for home delivery of food.  Nearest location: 4.26 miles away.  [facility] Food Pantry  [address] [place] [address]   Note: The food pantry is open on the 2nd Saturday of each month.  Hours:  Saturday:09:00 AM - 12:00 PM  Food Assistance by: [facility]  Next Steps:    Call [phone] to get more info.      Go to [email or web address] to apply.   About: [facility] strives to provide food for a well-balanced and healthy diet. All clients receive eggs, milk, cheese, produce, frozen meat and non-perishable food items, and can choose from a variety of bread,  bakery items and condiments.    This program provides:    - Food for basic nutrition  - Personal care items  - Paper goods and cleaning supplies, when available    New clients must download the Client Registration Form on the website and bring on the first visit.    Client services are provided to eligible households twice within a calendar month. When you arrive for distribution, you might see a line of cars. Follow the line around to the food distribution point. Please wait in your car. Someone will come to your car and assist you. Please bring several bags or boxes for the food.  Eligibility: Must meet income guidelines.  Nearest location: 5.86 miles away.  [place] [address] [facility]  [address] [place] [address]   [phone]  Hours:  Tuesday:08:30 AM - 11:30 AM  Thursday:05:30 PM - 07:30 PM  Saturday:08:30 AM - 11:30 AM  Grocery Assistance by: [employer]  Next Steps:    Apply on their website, [email or web address]      Go to the nearest location to get services.   About: [employer] provides healthy food and grocery assistance to individuals and families living on low incomes and who are in need of assistance.    This program provides:    - Groceries to meet basic nutritional needs    To qualify for food assistance, a household’s gross monthly income must be within the guidelines. Food is offered once a week to all registered households.    [employer] is providing online registration as a convenience to visitors. After filling out the online form, a card will be emailed to you to make client check-in quick and easy every time you come. To receive a permanent card and register for any additional programs, please plan to visit [employer] and bring in your picture ID and proof of residency.  Eligibility: Serving [facility] [facility] counties. Must meet income  guidelines.  Nearest location: 1.9 miles away.  [address] [address]   [phone]   Note: Closed Thursdays from 3 p.m. to 5 p.m.  Hours:  Tuesday:09:00 AM - 03:00 PM  Wednesday:09:00 AM - 03:00 PM  Thursday:09:00 AM - 07:00 PM  Saturday:08:00 AM - 12:00 PM  Neighborhood Food Pantries by: [facility]  Next Steps:    Call [phone] to get more info.   About: Neighborhood Food Pantries provide basic food items for those who could not otherwise afford to buy them, as well as monetary assistance for housing (rent/mortgage/short-term shelter), utilities, prescription medications and other medical related needs, and transportation.    This program provides:    - Food pantries    Services included:    - Help paying rent or housing  - Help paying utilities  - Help getting transportation  - Medical assistance   Nearest location: 2.57 miles away.  [facility])  [address] [place] [address]   [phone]  Hours:  Monday:05:00 PM - 08:00 PM  Wednesday:12:00 PM - 02:30 PM  Friday:12:00 PM - 02:30 PM        Please follow up at an outpatient lab for a repeat CBC

## 2024-10-15 NOTE — CM/SW NOTE
10/15/24 1200   CM/SW Referral Data   Referral Source Social Work (self-referral)   Reason for Referral Discharge planning   Informant EMR;Clinical Staff Member;Patient   Medical Hx   Does patient have an established PCP? Yes   Patient Info   Patient's Current Mental Status at Time of Assessment Alert;Oriented   Patient's Home Environment House   Number of Levels in Home 3   Patient lives with Spouse/Significant other;Daughter;Grandchild  (daughter's fiance)   Patient Status Prior to Admission   Independent with ADLs and Mobility Yes   Discharge Needs   Anticipated D/C needs To be determined     Patient is a73 y/o male admitted due to exertional chest pain. SW was notified by nutritionist that pt expressed some food insecurities.     SW met with pt at bedside to discuss DC plan and food insecurities. Pt reports he lived in a 3 level house with his wife, daughter, granddaughter and son-in-law. Pt denied personally owning any DME, but did report his wife has a walker. Pt stated he is typically independent with ADLs and mobility. Pt reports still driving and owning a car.    SW inquired about food insecurity concerns. Pt stated his only income is from his social security. Pt stated his daughter is on food stamps now, but did express some concern with having enough food at home. SW discussed food pantry resources and Meals on Wheels. Pt agreeable with SW adding food pantry resources to his AVS. However, pt denied Meals on Wheel as he reports he has tried doing it in the past but they were unable to set him up with it.     SW confirmed she will add resources to his AVS. Pt thanked EMILY and denied having any further questions at this time.     to remain available for support and/or discharge planning.    Kandy Jarquin [facility]  Discharge Planner  [phone]

## 2024-10-15 NOTE — PROGRESS NOTES
[facility]    Guy White Patient Status:  Inpatient    1950 MRN [id number]   Location [facility] 2NE-A Attending Iveth Hurley MD   Hosp Day # 13 PCP Salvador Cohn MD     SUBJECTIVE:overall doing better. Knee pain resolved     OBJECTIVE:  /54 (BP Location: Right arm)   Pulse 65   Temp 98.8 °F (37.1 °C) (Oral)   Resp 16   Ht 182.9 cm (6')   Wt 147 lb 4.3 oz (66.8 kg)   SpO2 96%   BMI 19.97 kg/m²   O2 requirement: on room air     I/O last 3 completed shifts:  In: 720 [P.O.:720]  Out: 1 [Stool:1]  No intake/output data recorded.     Current Medications:   Current Facility-Administered Medications:     piperacillin-tazobactam (Zosyn) 3.375 g in dextrose 5% 100 mL IVPB-ADDV, 3.375 g, Intravenous, Q8H    enoxaparin (Lovenox) 40 MG/0.4ML SUBQ injection 40 mg, 40 mg, Subcutaneous, Nightly    HYDROcodone-acetaminophen (Norco) 5-325 MG per tab 2 tablet, 2 tablet, Oral, Q4H PRN    HYDROcodone-acetaminophen (Norco) 5-325 MG per tab 1 tablet, 1 tablet, Oral, Q6H PRN    allopurinol (Zyloprim) tab 100 mg, 100 mg, Oral, Daily    atorvastatin (Lipitor) tab 10 mg, 10 mg, Oral, After dinner    metoprolol succinate ER (Toprol XL) 24 hr tab 50 mg, 50 mg, Oral, Daily Beta Blocker    acetaminophen (Tylenol Extra Strength) tab 500 mg, 500 mg, Oral, Q4H PRN    lidocaine-menthol 4-1 % patch 1 patch, 1 patch, Transdermal, Daily     General appearance: alert, appears stated age, and cooperative  Lungs: clear to auscultation bilaterally  Heart: regular rate and rhythm  Abdomen: soft, non-tender; bowel sounds normal; no masses,  no organomegaly  Extremities: extremities normal, atraumatic, no cyanosis or edema     Lab Results   Component Value Date    WBC 3.8 10/15/2024    RBC 2.64 10/15/2024    HGB 7.7 10/15/2024    HCT 24.0 10/15/2024    MCV 90.9 10/15/2024    MCH 29.2 10/15/2024    MCHC 32.1 10/15/2024    RDW 14.7 10/15/2024    .0 10/15/2024     Lab Results   Component Value Date      10/15/2024    K 4.5 10/15/2024     10/15/2024    CO2 26.0 10/15/2024    BUN 26 10/15/2024    CREATSERUM 1.30 10/15/2024     10/15/2024    CA 10.1 10/15/2024     Lab Results   Component Value Date    INR 1.15 10/09/2024    INR 1.22 (H) 10/07/2024    INR 1.37 (H) 10/03/2024          Imaging: reviewed. Per EMR     ASSESSMENT/PLAN:   Dyspnea - secondary to R pleural effusion +/- pneumonia  - improved  -continue antibiotics   -bronchial hygiene  -on room air.   Loculated right pleural effusion - thoracentesis was done 10/3 (1500cc fluid removed), 10/7 (300cc removed), followed by right sided chest tube insertion 10/9. Pleural fluid is exudative with benign cytology. Cultures NGTD.  -repeat CT shows significant improvement   -chest tube removed 10/12  -antibiotics as below  Pneumonia - with right middle lobe cavitary lesion  -patient was initially on ceftriaxone (10/2-10/8 ), changed to zosyn (10/8- ) given worsening cavitation.  Has been on abx for two weeks now.  Will dc with one more week of augmentin  -completed course of azithromycin  -follow up cultures  MDS  -per [facility]  Proph  -LMWH  Dispo -full code  -discharge planning.   - can f/u with Dr. Tellez IV in 1-2 weeks    Renan Pemberton MD  10/15/2024  11:17 AM

## 2024-10-15 NOTE — PLAN OF CARE
Assumed care of patient at 0700  Denies pain  Patient worked with PT, tolerated stairs  Patient clear to discharge from all standpoints  Discharge instructions discussed at the bedside. All questions answered and patient verbalized understanding  Patient to be discharged by wheelchair with all belongings to be taken home by daughter                Problem: Patient/Family Goals  Goal: Patient/Family Long Term Goal  Description: Patient's Long Term Goal: stay healthy  10/7: Stay out of the hospital when discharged    Interventions:  - Medication management  -Dietary management  -Prompt follow up with physician  10/7:   -Attend my follow up appointments with Mds   -Follow my medication regimen at home  - See additional Care Plan goals for specific interventions  Outcome: Adequate for Discharge  Goal: Patient/Family Short Term Goal  Description: Patient's Short Term Goal: Discharge home    Interventions:   - Pulmonology consult  -IV antbiotics  -2D echo   10/7:  -IV abx  -Tele monitoring  -Monitor labs  -R sided thoracentesis 10/7  -Ambulate as tolerated  10/9:   -Tele monitoring  -Monitor labs  -Monitor hemoglobin  -Monitor thoracentesis site s/p right sided thoracentesis   -Ambulate as tolerated  -IV abx  - See additional Care Plan goals for specific interventions  Outcome: Adequate for Discharge     Problem: RESPIRATORY - ADULT  Goal: Achieves optimal ventilation and oxygenation  Description: INTERVENTIONS:  - Assess for changes in respiratory status  - Assess for changes in mentation and behavior  - Position to facilitate oxygenation and minimize respiratory effort  - Oxygen supplementation based on oxygen saturation or ABGs  - Provide Smoking Cessation handout, if applicable  - Encourage broncho-pulmonary hygiene including cough, deep breathe, Incentive Spirometry  - Assess the need for suctioning and perform as needed  - Assess and instruct to report SOB or any respiratory difficulty  - Respiratory Therapy support  as indicated  - Manage/alleviate anxiety  - Monitor for signs/symptoms of CO2 retention  Outcome: Adequate for Discharge     Problem: CARDIOVASCULAR - ADULT  Goal: Maintains optimal cardiac output and hemodynamic stability  Description: INTERVENTIONS:  - Monitor vital signs, rhythm, and trends  - Monitor for bleeding, hypotension and signs of decreased cardiac output  - Evaluate effectiveness of vasoactive medications to optimize hemodynamic stability  - Monitor arterial and/or venous puncture sites for bleeding and/or hematoma  - Assess quality of pulses, skin color and temperature  - Assess for signs of decreased coronary artery perfusion - ex. Angina  - Evaluate fluid balance, assess for edema, trend weights  Outcome: Adequate for Discharge  Goal: Absence of cardiac arrhythmias or at baseline  Description: INTERVENTIONS:  - Continuous cardiac monitoring, monitor vital signs, obtain 12 lead EKG if indicated  - Evaluate effectiveness of antiarrhythmic and heart rate control medications as ordered  - Initiate emergency measures for life threatening arrhythmias  - Monitor electrolytes and administer replacement therapy as ordered  Outcome: Adequate for Discharge     Problem: HEMATOLOGIC - ADULT  Goal: Free from bleeding injury  Description: (Example usage: patient with low platelets)  INTERVENTIONS:  - Avoid intramuscular injections, enemas and rectal medication administration  - Ensure safe mobilization of patient  - Hold pressure on venipuncture sites to achieve adequate hemostasis  - Assess for signs and symptoms of internal bleeding  - Monitor lab trends  - Patient is to report abnormal signs of bleeding to staff  - Avoid use of toothpicks and dental floss  - Use electric shaver for shaving  - Use soft bristle tooth brush  - Limit straining and forceful nose blowing  Outcome: Adequate for Discharge

## 2024-10-15 NOTE — DIETARY NOTE
[facility]   part of [facility]    NUTRITION ASSESSMENT      Pt meets moderate malnutrition criteria at this time.    CRITERIA FOR MALNUTRITION DIAGNOSIS:  Criteria for non-severe malnutrition diagnosis: social/environmental related to wt loss greater than 7.5% in 3 months and moderate fat/muscle wasting.       NUTRITION INTERVENTION:    RD nutrition Care Plan- See RD nutrition assessment for additional recommendations  Meal and Snacks - Monitor and encourage adequate PO intake. Regular diet.  Medical Food Supplements - Ensure Plus High Protein Daily; at breakfast (provides: 350 kcal, 20 gm protein).   Nutrition Education - Provided handouts on Tips for Increasing Calories and Protein and Soft Foods Nutrition Therapy w/ sample menus.   Coordination of Nutrition Care - Social work consult for nutrition assistance programs/discharge needs        PATIENT STATUS:     10/15- Chest tube removed on 10/12. Pt stated he has a great appetite. Denied any N/V/D/C. Recorded PO intakes:100% most meals. Consuming 100% of Ensure. Pt noted two barriers to his nutrition are poor dentition and some food insecurity. Had an appointment w/ an outpatient RD, but was cancelled d/t hospital admit. Provided handout on Tips for Increasing Calories and Protein and Soft Foods Nutrition Therapy. Provided Ensure samples and coupons. Notified  about pt's mention of food insecurity, who added food pantry resources in pt's AVS. Plans for discharge today.         10/9/24- 72 y/o male admitted w/ exertional chest pain. Pt chart reviewed d/t length of stay. Pt sound asleep at time of visit. Had R chest tube placed a few hours before visit. S/p R thoracentesis on 10/3 (1500 ml removed) and 10/7 (300 ml removed). Previous recorded PO intakes vary:% w/ 75% or greater most meals. Will add ONS daily to help maximize nutrition intakes.   PMH:former tobacco use, HTN, HLD, MDS on chemo, frequent transfusions.  Please see pt's Leti Artst message.    Will route to Dr. Heredia' office to advise.         ANTHROPOMETRICS:  Ht: 182.9 cm (6')  Wt: 66.8 kg (147 lb 4.3 oz).   BMI: Body mass index is 19.97 kg/m².  IBW: 81 kg      WEIGHT HISTORY:     -Per EMR hx, pt may have lost about 22 lb (12.6%) in about 2.5 months, which is significant wt loss per standards (175 lb down to 153 lb).     -Pt reported unintentional wt loss of ~30 lb (16.4%) in about 6 months, which is severe wt loss per standards.     Wt Readings from Last 10 Encounters:   10/15/24 66.8 kg (147 lb 4.3 oz)   09/27/24 72.5 kg (159 lb 12.8 oz)   09/09/24 72.6 kg (160 lb)   08/14/24 76.1 kg (167 lb 12.8 oz)   07/28/24 79.4 kg (175 lb)   07/18/24 78.7 kg (173 lb 8 oz)   06/18/24 77.1 kg (170 lb)   05/15/24 79.4 kg (175 lb 0.7 oz)   12/26/21 88.5 kg (195 lb)   08/30/21 87.1 kg (192 lb)        NUTRITION:  Diet:       Procedures    Regular/General diet Is Patient on Accuchecks? No      Food Allergies: No  Cultural/Ethnic/Jewish Preferences Addressed: Yes    Percent Meals Eaten (last 3 days)       Date/Time Percent Meals Eaten (%)    10/12/24 0900 100 %    10/12/24 1300 100 %    10/12/24 1700 100 %    10/13/24 1000 100 %    10/13/24 1100 100 %    10/13/24 1700 100 %    10/14/24 1753 100 %    10/14/24 1835 100 %    10/15/24 0900 75 %    10/15/24 1200 100 %          GI system review:  Poor dentition, top dentures.  Last BM Date: 10/12/24  Skin and wounds: no pressure ulcers per RN documentation.     NUTRITION RELATED PHYSICAL FINDINGS:     1. Body Fat/Muscle Mass: moderate depletion body fat Buccal fat pad and Midaxillary line and moderate muscle depletion Temple region      2. Fluid Accumulation: none per RN documentation    NUTRITION PRESCRIPTION:  66.8 kg-147 lb (10/15) Actual Body Weight  Calories: 8650-6904 calories/day (25-30 kcal/kg)  Protein:  grams protein/day (1.2-1.5 grams protein per kg)  Fluid: ~1 ml/kcal or per MD discretion    NUTRITION DIAGNOSIS/PROBLEM:  Malnutrition related to  social environmental causes (reported food insecurity,  poor dentition) and chronic illness  as evidenced by   wt loss greater than 7.5% in 3 months and moderate fat/muscle wasting.       MONITOR AND EVALUATE/NUTRITION GOALS:  PO intake of 75% of meals TID - Met, continues  PO intake of 75% of oral nutrition supplement/s - Met, continues  Weight stable within 1 to 2 lbs during admission - Ongoing      MEDICATIONS:  IV abx, Norco    LABS:  Glu:102, BUN:26, Cr:1.30, GFR:58    Pt is at Moderate nutrition risk    Sameera Hollingsworth MS, RD, LDN  Clinical Dietitian  Ext:[phone]

## 2024-10-15 NOTE — PROGRESS NOTES
CC: follow-up hospital admission sob    SUBJECTIVE:  Interval History:     Patient is doing well, currently on room air, hemoglobin appears to be stable at this time    Awaiting pulmonology clearance for discharge    OBJECTIVE:  Scheduled Meds:    piperacillin-tazobactam  3.375 g Intravenous Q8H    enoxaparin  40 mg Subcutaneous Nightly    allopurinol  100 mg Oral Daily    atorvastatin  10 mg Oral After dinner    metoprolol succinate ER  50 mg Oral Daily Beta Blocker    lidocaine-menthol  1 patch Transdermal Daily     Continuous Infusions:   PRN Meds:   HYDROcodone-acetaminophen    HYDROcodone-acetaminophen    acetaminophen    PHYSICAL EXAM  Vital signs: Temp:  [97.4 °F (36.3 °C)-98.8 °F (37.1 °C)] 97.4 °F (36.3 °C)  Pulse:  [65-73] 66  Resp:  [16-20] 18  BP: (116-156)/(54-74) 123/60  SpO2:  [94 %-100 %] 96 %      GENERAL - NAD, AAO  EYES- sclera anicteric,   HENT- NC/AT  NECK - no JVD  CV- RRR  RESP - decreased at bases, normal resp effort  ABDOMEN- soft, NT/ND   EXT- no LE edema   PSYCH - normal mentation/ normal affect    Data Review:   Labs:   Recent Labs   Lab 10/09/24  0827 10/10/24  0641 10/11/24  0545 10/12/24  0618 10/13/24  0711 10/13/24  1451 10/14/24  0637 10/15/24  0609   WBC  --    < > 5.1 3.5* 3.8*  --  4.6 3.8*   HGB  --    < > 8.0* 7.2* 6.6* 8.4* 8.4* 7.7*   MCV  --    < > 90.8 91.1 92.5  --  90.6 90.9   PLT  --    < > 156.0 143.0* 161.0  --  189.0 187.0   INR 1.15  --   --   --   --   --   --   --     < > = values in this interval not displayed.       Recent Labs   Lab 10/09/24  0706 10/10/24  0719 10/11/24  0559 10/12/24  0618 10/13/24  0711 10/14/24  0637 10/15/24  0609    134* 134* 134* 134* 138 137   K 4.2 4.3 4.7 4.5 4.4 4.4 4.5    104 102 104 106 108 109   CO2 24.0 24.0 26.0 26.0 25.0 25.0 26.0   BUN 24* 22 34* 34* 33* 27* 26*   CREATSERUM 1.14 1.23 1.70* 1.51* 1.34* 1.24 1.30   CA 9.3 9.4 9.7 10.1 9.8 10.4 10.1   MG 1.7 1.8 2.0 2.0  --   --   --    * 131* 139* 88 93 88  102*       Recent Labs   Lab 10/09/24  0706 10/10/24  0719 10/11/24  0559 10/12/24  0618   ALT 71* 58* 43 38   AST 34* 24 19 21   ALB 2.9* 3.0* 2.9* 2.7*       No results for input(s): \"PGLU\" in the last 168 hours.    ECHO    Conclusions:     1. Study data: Normal sinus, frequent PACs   2. Left ventricle: The cavity size was normal. Wall thickness was normal.      Systolic function was normal. The estimated ejection fraction was 60-65%,      by visual assessment. No diagnostic evidence for regional wall motion      abnormalities. Unable to assess LV diastolic function due to heart      rhythm.   3. Left atrium: The left atrial volume was mildly increased.   4. Aortic valve: Transvalvular velocity was minimally increased. There was      no evidence for stenosis. There was mild regurgitation. The peak systolic      velocity was 2.39m/sec. The mean systolic gradient was 9mm Hg. The valve      area (VTI) was 2.66cm^2. The valve area (VTI) index was 1.39cm^2/m^2.   5. Mitral valve: There was mild regurgitation.   6. Pulmonary arteries: Systolic pressure was moderately increased, in the      range of 50mm Hg to 55mm Hg.   7. Pericardium, extracardiac: A trivial pericardial effusion was identified.      There was no evidence of hemodynamic compromise. There was a right      pleural effusion.   Impressions:  No previous study from [facility] was   available for comparison.       ASSESSMENT/PLAN:    Patient is a 73 year old male with PMH sig for HTN, HLD, MDS on chemotherapy, here for chest pain /sob       #R sided pleural effusion / suspect pna  #R chest pain / sob  -symptoms lkely due to R sided cxr findings. Less likely cardiac etiology but will trend trop and EKG -neg  -echo as above, no large pericardial effusion  Sp thoracentesis 10/3 1.5L removed. Cx ngtd. Repeat thora 10/7, 300 cc removed.   -Moderate right sided effusion remains on CXR post-thora. Repeat CT demonstrating moderate R pleural effusion  and increased cavitation in RML.    -Pulm following  -iv abx, Completed course of azithro. Rocephin (10/2-). Zosyn (10/8-).   0chest tube removed, dc planning, still on zosyn, dc abx per pulm     #HTN  -on bb, cont     #HLD  -statin     #MDS  -heme eval  -transfused 2 unit thus far   -post transfusion H&H is stable, check CBC as an outpatient    #CKDII!  -Trend renal function  -stable at this time     DVT ppx: Scds, lovenox      Iveth Hurley MD   [facility]

## 2024-10-15 NOTE — PLAN OF CARE
Assumed patient at 1930. Alert and oriented x 4 on room air. Lung sounds diminished bilaterally. Normal sinus on tele. Continent of bowel and bladder. Up standby assist. Patient updated on plan of care and verbalized understanding.     POC: IV Zoysn Q8, physical therapy to see.     Problem: Patient/Family Goals  Goal: Patient/Family Long Term Goal  Description: Patient's Long Term Goal: stay healthy  10/7: Stay out of the hospital when discharged    Interventions:  - Medication management  -Dietary management  -Prompt follow up with physician  10/7:   -Attend my follow up appointments with Mds   -Follow my medication regimen at home  - See additional Care Plan goals for specific interventions  Outcome: Progressing  Goal: Patient/Family Short Term Goal  Description: Patient's Short Term Goal: Discharge home    Interventions:   - Pulmonology consult  -IV antbiotics  -2D echo   10/7:  -IV abx  -Tele monitoring  -Monitor labs  -R sided thoracentesis 10/7  -Ambulate as tolerated  10/9:   -Tele monitoring  -Monitor labs  -Monitor hemoglobin  -Monitor thoracentesis site s/p right sided thoracentesis   -Ambulate as tolerated  -IV abx  - See additional Care Plan goals for specific interventions  Outcome: Progressing     Problem: RESPIRATORY - ADULT  Goal: Achieves optimal ventilation and oxygenation  Description: INTERVENTIONS:  - Assess for changes in respiratory status  - Assess for changes in mentation and behavior  - Position to facilitate oxygenation and minimize respiratory effort  - Oxygen supplementation based on oxygen saturation or ABGs  - Provide Smoking Cessation handout, if applicable  - Encourage broncho-pulmonary hygiene including cough, deep breathe, Incentive Spirometry  - Assess the need for suctioning and perform as needed  - Assess and instruct to report SOB or any respiratory difficulty  - Respiratory Therapy support as indicated  - Manage/alleviate anxiety  - Monitor for signs/symptoms of CO2  retention  Outcome: Progressing     Problem: CARDIOVASCULAR - ADULT  Goal: Maintains optimal cardiac output and hemodynamic stability  Description: INTERVENTIONS:  - Monitor vital signs, rhythm, and trends  - Monitor for bleeding, hypotension and signs of decreased cardiac output  - Evaluate effectiveness of vasoactive medications to optimize hemodynamic stability  - Monitor arterial and/or venous puncture sites for bleeding and/or hematoma  - Assess quality of pulses, skin color and temperature  - Assess for signs of decreased coronary artery perfusion - ex. Angina  - Evaluate fluid balance, assess for edema, trend weights  Outcome: Progressing  Goal: Absence of cardiac arrhythmias or at baseline  Description: INTERVENTIONS:  - Continuous cardiac monitoring, monitor vital signs, obtain 12 lead EKG if indicated  - Evaluate effectiveness of antiarrhythmic and heart rate control medications as ordered  - Initiate emergency measures for life threatening arrhythmias  - Monitor electrolytes and administer replacement therapy as ordered  Outcome: Progressing     Problem: HEMATOLOGIC - ADULT  Goal: Free from bleeding injury  Description: (Example usage: patient with low platelets)  INTERVENTIONS:  - Avoid intramuscular injections, enemas and rectal medication administration  - Ensure safe mobilization of patient  - Hold pressure on venipuncture sites to achieve adequate hemostasis  - Assess for signs and symptoms of internal bleeding  - Monitor lab trends  - Patient is to report abnormal signs of bleeding to staff  - Avoid use of toothpicks and dental floss  - Use electric shaver for shaving  - Use soft bristle tooth brush  - Limit straining and forceful nose blowing  Outcome: Progressing

## 2024-10-16 NOTE — CDS QUERY
Dear Dr Hurley,   Please provide a nutritional diagnosis, if known, related to the clinical information below:   [  x ] Moderate Malnutrition  [   ]  Other (please specify) :_____________________________      Documentation from the Medical Record:     Potential Risk Factors:  MDS on chemotherapy, Pneumonia, Poor dentition, Reported food insecurity    Clinical Indicators:   Dietitian consult:   Malnutrition related to social environmental causes (reported food insecurity, poor dentition) and chronic illness as evidenced by  wt loss greater than 7.5% in 3 months and moderate fat/muscle wasting.        CRITERIA FOR MALNUTRITION DIAGNOSIS:  Per EMR hx, pt may have lost about 22 lb (12.6%) in about 2.5 months, which is significant wt loss per standards (175 lb down to 153 lb)   ANTHROPOMETRICS:  Ht: 182.9 cm (6')  Wt: 66.8 kg (147 lb 4.3 oz).   BMI: Body mass index is 19.97 kg/m².  IBW: 81 kg    Treatment: Dietician Consult: Medical Nutrition Supplements, Ensure Plus High Protein Daily; at breakfast              Use of terms such as suspected, possible, or probable (associated with a specific diagnosis that is being evaluated, monitored, or treated as if it exists) are acceptable and can be coded in the inpatient setting, when documented at the time of discharge.     Please add any additional documentation to your progress note and continue to document this through discharge.    Thank you. For questions regarding this query, please contact Clinical : Yamilka Lacey RN # [phone]   This form is a permanent part of the medical record.

## 2024-10-29 ENCOUNTER — OFFICE VISIT (OUTPATIENT)
Dept: HEMATOLOGY/ONCOLOGY | Facility: HOSPITAL | Age: 74
End: 2024-10-29
Attending: INTERNAL MEDICINE
Payer: MEDICARE

## 2024-10-29 VITALS
TEMPERATURE: 97 F | RESPIRATION RATE: 16 BRPM | DIASTOLIC BLOOD PRESSURE: 60 MMHG | HEART RATE: 61 BPM | SYSTOLIC BLOOD PRESSURE: 118 MMHG | OXYGEN SATURATION: 100 %

## 2024-10-29 DIAGNOSIS — D61.818 PANCYTOPENIA (HCC): ICD-10-CM

## 2024-10-29 DIAGNOSIS — D46.9 MDS (MYELODYSPLASTIC SYNDROME) (HCC): Primary | ICD-10-CM

## 2024-10-29 LAB
ANTIBODY SCREEN: NEGATIVE
RH BLOOD TYPE: NEGATIVE

## 2024-10-29 PROCEDURE — 86900 BLOOD TYPING SEROLOGIC ABO: CPT

## 2024-10-29 PROCEDURE — 36430 TRANSFUSION BLD/BLD COMPNT: CPT

## 2024-10-29 PROCEDURE — 86850 RBC ANTIBODY SCREEN: CPT

## 2024-10-29 PROCEDURE — 86920 COMPATIBILITY TEST SPIN: CPT

## 2024-10-29 PROCEDURE — 36415 COLL VENOUS BLD VENIPUNCTURE: CPT

## 2024-10-29 PROCEDURE — 86901 BLOOD TYPING SEROLOGIC RH(D): CPT

## 2024-10-29 NOTE — PATIENT INSTRUCTIONS
Post Transfusion Instructions for Out-Patients    Most recipients of blood transfusions do not experience any adverse effects.  You may resume your normal activities 4 to 6 hours after your blood transfusion.  Occasionally, reactions of blood transfusions may be delayed (for several weeks).    Symptoms of a transfusion reaction can include:    Faintness  Weakness  Nausea  Vomiting  Shortness of breath  Chest pain  Back pain  Hives  Rash  Itch  Flushing  Fever  Chills  Jaundice (yellowish tint to eyes/skin)  Dark or red urine    Though these symptoms may not be related to the blood transfusions, they should be reported to your physician.  Contact both your physician and the laboratory Blood Bank (see information below) so that your symptoms can be thoroughly evaluated.    Your physician's name: dr camp  Your physician's phone number: [phone]    If your physician is unavailable, contact the Emergency Department.    [facility] Blood Bank:  [phone]  [facility] Emergency Department:  [phone]    [facility] Blood Bank: [phone]  [facility] Emergency Department: [phone]

## 2024-10-29 NOTE — PROGRESS NOTES
Pt here for PRBC . Pt denies any issues or concerns.      Ordering Provider: dr camp  Order Exp: tomorrow     Pt tolerated infusion without difficulty or complaint. Reviewed next apt date/time: na      Education Record  Learner:  Patient  Disease / Diagnosis: MDS  Barriers / Limitations:  None  Method:  Brief focused  General Topics:  Plan of care reviewed  Outcome:  Shows understanding

## 2024-10-30 LAB
BLOOD TYPE BARCODE: 9500
UNIT VOLUME: 250 ML

## 2024-11-05 ENCOUNTER — TELEPHONE (OUTPATIENT)
Dept: HEMATOLOGY/ONCOLOGY | Facility: HOSPITAL | Age: 74
End: 2024-11-05

## 2024-11-07 ENCOUNTER — OFFICE VISIT (OUTPATIENT)
Dept: HEMATOLOGY/ONCOLOGY | Facility: HOSPITAL | Age: 74
End: 2024-11-07
Attending: INTERNAL MEDICINE
Payer: MEDICARE

## 2024-11-07 VITALS
SYSTOLIC BLOOD PRESSURE: 126 MMHG | OXYGEN SATURATION: 99 % | RESPIRATION RATE: 18 BRPM | HEART RATE: 58 BPM | TEMPERATURE: 97 F | DIASTOLIC BLOOD PRESSURE: 57 MMHG

## 2024-11-07 DIAGNOSIS — D46.9 MDS (MYELODYSPLASTIC SYNDROME) (HCC): Primary | ICD-10-CM

## 2024-11-07 DIAGNOSIS — D61.818 PANCYTOPENIA (HCC): ICD-10-CM

## 2024-11-07 LAB
ANTIBODY SCREEN: NEGATIVE
RH BLOOD TYPE: NEGATIVE

## 2024-11-07 PROCEDURE — 86850 RBC ANTIBODY SCREEN: CPT

## 2024-11-07 PROCEDURE — 86920 COMPATIBILITY TEST SPIN: CPT

## 2024-11-07 PROCEDURE — 36430 TRANSFUSION BLD/BLD COMPNT: CPT

## 2024-11-07 PROCEDURE — 86900 BLOOD TYPING SEROLOGIC ABO: CPT

## 2024-11-07 PROCEDURE — 36415 COLL VENOUS BLD VENIPUNCTURE: CPT

## 2024-11-07 PROCEDURE — 86901 BLOOD TYPING SEROLOGIC RH(D): CPT

## 2024-11-07 NOTE — PROGRESS NOTES
Education Record    Learner:  Patient    Disease / Diagnosis: MDS - here for 2 units PRBC for hgb of 6.6    Barriers / Limitations:  None   Comments:    Method:  Brief focused   Comments:    General Topics:  Medication, Procedure, and Plan of care reviewed   Comments:    Outcome:  Shows understanding   Comments:    Tolerated transfusion well without issue. Discharged in stable condition.

## 2024-11-08 LAB
BLOOD TYPE BARCODE: 9500
UNIT VOLUME: 250 ML

## 2024-11-20 ENCOUNTER — OFFICE VISIT (OUTPATIENT)
Dept: HEMATOLOGY/ONCOLOGY | Facility: HOSPITAL | Age: 74
End: 2024-11-20
Attending: INTERNAL MEDICINE
Payer: MEDICARE

## 2024-11-20 VITALS
WEIGHT: 158.81 LBS | BODY MASS INDEX: 22 KG/M2 | SYSTOLIC BLOOD PRESSURE: 110 MMHG | HEART RATE: 56 BPM | TEMPERATURE: 97 F | RESPIRATION RATE: 16 BRPM | DIASTOLIC BLOOD PRESSURE: 73 MMHG | OXYGEN SATURATION: 100 %

## 2024-11-20 DIAGNOSIS — D46.9 MDS (MYELODYSPLASTIC SYNDROME) (HCC): Primary | ICD-10-CM

## 2024-11-20 DIAGNOSIS — D61.818 PANCYTOPENIA (HCC): ICD-10-CM

## 2024-11-20 LAB
ANTIBODY SCREEN: NEGATIVE
RH BLOOD TYPE: NEGATIVE

## 2024-11-20 PROCEDURE — 86850 RBC ANTIBODY SCREEN: CPT

## 2024-11-20 PROCEDURE — 86900 BLOOD TYPING SEROLOGIC ABO: CPT

## 2024-11-20 PROCEDURE — 36430 TRANSFUSION BLD/BLD COMPNT: CPT

## 2024-11-20 PROCEDURE — 86901 BLOOD TYPING SEROLOGIC RH(D): CPT

## 2024-11-20 NOTE — PROGRESS NOTES
Tolerated 2 units of PRBC without any issues. Complaining of right knee pain when he get here. Patient advised to monitor and if it does not get better to let his PCP know. Patient discharged in stable condition.

## 2024-11-20 NOTE — PROGRESS NOTES
Post Transfusion Instructions for Out-Patients    Most recipients of blood transfusions do not experience any adverse effects.  You may resume your normal activities 4 to 6 hours after your blood transfusion.  Occasionally, reactions of blood transfusions may be delayed (for several weeks).    Symptoms of a transfusion reaction can include:    Faintness  Weakness  Nausea  Vomiting  Shortness of breath  Chest pain  Back pain  Hives  Rash  Itch  Flushing  Fever  Chills  Jaundice (yellowish tint to eyes/skin)  Dark or red urine    Though these symptoms may not be related to the blood transfusions, they should be reported to your physician.  Contact both your physician and the laboratory Blood Bank (see information below) so that your symptoms can be thoroughly evaluated.    Your physician's name: Dr. Víctor Alfaro  Your physician's phone number: [phone]    If your physician is unavailable, contact the Emergency Department.    [facility] Blood Bank:  [phone]  [facility] Emergency Department:  [phone]    [facility] Blood Bank: [phone]  [facility] Emergency Department: [phone]

## 2024-11-21 LAB
BLOOD TYPE BARCODE: 9500
UNIT VOLUME: 250 ML

## 2024-12-02 ENCOUNTER — TELEPHONE (OUTPATIENT)
Dept: HEMATOLOGY/ONCOLOGY | Facility: HOSPITAL | Age: 74
End: 2024-12-02

## 2024-12-02 ENCOUNTER — NURSE ONLY (OUTPATIENT)
Dept: HEMATOLOGY/ONCOLOGY | Facility: HOSPITAL | Age: 74
End: 2024-12-02
Attending: INTERNAL MEDICINE
Payer: MEDICARE

## 2024-12-02 DIAGNOSIS — D61.818 PANCYTOPENIA (HCC): ICD-10-CM

## 2024-12-02 DIAGNOSIS — D46.9 MDS (MYELODYSPLASTIC SYNDROME) (HCC): Primary | ICD-10-CM

## 2024-12-02 LAB
ANTIBODY SCREEN: NEGATIVE
RH BLOOD TYPE: NEGATIVE
UNIT VOLUME: 350 ML

## 2024-12-02 PROCEDURE — 86901 BLOOD TYPING SEROLOGIC RH(D): CPT

## 2024-12-02 PROCEDURE — 36415 COLL VENOUS BLD VENIPUNCTURE: CPT

## 2024-12-02 PROCEDURE — 86850 RBC ANTIBODY SCREEN: CPT

## 2024-12-02 PROCEDURE — 86900 BLOOD TYPING SEROLOGIC ABO: CPT

## 2024-12-04 ENCOUNTER — OFFICE VISIT (OUTPATIENT)
Dept: HEMATOLOGY/ONCOLOGY | Facility: HOSPITAL | Age: 74
End: 2024-12-04
Attending: INTERNAL MEDICINE
Payer: MEDICARE

## 2024-12-04 VITALS
TEMPERATURE: 97 F | OXYGEN SATURATION: 100 % | HEART RATE: 57 BPM | SYSTOLIC BLOOD PRESSURE: 125 MMHG | DIASTOLIC BLOOD PRESSURE: 73 MMHG | RESPIRATION RATE: 16 BRPM

## 2024-12-04 DIAGNOSIS — D61.818 PANCYTOPENIA (HCC): ICD-10-CM

## 2024-12-04 DIAGNOSIS — D46.9 MDS (MYELODYSPLASTIC SYNDROME) (HCC): Primary | ICD-10-CM

## 2024-12-04 PROCEDURE — 36430 TRANSFUSION BLD/BLD COMPNT: CPT

## 2024-12-04 NOTE — PATIENT INSTRUCTIONS
Post Transfusion Instructions for Out-Patients    Most recipients of blood transfusions do not experience any adverse effects.  You may resume your normal activities 4 to 6 hours after your blood transfusion.  Occasionally, reactions of blood transfusions may be delayed (for several weeks).    Symptoms of a transfusion reaction can include:    Faintness  Weakness  Nausea  Vomiting  Shortness of breath  Chest pain  Back pain  Hives  Rash  Itch  Flushing  Fever  Chills  Jaundice (yellowish tint to eyes/skin)  Dark or red urine    Though these symptoms may not be related to the blood transfusions, they should be reported to your physician.  Contact both your physician and the laboratory Blood Bank (see information below) so that your symptoms can be thoroughly evaluated.    Your physician's name: Dr Víctor Alfaro  Your physician's phone number: [phone]    If your physician is unavailable, contact the Emergency Department.    [facility] Blood Bank:  [phone]  [facility] Emergency Department:  [phone]    [facility] Blood Bank: [phone]  Health system Emergency Department: [phone]

## 2024-12-05 LAB
BLOOD TYPE BARCODE: 9500
UNIT VOLUME: 350 ML

## 2024-12-13 ENCOUNTER — HOSPITAL ENCOUNTER (EMERGENCY)
Facility: HOSPITAL | Age: 74
Discharge: HOME | End: 2024-12-13
Attending: EMERGENCY MEDICINE
Payer: MEDICARE

## 2024-12-13 VITALS
OXYGEN SATURATION: 100 % | TEMPERATURE: 98 F | SYSTOLIC BLOOD PRESSURE: 147 MMHG | RESPIRATION RATE: 17 BRPM | DIASTOLIC BLOOD PRESSURE: 75 MMHG | HEART RATE: 54 BPM

## 2024-12-13 DIAGNOSIS — D64.9 ACUTE ON CHRONIC ANEMIA: Primary | ICD-10-CM

## 2024-12-13 DIAGNOSIS — D46.9 MDS (MYELODYSPLASTIC SYNDROME) (HCC): ICD-10-CM

## 2024-12-13 LAB
ANTIBODY SCREEN: NEGATIVE
RH BLOOD TYPE: NEGATIVE

## 2024-12-13 PROCEDURE — 99285 EMERGENCY DEPT VISIT HI MDM: CPT

## 2024-12-13 PROCEDURE — 86920 COMPATIBILITY TEST SPIN: CPT

## 2024-12-13 PROCEDURE — 86900 BLOOD TYPING SEROLOGIC ABO: CPT | Performed by: EMERGENCY MEDICINE

## 2024-12-13 PROCEDURE — 86901 BLOOD TYPING SEROLOGIC RH(D): CPT | Performed by: EMERGENCY MEDICINE

## 2024-12-13 PROCEDURE — 86850 RBC ANTIBODY SCREEN: CPT | Performed by: EMERGENCY MEDICINE

## 2024-12-13 PROCEDURE — 36415 COLL VENOUS BLD VENIPUNCTURE: CPT

## 2024-12-13 PROCEDURE — 36430 TRANSFUSION BLD/BLD COMPNT: CPT

## 2024-12-13 NOTE — ED PROVIDER NOTES
Patient Seen in: [facility] Emergency Department      History     Chief Complaint   Patient presents with    Abnormal Labs     Stated Complaint: hgb 6, sent for blood transfusion    Subjective:   HPI      73-year-old male with a past medical history as below including MDS requiring more frequent transfusions presents due to low hemoglobin on labs today.  Patient states he normally goes to the cancer center for transfusions but they could not fit him in today.  Patient states he otherwise feels well.  Denies feeling dizzy or lightheaded.  Denies chest pain, shortness of breath or palpitations.  Denies rectal bleeding or melena.    Objective:     Past Medical History:    Atherosclerosis of abdominal aorta (HCC)    Cancer (HCC)    skin cancer-squamous cell    Dermatitis    Resolved per note 3/7/19     Essential hypertension    Gout    Gout, unspecified    Resolved as stable per note 3/7/19    Greater trochanteric bursitis, right    Resolved last addressed 9/4/14    High blood pressure    High cholesterol    HTN (hypertension)    Hyperlipidemia    Lumbar radiculitis    Resolved per note 3/7/19    MDS (myelodysplastic syndrome) (HCC)    Osteoarthritis    right hip    Status post total replacement of right hip              Past Surgical History:   Procedure Laterality Date    Colonoscopy      Colonoscopy N/A 6/27/2016    Procedure: COLONOSCOPY;  Surgeon: Elvis Warren MD;  Location:  ENDOSCOPY    Hip replacement surgery      Other      wisdom teeth extraction                Social History     Socioeconomic History    Marital status:     Number of children: 1   Occupational History    Occupation: Call Center   Tobacco Use    Smoking status: Former     Current packs/day: 1.00     Average packs/day: 1 pack/day for 35.0 years (35.0 ttl pk-yrs)     Types: Cigarettes     Passive exposure: Never    Smokeless tobacco: Never   Vaping Use    Vaping status: Never Used   Substance and Sexual Activity    Alcohol  use: Yes     Alcohol/week: 0.0 standard drinks of alcohol     Comment: rarely    Drug use: No    Sexual activity: Yes     Partners: Female   Other Topics Concern     Service No    Sleep Concern No    Exercise Yes    Seat Belt Yes     Social Drivers of Health     Food Insecurity: No Food Insecurity (10/2/2024)    Food Insecurity     Food Insecurity: Never true   Transportation Needs: No Transportation Needs (10/2/2024)    Transportation Needs     Lack of Transportation: No   Housing Stability: Low Risk  (10/2/2024)    Housing Stability     Housing Instability: No                  Physical Exam     ED Triage Vitals [12/13/24 1157]   /47   Pulse 63   Resp 16   Temp 97.7 °F (36.5 °C)   Temp src Temporal   SpO2 99 %   O2 Device None (Room air)       Current Vitals:   Vital Signs  BP: 127/61  Pulse: 56  Resp: 16  Temp: 98.1 °F (36.7 °C)  Temp src: Temporal  MAP (mmHg): 79    Oxygen Therapy  SpO2: 100 %  O2 Device: None (Room air)        Physical Exam  Vitals and nursing note reviewed.   Constitutional:       General: He is not in acute distress.     Appearance: He is well-developed. He is not ill-appearing.   HENT:      Head: Normocephalic and atraumatic.      Mouth/Throat:      Mouth: Mucous membranes are moist.   Eyes:      General: No scleral icterus.     Extraocular Movements: Extraocular movements intact.   Cardiovascular:      Rate and Rhythm: Normal rate and regular rhythm.   Pulmonary:      Effort: Pulmonary effort is normal.      Breath sounds: Normal breath sounds.   Abdominal:      Palpations: Abdomen is soft.      Tenderness: There is no abdominal tenderness.   Musculoskeletal:      Cervical back: Neck supple.   Skin:     General: Skin is warm and dry.      Capillary Refill: Capillary refill takes less than 2 seconds.   Neurological:      Mental Status: He is alert and oriented to person, place, and time.      GCS: GCS eye subscore is 4. GCS verbal subscore is 5. GCS motor subscore is 6.    Psychiatric:         Mood and Affect: Mood normal.         Behavior: Behavior normal.             ED Course     Labs Reviewed   TYPE AND SCREEN    Narrative:     The following orders were created for panel order Type and screen.  Procedure                               Abnormality         Status                     ---------                               -----------         ------                     ABORH (Blood Type)[[id number]]                               Final result               Antibody Screen[[id number]]                                  Final result                 Please view results for these tests on the individual orders.   ABORH (BLOOD TYPE)   ANTIBODY SCREEN   PREPARE RBC   RAINBOW DRAW LAVENDER   RAINBOW DRAW LIGHT GREEN   RAINBOW DRAW BLUE                   MDM      73-year-old male with a past medical history as below including MDS requiring more frequent transfusions presents due to low hemoglobin on labs today.      Chart review shows patient had labs today that showed a hemoglobin of 6.7.    Differential includes but is not limited to acute on chronic anemia, MDS    Discussed with hematology Dr. Javier who recommends transfusion 1 unit PRBCs and discharge afterwards.  Patient is comfortable with this plan.      Medical Decision Making  Amount and/or Complexity of Data Reviewed  External Data Reviewed: labs.     Details: See HPI  Labs: ordered. Decision-making details documented in ED Course.  Discussion of management or test interpretation with external provider(s): Heme-onc    Risk  Decision regarding hospitalization.        Disposition and Plan     Clinical Impression:  1. Acute on chronic anemia    2. MDS (myelodysplastic syndrome) (HCC)         Disposition:  There is no disposition on file for this visit.  There is no disposition time on file for this visit.    Follow-up:  Salvador Cohn [place]  [id number] [facility]  [address] [place] [id number] [phone]    Schedule an  appointment as soon as possible for a visit            Medications Prescribed:  Current Discharge Medication List              Supplementary Documentation:

## 2024-12-13 NOTE — ED INITIAL ASSESSMENT (HPI)
Patient arrives ambulatory with low hgb of 6. Patient was sent by PCP for blood transfusion. Patient arrives asymptomatic.

## 2024-12-15 LAB
BLOOD TYPE BARCODE: 9500
UNIT VOLUME: 350 ML

## 2024-12-17 ENCOUNTER — TELEPHONE (OUTPATIENT)
Age: 74
End: 2024-12-17

## 2024-12-17 NOTE — TELEPHONE ENCOUNTER
Please call patient to reschedule the 10am  time will not work as he has an infusion scheduled elsewhere at 11

## 2024-12-18 ENCOUNTER — NURSE ONLY (OUTPATIENT)
Age: 74
End: 2024-12-18
Attending: INTERNAL MEDICINE
Payer: MEDICARE

## 2024-12-18 DIAGNOSIS — D61.818 PANCYTOPENIA (HCC): ICD-10-CM

## 2024-12-18 DIAGNOSIS — D46.9 MDS (MYELODYSPLASTIC SYNDROME) (HCC): Primary | ICD-10-CM

## 2024-12-18 LAB
ANTIBODY SCREEN: NEGATIVE
RH BLOOD TYPE: NEGATIVE

## 2024-12-19 ENCOUNTER — OFFICE VISIT (OUTPATIENT)
Age: 74
End: 2024-12-19
Attending: INTERNAL MEDICINE
Payer: MEDICARE

## 2024-12-19 ENCOUNTER — APPOINTMENT (OUTPATIENT)
Age: 74
End: 2024-12-19
Attending: INTERNAL MEDICINE
Payer: MEDICARE

## 2024-12-19 VITALS
WEIGHT: 166.19 LBS | BODY MASS INDEX: 23 KG/M2 | SYSTOLIC BLOOD PRESSURE: 115 MMHG | OXYGEN SATURATION: 100 % | RESPIRATION RATE: 16 BRPM | HEART RATE: 59 BPM | TEMPERATURE: 98 F | DIASTOLIC BLOOD PRESSURE: 54 MMHG

## 2024-12-19 DIAGNOSIS — D61.818 PANCYTOPENIA (HCC): ICD-10-CM

## 2024-12-19 DIAGNOSIS — D46.9 MDS (MYELODYSPLASTIC SYNDROME) (HCC): Primary | ICD-10-CM

## 2024-12-19 NOTE — PROGRESS NOTES
Education Record    Learner:  Patient    Disease / Diagnosis: MDS    Barriers / Limitations:  None   Comments:    Method:  Discussion   Comments:    General Topics:  Medication, Side effects and symptom management, Plan of care reviewed, and Fall risk and prevention   Comments:    Outcome:  Shows understanding   Comments:    Pt of  here for 1u PRBC transfusion. Consent signed prior to initiation of transfusion. Pt tolerated well. Discharged ambulatory in stable condition upon completion.

## 2024-12-24 DIAGNOSIS — D61.818 PANCYTOPENIA (HCC): ICD-10-CM

## 2024-12-24 DIAGNOSIS — D46.9 MDS (MYELODYSPLASTIC SYNDROME) (HCC): Primary | ICD-10-CM

## 2024-12-27 ENCOUNTER — OFFICE VISIT (OUTPATIENT)
Age: 74
End: 2024-12-27
Attending: INTERNAL MEDICINE
Payer: MEDICARE

## 2024-12-27 VITALS
OXYGEN SATURATION: 100 % | TEMPERATURE: 98 F | RESPIRATION RATE: 16 BRPM | DIASTOLIC BLOOD PRESSURE: 52 MMHG | HEART RATE: 55 BPM | SYSTOLIC BLOOD PRESSURE: 131 MMHG

## 2024-12-27 DIAGNOSIS — D61.818 PANCYTOPENIA (HCC): ICD-10-CM

## 2024-12-27 DIAGNOSIS — D46.9 MDS (MYELODYSPLASTIC SYNDROME) (HCC): Primary | ICD-10-CM

## 2024-12-27 LAB
ANTIBODY SCREEN: NEGATIVE
RH BLOOD TYPE: NEGATIVE

## 2024-12-27 NOTE — PROGRESS NOTES
Education Record    Learner:  Patient    Disease / Diagnosis: MDS    Barriers / Limitations:  None   Comments:    Method:  Discussion   Comments:    General Topics:  Medication, Side effects and symptom management, Plan of care reviewed, and Fall risk and prevention   Comments:    Outcome:  Shows understanding   Comments:    Pt of  here for 1u PRBC transfusion. Consent signed prior to initiation of transfusion. Pt tolerated well. Discharged ambulatory in stable condition upon completion.      1140  patient complain of pain @ IV site, slight swelling noted  Infusion paused , PIV removed, coban to site  Patient informed area may be painful and may bruise. He can apply heat  for comfort    PIV re established right hand , transfusion completed without further difficulty

## 2024-12-28 LAB
BLOOD TYPE BARCODE: 9500
UNIT VOLUME: 350 ML

## 2025-01-01 ENCOUNTER — APPOINTMENT (OUTPATIENT)
Dept: CT IMAGING | Facility: HOSPITAL | Age: 75
End: 2025-01-01
Attending: INTERNAL MEDICINE
Payer: MEDICARE

## 2025-01-01 ENCOUNTER — LAB REQUISITION (OUTPATIENT)
Age: 75
End: 2025-01-01
Payer: MEDICARE

## 2025-01-01 ENCOUNTER — APPOINTMENT (OUTPATIENT)
Dept: CV DIAGNOSTICS | Facility: HOSPITAL | Age: 75
End: 2025-01-01
Attending: PHYSICIAN ASSISTANT
Payer: MEDICARE

## 2025-01-01 ENCOUNTER — HOSPITAL ENCOUNTER (EMERGENCY)
Facility: HOSPITAL | Age: 75
Discharge: HOME | End: 2025-01-01
Attending: EMERGENCY MEDICINE
Payer: MEDICARE

## 2025-01-01 ENCOUNTER — HOSPITAL ENCOUNTER (INPATIENT)
Facility: HOSPITAL | Age: 75
LOS: 9 days | Discharge: HOSPICE | End: 2025-01-01
Attending: EMERGENCY MEDICINE | Admitting: HOSPITALIST
Payer: MEDICARE

## 2025-01-01 ENCOUNTER — APPOINTMENT (OUTPATIENT)
Dept: GENERAL RADIOLOGY | Facility: HOSPITAL | Age: 75
End: 2025-01-01
Attending: EMERGENCY MEDICINE
Payer: MEDICARE

## 2025-01-01 ENCOUNTER — APPOINTMENT (OUTPATIENT)
Dept: NUCLEAR MEDICINE | Facility: HOSPITAL | Age: 75
End: 2025-01-01
Attending: EMERGENCY MEDICINE
Payer: MEDICARE

## 2025-01-01 ENCOUNTER — APPOINTMENT (OUTPATIENT)
Dept: ULTRASOUND IMAGING | Facility: HOSPITAL | Age: 75
End: 2025-01-01
Attending: INTERNAL MEDICINE
Payer: MEDICARE

## 2025-01-01 ENCOUNTER — HOSPITAL ENCOUNTER (INPATIENT)
Facility: HOSPITAL | Age: 75
LOS: 1 days | End: 2025-01-01
Attending: STUDENT IN AN ORGANIZED HEALTH CARE EDUCATION/TRAINING PROGRAM | Admitting: STUDENT IN AN ORGANIZED HEALTH CARE EDUCATION/TRAINING PROGRAM
Payer: OTHER MISCELLANEOUS

## 2025-01-01 ENCOUNTER — APPOINTMENT (OUTPATIENT)
Dept: ULTRASOUND IMAGING | Facility: HOSPITAL | Age: 75
End: 2025-01-01
Payer: MEDICARE

## 2025-01-01 VITALS
RESPIRATION RATE: 23 BRPM | HEART RATE: 60 BPM | SYSTOLIC BLOOD PRESSURE: 115 MMHG | TEMPERATURE: 98 F | OXYGEN SATURATION: 100 % | DIASTOLIC BLOOD PRESSURE: 52 MMHG

## 2025-01-01 VITALS
OXYGEN SATURATION: 100 % | TEMPERATURE: 100 F | BODY MASS INDEX: 26.02 KG/M2 | HEIGHT: 72 IN | HEART RATE: 88 BPM | SYSTOLIC BLOOD PRESSURE: 132 MMHG | DIASTOLIC BLOOD PRESSURE: 61 MMHG | RESPIRATION RATE: 20 BRPM | WEIGHT: 192.13 LBS

## 2025-01-01 VITALS
RESPIRATION RATE: 6 BRPM | HEART RATE: 99 BPM | TEMPERATURE: 102 F | SYSTOLIC BLOOD PRESSURE: 108 MMHG | DIASTOLIC BLOOD PRESSURE: 52 MMHG | OXYGEN SATURATION: 96 %

## 2025-01-01 DIAGNOSIS — D46.9 MDS (MYELODYSPLASTIC SYNDROME) (HCC): ICD-10-CM

## 2025-01-01 DIAGNOSIS — R50.81 NEUTROPENIC FEVER: Primary | ICD-10-CM

## 2025-01-01 DIAGNOSIS — D46.9 MYELODYSPLASTIC SYNDROME (HCC): Primary | ICD-10-CM

## 2025-01-01 DIAGNOSIS — D70.9 NEUTROPENIC FEVER: Primary | ICD-10-CM

## 2025-01-01 LAB
ADENOVIRUS PCR:: NOT DETECTED
ALBUMIN SERPL-MCNC: 1.9 G/DL (ref 3.2–4.8)
ALBUMIN SERPL-MCNC: 2.3 G/DL (ref 3.2–4.8)
ALBUMIN SERPL-MCNC: 2.4 G/DL (ref 3.2–4.8)
ALBUMIN SERPL-MCNC: 2.5 G/DL (ref 3.2–4.8)
ALBUMIN SERPL-MCNC: 2.9 G/DL (ref 3.2–4.8)
ALBUMIN SERPL-MCNC: 3.5 G/DL (ref 3.2–4.8)
ALBUMIN SERPL-MCNC: 3.7 G/DL (ref 3.2–4.8)
ALBUMIN SERPL-MCNC: 3.7 G/DL (ref 3.2–4.8)
ALBUMIN SERPL-MCNC: 3.9 G/DL (ref 3.2–4.8)
ALBUMIN SERPL-MCNC: 4.3 G/DL (ref 3.2–4.8)
ALBUMIN/GLOB SERPL: 1.5 {RATIO} (ref 1–2)
ALBUMIN/GLOB SERPL: 1.6 {RATIO} (ref 1–2)
ALBUMIN/GLOB SERPL: 1.7 {RATIO} (ref 1–2)
ALBUMIN/GLOB SERPL: 1.8 {RATIO} (ref 1–2)
ALBUMIN/GLOB SERPL: 2 {RATIO} (ref 1–2)
ALP LIVER SERPL-CCNC: 100 U/L
ALP LIVER SERPL-CCNC: 104 U/L
ALP LIVER SERPL-CCNC: 53 U/L
ALP LIVER SERPL-CCNC: 55 U/L
ALP LIVER SERPL-CCNC: 60 U/L
ALP LIVER SERPL-CCNC: 66 U/L
ALT SERPL-CCNC: 120 U/L
ALT SERPL-CCNC: 141 U/L
ALT SERPL-CCNC: 15 U/L
ALT SERPL-CCNC: 20 U/L
ALT SERPL-CCNC: 203 U/L
ALT SERPL-CCNC: 38 U/L
ANION GAP SERPL CALC-SCNC: 10 MMOL/L (ref 0–18)
ANION GAP SERPL CALC-SCNC: 11 MMOL/L (ref 0–18)
ANION GAP SERPL CALC-SCNC: 12 MMOL/L (ref 0–18)
ANION GAP SERPL CALC-SCNC: 13 MMOL/L (ref 0–18)
ANION GAP SERPL CALC-SCNC: 13 MMOL/L (ref 0–18)
ANION GAP SERPL CALC-SCNC: 14 MMOL/L (ref 0–18)
ANION GAP SERPL CALC-SCNC: 14 MMOL/L (ref 0–18)
ANION GAP SERPL CALC-SCNC: 15 MMOL/L (ref 0–18)
ANION GAP SERPL CALC-SCNC: 8 MMOL/L (ref 0–18)
ANION GAP SERPL CALC-SCNC: 8 MMOL/L (ref 0–18)
ANTIBODY SCREEN: NEGATIVE
AST SERPL-CCNC: 106 U/L (ref ?–34)
AST SERPL-CCNC: 16 U/L (ref ?–34)
AST SERPL-CCNC: 22 U/L (ref ?–34)
AST SERPL-CCNC: 38 U/L (ref ?–34)
AST SERPL-CCNC: 42 U/L (ref ?–34)
AST SERPL-CCNC: 76 U/L (ref ?–34)
B PARAPERT DNA SPEC QL NAA+PROBE: NOT DETECTED
B PERT DNA SPEC QL NAA+PROBE: NOT DETECTED
BACTERIA BLD CULT: POSITIVE
BACTERIA BLD CULT: POSITIVE
BASE EXCESS BLDA CALC-SCNC: -9.2 MMOL/L (ref ?–2)
BASOPHILS # BLD AUTO: 0 X10(3) UL (ref 0–0.2)
BASOPHILS # BLD: 0 X10(3) UL (ref 0–0.2)
BASOPHILS NFR BLD AUTO: 0 %
BASOPHILS NFR BLD: 0 %
BILIRUB DIRECT SERPL-MCNC: 0.7 MG/DL (ref ?–0.3)
BILIRUB SERPL-MCNC: 1.3 MG/DL (ref 0.2–1.1)
BILIRUB SERPL-MCNC: 1.4 MG/DL (ref 0.2–1.1)
BILIRUB SERPL-MCNC: 1.7 MG/DL (ref 0.2–1.1)
BILIRUB SERPL-MCNC: 2.1 MG/DL (ref 0.2–1.1)
BILIRUB UR QL STRIP.AUTO: NEGATIVE
BILIRUB UR QL STRIP.AUTO: NEGATIVE
BLACTX-M ISLT/SPM QL: DETECTED
BLOOD TYPE BARCODE: 5100
BLOOD TYPE BARCODE: 6200
BLOOD TYPE BARCODE: 6200
BLOOD TYPE BARCODE: 9500
BODY TEMPERATURE: 98.6 F
BUN BLD-MCNC: 33 MG/DL (ref 9–23)
BUN BLD-MCNC: 34 MG/DL (ref 9–23)
BUN BLD-MCNC: 37 MG/DL (ref 9–23)
BUN BLD-MCNC: 38 MG/DL (ref 9–23)
BUN BLD-MCNC: 44 MG/DL (ref 9–23)
BUN BLD-MCNC: 45 MG/DL (ref 9–23)
BUN BLD-MCNC: 47 MG/DL (ref 9–23)
BUN BLD-MCNC: 51 MG/DL (ref 9–23)
BUN BLD-MCNC: 51 MG/DL (ref 9–23)
BUN BLD-MCNC: 55 MG/DL (ref 9–23)
BUN BLD-MCNC: 56 MG/DL (ref 9–23)
BUN BLD-MCNC: 58 MG/DL (ref 9–23)
C DIFF TOX B STL QL: NEGATIVE
C PNEUM DNA SPEC QL NAA+PROBE: NOT DETECTED
CALCIUM BLD-MCNC: 7.7 MG/DL (ref 8.7–10.6)
CALCIUM BLD-MCNC: 7.9 MG/DL (ref 8.7–10.6)
CALCIUM BLD-MCNC: 8 MG/DL (ref 8.7–10.6)
CALCIUM BLD-MCNC: 8.2 MG/DL (ref 8.7–10.6)
CALCIUM BLD-MCNC: 8.5 MG/DL (ref 8.7–10.6)
CALCIUM BLD-MCNC: 8.5 MG/DL (ref 8.7–10.6)
CALCIUM BLD-MCNC: 8.7 MG/DL (ref 8.7–10.6)
CALCIUM BLD-MCNC: 8.8 MG/DL (ref 8.7–10.6)
CALCIUM BLD-MCNC: 9.6 MG/DL (ref 8.7–10.6)
CD10 CELLS NFR SPEC: <1 %
CD10/CD19: <1 %
CD19 CELLS NFR SPEC: 3 %
CD19+/CD200+: 1 %
CD2 CELLS NFR SPEC: 87 %
CD20 CELLS NFR SPEC: 3 %
CD200 CELLS: 11 %
CD3 CELLS NFR SPEC: 80 %
CD3+/TCRGD+: 6 %
CD3+CD4+ CELLS NFR SPEC: 41 %
CD3+CD4+ CELLS/CD3+CD8+ CLL SPEC: 1.2
CD3+CD8+ CELLS NFR SPEC: 35 %
CD3-/CD56+: 7 %
CD34 CELLS NFR SPEC: <1 %
CD38 CELLS NFR SPEC: 9 %
CD38+/CD19+: <1 %
CD45 CELLS NFR SPEC: 100 %
CD5 CELLS NFR SPEC: 70 %
CD5/CD19 CELLS: <1 %
CD7 CELLS NFR SPEC: 74 %
CELL SURF KAPPA/LAMBDA RATIO: 1
CELL SURF LAMBDA LIGHT CHAIN: 1 %
CELL SURFACE KAPPA LIGHT CHAIN: 1 %
CHLORIDE SERPL-SCNC: 105 MMOL/L (ref 98–112)
CHLORIDE SERPL-SCNC: 105 MMOL/L (ref 98–112)
CHLORIDE SERPL-SCNC: 106 MMOL/L (ref 98–112)
CHLORIDE SERPL-SCNC: 109 MMOL/L (ref 98–112)
CHLORIDE SERPL-SCNC: 110 MMOL/L (ref 98–112)
CHLORIDE SERPL-SCNC: 112 MMOL/L (ref 98–112)
CHLORIDE SERPL-SCNC: 114 MMOL/L (ref 98–112)
CLARITY UR REFRACT.AUTO: CLEAR
CO2 SERPL-SCNC: 14 MMOL/L (ref 21–32)
CO2 SERPL-SCNC: 16 MMOL/L (ref 21–32)
CO2 SERPL-SCNC: 18 MMOL/L (ref 21–32)
CO2 SERPL-SCNC: 19 MMOL/L (ref 21–32)
CO2 SERPL-SCNC: 19 MMOL/L (ref 21–32)
CO2 SERPL-SCNC: 20 MMOL/L (ref 21–32)
CO2 SERPL-SCNC: 21 MMOL/L (ref 21–32)
CO2 SERPL-SCNC: 21 MMOL/L (ref 21–32)
CO2 SERPL-SCNC: 22 MMOL/L (ref 21–32)
CO2 SERPL-SCNC: 23 MMOL/L (ref 21–32)
COHGB MFR BLD: 1.3 % SAT (ref 0–3)
COLOR UR AUTO: YELLOW
CORONAVIRUS 229E PCR:: NOT DETECTED
CORONAVIRUS HKU1 PCR:: NOT DETECTED
CORONAVIRUS NL63 PCR:: NOT DETECTED
CORONAVIRUS OC43 PCR:: NOT DETECTED
CREAT BLD-MCNC: 0.88 MG/DL
CREAT BLD-MCNC: 0.89 MG/DL
CREAT BLD-MCNC: 0.94 MG/DL
CREAT BLD-MCNC: 1 MG/DL
CREAT BLD-MCNC: 1.06 MG/DL
CREAT BLD-MCNC: 1.06 MG/DL
CREAT BLD-MCNC: 1.07 MG/DL
CREAT BLD-MCNC: 1.63 MG/DL
CREAT BLD-MCNC: 1.63 MG/DL
CREAT BLD-MCNC: 1.66 MG/DL
CREAT BLD-MCNC: 1.76 MG/DL
CREAT BLD-MCNC: 1.76 MG/DL
CREAT BLD-MCNC: 2.11 MG/DL
E COLI DNA BLD POS QL NAA+NON-PROBE: DETECTED
EGFRCR SERPLBLD CKD-EPI 2021: 32 ML/MIN/1.73M2 (ref 60–?)
EGFRCR SERPLBLD CKD-EPI 2021: 40 ML/MIN/1.73M2 (ref 60–?)
EGFRCR SERPLBLD CKD-EPI 2021: 40 ML/MIN/1.73M2 (ref 60–?)
EGFRCR SERPLBLD CKD-EPI 2021: 43 ML/MIN/1.73M2 (ref 60–?)
EGFRCR SERPLBLD CKD-EPI 2021: 44 ML/MIN/1.73M2 (ref 60–?)
EGFRCR SERPLBLD CKD-EPI 2021: 44 ML/MIN/1.73M2 (ref 60–?)
EGFRCR SERPLBLD CKD-EPI 2021: 73 ML/MIN/1.73M2 (ref 60–?)
EGFRCR SERPLBLD CKD-EPI 2021: 74 ML/MIN/1.73M2 (ref 60–?)
EGFRCR SERPLBLD CKD-EPI 2021: 74 ML/MIN/1.73M2 (ref 60–?)
EGFRCR SERPLBLD CKD-EPI 2021: 79 ML/MIN/1.73M2 (ref 60–?)
EGFRCR SERPLBLD CKD-EPI 2021: 85 ML/MIN/1.73M2 (ref 60–?)
EGFRCR SERPLBLD CKD-EPI 2021: 90 ML/MIN/1.73M2 (ref 60–?)
EGFRCR SERPLBLD CKD-EPI 2021: 90 ML/MIN/1.73M2 (ref 60–?)
EOSINOPHIL # BLD AUTO: 0 X10(3) UL (ref 0–0.7)
EOSINOPHIL # BLD AUTO: 0.01 X10(3) UL (ref 0–0.7)
EOSINOPHIL # BLD: 0 X10(3) UL (ref 0–0.7)
EOSINOPHIL NFR BLD AUTO: 0 %
EOSINOPHIL NFR BLD AUTO: 5.6 %
EOSINOPHIL NFR BLD: 0 %
ERYTHROCYTE [DISTWIDTH] IN BLOOD BY AUTOMATED COUNT: 14.1 %
ERYTHROCYTE [DISTWIDTH] IN BLOOD BY AUTOMATED COUNT: 14.2 %
ERYTHROCYTE [DISTWIDTH] IN BLOOD BY AUTOMATED COUNT: 14.4 %
ERYTHROCYTE [DISTWIDTH] IN BLOOD BY AUTOMATED COUNT: 14.4 %
ERYTHROCYTE [DISTWIDTH] IN BLOOD BY AUTOMATED COUNT: 14.5 %
ERYTHROCYTE [DISTWIDTH] IN BLOOD BY AUTOMATED COUNT: 14.5 %
ERYTHROCYTE [DISTWIDTH] IN BLOOD BY AUTOMATED COUNT: 14.6 %
ERYTHROCYTE [DISTWIDTH] IN BLOOD BY AUTOMATED COUNT: 14.7 %
ERYTHROCYTE [DISTWIDTH] IN BLOOD BY AUTOMATED COUNT: 14.7 %
ERYTHROCYTE [DISTWIDTH] IN BLOOD BY AUTOMATED COUNT: 14.8 %
ERYTHROCYTE [DISTWIDTH] IN BLOOD BY AUTOMATED COUNT: 14.9 %
ERYTHROCYTE [DISTWIDTH] IN BLOOD BY AUTOMATED COUNT: 15 %
ERYTHROCYTE [DISTWIDTH] IN BLOOD BY AUTOMATED COUNT: 15.4 %
FIO2: 40 %
FLUAV + FLUBV RNA SPEC NAA+PROBE: NEGATIVE
FLUAV + FLUBV RNA SPEC NAA+PROBE: NEGATIVE
FLUAV RNA SPEC QL NAA+PROBE: NOT DETECTED
FLUBV RNA SPEC QL NAA+PROBE: NOT DETECTED
GLOBULIN PLAS-MCNC: 2 G/DL (ref 2–3.5)
GLOBULIN PLAS-MCNC: 2.1 G/DL (ref 2–3.5)
GLOBULIN PLAS-MCNC: 2.2 G/DL (ref 2–3.5)
GLOBULIN PLAS-MCNC: 2.3 G/DL (ref 2–3.5)
GLOBULIN PLAS-MCNC: 2.3 G/DL (ref 2–3.5)
GLUCOSE BLD-MCNC: 100 MG/DL (ref 70–99)
GLUCOSE BLD-MCNC: 104 MG/DL (ref 70–99)
GLUCOSE BLD-MCNC: 112 MG/DL (ref 70–99)
GLUCOSE BLD-MCNC: 114 MG/DL (ref 70–99)
GLUCOSE BLD-MCNC: 115 MG/DL (ref 70–99)
GLUCOSE BLD-MCNC: 123 MG/DL (ref 70–99)
GLUCOSE BLD-MCNC: 123 MG/DL (ref 70–99)
GLUCOSE BLD-MCNC: 124 MG/DL (ref 70–99)
GLUCOSE BLD-MCNC: 128 MG/DL (ref 70–99)
GLUCOSE BLD-MCNC: 132 MG/DL (ref 70–99)
GLUCOSE BLD-MCNC: 157 MG/DL (ref 70–99)
GLUCOSE BLD-MCNC: 89 MG/DL (ref 70–99)
GLUCOSE UR STRIP.AUTO-MCNC: NORMAL MG/DL
GLUCOSE UR STRIP.AUTO-MCNC: NORMAL MG/DL
HCO3 BLDA-SCNC: 17.8 MEQ/L (ref 21–27)
HCT VFR BLD AUTO: 16.1 %
HCT VFR BLD AUTO: 17 %
HCT VFR BLD AUTO: 17.3 %
HCT VFR BLD AUTO: 17.7 %
HCT VFR BLD AUTO: 18.5 %
HCT VFR BLD AUTO: 19 %
HCT VFR BLD AUTO: 20.3 %
HCT VFR BLD AUTO: 21 %
HCT VFR BLD AUTO: 21.2 %
HCT VFR BLD AUTO: 21.4 %
HCT VFR BLD AUTO: 21.5 %
HCT VFR BLD AUTO: 22.4 %
HCT VFR BLD AUTO: 22.6 %
HCT VFR BLD AUTO: 23.9 %
HCT VFR BLD AUTO: 24.4 %
HCT VFR BLD AUTO: 24.6 %
HGB BLD-MCNC: 5.6 G/DL
HGB BLD-MCNC: 5.7 G/DL
HGB BLD-MCNC: 6 G/DL
HGB BLD-MCNC: 6.1 G/DL
HGB BLD-MCNC: 6.6 G/DL
HGB BLD-MCNC: 6.7 G/DL
HGB BLD-MCNC: 7 G/DL
HGB BLD-MCNC: 7.2 G/DL
HGB BLD-MCNC: 7.4 G/DL
HGB BLD-MCNC: 7.4 G/DL
HGB BLD-MCNC: 7.8 G/DL
HGB BLD-MCNC: 7.9 G/DL
HGB BLD-MCNC: 8.1 G/DL
HGB BLD-MCNC: 8.3 G/DL
HGB BLD-MCNC: 8.4 G/DL
HYALINE CASTS #/AREA URNS AUTO: PRESENT /LPF
IMM GRANULOCYTES # BLD AUTO: 0 X10(3) UL (ref 0–1)
IMM GRANULOCYTES # BLD AUTO: 0.01 X10(3) UL (ref 0–1)
IMM GRANULOCYTES NFR BLD: 0 %
IMM GRANULOCYTES NFR BLD: 1.9 %
KETONES UR STRIP.AUTO-MCNC: NEGATIVE MG/DL
L/M: 5 L/MIN
LACTATE SERPL-SCNC: 1 MMOL/L (ref 0.5–2)
LACTATE SERPL-SCNC: 2.5 MMOL/L (ref 0.5–2)
LACTATE SERPL-SCNC: 3.6 MMOL/L (ref 0.5–2)
LACTATE SERPL-SCNC: 3.6 MMOL/L (ref 0.5–2)
LACTATE SERPL-SCNC: 3.7 MMOL/L (ref 0.5–2)
LACTATE SERPL-SCNC: 3.8 MMOL/L (ref 0.5–2)
LACTATE SERPL-SCNC: 4 MMOL/L (ref 0.5–2)
LACTATE SERPL-SCNC: 4.2 MMOL/L (ref 0.5–2)
LACTATE SERPL-SCNC: 4.3 MMOL/L (ref 0.5–2)
LACTATE SERPL-SCNC: 5.1 MMOL/L (ref 0.5–2)
LEUKOCYTE ESTERASE UR QL STRIP.AUTO: NEGATIVE
LEUKOCYTE ESTERASE UR QL STRIP.AUTO: NEGATIVE
LYMPHOCYTES # BLD AUTO: 0.03 X10(3) UL (ref 1–4)
LYMPHOCYTES # BLD AUTO: 0.04 X10(3) UL (ref 1–4)
LYMPHOCYTES # BLD AUTO: 0.05 X10(3) UL (ref 1–4)
LYMPHOCYTES # BLD AUTO: 0.05 X10(3) UL (ref 1–4)
LYMPHOCYTES # BLD AUTO: 0.06 X10(3) UL (ref 1–4)
LYMPHOCYTES # BLD AUTO: 0.15 X10(3) UL (ref 1–4)
LYMPHOCYTES # BLD AUTO: 0.16 X10(3) UL (ref 1–4)
LYMPHOCYTES # BLD AUTO: 0.16 X10(3) UL (ref 1–4)
LYMPHOCYTES # BLD AUTO: 0.24 X10(3) UL (ref 1–4)
LYMPHOCYTES # BLD AUTO: 0.51 X10(3) UL (ref 1–4)
LYMPHOCYTES NFR BLD AUTO: 60 %
LYMPHOCYTES NFR BLD AUTO: 66.7 %
LYMPHOCYTES NFR BLD AUTO: 66.7 %
LYMPHOCYTES NFR BLD AUTO: 71.4 %
LYMPHOCYTES NFR BLD AUTO: 71.4 %
LYMPHOCYTES NFR BLD AUTO: 72.7 %
LYMPHOCYTES NFR BLD AUTO: 75 %
LYMPHOCYTES NFR BLD AUTO: 83.3 %
LYMPHOCYTES NFR BLD AUTO: 88.8 %
LYMPHOCYTES NFR BLD AUTO: 94.4 %
LYMPHOCYTES NFR BLD: 0.08 X10(3) UL (ref 1–4)
LYMPHOCYTES NFR BLD: 0.1 X10(3) UL (ref 1–4)
LYMPHOCYTES NFR BLD: 0.2 X10(3) UL (ref 1–4)
LYMPHOCYTES NFR BLD: 0.36 X10(3) UL (ref 1–4)
LYMPHOCYTES NFR BLD: 100 %
LYMPHOCYTES NFR BLD: 100 %
LYMPHOCYTES NFR BLD: 83 %
LYMPHOCYTES NFR BLD: 89 %
MAGNESIUM SERPL-MCNC: 1.2 MG/DL (ref 1.6–2.6)
MAGNESIUM SERPL-MCNC: 1.4 MG/DL (ref 1.6–2.6)
MAGNESIUM SERPL-MCNC: 1.4 MG/DL (ref 1.6–2.6)
MAGNESIUM SERPL-MCNC: 1.5 MG/DL (ref 1.6–2.6)
MAGNESIUM SERPL-MCNC: 1.6 MG/DL (ref 1.6–2.6)
MAGNESIUM SERPL-MCNC: 1.6 MG/DL (ref 1.6–2.6)
MAGNESIUM SERPL-MCNC: 1.8 MG/DL (ref 1.6–2.6)
MAGNESIUM SERPL-MCNC: 1.8 MG/DL (ref 1.6–2.6)
MAGNESIUM SERPL-MCNC: 2 MG/DL (ref 1.6–2.6)
MCH RBC QN AUTO: 28.1 PG (ref 26–34)
MCH RBC QN AUTO: 28.5 PG (ref 26–34)
MCH RBC QN AUTO: 28.5 PG (ref 26–34)
MCH RBC QN AUTO: 28.6 PG (ref 26–34)
MCH RBC QN AUTO: 28.7 PG (ref 26–34)
MCH RBC QN AUTO: 28.9 PG (ref 26–34)
MCH RBC QN AUTO: 29 PG (ref 26–34)
MCH RBC QN AUTO: 29.1 PG (ref 26–34)
MCH RBC QN AUTO: 29.1 PG (ref 26–34)
MCH RBC QN AUTO: 29.2 PG (ref 26–34)
MCH RBC QN AUTO: 29.3 PG (ref 26–34)
MCH RBC QN AUTO: 29.3 PG (ref 26–34)
MCHC RBC AUTO-ENTMCNC: 32.7 G/DL (ref 31–37)
MCHC RBC AUTO-ENTMCNC: 32.9 G/DL (ref 31–37)
MCHC RBC AUTO-ENTMCNC: 33.5 G/DL (ref 31–37)
MCHC RBC AUTO-ENTMCNC: 33.9 G/DL (ref 31–37)
MCHC RBC AUTO-ENTMCNC: 34.3 G/DL (ref 31–37)
MCHC RBC AUTO-ENTMCNC: 34.4 G/DL (ref 31–37)
MCHC RBC AUTO-ENTMCNC: 34.4 G/DL (ref 31–37)
MCHC RBC AUTO-ENTMCNC: 34.5 G/DL (ref 31–37)
MCHC RBC AUTO-ENTMCNC: 34.7 G/DL (ref 31–37)
MCHC RBC AUTO-ENTMCNC: 34.8 G/DL (ref 31–37)
MCHC RBC AUTO-ENTMCNC: 34.8 G/DL (ref 31–37)
MCHC RBC AUTO-ENTMCNC: 34.9 G/DL (ref 31–37)
MCHC RBC AUTO-ENTMCNC: 35 G/DL (ref 31–37)
MCHC RBC AUTO-ENTMCNC: 35.3 G/DL (ref 31–37)
MCHC RBC AUTO-ENTMCNC: 35.3 G/DL (ref 31–37)
MCHC RBC AUTO-ENTMCNC: 35.7 G/DL (ref 31–37)
MCV RBC AUTO: 81.9 FL
MCV RBC AUTO: 81.9 FL
MCV RBC AUTO: 82.2 FL
MCV RBC AUTO: 82.5 FL
MCV RBC AUTO: 82.8 FL
MCV RBC AUTO: 83 FL
MCV RBC AUTO: 83 FL
MCV RBC AUTO: 83.1 FL
MCV RBC AUTO: 83.8 FL
MCV RBC AUTO: 83.9 FL
MCV RBC AUTO: 84.3 FL
MCV RBC AUTO: 84.7 FL
MCV RBC AUTO: 85 FL
MCV RBC AUTO: 85.4 FL
MCV RBC AUTO: 85.4 FL
MCV RBC AUTO: 87 FL
METAPNEUMOVIRUS PCR:: NOT DETECTED
METHGB MFR BLD: 1.2 % SAT (ref 0.4–1.5)
MONOCYTES # BLD AUTO: 0 X10(3) UL (ref 0.1–1)
MONOCYTES # BLD AUTO: 0 X10(3) UL (ref 0.1–1)
MONOCYTES # BLD AUTO: 0.01 X10(3) UL (ref 0.1–1)
MONOCYTES # BLD AUTO: 0.02 X10(3) UL (ref 0.1–1)
MONOCYTES # BLD AUTO: 0.06 X10(3) UL (ref 0.1–1)
MONOCYTES # BLD AUTO: 0.07 X10(3) UL (ref 0.1–1)
MONOCYTES # BLD: 0 X10(3) UL (ref 0.1–1)
MONOCYTES # BLD: 0.01 X10(3) UL (ref 0.1–1)
MONOCYTES NFR BLD AUTO: 0 %
MONOCYTES NFR BLD AUTO: 0 %
MONOCYTES NFR BLD AUTO: 1.9 %
MONOCYTES NFR BLD AUTO: 14.3 %
MONOCYTES NFR BLD AUTO: 16.7 %
MONOCYTES NFR BLD AUTO: 16.7 %
MONOCYTES NFR BLD AUTO: 21.2 %
MONOCYTES NFR BLD AUTO: 25 %
MONOCYTES NFR BLD AUTO: 5.6 %
MONOCYTES NFR BLD AUTO: 9.5 %
MONOCYTES NFR BLD: 0 %
MONOCYTES NFR BLD: 8 %
MORPHOLOGY: NORMAL
MYCOPLASMA PNEUMONIA PCR:: NOT DETECTED
NEUTROPHILS # BLD AUTO: 0 X10(3) UL (ref 1.5–7.7)
NEUTROPHILS # BLD AUTO: 0.01 X10 (3) UL (ref 1.5–7.7)
NEUTROPHILS # BLD AUTO: 0.01 X10(3) UL (ref 1.5–7.7)
NEUTROPHILS # BLD AUTO: 0.02 X10 (3) UL (ref 1.5–7.7)
NEUTROPHILS # BLD AUTO: 0.02 X10(3) UL (ref 1.5–7.7)
NEUTROPHILS # BLD AUTO: 0.03 X10 (3) UL (ref 1.5–7.7)
NEUTROPHILS # BLD AUTO: 0.03 X10 (3) UL (ref 1.5–7.7)
NEUTROPHILS # BLD AUTO: 0.04 X10 (3) UL (ref 1.5–7.7)
NEUTROPHILS # BLD AUTO: 0.04 X10(3) UL (ref 1.5–7.7)
NEUTROPHILS NFR BLD AUTO: 0 %
NEUTROPHILS NFR BLD AUTO: 0 %
NEUTROPHILS NFR BLD AUTO: 1.8 %
NEUTROPHILS NFR BLD AUTO: 14.3 %
NEUTROPHILS NFR BLD AUTO: 16.6 %
NEUTROPHILS NFR BLD AUTO: 19.1 %
NEUTROPHILS NFR BLD AUTO: 25 %
NEUTROPHILS NFR BLD AUTO: 40 %
NEUTROPHILS NFR BLD AUTO: 6.1 %
NEUTROPHILS NFR BLD AUTO: 8.3 %
NEUTROPHILS NFR BLD: 0 %
NEUTROPHILS NFR BLD: 0 %
NEUTROPHILS NFR BLD: 11 %
NEUTROPHILS NFR BLD: 8 %
NEUTS HYPERSEG # BLD: 0 X10(3) UL (ref 1.5–7.7)
NEUTS HYPERSEG # BLD: 0 X10(3) UL (ref 1.5–7.7)
NEUTS HYPERSEG # BLD: 0.01 X10(3) UL (ref 1.5–7.7)
NEUTS HYPERSEG # BLD: 0.04 X10(3) UL (ref 1.5–7.7)
NITRITE UR QL STRIP.AUTO: NEGATIVE
NITRITE UR QL STRIP.AUTO: NEGATIVE
OSMOLALITY SERPL CALC.SUM OF ELEC: 287 MOSM/KG (ref 275–295)
OSMOLALITY SERPL CALC.SUM OF ELEC: 293 MOSM/KG (ref 275–295)
OSMOLALITY SERPL CALC.SUM OF ELEC: 299 MOSM/KG (ref 275–295)
OSMOLALITY SERPL CALC.SUM OF ELEC: 300 MOSM/KG (ref 275–295)
OSMOLALITY SERPL CALC.SUM OF ELEC: 300 MOSM/KG (ref 275–295)
OSMOLALITY SERPL CALC.SUM OF ELEC: 302 MOSM/KG (ref 275–295)
OSMOLALITY SERPL CALC.SUM OF ELEC: 302 MOSM/KG (ref 275–295)
OSMOLALITY SERPL CALC.SUM OF ELEC: 307 MOSM/KG (ref 275–295)
OSMOLALITY SERPL CALC.SUM OF ELEC: 309 MOSM/KG (ref 275–295)
OSMOLALITY SERPL CALC.SUM OF ELEC: 313 MOSM/KG (ref 275–295)
OXYHGB MFR BLDA: 96.4 % (ref 92–100)
PARAINFLUENZA 1 PCR:: NOT DETECTED
PARAINFLUENZA 2 PCR:: NOT DETECTED
PARAINFLUENZA 3 PCR:: NOT DETECTED
PARAINFLUENZA 4 PCR:: NOT DETECTED
PCO2 BLDA: 22 MM HG (ref 35–45)
PH BLDA: 7.42 [PH] (ref 7.35–7.45)
PH UR STRIP.AUTO: 5.5 [PH] (ref 5–8)
PH UR STRIP.AUTO: 5.5 [PH] (ref 5–8)
PHOSPHATE SERPL-MCNC: 2.4 MG/DL (ref 2.4–5.1)
PHOSPHATE SERPL-MCNC: 2.5 MG/DL (ref 2.4–5.1)
PHOSPHATE SERPL-MCNC: 2.9 MG/DL (ref 2.4–5.1)
PHOSPHATE SERPL-MCNC: 3.3 MG/DL (ref 2.4–5.1)
PHOSPHATE SERPL-MCNC: 3.4 MG/DL (ref 2.4–5.1)
PLATELET # BLD AUTO: 10 10(3)UL (ref 150–450)
PLATELET # BLD AUTO: 12 10(3)UL (ref 150–450)
PLATELET # BLD AUTO: 15 10(3)UL (ref 150–450)
PLATELET # BLD AUTO: 16 10(3)UL (ref 150–450)
PLATELET # BLD AUTO: 18 10(3)UL (ref 150–450)
PLATELET # BLD AUTO: 2 10(3)UL (ref 150–450)
PLATELET # BLD AUTO: 20 10(3)UL (ref 150–450)
PLATELET # BLD AUTO: 24 10(3)UL (ref 150–450)
PLATELET # BLD AUTO: 4 10(3)UL (ref 150–450)
PLATELET # BLD AUTO: 5 10(3)UL (ref 150–450)
PLATELET # BLD AUTO: 5 10(3)UL (ref 150–450)
PLATELET # BLD AUTO: 6 10(3)UL (ref 150–450)
PLATELET # BLD AUTO: 7 10(3)UL (ref 150–450)
PLATELET # BLD AUTO: 7 10(3)UL (ref 150–450)
PLATELET # BLD AUTO: 8 10(3)UL (ref 150–450)
PLATELET # BLD AUTO: <2 10(3)UL (ref 150–450)
PLATELET MORPHOLOGY: NORMAL
PLATELETS.RETICULATED NFR BLD AUTO: 0 % (ref 0–7)
PLATELETS.RETICULATED NFR BLD AUTO: 1.1 % (ref 0–7)
PLATELETS.RETICULATED NFR BLD AUTO: 2.6 % (ref 0–7)
PLATELETS.RETICULATED NFR BLD AUTO: 2.9 % (ref 0–7)
PLATELETS.RETICULATED NFR BLD AUTO: 2.9 % (ref 0–7)
PLATELETS.RETICULATED NFR BLD AUTO: 22 % (ref 0–7)
PLATELETS.RETICULATED NFR BLD AUTO: 3.1 % (ref 0–7)
PLATELETS.RETICULATED NFR BLD AUTO: 3.3 % (ref 0–7)
PLATELETS.RETICULATED NFR BLD AUTO: 4.5 % (ref 0–7)
PLATELETS.RETICULATED NFR BLD AUTO: 4.8 % (ref 0–7)
PLATELETS.RETICULATED NFR BLD AUTO: 5.2 % (ref 0–7)
PLATELETS.RETICULATED NFR BLD AUTO: 5.3 % (ref 0–7)
PLATELETS.RETICULATED NFR BLD AUTO: 6.5 % (ref 0–7)
PLATELETS.RETICULATED NFR BLD AUTO: 8.1 % (ref 0–7)
PO2 BLDA: 110 MM HG (ref 80–100)
POTASSIUM SERPL-SCNC: 3 MMOL/L (ref 3.5–5.1)
POTASSIUM SERPL-SCNC: 3.1 MMOL/L (ref 3.5–5.1)
POTASSIUM SERPL-SCNC: 3.3 MMOL/L (ref 3.5–5.1)
POTASSIUM SERPL-SCNC: 3.3 MMOL/L (ref 3.5–5.1)
POTASSIUM SERPL-SCNC: 3.5 MMOL/L (ref 3.5–5.1)
POTASSIUM SERPL-SCNC: 3.7 MMOL/L (ref 3.5–5.1)
POTASSIUM SERPL-SCNC: 3.7 MMOL/L (ref 3.5–5.1)
POTASSIUM SERPL-SCNC: 3.9 MMOL/L (ref 3.5–5.1)
POTASSIUM SERPL-SCNC: 4 MMOL/L (ref 3.5–5.1)
POTASSIUM SERPL-SCNC: 4 MMOL/L (ref 3.5–5.1)
POTASSIUM SERPL-SCNC: 4.1 MMOL/L (ref 3.5–5.1)
POTASSIUM SERPL-SCNC: 4.5 MMOL/L (ref 3.5–5.1)
POTASSIUM SERPL-SCNC: 5 MMOL/L (ref 3.5–5.1)
PROT SERPL-MCNC: 4.9 G/DL (ref 5.7–8.2)
PROT SERPL-MCNC: 5.7 G/DL (ref 5.7–8.2)
PROT SERPL-MCNC: 5.8 G/DL (ref 5.7–8.2)
PROT SERPL-MCNC: 6 G/DL (ref 5.7–8.2)
PROT SERPL-MCNC: 6.2 G/DL (ref 5.7–8.2)
PROT SERPL-MCNC: 6.5 G/DL (ref 5.7–8.2)
PROT UR STRIP.AUTO-MCNC: 100 MG/DL
PROT UR STRIP.AUTO-MCNC: 50 MG/DL
RBC # BLD AUTO: 1.91 X10(6)UL
RBC # BLD AUTO: 1.99 X10(6)UL
RBC # BLD AUTO: 2.09 X10(6)UL
RBC # BLD AUTO: 2.09 X10(6)UL
RBC # BLD AUTO: 2.25 X10(6)UL
RBC # BLD AUTO: 2.32 X10(6)UL
RBC # BLD AUTO: 2.46 X10(6)UL
RBC # BLD AUTO: 2.46 X10(6)UL
RBC # BLD AUTO: 2.47 X10(6)UL
RBC # BLD AUTO: 2.59 X10(6)UL
RBC # BLD AUTO: 2.59 X10(6)UL
RBC # BLD AUTO: 2.7 X10(6)UL
RBC # BLD AUTO: 2.72 X10(6)UL
RBC # BLD AUTO: 2.85 X10(6)UL
RBC # BLD AUTO: 2.88 X10(6)UL
RBC # BLD AUTO: 2.91 X10(6)UL
RBC #/AREA URNS AUTO: >10 /HPF
RH BLOOD TYPE: NEGATIVE
RHINOVIRUS/ENTERO PCR:: NOT DETECTED
RSV RNA SPEC NAA+PROBE: NEGATIVE
RSV RNA SPEC QL NAA+PROBE: NOT DETECTED
SARS-COV-2 RNA NPH QL NAA+NON-PROBE: NOT DETECTED
SARS-COV-2 RNA RESP QL NAA+PROBE: NOT DETECTED
SODIUM SERPL-SCNC: 134 MMOL/L (ref 136–145)
SODIUM SERPL-SCNC: 135 MMOL/L (ref 136–145)
SODIUM SERPL-SCNC: 137 MMOL/L (ref 136–145)
SODIUM SERPL-SCNC: 138 MMOL/L (ref 136–145)
SODIUM SERPL-SCNC: 139 MMOL/L (ref 136–145)
SODIUM SERPL-SCNC: 141 MMOL/L (ref 136–145)
SODIUM SERPL-SCNC: 141 MMOL/L (ref 136–145)
SODIUM SERPL-SCNC: 142 MMOL/L (ref 136–145)
SODIUM SERPL-SCNC: 143 MMOL/L (ref 136–145)
SODIUM SERPL-SCNC: 144 MMOL/L (ref 136–145)
SP GR UR STRIP.AUTO: 1.01 (ref 1–1.03)
SP GR UR STRIP.AUTO: 1.02 (ref 1–1.03)
TCR G-D CELLS NFR SPEC: 6 %
TOTAL CELLS COUNTED BLD: 12
TOTAL CELLS COUNTED BLD: 18
TOTAL CELLS COUNTED BLD: 25
TOTAL CELLS COUNTED BLD: 27
UNIT VOLUME: 187 ML
UNIT VOLUME: 187 ML
UNIT VOLUME: 192 ML
UNIT VOLUME: 250 ML
UNIT VOLUME: 268 ML
UNIT VOLUME: 271 ML
UNIT VOLUME: 306 ML
UNIT VOLUME: 350 ML
UROBILINOGEN UR STRIP.AUTO-MCNC: NORMAL MG/DL
UROBILINOGEN UR STRIP.AUTO-MCNC: NORMAL MG/DL
VANCOMYCIN TROUGH SERPL-MCNC: 12.1 UG/ML (ref 10–20)
VANCOMYCIN TROUGH SERPL-MCNC: 9.8 UG/ML (ref 10–20)
WBC # BLD AUTO: 0.1 X10(3) UL (ref 4–11)
WBC # BLD AUTO: 0.2 X10(3) UL (ref 4–11)
WBC # BLD AUTO: 0.3 X10(3) UL (ref 4–11)
WBC # BLD AUTO: 0.4 X10(3) UL (ref 4–11)
WBC # BLD AUTO: 0.5 X10(3) UL (ref 4–11)

## 2025-01-01 PROCEDURE — 99285 EMERGENCY DEPT VISIT HI MDM: CPT

## 2025-01-01 PROCEDURE — 86850 RBC ANTIBODY SCREEN: CPT | Performed by: EMERGENCY MEDICINE

## 2025-01-01 PROCEDURE — 30233S1 TRANSFUSION OF NONAUTOLOGOUS GLOBULIN INTO PERIPHERAL VEIN, PERCUTANEOUS APPROACH: ICD-10-PCS | Performed by: STUDENT IN AN ORGANIZED HEALTH CARE EDUCATION/TRAINING PROGRAM

## 2025-01-01 PROCEDURE — CF241ZZ TOMOGRAPHIC (TOMO) NUCLEAR MEDICINE IMAGING OF GALLBLADDER USING TECHNETIUM 99M (TC-99M): ICD-10-PCS | Performed by: RADIOLOGY

## 2025-01-01 PROCEDURE — 86901 BLOOD TYPING SEROLOGIC RH(D): CPT

## 2025-01-01 PROCEDURE — 99221 1ST HOSP IP/OBS SF/LOW 40: CPT | Performed by: NURSE PRACTITIONER

## 2025-01-01 PROCEDURE — 74176 CT ABD & PELVIS W/O CONTRAST: CPT | Performed by: INTERNAL MEDICINE

## 2025-01-01 PROCEDURE — 86920 COMPATIBILITY TEST SPIN: CPT

## 2025-01-01 PROCEDURE — 36430 TRANSFUSION BLD/BLD COMPNT: CPT

## 2025-01-01 PROCEDURE — 99233 SBSQ HOSP IP/OBS HIGH 50: CPT | Performed by: NURSE PRACTITIONER

## 2025-01-01 PROCEDURE — 71045 X-RAY EXAM CHEST 1 VIEW: CPT | Performed by: EMERGENCY MEDICINE

## 2025-01-01 PROCEDURE — 78226 HEPATOBILIARY SYSTEM IMAGING: CPT | Performed by: EMERGENCY MEDICINE

## 2025-01-01 PROCEDURE — 86900 BLOOD TYPING SEROLOGIC ABO: CPT

## 2025-01-01 PROCEDURE — 93325 DOPPLER ECHO COLOR FLOW MAPG: CPT | Performed by: PHYSICIAN ASSISTANT

## 2025-01-01 PROCEDURE — 30233R1 TRANSFUSION OF NONAUTOLOGOUS PLATELETS INTO PERIPHERAL VEIN, PERCUTANEOUS APPROACH: ICD-10-PCS | Performed by: EMERGENCY MEDICINE

## 2025-01-01 PROCEDURE — 93308 TTE F-UP OR LMTD: CPT | Performed by: PHYSICIAN ASSISTANT

## 2025-01-01 PROCEDURE — 88184 FLOWCYTOMETRY/ TC 1 MARKER: CPT | Performed by: INTERNAL MEDICINE

## 2025-01-01 PROCEDURE — 86900 BLOOD TYPING SEROLOGIC ABO: CPT | Performed by: EMERGENCY MEDICINE

## 2025-01-01 PROCEDURE — 99223 1ST HOSP IP/OBS HIGH 75: CPT | Performed by: INTERNAL MEDICINE

## 2025-01-01 PROCEDURE — 36415 COLL VENOUS BLD VENIPUNCTURE: CPT

## 2025-01-01 PROCEDURE — 93321 DOPPLER ECHO F-UP/LMTD STD: CPT | Performed by: PHYSICIAN ASSISTANT

## 2025-01-01 PROCEDURE — 76705 ECHO EXAM OF ABDOMEN: CPT

## 2025-01-01 PROCEDURE — 88185 FLOWCYTOMETRY/TC ADD-ON: CPT | Performed by: INTERNAL MEDICINE

## 2025-01-01 PROCEDURE — 99233 SBSQ HOSP IP/OBS HIGH 50: CPT | Performed by: INTERNAL MEDICINE

## 2025-01-01 PROCEDURE — 93971 EXTREMITY STUDY: CPT | Performed by: INTERNAL MEDICINE

## 2025-01-01 PROCEDURE — 86850 RBC ANTIBODY SCREEN: CPT

## 2025-01-01 PROCEDURE — 85025 COMPLETE CBC W/AUTO DIFF WBC: CPT | Performed by: EMERGENCY MEDICINE

## 2025-01-01 PROCEDURE — 86901 BLOOD TYPING SEROLOGIC RH(D): CPT | Performed by: EMERGENCY MEDICINE

## 2025-01-01 PROCEDURE — 71250 CT THORAX DX C-: CPT | Performed by: INTERNAL MEDICINE

## 2025-01-01 RX ORDER — SODIUM CHLORIDE, SODIUM LACTATE, POTASSIUM CHLORIDE, CALCIUM CHLORIDE 600; 310; 30; 20 MG/100ML; MG/100ML; MG/100ML; MG/100ML
INJECTION, SOLUTION INTRAVENOUS CONTINUOUS
Status: DISCONTINUED | OUTPATIENT
Start: 2025-01-01 | End: 2025-01-01

## 2025-01-01 RX ORDER — MORPHINE SULFATE 2 MG/ML
1 INJECTION, SOLUTION INTRAMUSCULAR; INTRAVENOUS
Status: DISCONTINUED | OUTPATIENT
Start: 2025-01-01 | End: 2025-01-01

## 2025-01-01 RX ORDER — HYDROCODONE BITARTRATE AND ACETAMINOPHEN 5; 325 MG/1; MG/1
1 TABLET ORAL EVERY 6 HOURS PRN
Status: DISCONTINUED | OUTPATIENT
Start: 2025-01-01 | End: 2025-01-01

## 2025-01-01 RX ORDER — SODIUM CHLORIDE 0.9 % (FLUSH) 0.9 %
10 SYRINGE (ML) INJECTION AS NEEDED
Status: DISCONTINUED | OUTPATIENT
Start: 2025-01-01 | End: 2025-01-01

## 2025-01-01 RX ORDER — BISACODYL 10 MG
10 SUPPOSITORY, RECTAL RECTAL
Status: DISCONTINUED | OUTPATIENT
Start: 2025-01-01 | End: 2025-01-01

## 2025-01-01 RX ORDER — METOCLOPRAMIDE HYDROCHLORIDE 5 MG/ML
5 INJECTION INTRAMUSCULAR; INTRAVENOUS EVERY 8 HOURS PRN
Status: DISCONTINUED | OUTPATIENT
Start: 2025-01-01 | End: 2025-01-01

## 2025-01-01 RX ORDER — ACETAMINOPHEN 10 MG/ML
1000 INJECTION, SOLUTION INTRAVENOUS EVERY 6 HOURS PRN
Status: DISCONTINUED | OUTPATIENT
Start: 2025-01-01 | End: 2025-01-01

## 2025-01-01 RX ORDER — MAGNESIUM SULFATE HEPTAHYDRATE 40 MG/ML
2 INJECTION, SOLUTION INTRAVENOUS ONCE
Status: COMPLETED | OUTPATIENT
Start: 2025-01-01 | End: 2025-01-01

## 2025-01-01 RX ORDER — ACETAMINOPHEN 500 MG
1000 TABLET ORAL ONCE
Status: COMPLETED | OUTPATIENT
Start: 2025-01-01 | End: 2025-01-01

## 2025-01-01 RX ORDER — MORPHINE SULFATE 20 MG/ML
5 SOLUTION ORAL
Status: DISCONTINUED | OUTPATIENT
Start: 2025-01-01 | End: 2025-01-01

## 2025-01-01 RX ORDER — ONDANSETRON 2 MG/ML
4 INJECTION INTRAMUSCULAR; INTRAVENOUS EVERY 6 HOURS PRN
Status: DISCONTINUED | OUTPATIENT
Start: 2025-01-01 | End: 2025-01-01

## 2025-01-01 RX ORDER — TAMSULOSIN HYDROCHLORIDE 0.4 MG/1
0.4 CAPSULE ORAL DAILY
Status: DISCONTINUED | OUTPATIENT
Start: 2025-01-01 | End: 2025-01-01

## 2025-01-01 RX ORDER — ACETAMINOPHEN 500 MG
500 TABLET ORAL EVERY 4 HOURS PRN
Status: DISCONTINUED | OUTPATIENT
Start: 2025-01-01 | End: 2025-01-01

## 2025-01-01 RX ORDER — POTASSIUM CHLORIDE 14.9 MG/ML
20 INJECTION INTRAVENOUS ONCE
Status: COMPLETED | OUTPATIENT
Start: 2025-01-01 | End: 2025-01-01

## 2025-01-01 RX ORDER — POLYETHYLENE GLYCOL 3350 17 G/17G
17 POWDER, FOR SOLUTION ORAL DAILY PRN
Status: DISCONTINUED | OUTPATIENT
Start: 2025-01-01 | End: 2025-01-01

## 2025-01-01 RX ORDER — DIPHENHYDRAMINE HCL 25 MG
25 CAPSULE ORAL ONCE
Status: COMPLETED | OUTPATIENT
Start: 2025-01-01 | End: 2025-01-01

## 2025-01-01 RX ORDER — SODIUM CHLORIDE 9 MG/ML
INJECTION, SOLUTION INTRAVENOUS ONCE
Status: COMPLETED | OUTPATIENT
Start: 2025-01-01 | End: 2025-01-01

## 2025-01-01 RX ORDER — ACETAMINOPHEN 650 MG/1
650 SUPPOSITORY RECTAL EVERY 4 HOURS PRN
Status: DISCONTINUED | OUTPATIENT
Start: 2025-01-01 | End: 2025-01-01

## 2025-01-01 RX ORDER — MAGNESIUM OXIDE 400 MG/1
800 TABLET ORAL ONCE
Status: COMPLETED | OUTPATIENT
Start: 2025-01-01 | End: 2025-01-01

## 2025-01-01 RX ORDER — SODIUM CHLORIDE 9 MG/ML
INJECTION, SOLUTION INTRAVENOUS ONCE
Status: DISCONTINUED | OUTPATIENT
Start: 2025-01-01 | End: 2025-01-01

## 2025-01-01 RX ORDER — LORAZEPAM 2 MG/ML
2 INJECTION INTRAMUSCULAR EVERY 4 HOURS PRN
Status: DISCONTINUED | OUTPATIENT
Start: 2025-01-01 | End: 2025-01-01

## 2025-01-01 RX ORDER — THIAMINE HYDROCHLORIDE 100 MG/ML
200 INJECTION, SOLUTION INTRAMUSCULAR; INTRAVENOUS 2 TIMES DAILY
Status: DISCONTINUED | OUTPATIENT
Start: 2025-01-01 | End: 2025-01-01

## 2025-01-01 RX ORDER — MELATONIN
200 2 TIMES DAILY
Status: DISCONTINUED | OUTPATIENT
Start: 2025-01-01 | End: 2025-01-01

## 2025-01-01 RX ORDER — SCOPOLAMINE 1 MG/3D
1 PATCH, EXTENDED RELEASE TRANSDERMAL
Status: DISCONTINUED | OUTPATIENT
Start: 2025-01-01 | End: 2025-01-01

## 2025-01-01 RX ORDER — GLYCOPYRROLATE 0.2 MG/ML
0.4 INJECTION, SOLUTION INTRAMUSCULAR; INTRAVENOUS
Status: DISCONTINUED | OUTPATIENT
Start: 2025-01-01 | End: 2025-01-01

## 2025-01-01 RX ORDER — ERTAPENEM 1 G/1
1 INJECTION, POWDER, LYOPHILIZED, FOR SOLUTION INTRAMUSCULAR; INTRAVENOUS DAILY
Qty: 10 EACH | Refills: 0 | Status: SHIPPED | OUTPATIENT
Start: 2025-01-01 | End: 2025-02-16

## 2025-01-01 RX ORDER — POTASSIUM CHLORIDE 1500 MG/1
40 TABLET, EXTENDED RELEASE ORAL ONCE
Status: COMPLETED | OUTPATIENT
Start: 2025-01-01 | End: 2025-01-01

## 2025-01-01 RX ORDER — METOPROLOL SUCCINATE 50 MG/1
50 TABLET, EXTENDED RELEASE ORAL DAILY
Status: DISCONTINUED | OUTPATIENT
Start: 2025-01-01 | End: 2025-01-01

## 2025-01-01 RX ORDER — LIDOCAINE AND PRILOCAINE 25; 25 MG/G; MG/G
CREAM TOPICAL
COMMUNITY
Start: 2025-01-01

## 2025-01-01 RX ORDER — HALOPERIDOL 5 MG/ML
2 INJECTION INTRAMUSCULAR
Status: DISCONTINUED | OUTPATIENT
Start: 2025-01-01 | End: 2025-01-01

## 2025-01-01 RX ORDER — BENZONATATE 100 MG/1
200 CAPSULE ORAL 3 TIMES DAILY PRN
Status: DISCONTINUED | OUTPATIENT
Start: 2025-01-01 | End: 2025-01-01

## 2025-01-01 RX ORDER — HALOPERIDOL 5 MG/ML
1 INJECTION INTRAMUSCULAR
Status: DISCONTINUED | OUTPATIENT
Start: 2025-01-01 | End: 2025-01-01

## 2025-01-01 RX ORDER — DOCUSATE SODIUM 100 MG/1
100 CAPSULE, LIQUID FILLED ORAL 2 TIMES DAILY
Status: DISCONTINUED | OUTPATIENT
Start: 2025-01-01 | End: 2025-01-01

## 2025-01-01 RX ORDER — SENNOSIDES 8.6 MG
17.2 TABLET ORAL NIGHTLY PRN
Status: DISCONTINUED | OUTPATIENT
Start: 2025-01-01 | End: 2025-01-01

## 2025-01-01 RX ORDER — MAGNESIUM OXIDE 400 MG/1
400 TABLET ORAL ONCE
Status: COMPLETED | OUTPATIENT
Start: 2025-01-01 | End: 2025-01-01

## 2025-01-01 RX ORDER — ONDANSETRON 8 MG/1
TABLET, ORALLY DISINTEGRATING ORAL
COMMUNITY
Start: 2024-01-01

## 2025-01-01 RX ORDER — METOCLOPRAMIDE 5 MG/1
5 TABLET ORAL 3 TIMES DAILY
Status: DISCONTINUED | OUTPATIENT
Start: 2025-01-01 | End: 2025-01-01

## 2025-01-01 RX ORDER — FUROSEMIDE 10 MG/ML
40 INJECTION INTRAMUSCULAR; INTRAVENOUS EVERY 8 HOURS PRN
Status: DISCONTINUED | OUTPATIENT
Start: 2025-01-01 | End: 2025-01-01

## 2025-01-01 RX ORDER — DIPHENHYDRAMINE HCL 25 MG
50 CAPSULE ORAL
Status: COMPLETED | OUTPATIENT
Start: 2025-01-01 | End: 2025-01-01

## 2025-01-01 RX ORDER — MORPHINE SULFATE 2 MG/ML
1 INJECTION, SOLUTION INTRAMUSCULAR; INTRAVENOUS EVERY 2 HOUR PRN
Status: DISCONTINUED | OUTPATIENT
Start: 2025-01-01 | End: 2025-01-01

## 2025-01-01 RX ORDER — METOCLOPRAMIDE HYDROCHLORIDE 5 MG/ML
5 INJECTION INTRAMUSCULAR; INTRAVENOUS 3 TIMES DAILY
Status: DISCONTINUED | OUTPATIENT
Start: 2025-01-01 | End: 2025-01-01

## 2025-01-01 RX ORDER — DEXTROSE MONOHYDRATE AND SODIUM CHLORIDE 5; .45 G/100ML; G/100ML
INJECTION, SOLUTION INTRAVENOUS CONTINUOUS
Status: DISCONTINUED | OUTPATIENT
Start: 2025-01-01 | End: 2025-01-01

## 2025-01-01 RX ORDER — ACETAMINOPHEN 325 MG/1
650 TABLET ORAL
Status: COMPLETED | OUTPATIENT
Start: 2025-01-01 | End: 2025-01-01

## 2025-01-01 RX ORDER — SODIUM PHOSPHATE, DIBASIC AND SODIUM PHOSPHATE, MONOBASIC 7; 19 G/230ML; G/230ML
1 ENEMA RECTAL ONCE AS NEEDED
Status: DISCONTINUED | OUTPATIENT
Start: 2025-01-01 | End: 2025-01-01

## 2025-01-01 RX ORDER — ALLOPURINOL 100 MG/1
100 TABLET ORAL DAILY
Status: DISCONTINUED | OUTPATIENT
Start: 2025-01-01 | End: 2025-01-01

## 2025-01-01 RX ORDER — LORAZEPAM 2 MG/ML
1 INJECTION INTRAMUSCULAR EVERY 4 HOURS PRN
Status: DISCONTINUED | OUTPATIENT
Start: 2025-01-01 | End: 2025-01-01

## 2025-01-01 RX ORDER — ALBUMIN (HUMAN) 12.5 G/50ML
25 SOLUTION INTRAVENOUS EVERY 6 HOURS
Status: DISCONTINUED | OUTPATIENT
Start: 2025-01-01 | End: 2025-01-01

## 2025-01-01 RX ORDER — VANCOMYCIN HYDROCHLORIDE
1250 EVERY 24 HOURS
Status: DISCONTINUED | OUTPATIENT
Start: 2025-01-01 | End: 2025-01-01

## 2025-01-01 RX ORDER — ATROPINE SULFATE 10 MG/ML
2 SOLUTION/ DROPS OPHTHALMIC EVERY 2 HOUR PRN
Status: DISCONTINUED | OUTPATIENT
Start: 2025-01-01 | End: 2025-01-01

## 2025-01-01 RX ORDER — ACETAMINOPHEN 325 MG/1
650 TABLET ORAL ONCE
Status: COMPLETED | OUTPATIENT
Start: 2025-01-01 | End: 2025-01-01

## 2025-01-02 ENCOUNTER — NURSE ONLY (OUTPATIENT)
Age: 75
End: 2025-01-02
Attending: INTERNAL MEDICINE
Payer: MEDICARE

## 2025-01-02 DIAGNOSIS — D61.818 PANCYTOPENIA (HCC): ICD-10-CM

## 2025-01-02 DIAGNOSIS — D46.9 MDS (MYELODYSPLASTIC SYNDROME) (HCC): Primary | ICD-10-CM

## 2025-01-02 LAB
ANTIBODY SCREEN: NEGATIVE
RH BLOOD TYPE: NEGATIVE

## 2025-01-03 ENCOUNTER — OFFICE VISIT (OUTPATIENT)
Age: 75
End: 2025-01-03
Attending: INTERNAL MEDICINE
Payer: MEDICARE

## 2025-01-03 VITALS
TEMPERATURE: 98 F | HEART RATE: 57 BPM | RESPIRATION RATE: 16 BRPM | OXYGEN SATURATION: 100 % | SYSTOLIC BLOOD PRESSURE: 108 MMHG | DIASTOLIC BLOOD PRESSURE: 51 MMHG

## 2025-01-03 DIAGNOSIS — D61.818 PANCYTOPENIA (HCC): ICD-10-CM

## 2025-01-03 DIAGNOSIS — D46.9 MDS (MYELODYSPLASTIC SYNDROME) (HCC): Primary | ICD-10-CM

## 2025-01-03 NOTE — PATIENT INSTRUCTIONS
Post Transfusion Instructions for Out-Patients    Most recipients of blood transfusions do not experience any adverse effects.  You may resume your normal activities 4 to 6 hours after your blood transfusion.  Occasionally, reactions of blood transfusions may be delayed (for several weeks).    Symptoms of a transfusion reaction can include:    Faintness  Weakness  Nausea  Vomiting  Shortness of breath  Chest pain  Back pain  Hives  Rash  Itch  Flushing  Fever  Chills  Jaundice (yellowish tint to eyes/skin)  Dark or red urine    Though these symptoms may not be related to the blood transfusions, they should be reported to your physician.  Contact both your physician and the laboratory Blood Bank (see information below) so that your symptoms can be thoroughly evaluated.    Your physician's name: Dr Víctor Alfaro    Your physician's phone number: [phone]    If your physician is unavailable, contact the Emergency Department.    [facility] Blood Bank:  [phone]  [facility] Emergency Department:  [phone]    [facility] Blood Bank: [phone]  [facility] Emergency Department: [phone]

## 2025-01-04 LAB
BLOOD TYPE BARCODE: 9500
UNIT VOLUME: 350 ML

## 2025-01-07 ENCOUNTER — NURSE ONLY (OUTPATIENT)
Age: 75
End: 2025-01-07
Attending: INTERNAL MEDICINE
Payer: MEDICARE

## 2025-01-07 ENCOUNTER — LAB REQUISITION (OUTPATIENT)
Age: 75
End: 2025-01-07
Payer: MEDICARE

## 2025-01-07 DIAGNOSIS — D61.818 PANCYTOPENIA (HCC): ICD-10-CM

## 2025-01-07 DIAGNOSIS — D46.9 MDS (MYELODYSPLASTIC SYNDROME) (HCC): ICD-10-CM

## 2025-01-07 DIAGNOSIS — D46.9 MDS (MYELODYSPLASTIC SYNDROME) (HCC): Primary | ICD-10-CM

## 2025-01-07 LAB
ANTIBODY SCREEN: NEGATIVE
RH BLOOD TYPE: NEGATIVE

## 2025-01-08 ENCOUNTER — OFFICE VISIT (OUTPATIENT)
Age: 75
End: 2025-01-08
Attending: INTERNAL MEDICINE
Payer: MEDICARE

## 2025-01-08 VITALS
RESPIRATION RATE: 18 BRPM | TEMPERATURE: 96 F | HEART RATE: 57 BPM | SYSTOLIC BLOOD PRESSURE: 125 MMHG | DIASTOLIC BLOOD PRESSURE: 72 MMHG | OXYGEN SATURATION: 100 %

## 2025-01-08 DIAGNOSIS — D46.9 MDS (MYELODYSPLASTIC SYNDROME) (HCC): Primary | ICD-10-CM

## 2025-01-08 DIAGNOSIS — D61.818 PANCYTOPENIA (HCC): ICD-10-CM

## 2025-01-08 NOTE — PATIENT INSTRUCTIONS
Post Transfusion Instructions for Out-Patients    Most recipients of blood transfusions do not experience any adverse effects.  You may resume your normal activities 4 to 6 hours after your blood transfusion.  Occasionally, reactions of blood transfusions may be delayed (for several weeks).    Symptoms of a transfusion reaction can include:    Faintness  Weakness  Nausea  Vomiting  Shortness of breath  Chest pain  Back pain  Hives  Rash  Itch  Flushing  Fever  Chills  Jaundice (yellowish tint to eyes/skin)  Dark or red urine    Though these symptoms may not be related to the blood transfusions, they should be reported to your physician.  Contact both your physician and the laboratory Blood Bank (see information below) so that your symptoms can be thoroughly evaluated.    Your physician's name: Dr Víctor Alfaro   Your physician's phone number: [phone]    If your physician is unavailable, contact the Emergency Department.    [facility] Blood Bank:  [phone]  [facility] Emergency Department:  [phone]    [facility] Blood Bank: [phone]  [facility] Emergency Department: [phone]

## 2025-01-09 LAB
BLOOD TYPE BARCODE: 6200
BLOOD TYPE BARCODE: 9500
UNIT VOLUME: 195 ML
UNIT VOLUME: 350 ML

## 2025-01-14 ENCOUNTER — NURSE ONLY (OUTPATIENT)
Age: 75
End: 2025-01-14
Attending: INTERNAL MEDICINE
Payer: MEDICARE

## 2025-01-14 DIAGNOSIS — D46.9 MDS (MYELODYSPLASTIC SYNDROME) (HCC): ICD-10-CM

## 2025-01-14 DIAGNOSIS — D61.818 PANCYTOPENIA (HCC): ICD-10-CM

## 2025-01-14 LAB
ANTIBODY SCREEN: NEGATIVE
RH BLOOD TYPE: NEGATIVE

## 2025-01-15 ENCOUNTER — OFFICE VISIT (OUTPATIENT)
Age: 75
End: 2025-01-15
Attending: INTERNAL MEDICINE
Payer: MEDICARE

## 2025-01-15 ENCOUNTER — APPOINTMENT (OUTPATIENT)
Age: 75
End: 2025-01-15
Attending: INTERNAL MEDICINE
Payer: MEDICARE

## 2025-01-15 VITALS
SYSTOLIC BLOOD PRESSURE: 136 MMHG | RESPIRATION RATE: 16 BRPM | OXYGEN SATURATION: 100 % | TEMPERATURE: 97 F | DIASTOLIC BLOOD PRESSURE: 74 MMHG | HEART RATE: 60 BPM

## 2025-01-15 DIAGNOSIS — D46.9 MDS (MYELODYSPLASTIC SYNDROME) (HCC): Primary | ICD-10-CM

## 2025-01-15 DIAGNOSIS — D61.818 PANCYTOPENIA (HCC): ICD-10-CM

## 2025-01-15 NOTE — PROGRESS NOTES
Pt here for 2 units PRBC and 1 unit Platelets . Pt denies any issues or concerns.      Ordering Provider: Dr. Alfaro  Order Exp: after today's transfusion     Pt tolerated infusion without difficulty or complaint. Reviewed next apt date/time: as needed, nothing scheduled at this time      Education Record  Learner:  Patient  Disease / Diagnosis: MDS  Barriers / Limitations:  None  Method:  Discussion and Reinforcement  General Topics:  Medication, Procedure, Side effects and symptom management, and Plan of care reviewed  Outcome:  Shows understanding

## 2025-01-15 NOTE — PATIENT INSTRUCTIONS
Post Transfusion Instructions for Out-Patients    Most recipients of blood transfusions do not experience any adverse effects.  You may resume your normal activities 4 to 6 hours after your blood transfusion.  Occasionally, reactions of blood transfusions may be delayed (for several weeks).    Symptoms of a transfusion reaction can include:    Faintness  Weakness  Nausea  Vomiting  Shortness of breath  Chest pain  Back pain  Hives  Rash  Itch  Flushing  Fever  Chills  Jaundice (yellowish tint to eyes/skin)  Dark or red urine    Though these symptoms may not be related to the blood transfusions, they should be reported to your physician.  Contact both your physician and the laboratory Blood Bank (see information below) so that your symptoms can be thoroughly evaluated.    Your physician's name: Dr. Alfaro  Your physician's phone number: [phone]    If your physician is unavailable, contact the Emergency Department.    [facility] Blood Bank:  [phone]  [facility] Emergency Department:  [phone]    [facility] Blood Bank: [phone]  [facility] Emergency Department: [phone]

## 2025-01-27 ENCOUNTER — HOSPITAL ENCOUNTER (EMERGENCY)
Facility: HOSPITAL | Age: 75
Discharge: HOME | End: 2025-01-27
Attending: STUDENT IN AN ORGANIZED HEALTH CARE EDUCATION/TRAINING PROGRAM
Payer: MEDICARE

## 2025-01-27 VITALS
OXYGEN SATURATION: 100 % | SYSTOLIC BLOOD PRESSURE: 125 MMHG | TEMPERATURE: 98 F | DIASTOLIC BLOOD PRESSURE: 61 MMHG | WEIGHT: 161 LBS | HEIGHT: 72 IN | BODY MASS INDEX: 21.81 KG/M2 | RESPIRATION RATE: 23 BRPM | HEART RATE: 63 BPM

## 2025-01-27 DIAGNOSIS — D64.9 ANEMIA, UNSPECIFIED TYPE: Primary | ICD-10-CM

## 2025-01-27 LAB
ANTIBODY SCREEN: NEGATIVE
BASOPHILS # BLD AUTO: 0.01 X10(3) UL (ref 0–0.2)
BASOPHILS NFR BLD AUTO: 0.8 %
EOSINOPHIL # BLD AUTO: 0 X10(3) UL (ref 0–0.7)
EOSINOPHIL NFR BLD AUTO: 0 %
ERYTHROCYTE [DISTWIDTH] IN BLOOD BY AUTOMATED COUNT: 14.5 %
HCT VFR BLD AUTO: 18.5 %
HGB BLD-MCNC: 6.1 G/DL
IMM GRANULOCYTES # BLD AUTO: 0.01 X10(3) UL (ref 0–1)
IMM GRANULOCYTES NFR BLD: 0.8 %
LYMPHOCYTES # BLD AUTO: 0.68 X10(3) UL (ref 1–4)
LYMPHOCYTES NFR BLD AUTO: 52.3 %
MCH RBC QN AUTO: 28.9 PG (ref 26–34)
MCHC RBC AUTO-ENTMCNC: 33 G/DL (ref 31–37)
MCV RBC AUTO: 87.7 FL
MONOCYTES # BLD AUTO: 0.08 X10(3) UL (ref 0.1–1)
MONOCYTES NFR BLD AUTO: 6.2 %
NEUTROPHILS # BLD AUTO: 0.52 X10 (3) UL (ref 1.5–7.7)
NEUTROPHILS # BLD AUTO: 0.52 X10(3) UL (ref 1.5–7.7)
NEUTROPHILS NFR BLD AUTO: 39.9 %
PLATELET # BLD AUTO: 22 10(3)UL (ref 150–450)
PLATELETS.RETICULATED NFR BLD AUTO: 56.2 % (ref 0–7)
RBC # BLD AUTO: 2.11 X10(6)UL
RH BLOOD TYPE: NEGATIVE
WBC # BLD AUTO: 1.3 X10(3) UL (ref 4–11)

## 2025-01-27 PROCEDURE — 86900 BLOOD TYPING SEROLOGIC ABO: CPT | Performed by: STUDENT IN AN ORGANIZED HEALTH CARE EDUCATION/TRAINING PROGRAM

## 2025-01-27 PROCEDURE — 85025 COMPLETE CBC W/AUTO DIFF WBC: CPT | Performed by: STUDENT IN AN ORGANIZED HEALTH CARE EDUCATION/TRAINING PROGRAM

## 2025-01-27 PROCEDURE — 86850 RBC ANTIBODY SCREEN: CPT | Performed by: STUDENT IN AN ORGANIZED HEALTH CARE EDUCATION/TRAINING PROGRAM

## 2025-01-27 PROCEDURE — 85025 COMPLETE CBC W/AUTO DIFF WBC: CPT

## 2025-01-27 PROCEDURE — 86901 BLOOD TYPING SEROLOGIC RH(D): CPT | Performed by: STUDENT IN AN ORGANIZED HEALTH CARE EDUCATION/TRAINING PROGRAM

## 2025-01-27 PROCEDURE — 86901 BLOOD TYPING SEROLOGIC RH(D): CPT

## 2025-01-27 PROCEDURE — 86850 RBC ANTIBODY SCREEN: CPT

## 2025-01-27 PROCEDURE — 86920 COMPATIBILITY TEST SPIN: CPT

## 2025-01-27 PROCEDURE — 99284 EMERGENCY DEPT VISIT MOD MDM: CPT

## 2025-01-27 PROCEDURE — 36430 TRANSFUSION BLD/BLD COMPNT: CPT

## 2025-01-27 PROCEDURE — 99285 EMERGENCY DEPT VISIT HI MDM: CPT

## 2025-01-27 PROCEDURE — 86900 BLOOD TYPING SEROLOGIC ABO: CPT

## 2025-01-27 NOTE — ED PROVIDER NOTES
History     Chief Complaint   Patient presents with    Abnormal Labs    Fatigue       HPI    74 year old male history of myelodysplastic syndrome on chemo sent in for anemia.  Patient had infusion earlier today, hemoglobin noted to be in the fives.  Patient states during the weekend he feels more fatigued than normal, has frequent blood transfusions due to anemia.  No other bleeding or bruising.          Past Medical History:    Atherosclerosis of abdominal aorta    Cancer (HCC)    skin cancer-squamous cell    Dermatitis    Resolved per note 3/7/19     Essential hypertension    Gout    Gout, unspecified    Resolved as stable per note 3/7/19    Greater trochanteric bursitis, right    Resolved last addressed 9/4/14    High blood pressure    High cholesterol    HTN (hypertension)    Hyperlipidemia    Lumbar radiculitis    Resolved per note 3/7/19    MDS (myelodysplastic syndrome) (HCC)    Osteoarthritis    right hip    Status post total replacement of right hip       Past Surgical History:   Procedure Laterality Date    Colonoscopy      Colonoscopy N/A 6/27/2016    Procedure: COLONOSCOPY;  Surgeon: Elvis Warren MD;  Location:  ENDOSCOPY    Hip replacement surgery      Other      wisdom teeth extraction       Social History     Socioeconomic History    Marital status:     Number of children: 1   Occupational History    Occupation: [employer] Center   Tobacco Use    Smoking status: Former     Current packs/day: 1.00     Average packs/day: 1 pack/day for 35.0 years (35.0 ttl pk-yrs)     Types: Cigarettes     Passive exposure: Never    Smokeless tobacco: Never   Vaping Use    Vaping status: Never Used   Substance and Sexual Activity    Alcohol use: Yes     Alcohol/week: 0.0 standard drinks of alcohol     Comment: rarely    Drug use: No    Sexual activity: Yes     Partners: Female   Other Topics Concern     Service No    Sleep Concern No    Exercise Yes    Seat Belt Yes     Social Drivers of Health      Food Insecurity: No Food Insecurity (10/2/2024)    Food Insecurity     Food Insecurity: Never true   Transportation Needs: No Transportation Needs (10/2/2024)    Transportation Needs     Lack of Transportation: No   Housing Stability: Low Risk  (10/2/2024)    Housing Stability     Housing Instability: No                   Physical Exam     ED Triage Vitals [01/27/25 1155]   /61   Pulse 62   Resp 18   Temp 97.6 °F (36.4 °C)   Temp src Temporal   SpO2 100 %   O2 Device None (Room air)       Physical Exam  Constitutional:       General: He is not in acute distress.  Eyes:      Extraocular Movements: Extraocular movements intact.   Cardiovascular:      Rate and Rhythm: Normal rate.      Pulses: Normal pulses.   Pulmonary:      Effort: Pulmonary effort is normal. No respiratory distress.   Abdominal:      General: Abdomen is flat. There is no distension.      Tenderness: There is no abdominal tenderness.   Musculoskeletal:      Cervical back: Normal range of motion.   Skin:     General: Skin is warm.   Neurological:      General: No focal deficit present.      Mental Status: He is alert.              ED Course     Labs Reviewed   CBC WITH DIFFERENTIAL WITH PLATELET - Abnormal; Notable for the following components:       Result Value    WBC 1.3 (*)     RBC 2.11 (*)     HGB 6.1 (*)     HCT 18.5 (*)     PLT 22.0 (*)     Immature Platelet Fraction 56.2 (*)     Neutrophil Absolute Prelim 0.52 (*)     Neutrophil Absolute 0.52 (*)     Lymphocyte Absolute 0.68 (*)     Monocyte Absolute 0.08 (*)     All other components within normal limits   SCAN SLIDE   TYPE AND SCREEN    Narrative:     The following orders were created for panel order Type and screen.  Procedure                               Abnormality         Status                     ---------                               -----------         ------                     ABORH (Blood Type)[[id number]]                               Final result               Antibody  Screen[[id number]]                                  Final result                 Please view results for these tests on the individual orders.   ABORH (BLOOD TYPE)   ANTIBODY SCREEN   PREPARE RBC   RAINBOW DRAW LAVENDER   RAINBOW DRAW LIGHT GREEN   RAINBOW DRAW BLUE     No results found.        [facility]     Vitals:    01/27/25 1438 01/27/25 1453 01/27/25 1530 01/27/25 1600   BP: 124/68 149/55 139/62 146/54   Pulse: 57 62 60 58   Resp: 18 18 17 16   Temp: 97.7 °F (36.5 °C) 97.3 °F (36.3 °C)     TempSrc: Temporal      SpO2: 100% 100% 100% 100%   Weight:       Height:           Acute on chronic anemia on chemo for myelodysplastic syndrome.  Vitals are reassuring.  Mentating perfusing appropriately.  Plan for blood transfusion, discussed with hematology, recommends irradiated blood 1 unit.    ED Course as of 01/27/25 1625  ------------------------------------------------------------  Time: 01/27 1304  Comment: Per chart review, baseline Hgb appears to be 6-7  ------------------------------------------------------------  Time: 01/27 1619  Comment: Hemodynamically stable and comfortable here.  Will plan for discharge home after transfusion.  Follow-up with hematology.         Disposition and Plan     Clinical Impression:  1. Anemia, unspecified type        Disposition:  Discharge    Follow-up:  hematology    Follow up        Medications Prescribed:  Current Discharge Medication List

## 2025-01-28 ENCOUNTER — TELEPHONE (OUTPATIENT)
Age: 75
End: 2025-01-28

## 2025-01-28 LAB
BLOOD TYPE BARCODE: 9500
UNIT VOLUME: 350 ML

## 2025-01-28 NOTE — TELEPHONE ENCOUNTER
Called patient on his preferred contact #, but no answer. Called his mobile phone and left a message that he does not have to come the day before his blood transfusion for PL T&S as the T&S that was drawn on 1/27/25 in the ER is good until 1/30/25 midnight. This was verified by blood bank. Patient asked to call back if he has any questions.

## 2025-01-30 ENCOUNTER — OFFICE VISIT (OUTPATIENT)
Age: 75
End: 2025-01-30
Attending: INTERNAL MEDICINE
Payer: MEDICARE

## 2025-01-30 VITALS
DIASTOLIC BLOOD PRESSURE: 59 MMHG | TEMPERATURE: 97 F | HEART RATE: 55 BPM | RESPIRATION RATE: 16 BRPM | OXYGEN SATURATION: 100 % | SYSTOLIC BLOOD PRESSURE: 126 MMHG

## 2025-01-30 DIAGNOSIS — D61.818 PANCYTOPENIA (HCC): ICD-10-CM

## 2025-01-30 DIAGNOSIS — D46.9 MDS (MYELODYSPLASTIC SYNDROME) (HCC): Primary | ICD-10-CM

## 2025-01-30 LAB
BASOPHILS # BLD AUTO: 0.01 X10(3) UL (ref 0–0.2)
BASOPHILS NFR BLD AUTO: 0.7 %
EOSINOPHIL # BLD AUTO: 0 X10(3) UL (ref 0–0.7)
EOSINOPHIL NFR BLD AUTO: 0 %
ERYTHROCYTE [DISTWIDTH] IN BLOOD BY AUTOMATED COUNT: 14.7 %
HCT VFR BLD AUTO: 19.4 %
HGB BLD-MCNC: 6.2 G/DL
IMM GRANULOCYTES # BLD AUTO: 0 X10(3) UL (ref 0–1)
IMM GRANULOCYTES NFR BLD: 0 %
LYMPHOCYTES # BLD AUTO: 0.73 X10(3) UL (ref 1–4)
LYMPHOCYTES NFR BLD AUTO: 53.7 %
MCH RBC QN AUTO: 28.7 PG (ref 26–34)
MCHC RBC AUTO-ENTMCNC: 32 G/DL (ref 31–37)
MCV RBC AUTO: 89.8 FL
MONOCYTES # BLD AUTO: 0.05 X10(3) UL (ref 0.1–1)
MONOCYTES NFR BLD AUTO: 3.7 %
NEUTROPHILS # BLD AUTO: 0.57 X10 (3) UL (ref 1.5–7.7)
NEUTROPHILS # BLD AUTO: 0.57 X10(3) UL (ref 1.5–7.7)
NEUTROPHILS NFR BLD AUTO: 41.9 %
PLATELET # BLD AUTO: 54 10(3)UL (ref 150–450)
PLATELETS.RETICULATED NFR BLD AUTO: 13.7 % (ref 0–7)
RBC # BLD AUTO: 2.16 X10(6)UL
WBC # BLD AUTO: 1.4 X10(3) UL (ref 4–11)

## 2025-01-30 NOTE — PROGRESS NOTES
Cbc drawn per order after blood and platelet infusions. Hgb 6.2, plt 54-Humberto, NP at Dr Alfaro's office notified-no new orders given. Pt to go for port insertion tomorrow.

## 2025-01-30 NOTE — PATIENT INSTRUCTIONS
Post Transfusion Instructions for Out-Patients    Most recipients of blood transfusions do not experience any adverse effects.  You may resume your normal activities 4 to 6 hours after your blood transfusion.  Occasionally, reactions of blood transfusions may be delayed (for several weeks).    Symptoms of a transfusion reaction can include:    Faintness  Weakness  Nausea  Vomiting  Shortness of breath  Chest pain  Back pain  Hives  Rash  Itch  Flushing  Fever  Chills  Jaundice (yellowish tint to eyes/skin)  Dark or red urine    Though these symptoms may not be related to the blood transfusions, they should be reported to your physician.  Contact both your physician and the laboratory Blood Bank (see information below) so that your symptoms can be thoroughly evaluated.    Your physician's name: Dr Víctor Alfaro   Your physician's phone number: [phone]    If your physician is unavailable, contact the Emergency Department.    [facility] Blood Bank:  [phone]  [facility] Emergency Department:  [phone]    [facility] Blood Bank: [phone]  [facility] Emergency Department: [phone]

## 2025-02-04 ENCOUNTER — HOSPITAL ENCOUNTER (EMERGENCY)
Facility: HOSPITAL | Age: 75
Discharge: HOME | End: 2025-02-04
Attending: EMERGENCY MEDICINE
Payer: MEDICARE

## 2025-02-04 NOTE — ED QUICK NOTES
Patient's new port accessed prior to ED visit, still bleeding from port. New dressing applied, bleeding controlled. Patient mentions that his missed his AM cancer medications

## 2025-02-04 NOTE — ED PROVIDER NOTES
Patient Seen in: [facility] Emergency Department      History     Chief Complaint   Patient presents with    Abnormal Labs     Stated Complaint: hemoglobin 5.1, needs transfusion    Subjective:   HPI  74-year-old male presents to the emergency department to receive a blood transfusion for hemoglobin of 5.1 earlier today.  Patient was seen on 1/30/2025 on review of his records for MDS.  He has been transfused in the past and was seen her on 1/27/2025 for transfusion.  He denies fevers or chills.  Has been feeling fatigued.  He recently had a catheter placed in the chest and is noticed he had some bleeding from the site.    Objective:     Past Medical History:    Atherosclerosis of abdominal aorta    Cancer (HCC)    skin cancer-squamous cell    Dermatitis    Resolved per note 3/7/19     Essential hypertension    Gout    Gout, unspecified    Resolved as stable per note 3/7/19    Greater trochanteric bursitis, right    Resolved last addressed 9/4/14    High blood pressure    High cholesterol    HTN (hypertension)    Hyperlipidemia    Lumbar radiculitis    Resolved per note 3/7/19    MDS (myelodysplastic syndrome) (HCC)    Osteoarthritis    right hip    Status post total replacement of right hip              Past Surgical History:   Procedure Laterality Date    Colonoscopy      Colonoscopy N/A 6/27/2016    Procedure: COLONOSCOPY;  Surgeon: Elvis Warren MD;  Location:  ENDOSCOPY    Hip replacement surgery      Other      wisdom teeth extraction                Social History     Socioeconomic History    Marital status:     Number of children: 1   Occupational History    Occupation: [employer]   Tobacco Use    Smoking status: Former     Current packs/day: 1.00     Average packs/day: 1 pack/day for 35.0 years (35.0 ttl pk-yrs)     Types: Cigarettes     Passive exposure: Never    Smokeless tobacco: Never   Vaping Use    Vaping status: Never Used   Substance and Sexual Activity    Alcohol use: Yes      Alcohol/week: 0.0 standard drinks of alcohol     Comment: rarely    Drug use: No    Sexual activity: Yes     Partners: Female   Other Topics Concern     Service No    Sleep Concern No    Exercise Yes    Seat Belt Yes     Social Drivers of Health     Food Insecurity: No Food Insecurity (10/2/2024)    Food Insecurity     Food Insecurity: Never true   Transportation Needs: No Transportation Needs (10/2/2024)    Transportation Needs     Lack of Transportation: No   Housing Stability: Low Risk  (10/2/2024)    Housing Stability     Housing Instability: No                  Physical Exam     ED Triage Vitals [02/04/25 1008]   /44   Pulse 65   Resp 10   Temp 98.9 °F (37.2 °C)   Temp src Temporal   SpO2 100 %   O2 Device None (Room air)       Current Vitals:   Vital Signs  BP: 126/50  Pulse: 60  Resp: 23  Temp: 97.8 °F (36.6 °C)  Temp src: Oral  MAP (mmHg): 72    Oxygen Therapy  SpO2: 100 %  O2 Device: None (Room air)        Physical Exam  General: 74-year-old male pale appearing in no distress.  HEENT: Pupils are active light extract motion intact.  There is conjunctival pallor noted.  Lungs: Clear without wheezes or rhonchi.  Cardiac: Heart rate of 70 normal S1-2 without murmurs or ectopy  Chest wall: Catheter site blood noted at the catheter site without significant hemorrhage.  There is bruising noted surrounding the site.  Extremities: Moving all 4 without difficulty.  No deformities.    ED Course     Labs Reviewed   CBC WITH DIFFERENTIAL WITH PLATELET - Abnormal; Notable for the following components:       Result Value    WBC 0.5 (*)     RBC 1.99 (*)     HGB 5.7 (*)     HCT 17.0 (*)     PLT 15.0 (*)     Immature Platelet Fraction 22.0 (*)     Neutrophil Absolute Prelim 0.01 (*)     Neutrophil Absolute 0.01 (*)     Lymphocyte Absolute 0.51 (*)     Monocyte Absolute 0.01 (*)     All other components within normal limits   SCAN SLIDE   TYPE AND SCREEN    Narrative:     The following orders were created for  panel order Type and screen.  Procedure                               Abnormality         Status                     ---------                               -----------         ------                     ABORH (Blood Type)[[id number]]                               Final result               Antibody Screen[[id number]]                                  Final result                 Please view results for these tests on the individual orders.   ABORH (BLOOD TYPE)   ANTIBODY SCREEN   PREPARE RBC   RAINBOW DRAW LAVENDER   RAINBOW DRAW LIGHT GREEN   RAINBOW DRAW BLUE                   MDM    Differential diagnosis includes but is not limited to symptomatic anemia, myelodysplastic syndrome.  The patient had laboratory sent.  White count was 500.  Hemoglobin of 5.7.  Platelet count of 15,000.  I did speak to the patient's oncology service made them aware of the patient's neutropenia.  They state it would not change to the patient receiving the transfusion.  They agree he should receive 1 unit of blood and he will then be discharged to follow-up as an outpatient with his oncology service.  The patient be transfused and discharged after transfusion is completed.  A total of 45 minutes of critical care time (exclusive of billable procedures) was administered to manage the patient's critical lab values due to his anemia secondary to myelodysplastic syndrome requiring transfusion.  This involved direct patient intervention, complex decision making, and/or extensive discussions with the patient, family, and clinical staff.  Note to Patient  The 21st Century Cures Act makes medical notes like these available to patients in the interest of transparency. However, be advised this is a medical document and is intended as [id number] communication; it is written in medical language and may appear blunt, direct, or contain abbreviations or verbiage that are unfamiliar. Medical documents are intended to carry relevant information, facts  as evident, and the clinical opinion of the practitioner.  Patient was evaluated and a screening exam was performed.   As a treating physician attending to the patient, I determined, within reasonable clinical confidence and prior to discharge, that an emergency medical condition was not or was no longer present.  There was no indication for further evaluation, treatment or admission on an emergency basis.  Comprehensive verbal and written discharge and follow-up instructions were provided to help prevent relapse or worsening.  Patient was instructed to follow-up with their primary care provider for further evaluation and treatment, but to return immediately to the ER for worsening, concerning, new, changing or persisting symptoms.  I discussed the case with the patient and they had no questions, complaints, or concerns.  Patient felt comfortable going home.  ^^Please note that this report has been produced using speech recognition software and may contain errors related to that system including, but not limited to, errors in grammar, punctuation, and spelling, as well as words and phrases that possibly may have been recognized inappropriately.  If there are any questions or concerns, contact the dictating provider for clarification.  Medical Decision Making      Disposition and Plan     Clinical Impression:  1. Myelodysplastic syndrome (HCC)         Disposition:  There is no disposition on file for this visit.  There is no disposition time on file for this visit.    Follow-up:  Salvador Cohn MD  [address]  [address]  [place] [address] [phone]    Schedule an appointment as soon as possible for a visit in 2 day(s)      Víctor Alfaro MD  [address]  [address]  [place] [address] [phone]    Schedule an appointment as soon as possible for a visit in 1 day(s)            Medications Prescribed:  Current Discharge Medication List              Supplementary Documentation:

## 2025-02-04 NOTE — ED QUICK NOTES
Patient updated on plan of care, awaiting callback from Oncology MD regarding type of blood patient needs. Patient provided with boxed sandwich.

## 2025-02-05 PROBLEM — D70.9 NEUTROPENIC FEVER: Status: ACTIVE | Noted: 2025-02-05

## 2025-02-05 PROBLEM — R50.81 NEUTROPENIC FEVER: Status: ACTIVE | Noted: 2025-01-01

## 2025-02-05 PROBLEM — D70.9 NEUTROPENIC FEVER: Status: ACTIVE | Noted: 2025-01-01

## 2025-02-05 PROBLEM — R50.81 NEUTROPENIC FEVER: Status: ACTIVE | Noted: 2025-02-05

## 2025-02-05 NOTE — ED INITIAL ASSESSMENT (HPI)
Pt here with wife with c/o fever of 102 taken by family member within the hour, no medication given. Pt was here for blood transfusion yesterday, had a port placed on Wednesday for chemo treatments. Per wife patient was not acting his norm. Shaky and SOB. 100% RA and temp 98.0 F Oral at this time in triage.

## 2025-02-05 NOTE — H&P
[facility] Hospital[facility] History and Physical      Chief Complaint   Patient presents with    Fever    Difficulty Breathing        PCP: Salvador Cohn MD      History of Present Illness: Patient is a 74 year old male with PMH sig for MDS/chronic pancytopenia, HTN, Gout, HL p/w fevers. He was seen in the ED just yesterday for anemia, given a transfusion of PRBC's and discharged with plans for OP oncology f/u. Recently had a port placed last Wednesday for chemotherapy treatments. Wife reports that he's been shaking, having rigors and reporting dyspnea. Also had a fever of 102 at home.   In the ED tmax 99.1, tachycardic, tachypneic. Labs with cr 1.66 with acidosis, neutropenic, anemic and plts of 4. One unit prbc and plts ordered along with cefepime and IVF. Oncology consulted. Will be admitted for further evaluation and treatment.     Past Medical History:    Atherosclerosis of abdominal aorta    Cancer (HCC)    skin cancer-squamous cell    Dermatitis    Resolved per note 3/7/19     Essential hypertension    Gout    Gout, unspecified    Resolved as stable per note 3/7/19    Greater trochanteric bursitis, right    Resolved last addressed 9/4/14    High blood pressure    High cholesterol    HTN (hypertension)    Hyperlipidemia    Lumbar radiculitis    Resolved per note 3/7/19    MDS (myelodysplastic syndrome) (HCC)    Osteoarthritis    right hip    Status post total replacement of right hip      Past Surgical History:   Procedure Laterality Date    Colonoscopy      Colonoscopy N/A 6/27/2016    Procedure: COLONOSCOPY;  Surgeon: Elvis Warren MD;  Location:  ENDOSCOPY    Hip replacement surgery      Other      wisdom teeth extraction        ALL:  Allergies[1]     Prior to Admission Medications   Medication Sig    prochlorperazine (COMPAZINE) 10 mg tablet Take 1 tablet (10 mg total) by mouth every 6 (six) hours as needed.    metoprolol succinate ER 50 MG Oral Tablet 24 Hr Take 1 tablet (50 mg total) by mouth daily.     allopurinol 100 MG Oral Tab Take 1 tablet (100 mg total) by mouth daily.    Multiple Vitamin (MULTI VITAMIN MENS) Oral Tab Take 1 tablet by mouth daily.       Social History     Tobacco Use    Smoking status: Former     Current packs/day: 1.00     Average packs/day: 1 pack/day for 35.0 years (35.0 ttl pk-yrs)     Types: Cigarettes     Passive exposure: Never    Smokeless tobacco: Never   Substance Use Topics    Alcohol use: Yes     Alcohol/week: 0.0 standard drinks of alcohol     Comment: rarely        Fam Hx  Family History   Problem Relation Age of Onset    Cancer Other     Stroke Other     Stroke Mother        Review of Systems  Comprehensive ROS reviewed and negative except for what is stated in HPI.      OBJECTIVE:  /57   Pulse 97   Temp 99.1 °F (37.3 °C) (Oral)   Resp (!) 33   Ht 6' (1.829 m)   Wt 161 lb (73 kg)   SpO2 100%   BMI 21.84 kg/m²   General:  Alert, no distress, appears stated age.    Head:  Normocephalic, without obvious abnormality, atraumatic.   Eyes:  Sclera anicteric, No conjunctival pallor, EOMs intact.    Nose: Nares normal. Septum midline. Mucosa normal. No drainage.   Throat: Lips, mucosa, and tongue normal. Teeth and gums normal.   Neck: Supple, symmetrical, trachea midline, no cervical or supraclavicular lymph adenopathy, thyroid: no enlargment/tenderness/nodules appreciated   Lungs:   Clear to auscultation bilaterally. Normal effort   Chest wall:  No tenderness or deformity.   Heart:  Regular rate and rhythm, S1, S2 normal, no murmur, rub or gallop appreciated   Abdomen:   Soft, non-tender. Bowel sounds normal. No masses,  No organomegaly. Non distended   Extremities: Extremities normal, atraumatic, no cyanosis or edema.   Skin: Skin color, texture, turgor normal. No rashes or lesions.    Neurologic: Normal strength, no focal deficit appreciated     Data Review:    LABS:   Lab Results   Component Value Date    WBC 0.1 02/05/2025    HGB 6.1 02/05/2025    HCT 17.3 02/05/2025     PLT 4.0 02/05/2025    CREATSERUM 1.66 02/05/2025    BUN 37 02/05/2025     02/05/2025    K 3.9 02/05/2025     02/05/2025    CO2 19.0 02/05/2025     02/05/2025    CA 9.6 02/05/2025    ALB 4.3 02/05/2025    ALKPHO 104 02/05/2025    BILT 1.7 02/05/2025    TP 6.5 02/05/2025    AST 16 02/05/2025    ALT 15 02/05/2025       CXR: All imaging personally reviewed.      Radiology: XR CHEST AP PORTABLE  (CPT=71045)    Result Date: 2/5/2025  PROCEDURE:  XR CHEST AP PORTABLE  (CPT=71045)  TECHNIQUE:  AP chest radiograph was obtained.  COMPARISON:  [facility] , XR, XR CHEST AP PORTABLE  (CPT=71045), 10/14/2024, 2:08 PM.  [facility] , XR, XR CHEST AP PORTABLE  (CPT=71045), 10/13/2024, 2:07 PM.  INDICATIONS:  recent port placed on last friday, fever spiked last night.  PATIENT STATED HISTORY: (As transcribed by Technologist)  recent port placed on last friday, fever spiked last night.    FINDINGS:  Right chest port catheter is in place with tip terminating at the cavoatrial junction.  There is a small right pleural effusion, with associated atelectasis which is grossly similar in appearance to 10/14/2024.  Correlate clinically for superimposed infection/inflammation.  No left pleural effusion.  No measurable pneumothorax.  Cardiomediastinal silhouette is stable from prior.  Atherosclerotic calcifications within the aorta.  No new osseous findings.            CONCLUSION:  See above   LOCATION:  [facility]      Dictated by (CST): Hunter Gambino MD on 2/05/2025 at 4:43 PM     Finalized by (CST): Hunter Gambino MD on 2/05/2025 at 4:44 PM          Assessment/Plan:     74 year old male with PMH sig for MDS/chronic pancytopenia, HTN, Gout, HL p/w fevers.    Neutropenic fevers with severe sepsis  - IVF  - cefepime, vanc pharm to dose  - await cultures  - check resp viral panel  - repeat lactic acid  - hematology consulted    Pancytopenia  - s/p 1u PRBCs, plts in ED  - hematology consulted  - goal Hgb>7, goal plts>10  - check LDH,  haptoglobin and fibrinogen    Acute renal injury  - prerenal  - isotonic crystalloids  - monitor renal parameters  - avoid nephrotoxins    Essential HTN  - metoprolol with parameters    Gout  - allopurinol    FEN: regular diet, PT/OT  Proph: SCDs only  Code status: Full code    Outpatient records or previous hospital records reviewed.   [facility] hospitalist to continue to follow patient while in house      Eric Wilson MD  [facility]  Hospitalist  Message over [employer]  Pager: [phone]                 [1]   Allergies  Allergen Reactions    Radiology Contrast Iodinated Dyes HIVES

## 2025-02-06 NOTE — PROGRESS NOTES
Pt is alert and oriented x3. Pt arrived from ER and blood running. Repeat hemoglobin and lactic done and md notified of lactic,  hemoglobin and 2nd unit of blood started and no reaction. Pt has portacath but has requested to not access at this time. Pt has no complaints of pain. Call light in reach and safety measures in place.

## 2025-02-06 NOTE — OCCUPATIONAL THERAPY NOTE
OCCUPATIONAL THERAPY EVALUATION - INPATIENT     Room Number: 417  Evaluation Date: 2/6/2025  Type of Evaluation: Initial  Presenting Problem: neutropenic fever    Physician Order: IP Consult to Occupational Therapy  Reason for Therapy: ADL/IADL Dysfunction and Discharge Planning    OCCUPATIONAL THERAPY ASSESSMENT   Patient is currently functioning below baseline with toileting, upper body dressing, lower body dressing, grooming, bed mobility, transfers, static sitting balance, dynamic sitting balance, static standing balance, dynamic standing balance, maintaining seated position, and functional standing tolerance. Prior to admission, patient's baseline is Mod I.  Patient is requiring moderate assistance as a result of the following impairments: decreased functional strength, decreased functional reach, decreased endurance, impaired coordination, impaired motor planning, and decreased muscular endurance. Occupational Therapy will continue to follow for duration of hospitalization.    Patient will benefit from continued skilled OT Services to promote return to prior level of function and safety with continuous assistance and gradual rehabilitative therapy    History Related to Current Admission: Patient is a 74 year old male admitted on 2/5/2025 with Presenting Problem: neutropenic fever. Co-Morbidities : h/o MDS    WEIGHT BEARING RESTRICTION       Recommendations for nursing staff:   Transfers: Mod A  Toileting location: Supine in bed for session    EVALUATION SESSION:  Patient Start of Session: supine in bed for session    FUNCTIONAL TRANSFER ASSESSMENT  Sit to Stand: Edge of Bed  Edge of Bed: Moderate Assist    BED MOBILITY  Rolling: Moderate Assist  Supine to Sit : Moderate Assist  Scooting: Mod to EOB    BALANCE ASSESSMENT  Static Sitting: Moderate Assist (with retropulsion)  Static Standing: Minimal Assist (to stand and take steps to chair)    FUNCTIONAL ADL ASSESSMENT  Grooming Seated: Minimal Assist (to wipe  face)  LB Dressing Seated: Maximum Assist (for socks)    ACTIVITY TOLERANCE: vitals stable                         O2 SATURATIONS       COGNITION  Overall Cognitive Status:  WFL - within functional limits    Upper Extremity   ROM: within functional limits   Strength: within functional limits   Coordination  Gross motor: WNL  Fine motor: WNL  Sensation: Light touch:  intact    EDUCATION PROVIDED  Patient Education : Role of Occupational Therapy; Plan of Care  Patient's Response to Education: Verbalized Understanding; Returned Demonstration    Equipment used: RW  Demonstrates functional use, Would benefit from additional trial      Therapist comments: Pt reported fatigue at home, pt educated on work simplification and energy conservation for at home to assist with independence with fatigue at home.     Patient End of Session: Up in chair;Needs met;Call light within reach;RN aware of session/findings;All patient questions and concerns addressed;Alarm set;Family present    OCCUPATIONAL PROFILE    HOME SITUATION  Type of Home: House  Home Layout: Multi-level  Lives With: Spouse    Toilet and Equipment: Standard height toilet  Shower/Tub and Equipment: Walk-in shower  Other Equipment: None                  Prior Level of Function: Pt typically independent with ADLs and mobility. Pt does not use AD.    SUBJECTIVE   Pt stated, \"I am doing better.\"    PAIN ASSESSMENT  Ratin  Location: no pain at this time       OBJECTIVE  Precautions: Bed/chair alarm  Fall Risk: High fall risk    ASSESSMENTS    AM-PAC ‘6-Clicks’ Inpatient Daily Activity Short Form  -   Putting on and taking off regular lower body clothing?: A Lot  -   Bathing (including washing, rinsing, drying)?: A Lot  -   Toileting, which includes using toilet, bedpan or urinal? : A Lot  -   Putting on and taking off regular upper body clothing?: A Little  -   Taking care of personal grooming such as brushing teeth?: A Little  -   Eating meals?: A Little    AM-PAC  Score:  Score: 15  Approx Degree of Impairment: 56.46%  Standardized Score (AM-PAC Scale): 34.69    ADDITIONAL TESTS     NEUROLOGICAL FINDINGS      COGNITION ASSESSMENTS     PLAN     OT Treatment Plan: Balance activities;Energy conservation/work simplification techniques;ADL training;IADL training;Continued evaluation;Compensatory technique education;Equipment eval/education;Patient/Family training;Patient/Family education;Endurance training;UE strengthening/ROM;Functional transfer training  Rehab Potential : Good  Frequency: 3-5x/week  Number of Visits to Meet Established Goals: 5    ADL Goals   Patient will perform upper body dressing:  with supervision  Patient will perform lower body dressing:  with supervision  Patient will perform toileting: with supervision    Functional Transfer Goals  Patient will transfer from supine to sit:  with supervision  Patient will transfer from sit to stand:  with supervision  Patient will transfer to toilet:  with supervision    UE Exercise Program Goal  Patient will be supervision with bilateral AROM HEP (home exercise program).    Additional Goals:  Pt will verbalize at least 3 energy conservation techniques  Pt will stand at sink for 5 minutes to complete grooming routine    Patient Evaluation Complexity Level:   Occupational Profile/Medical History MODERATE - Expanded review of history including review of medical or therapy record   Specific performance deficits impacting engagement in ADL/IADL MODERATE  3 - 5 performance deficits   Client Assessment/Performance Deficits MODERATE - Comorbidities and min to mod modifications of tasks    Clinical Decision Making MODERATE - Analysis of occupational profile, detailed assessments, several treatment options    Overall Complexity MODERATE     OT Session Time: 20 minutes  Self-Care Home Management: 10 minutes  Therapeutic Activity: 0 minutes  Neuromuscular Re-education: 0 minutes  Therapeutic Exercise: 0 minutes  Cognitive Skills: 0  minutes  Sensory Integrative: 0 minutes  Orthotic Management and Trainin minutes  Can add/delete any of these

## 2025-02-06 NOTE — PLAN OF CARE
Pt Aox4. Tachycardic, tachypneic. Low grade fever.   MD notified. Orders received.   Pt is transferring to ICU.     No complain of pain.   Frequent small Bms.   Incontinent.   Very poor appetite.   Family at the bedside.       Problem: MUSCULOSKELETAL - ADULT  Goal: Return mobility to safest level of function  Description: INTERVENTIONS:  - Assess patient stability and activity tolerance for standing, transferring and ambulating w/ or w/o assistive devices  - Assist with transfers and ambulation using safe patient handling equipment as needed  - Ensure adequate protection for wounds/incisions during mobilization  - Obtain PT/OT consults as needed  - Advance activity as appropriate  - Communicate ordered activity level and limitations with patient/family  Outcome: Progressing     Problem: RISK FOR INFECTION - ADULT  Goal: Absence of fever/infection during anticipated neutropenic period  Description: INTERVENTIONS  - Monitor WBC  - Administer growth factors as ordered  - Implement neutropenic guidelines  Outcome: Progressing

## 2025-02-06 NOTE — CONSULTS
Critical Care Consultation - [facility]        HPI: Guy White is a 74 year old male with a history of myelodysplastic syndrome, hypertension, hyperlipidemia, who presented to the ER yesterday with fevers and dyspnea. He was admitted for neutropenic fever. He was started on broad spectrum antibiotics . He was started on IVF for ALLEN. He was found to have ESBL E. Coli bacteremia. He has been noted to be tachypneic with rising lactic acid level. We are being consulted for possible transfer to the ICU.    The patient's only complaint right now is pain in his RLQ. He denies shortness of breath, although he is visibly tachypneic. He denies cough. He seems a little confused and history was somewhat limited.             Past Medical History:  Past Medical History:    Atherosclerosis of abdominal aorta    Gout    HTN (hypertension)    Hyperlipidemia    Lumbar radiculitis    MDS (myelodysplastic syndrome) (HCC)    Osteoarthritis    right hip    Squamous cell skin cancer     Past Surgical History:   Past Surgical History:   Procedure Laterality Date    Colonoscopy      Colonoscopy N/A 6/27/2016    Procedure: COLONOSCOPY;  Surgeon: Elvis Warren MD;  Location:  ENDOSCOPY    Hip replacement surgery      Other      wisdom teeth extraction      Family History:   Family History   Problem Relation Age of Onset    Cancer Other     Stroke Other     Stroke Mother      Social History:    reports that he has quit smoking. His smoking use included cigarettes. He has a 35 pack-year smoking history. He has never been exposed to tobacco smoke. He has never used smokeless tobacco. He reports current alcohol use. He reports that he does not use drugs.     HOME MEDICATIONS      Prior to Admission Medications   Prescriptions Last Dose Informant Patient Reported? Taking?   Venetoclax 100 MG Oral Tab Past Week  Yes Yes   Sig: Take 400 mg by mouth daily.   allopurinol 100 MG Oral Tab 2/4/2025  No Yes   Sig: Take 1 tablet (100 mg  total) by mouth daily.   lidocaine-prilocaine 2.5-2.5 % External Cream   Yes No   Sig: Apply a small amount over port prior to acess   metoprolol succinate ER 50 MG Oral Tablet 24 Hr 2/4/2025  Yes Yes   Sig: Take 1 tablet (50 mg total) by mouth daily.   ondansetron 8 MG Oral Tablet Dispersible Past Month  Yes Yes   Sig: TAKE 1 TABLET 1 HOUR PRIOR TO VIDAZA INJECTIONS AND EVERY 8 HOURS AS NEEDED   prochlorperazine (COMPAZINE) 10 mg tablet Past Week  Yes Yes   Sig: Take 1 tablet (10 mg total) by mouth every 6 (six) hours as needed.      Facility-Administered Medications: None       CURRENT MEDICATIONS       sodium chloride   Intravenous Once    meropenem  1 g Intravenous Q12H    sodium chloride  500 mL Intravenous Once    allopurinol  100 mg Oral Daily    metoprolol succinate ER  50 mg Oral Daily    vancomycin  15 mg/kg Intravenous Q24H       PRN meds:    acetaminophen    melatonin    polyethylene glycol (PEG 3350)    sennosides    bisacodyl    fleet enema    ondansetron    metoclopramide    benzonatate    Infusions:   lactated ringers 100 mL/hr at 02/06/25 0000         ALLERGIES      Allergies[1]      REVIEW OF SYSTEMS     Unable to obtain detailed history    OBJECTIVE      Vitals:    02/06/25 0536 02/06/25 0830 02/06/25 1033 02/06/25 1130   BP: 130/52 121/51  145/60   BP Location: Left arm Left arm  Left arm   Pulse: 102 101 96 108   Resp: 22 18  (!) 36   Temp: 97.9 °F (36.6 °C) 98.4 °F (36.9 °C) 100 °F (37.8 °C) 100.3 °F (37.9 °C)   TempSrc: Oral Oral Oral Oral   SpO2: 96% 96% 95% 95%   Weight: 170 lb 11.2 oz (77.4 kg)      Height:         O2: room air    Gen - tachypneic. Somewhat confused but answers simple questions  HEENT - head normocephalic, atraumatic. Eyes-no scleral icterus or injection              - Mouth - moist mucus membranes, no oral lesions  Neck - supple, no palpable lymphadenopathy.   Chest - right port-a-cath in place  Lungs - equal breath sounds bilaterally. No wheezing, crackles, rhonchi +  tachypnea  CV - tachycardic, no murmurs  Abdomen - soft, + tenderness in RUQ and RLQ. No palpable masses  Extremities - No cyanosis, clubbing, edema appreciated.      Labs:    Recent Labs   Lab 02/05/25  1537 02/06/25  0015 02/06/25  0535   WBC 0.1* 0.1* 0.1*   HGB 6.1* 6.0* 7.2*   PLT 4.0* 20.0* 16.0*     Recent Labs   Lab 02/05/25  1537 02/05/25  1633 02/05/25  1855 02/06/25  0015 02/06/25  0534 02/06/25  1202     --   --   --  134*  --    K 3.9  --   --   --  4.1  --      --   --   --  105  --    CO2 19.0*  --   --   --  14.0*  --    BUN 37*  --   --   --  34*  --    CREATSERUM 1.66*  --   --   --  1.76*  1.76*  --    *  --   --   --  132*  --    ANIONGAP 14  --   --   --  15  --    ALB 4.3  --   --   --  3.7  --    CA 9.6  --   --   --  8.8  --    MG  --   --   --   --  1.2*  --    ALKPHO 104  --   --   --  66  --    AST 16  --   --   --  22  --    ALT 15  --   --   --  20  --    BILT 1.7*  --   --   --  1.3*  --    TP 6.5  --   --   --  5.8  --    LACTI  --    < > 5.1* 3.6*  --  4.3*    < > = values in this interval not displayed.       Recent Labs   Lab 02/05/25  1633 02/05/25  2006   COVID19 Not Detected Not Detected   INFAPCR Negative Not Detected   INFBPCR Negative Not Detected   RSV Negative  --        Urinalysis:  Recent Labs   Lab 02/05/25  1645   COLORUR Light-Yellow   CLARITY Clear   SPECGRAVITY 1.014   PHURINE 5.5   PROUR 50*   KETUR Negative   GLUUR Normal   BILUR Negative   BLOODURINE 2+*   NITRITE Negative   UROBILINOGEN Normal   LEUUR Negative   EPIUR None Seen   WBCUR None Seen   BACUR None Seen       Imaging reviewed.    ASSESSMENT AND PLAN     Sepsis  - secondary to ESBL E. Coli bacteremia in a patient with MDS and neutropenia. Given abdominal pain, need to consider intra-abdominal source of infection.  -IVF  -IV vanc  -IV meropenem  -follow up MRSA screen  -ID consulted  -trend lactate  -CT A/P now  -if needed, will start vasopressors to keep MAP>65  Lactic acidosis -  suspect due to sepsis; this could be contributing to tachypnea  -check venous blood gas  -IVF  -trend lactate  Abdominal pain - ddx includes biliary pathology, typhilitis, etc   -CT A/P  MDS - with pancytopenia  -monitor labs - transfuse if hgb<7 or platelet<10  -heme consulted  ALLEN - suspect due to sepsis  -IVF  -monitor labs, urine output  Nutrition   -NPO pending CT scan; advance diet prn depending on findings  Proph   -SCD only  Dispo   -full code  -transfer to ICU for closer monitoring  Called patient's wife, but his daughter Bibi answered. She affirms that he is a full code. I updated her on the plan and all questions were answered.      Raman Charles M.D.  Pulmonary/Critical Care           [1]   Allergies  Allergen Reactions    Radiology Contrast Iodinated Dyes HIVES

## 2025-02-06 NOTE — ED QUICK NOTES
Orders for admission, patient is aware of plan and ready to go upstairs. Any questions, please call ED RN rula at extension [phone].     Patient Covid vaccination status: Fully vaccinated     COVID Test Ordered in ED: SARS-CoV-2/Flu A and B/RSV by PCR (GeneXpert)    COVID Suspicion at Admission: N/A    Running Infusions:      Mental Status/LOC at time of transport: oriented x4    Other pertinent information: na  CIWA score: N/A   NIH score:  N/A

## 2025-02-06 NOTE — CM/SW NOTE
SW sent referral for rehab placement via [facility] in the event that patient requires additional therapy post DC. SW will discuss DC plan and choice list if appropriate with family once it becomes available.     [facility] submitted. PASRR done.     Patient has a medicare advantage plan and therefore 3 inpatient midnights are not required. However, patient will need insurance authorization in order to go to rehab.     SW will continue to follow for plan of care changes and remain available for any additional DC needs or concerns.     Lissette Zambrano MSW, LSW  Discharge Planner   [phone]

## 2025-02-06 NOTE — PROGRESS NOTES
Dr Alfaro called and notified of all am critical labs. Md will see pt no new orders and this time.

## 2025-02-06 NOTE — CONSULTS
[facility] Pharmacy Dosing Service      Initial Pharmacokinetic Consult for Vancomycin Dosing     Guy White is a 74 year old male who is being initiated on vancomycin therapy for neutropenic fever.  Pharmacy has been asked to dose vancomycin by Dr Wilson.  The initial treatment and monitoring approach will be non-AUC strategy.        Weight and Temperature:    Wt Readings from Last 1 Encounters:   25 73 kg (161 lb)        Temp Readings from Last 1 Encounters:   25 98.2 °F (36.8 °C) (Oral)      Labs:   Recent Labs   Lab 25  1537   CREATSERUM 1.66*      Estimated Creatinine Clearance: 40.3 mL/min (A) (based on SCr of 1.66 mg/dL (H)).     Recent Labs   Lab 25  1249 25  1010 25  1537   WBC 1.4* 0.5* 0.1*          The Pharmacokinetic Target is:    Trough/random 10-15 mg/L    Renal Dosing Considerations:    ALLEN/ARF     Assessment/Plan:   Initial/Loading dose: Will receive 1000 mg IV (15 mg/kg, capped at 2250 mg) x 1 initial dose.      Maintenance dose: Pharmacy will dose vancomycin at 1000 mg IV q24 hours.    Monitorin) Plan for vancomycin trough to be obtained at steady state    2) Pharmacy will order SCr as clinically indicated to assess renal function.    3) Pharmacy will monitor for toxicity and efficacy, adjust vancomycin dose and/or frequency, and order vancomycin levels as appropriate per the Pharmacy and Therapeutics Committee approved protocol until discontinuation of the medication.       We appreciate the opportunity to assist in the care of this patient.     Summer Day, PharmD  2025  8:19 PM  [facility] Extension: [phone]

## 2025-02-06 NOTE — PHYSICAL THERAPY NOTE
PHYSICAL THERAPY EVALUATION - INPATIENT     Room Number: 471/471-A  Evaluation Date: 2/6/2025  Type of Evaluation: Initial  Physician Order: PT Eval and Treat    Presenting Problem: neutropenic fever  Co-Morbidities : h/o MDS  Reason for Therapy: Mobility Dysfunction and Discharge Planning    PHYSICAL THERAPY ASSESSMENT   Patient is a 74 year old male admitted 2/5/2025 for neutropenic fever.  Prior to admission, patient's baseline is ambulatory with RW.  Patient is currently functioning below baseline with bed mobility, transfers, and gait.  Patient is requiring minimal assist and moderate assist as a result of the following impairments: decreased functional strength and decreased endurance/aerobic capacity.  Physical Therapy will continue to follow for duration of hospitalization.    Patient will benefit from continued skilled PT Services to promote return to prior level of function and safety with continuous assistance and gradual rehabilitative therapy .    PLAN DURING HOSPITALIZATION  Nursing Mobility Recommendation : 1 Assist     PT Treatment Plan: Bed mobility;Body mechanics;Endurance;Energy conservation;Patient education;Family education;Gait training;Neuromuscular re-educate;Range of motion;Strengthening;Transfer training;Stair training;Balance training  Rehab Potential : Good  Frequency (Obs):  (2-3x/week)     CURRENT GOALS    Goal #1 Patient is able to demonstrate supine - sit EOB @ level: supervision     Goal #2 Patient is able to demonstrate transfers Sit to/from Stand at assistance level: supervision     Goal #3 Patient is able to ambulate 50 feet with assist device: walker - rolling at assistance level: supervision     Goal #4 Patient will negotiate 7 stairs with supervision to ensure safety at home.   Goal #5    Goal #6    Goal Comments: Goals established on 2/6/2025      PHYSICAL THERAPY MEDICAL/SOCIAL HISTORY  History related to current admission: Patient is a 74 year old male admitted on 2/5/2025  from home for neutropenic fever.    HOME SITUATION  Type of Home: House  Home Layout: Multi-level           Stairs to Bedroom: 7         Lives With: Spouse               Prior Level of [place]: Pt reports mod ind PTA with RW.  Pt states spouse is unable to assist much at home.  Niece takes pt to treatments.    SUBJECTIVE  \"Oh... I guess!\"    OBJECTIVE  Precautions: Bed/chair alarm  Fall Risk: High fall risk    WEIGHT BEARING RESTRICTION     PAIN ASSESSMENT  Ratin          COGNITION  Following Commands:  follows all commands and directions without difficulty    RANGE OF MOTION AND STRENGTH ASSESSMENT  Upper extremity ROM and strength are within functional limits     Lower extremity ROM is within functional limits     Lower extremity strength is within functional limits     BALANCE  Static Sitting: Good  Dynamic Sitting: Good  Static Standing: Fair -  Dynamic Standing: Fair -    ADDITIONAL TESTS                                    ACTIVITY TOLERANCE                         O2 WALK       NEUROLOGICAL FINDINGS                        AM-PAC '6-Clicks' INPATIENT SHORT FORM - BASIC MOBILITY  How much difficulty does the patient currently have...  Patient Difficulty: Turning over in bed (including adjusting bedclothes, sheets and blankets)?: A Little   Patient Difficulty: Sitting down on and standing up from a chair with arms (e.g., wheelchair, bedside commode, etc.): A Little   Patient Difficulty: Moving from lying on back to sitting on the side of the bed?: A Little   How much help from another person does the patient currently need...   Help from Another: Moving to and from a bed to a chair (including a wheelchair)?: A Little   Help from Another: Need to walk in hospital room?: A Lot   Help from Another: Climbing 3-5 steps with a railing?: Total     AM-PAC Score:  Raw Score: 15   Approx Degree of Impairment: 57.7%   Standardized Score (AM-PAC Scale): 39.45   CMS Modifier (G-Code): CK    FUNCTIONAL ABILITY  STATUS  Gait Assessment   Functional Mobility/Gait Assessment  Gait Assistance: Minimum assistance  Distance (ft): 2  Assistive Device: Rolling walker  Pattern: Shuffle    Skilled Therapy Provided     Bed Mobility:  Rolling: min A  Supine to sit: min A   Sit to supine: NT     Transfer Mobility:  Sit to stand: mod A to RW   Stand to sit: min A from RW  Gait = min A for steps to chair only.      Therapist's Comments: Pt with LOB posterior repeatedly while seated at eob, requires vc's to correct.  Noted mild SOB, O2 sats WFL.      Exercise/Education Provided:  Bed mobility  Body mechanics  Functional activity tolerated  Gait training    Patient End of Session: Up in chair;Needs met;Call light within reach;RN aware of session/findings;All patient questions and concerns addressed;Hospital anti-slip socks;Family present      Patient Evaluation Complexity Level:  History Moderate - 1 or 2 personal factors and/or co-morbidities   Examination of body systems Moderate - addressing a total of 3 or more elements   Clinical Presentation  Moderate - Evolving   Clinical Decision Making Moderate Complexity       PT Session Time: 20 minutes    Therapeutic Activity: 12 minutes

## 2025-02-06 NOTE — ED PROVIDER NOTES
Patient Seen in: [facility] Emergency Department      History     Chief Complaint   Patient presents with    Fever    Difficulty Breathing     Stated Complaint: recent port placed on last friday, fever spiked last night.    Subjective:   HPI      Patient has history mild dysplastic syndrome under treatment with Dr. Alfaro from [facility].  He had a port placed on Friday.  He started receiving his treatment both infusions and oral.  He developed a fever today to 102.  Feels generally weak.  No cough.  No urinary symptoms.  No GI symptoms.  No pain.    Objective:     Past Medical History:    Atherosclerosis of abdominal aorta    Cancer (HCC)    skin cancer-squamous cell    Dermatitis    Resolved per note 3/7/19     Essential hypertension    Gout    Gout, unspecified    Resolved as stable per note 3/7/19    Greater trochanteric bursitis, right    Resolved last addressed 9/4/14    High blood pressure    High cholesterol    HTN (hypertension)    Hyperlipidemia    Lumbar radiculitis    Resolved per note 3/7/19    MDS (myelodysplastic syndrome) (HCC)    Osteoarthritis    right hip    Status post total replacement of right hip              Past Surgical History:   Procedure Laterality Date    Colonoscopy      Colonoscopy N/A 6/27/2016    Procedure: COLONOSCOPY;  Surgeon: Elvis Warren MD;  Location:  ENDOSCOPY    Hip replacement surgery      Other      wisdom teeth extraction                Social History     Socioeconomic History    Marital status:     Number of children: 1   Occupational History    Occupation: [employer]   Tobacco Use    Smoking status: Former     Current packs/day: 1.00     Average packs/day: 1 pack/day for 35.0 years (35.0 ttl pk-yrs)     Types: Cigarettes     Passive exposure: Never    Smokeless tobacco: Never   Vaping Use    Vaping status: Never Used   Substance and Sexual Activity    Alcohol use: Yes     Alcohol/week: 0.0 standard drinks of alcohol     Comment: rarely    Drug use: No     Sexual activity: Yes     Partners: Female   Other Topics Concern     Service No    Sleep Concern No    Exercise Yes    Seat Belt Yes     Social Drivers of Health     Food Insecurity: No Food Insecurity (10/2/2024)    Food Insecurity     Food Insecurity: Never true   Transportation Needs: No Transportation Needs (10/2/2024)    Transportation Needs     Lack of Transportation: No   Housing Stability: Low Risk  (10/2/2024)    Housing Stability     Housing Instability: No                  Physical Exam     ED Triage Vitals [02/05/25 1527]   /49   Pulse 107   Resp 22   Temp 98 °F (36.7 °C)   Temp src Oral   SpO2 100 %   O2 Device None (Room air)       Current Vitals:   Vital Signs  BP: 128/54  Pulse: 98  Resp: 21  Temp: 98.9 °F (37.2 °C)  Temp src: Oral  MAP (mmHg): 75    Oxygen Therapy  SpO2: 100 %  O2 Device: None (Room air)        Physical Exam  Physical Exam   Constitutional: Awake, alert, well appearing  Head: Normocephalic and atraumatic.   Eyes: Conjunctivae are normal. Pupils are equal, round, and reactive to light.   Neck: Normal range of motion. No JVD  Cardiovascular: Normal rate, regular rhythm  Pulmonary/Chest: Normal effort.  No accessory muscle use.  No cyanosis.  Abdominal: Soft. Not distended.  Neurological: Pt is alert and oriented to person, place, and time. no cranial nerve deficits. Speech fluent      Port in place right upper chest    Belly benign    ED Course     Labs Reviewed   CBC WITH DIFFERENTIAL WITH PLATELET - Abnormal; Notable for the following components:       Result Value    WBC 0.1 (*)     RBC 2.09 (*)     HGB 6.1 (*)     HCT 17.3 (*)     PLT 4.0 (*)     All other components within normal limits   COMP METABOLIC PANEL (14) - Abnormal; Notable for the following components:    Glucose 157 (*)     CO2 19.0 (*)     BUN 37 (*)     Creatinine 1.66 (*)     Calculated Osmolality 300 (*)     eGFR-Cr 43 (*)     Bilirubin, Total 1.7 (*)     All other components within normal limits    LACTIC ACID, PLASMA - Abnormal; Notable for the following components:    Lactic Acid 3.8 (*)     All other components within normal limits   URINALYSIS, ROUTINE - Abnormal; Notable for the following components:    Blood Urine 2+ (*)     Protein Urine 50 (*)     RBC Urine 6-10 (*)     All other components within normal limits   SARS-COV-2/FLU A AND B/RSV BY PCR (GENEXPERT) - Normal    Narrative:     This test is intended for the qualitative detection and differentiation of SARS-CoV-2, influenza A, influenza B, and respiratory syncytial virus (RSV) viral RNA in nasopharyngeal or nares swabs from individuals suspected of respiratory viral infection consistent with COVID-19 by their healthcare provider. Signs and symptoms of respiratory viral infection due to SARS-CoV-2, influenza, and RSV can be similar.    Test performed using the Xpert Xpress SARS-CoV-2/FLU/RSV (real time RT-PCR)  assay on the GeneXpert instrument, [address], [place] [address].   This test is being used under the Food and Drug Administration's Emergency Use Authorization.    The authorized Fact Sheet for Healthcare Providers for this assay is available upon request from the laboratory.   SCAN SLIDE   LACTIC ACID REFLEX POST POSTIVE   PREPARE RBC   PREPARE PLATELETS   RAINBOW DRAW LAVENDER   RAINBOW DRAW LIGHT GREEN   RAINBOW DRAW BLUE   RAINBOW DRAW GOLD   BLOOD CULTURE   BLOOD CULTURE   URINE CULTURE, ROUTINE   C. DIFFICILE(TOXIGENIC)PCR            Blood work reviewed, neutropenic, slight bump in BUN/creatinine, anemic, thrombocytopenic      Discussed with Dr. Alfaro.  Agrees with transfusing blood and platelets.  Agrees with IV antibiotics.    Medications   sodium chloride 0.9 % IV bolus 1,000 mL (1,000 mL Intravenous New Bag 2/5/25 1737)   ceFEPIme (Maxpime) 2 g in sodium chloride 0.9% 100 mL IVPB-MBP (0 g Intravenous Stopped 2/5/25 1726)   acetaminophen (Tylenol Extra Strength) tab 1,000 mg (1,000 mg Oral Given 2/5/25 1652)         Patient did  not receive 30ml/kg of fluids despite having: elevated Lactate. The reason for doing so is: patient's blood pressure responded to a lesser volume. 1000mL of fluids were given instead ( is the amount of fluids given).     Blood culture sent lactic acid slightly elevated.       MDM            Differential diagnoses considered: Bacteremia, pneumonia, UTI, line infection, viral infection all considered    -Treatment for neutropenic fever with broad-spectrum antibiotics, cultures, etc.    -Discussed with oncology regarding transfusion of blood products as noted above    Patient will be admitted primarily to the [facility].    Care has been discussed with the admitting physician.    A total of 35 minutes of critical care time (exclusive of billable procedures) was administered to manage the patient's critical lab values due to his neutropenia, thrombocytopenia, anemia.  This involved direct patient intervention, complex decision making, and/or extensive discussions with the patient, family, and clinical staff.            *Discussion of ongoing management of this patient's care included: demarco romanoist, hematology  *Comorbidities contributing to the complexity of decision making:  MDS on treatment      1950  I was called by lab with a  repeat lactic acid of 5.1.  Patient has since gone upstairs.    [facility]  via [employer] text at 1950.      Admission disposition: 2/5/2025  6:00 PM           Medical Decision Making      Disposition and Plan     Clinical Impression:  1. Neutropenic fever         Disposition:  Admit  2/5/2025  6:00 pm    Follow-up:  No follow-up provider specified.        Medications Prescribed:  Current Discharge Medication List              Supplementary Documentation:         Hospital Problems       Present on Admission  Date Reviewed: 2/8/2022            ICD-10-CM Noted POA    * (Principal) Neutropenic fever D70.9, R50.81 2/5/2025 Unknown

## 2025-02-06 NOTE — PROGRESS NOTES
[facility]  Hospitalist Progress Note                                                                     [facility]   part of [facility]        Guy White  12/26/1950    SUBJECTIVE:  Patient seen and examined.  Tachypneic but not hypoxic.   Feeling tired but oriented.  Denies CP.  NAD.       OBJECTIVE:  Temp:  [97.4 °F (36.3 °C)-102.9 °F (39.4 °C)] 99.7 °F (37.6 °C)  Pulse:  [] 102  Resp:  [16-36] 36  BP: ()/(33-75) 145/60  SpO2:  [95 %-100 %] 99 %  Exam  Gen: mild resp distress distress, alert and oriented x3, no focal neurologic deficits  Pulm: Lungs clear bilaterally, normal respiratory effort  CV: Heart with regular rate and rhythm, no murmur.  Normal PMI.    Abd: Abdomen soft, RLQ ttp, nondistended, no organomegaly, bowel sounds present  MSK: Full range of motion in extremities, no clubbing, no cyanosis  Skin: no rashes or lesions    Labs:   Recent Labs   Lab 02/04/25  1010 02/05/25  1537 02/06/25  0015 02/06/25  0535   WBC 0.5* 0.1* 0.1* 0.1*   HGB 5.7* 6.1* 6.0* 7.2*   MCV 85.4 82.8 84.7 85.0   PLT 15.0* 4.0* 20.0* 16.0*       Recent Labs   Lab 02/05/25  1537 02/06/25  0534    134*   K 3.9 4.1    105   CO2 19.0* 14.0*   BUN 37* 34*   CREATSERUM 1.66* 1.76*  1.76*   CA 9.6 8.8   MG  --  1.2*   * 132*       Recent Labs   Lab 02/05/25  1537 02/06/25  0534   ALT 15 20   AST 16 22   ALB 4.3 3.7       No results for input(s): \"PGLU\" in the last 168 hours.    Meds:   Scheduled:    sodium chloride   Intravenous Once    meropenem  1 g Intravenous Q12H    allopurinol  100 mg Oral Daily    metoprolol succinate ER  50 mg Oral Daily    vancomycin  15 mg/kg Intravenous Q24H     Continuous Infusions:    lactated ringers 100 mL/hr at 02/06/25 0000     PRN:   acetaminophen    melatonin    polyethylene glycol (PEG 3350)    sennosides    bisacodyl    fleet enema    ondansetron    metoclopramide     benzonatate    Assessment/Plan:  Principal Problem:    Neutropenic fever    74 year old male with PMH sig for MDS/chronic pancytopenia, HTN, Gout, HL p/w fevers.     Severe Sepsis 2/2 ESBL E.coli bacteremia  Neutropenic fevers  - also with abd pain, will get STAT CT A/P with oral contrast only (ALLEN)  - cont aggressive isotonic crystalloid resuscitation  - transfer to ICU, d/w intensivist  - pressors for goal MAP>65  - adjusted cefepime to meropenem and vanc  - ID consulted  - await final speciation   - repeat lactic acid until improvement    Pancytopenia  - s/p 1u PRBCs, plts in ED  - hematology consulted  - goal Hgb>7, goal plts>10     Acute renal injury  - prerenal, infection  - isotonic crystalloids  - monitor renal parameters  - avoid nephrotoxins     Essential HTN  - metoprolol with parameters     Gout  - allopurinol     FEN: regular diet, PT/OT  Proph: SCDs only  Code status: Full code    Critical care time>35 mins     Dispo - transfer to ICU    Eric Wilson MD  [facility] Hospitalist  Message over [id number]  Pager: [phone]    Supplementary Documentation:   DVT Mechanical Prophylaxis:   SCDs,    DVT Pharmacologic Prophylaxis   Medication   None                Code Status: Full Code  Duarte: No urinary catheter in place  Duarte Duration (in days):   Central line:    YRIS:         **Certification      PHYSICIAN Certification of Need for Inpatient Hospitalization - Initial Certification    Patient will require inpatient services that will reasonably be expected to span two midnight's based on the clinical documentation in H+P.   Based on patients current state of illness, I anticipate that, after discharge, patient will require TBD.

## 2025-02-06 NOTE — CONSULTS
Dear Juan Jones    After reviewing your responses, I've been able to diagnose you with Acute bacterial sinusitis.      Based on your responses and diagnosis, I have prescribed   Orders Placed This Encounter     amoxicillin-clavulanate (AUGMENTIN) 875-125 MG tablet    to treat your symptoms. I have sent this to your pharmacy.?     It is also important to stay well hydrated, get lots of rest and take over-the-counter decongestants,?tylenol?or ibuprofen if you?are able to?take those medications per your primary care provider to help relieve discomfort.?     It is important that you take?all of?your prescribed medication even if your symptoms are improving after a few doses.? Taking?all of?your medicine helps prevent the symptoms from returning.?     If your symptoms worsen, you develop severe headache, vomiting, high fever (>102), or are not improving in 7 days, please contact your primary care provider for an appointment or visit any of our convenient Walk-in Care or Urgent Care Centers to be seen which can be found on our website?here.?     Thanks again for choosing?us?as your health care partner,?   ?  Irene Rubalcava MD?    Oncology consult    MDS bacteremia febrile neutropenia      2/6/25      HPI:  74 year old with mds here with bacteremia febrile neutropenia    Follow with DR. Alfaro on hymomethylating agents.     E yash bacteremia found antbx. Started    Pancytopenia         Past Medical History:    Atherosclerosis of abdominal aorta    Gout    HTN (hypertension)    Hyperlipidemia    Lumbar radiculitis    MDS (myelodysplastic syndrome) (HCC)    Osteoarthritis    right hip    Squamous cell skin cancer     Social History     Socioeconomic History    Marital status:     Number of children: 1   Occupational History    Occupation: [employer]   Tobacco Use    Smoking status: Former     Current packs/day: 1.00     Average packs/day: 1 pack/day for 35.0 years (35.0 ttl pk-yrs)     Types: Cigarettes     Passive exposure: Never    Smokeless tobacco: Never   Vaping Use    Vaping status: Never Used   Substance and Sexual Activity    Alcohol use: Yes     Alcohol/week: 0.0 standard drinks of alcohol     Comment: rarely    Drug use: No    Sexual activity: Yes     Partners: Female   Other Topics Concern     Service No    Sleep Concern No    Exercise Yes    Seat Belt Yes     Vitals:    02/06/25 1500   BP:    Pulse: 106   Resp: (!) 30   Temp:      Nd  Rr  Nd  No edema  [place]  Normal mood  No deformity    A/P:  74 year old with mds here with bacteremia febrile neutropenia  --cont antbx  --pancytopenia transfuse if hgb <7 or plts <10k    Holding mds treatment    Will follow    Data: ccm note.  Cbc, med note, cultures

## 2025-02-06 NOTE — CONSULTS
[facility]   part of [facility] Infectious Disease Consult    Guy White Patient Status:  Inpatient    1950 MRN [id number]   Location [facility] 4SW-A Attending Steve, Mike Reyes*   Hosp Day # 1 PCP Salvador Cohn MD     Reason for Consultation:  To help with infection and treatment, requested by Dr. Wilson     History of Present Illness:  Guy White is a 74 year old male admitted to [facility] on  with fevers,   Significant hx of   - MDS,  - Pancytopenia, need of prn transfusion,   - PORT placed last week for chemo, and was being used for chemo,   Was increasingly lethargic at home, and was found to be febrile at 102F, also had shaking chills, dyspnea, so brought to the hospital,   Here blood cul are growing GNR, started on IV Cefepime and IV Vanc,   Today transferred to ICU due to tachypnea and worsening LA level,   Antibiotics are changed to IV Meropenem and IV Vanc,   ID is now asked to help with infection and treatment,     History:  Past Medical History:    Atherosclerosis of abdominal aorta    Gout    HTN (hypertension)    Hyperlipidemia    Lumbar radiculitis    MDS (myelodysplastic syndrome) (HCC)    Osteoarthritis    right hip    Squamous cell skin cancer     Past Surgical History:   Procedure Laterality Date    Colonoscopy      Colonoscopy N/A 2016    Procedure: COLONOSCOPY;  Surgeon: Elvis Warren MD;  Location:  ENDOSCOPY    Hip replacement surgery      Other      wisdom teeth extraction     Family History   Problem Relation Age of Onset    Cancer Other     Stroke Other     Stroke Mother       reports that he has quit smoking. His smoking use included cigarettes. He has a 35 pack-year smoking history. He has never been exposed to tobacco smoke. He has never used smokeless tobacco. He reports current alcohol use. He reports that he does not use drugs.    Allergies:  Allergies[1]    Medications:    Current Facility-Administered  Medications:     sodium chloride 0.9% infusion, , Intravenous, Once    [COMPLETED] meropenem (Merrem) 1 g in sodium chloride 0.9% 100 mL IVPB-MBP, 1 g, Intravenous, Once **FOLLOWED BY** meropenem (Merrem) 1 g in sodium chloride 0.9% 100 mL IVPB-MBP, 1 g, Intravenous, Q12H    acetaminophen (Ofirmev) 10 mg/mL infusion premix 1,000 mg, 1,000 mg, Intravenous, Q6H PRN    allopurinol (Zyloprim) tab 100 mg, 100 mg, Oral, Daily    metoprolol succinate ER (Toprol XL) 24 hr tab 50 mg, 50 mg, Oral, Daily    acetaminophen (Tylenol Extra Strength) tab 500 mg, 500 mg, Oral, Q4H PRN    melatonin tab 3 mg, 3 mg, Oral, Nightly PRN    polyethylene glycol (PEG 3350) (Miralax) 17 g oral packet 17 g, 17 g, Oral, Daily PRN    sennosides (Senokot) tab 17.2 mg, 17.2 mg, Oral, Nightly PRN    bisacodyl (Dulcolax) 10 MG rectal suppository 10 mg, 10 mg, Rectal, Daily PRN    fleet enema (Fleet) rectal enema 133 mL, 1 enema, Rectal, Once PRN    ondansetron (Zofran) 4 MG/2ML injection 4 mg, 4 mg, Intravenous, Q6H PRN    metoclopramide (Reglan) 5 mg/mL injection 5 mg, 5 mg, Intravenous, Q8H PRN    lactated ringers infusion, , Intravenous, Continuous    benzonatate (Tessalon) cap 200 mg, 200 mg, Oral, TID PRN    vancomycin (Vancocin) 1,000 mg in sodium chloride 0.9% 250 mL IVPB-ADDV, 15 mg/kg, Intravenous, Q24H    Review of Systems:   Constitutional: Negative for anorexia, chills, fatigue, fevers, malaise, night sweats and weight loss.  Eyes: Negative for visual disturbance, irritation and redness.  Ears, nose, mouth, throat, and face: Negative for hearing loss, tinnitus, nasal congestion, snoring, sore throat, hoarseness and voice change.  Respiratory: Negative for cough, sputum, hemoptysis, chest pain, wheezing, dyspnea on exertion, or stridor.  Cardiovascular: Negative for chest pain, palpitations, irregular heart beats, syncope, fatigue, orthopnea, paroxysmal nocturnal dyspnea, lower extremity edema.  Gastrointestinal: Negative for dysphagia,  odynophagia, reflux symptoms, nausea, vomiting, change in bowel habits, diarrhea, constipation and abdominal pain.  Integument/breast: Negative for rash, skin lesions, and pruritus.  Hematologic/lymphatic: Negative for easy bruising, bleeding, and lymphadenopathy.  Musculoskeletal: Negative for myalgias, arthralgias, muscle weakness.  Neurological: Negative for headaches, dizziness, seizures, memory problems, trouble swallowing, speech problems, gait problems and weakness.  Behavioral/Psych: Negative for active tobacco use.  Endocrine: No history of of diabetes, thyroid disorder.  All other review of systems are negative.    Vital signs in last 24 hours:  Patient Vitals for the past 24 hrs:   BP Temp Temp src Pulse Resp SpO2 Height Weight   02/06/25 1500 -- -- -- 106 (!) 30 100 % -- --   02/06/25 1441 -- (!) 102.7 °F (39.3 °C) Temporal -- -- -- -- --   02/06/25 1300 -- 99.7 °F (37.6 °C) Oral 102 -- 99 % -- --   02/06/25 1130 145/60 100.3 °F (37.9 °C) Oral 108 (!) 36 95 % -- --   02/06/25 1033 -- 100 °F (37.8 °C) Oral 96 -- 95 % -- --   02/06/25 0830 121/51 98.4 °F (36.9 °C) Oral 101 18 96 % -- --   02/06/25 0536 130/52 97.9 °F (36.6 °C) Oral 102 22 96 % -- 170 lb 11.2 oz (77.4 kg)   02/06/25 0430 121/56 97.8 °F (36.6 °C) Oral 100 24 98 % -- --   02/06/25 0330 125/57 97.4 °F (36.3 °C) Oral 105 18 100 % -- --   02/06/25 0235 128/55 98.6 °F (37 °C) Oral 108 18 99 % -- --   02/06/25 0218 123/49 99.6 °F (37.6 °C) Oral 103 18 98 % -- --   02/06/25 0200 117/52 -- -- 105 -- -- -- --   02/06/25 0159 -- (!) 101.2 °F (38.4 °C) Oral -- -- -- -- --   02/05/25 2242 109/46 98 °F (36.7 °C) Oral 120 16 100 % -- --   02/05/25 2133 93/33 (!) 102.2 °F (39 °C) Oral 51 20 99 % -- --   02/05/25 2047 95/35 (!) 102.9 °F (39.4 °C) Oral -- 20 99 % -- --   02/05/25 1947 119/42 98.2 °F (36.8 °C) Oral 107 20 100 % -- --   02/05/25 1931 137/54 98.4 °F (36.9 °C) Oral 102 20 99 % -- --   02/05/25 1930 137/52 -- -- 101 (!) 30 100 % -- --   02/05/25  1909 130/49 98.5 °F (36.9 °C) Oral 106 20 99 % -- --   02/05/25 1830 128/59 -- -- 99 23 100 % -- --   02/05/25 1800 128/54 -- -- 98 21 100 % -- --   02/05/25 1745 111/75 -- -- 98 (!) 28 100 % -- --   02/05/25 1744 111/75 98.9 °F (37.2 °C) -- 98 (!) 27 -- -- --   02/05/25 1730 123/52 -- -- 97 (!) 31 100 % -- --   02/05/25 1717 -- 99.1 °F (37.3 °C) Oral -- -- -- -- --   02/05/25 1700 127/57 -- -- 97 (!) 33 100 % -- --   02/05/25 1630 -- -- -- 101 (!) 30 100 % -- --   02/05/25 1600 128/50 -- -- 101 26 100 % -- --   02/05/25 1527 115/49 98 °F (36.7 °C) Oral 107 22 100 % 6' (1.829 m) 161 lb (73 kg)       Physical Exam:   General: alert, cooperative, oriented.  No respiratory distress.   Head: Normocephalic, without obvious abnormality, atraumatic.   Eyes: Conjunctivae/corneas clear.  No scleral icterus.  No conjunctival     hemorrhage.   Nose: Nares normal.   Throat: Lips, mucosa, and tongue normal.  Poor oral hygiene,    Neck: Soft, supple neck; trachea midline, no adenopathy, no thyromegaly.   Lungs: CTAB, normal and equal bilateral chest rise   Chest wall: No tenderness or deformity.   Heart: Regular rate and rhythm, normal S1S2, no murmur.   Abdomen: soft, non-tender, non-distended, no masses, no guarding, no     rebound, positive BS.   Extremity: no edema, no cyanosis   Skin: No rashes or lesions.   Neurological: Alert, interactive, no focal deficits    Labs:  Lab Results   Component Value Date    WBC 0.1 02/06/2025    HGB 7.2 02/06/2025    HCT 21.0 02/06/2025    PLT 16.0 02/06/2025    CREATSERUM 1.76 02/06/2025    CREATSERUM 1.76 02/06/2025    BUN 34 02/06/2025     02/06/2025    K 4.1 02/06/2025     02/06/2025    CO2 14.0 02/06/2025     02/06/2025    CA 8.8 02/06/2025    ALB 3.7 02/06/2025    ALKPHO 66 02/06/2025    BILT 1.3 02/06/2025    TP 5.8 02/06/2025    AST 22 02/06/2025    ALT 20 02/06/2025    MG 1.2 02/06/2025       Radiology:  Reviewed,       Cultures:  Reviewed,     Assessment and  Plan:    1.  Febrile Neutropenia: with lethargy, fever and chills,   - Symptoms started on the day of admission,   - Had PORT placed in L chest five days back, was being used for infusions,   - Now in house, found to have tachypnea, also had abdominal tenderness, denies any cough, sick contacts, dysuria, diarrhea, RVP is negative, UA is Clean, PORT site is clean,   - Blood cul with E coli ( ESBL), UA is Clean, ? Intra abdominal source, Can't rule out line infection,   - Will need CT C A P, Repeat blood cul,   - On IV Vanc and Meropenem, will continue for now,     2.    Immunocompromised state: Sec to MDS,   - Transfusions prn,   - HemOnc on board,     3.     Disposition: in house, an elderly male with MDS, admitted with febrile neutropenia,   - Follow Pending cul,   - Continue IV Meropenem and IV Vanc, pharm to dose,   - CT C A P, rule out intra abdominal source, ? Pneumonia,   - Supportive care,     Discussed with patient, RN, all questions answered, further recommendations to follow, Thanks,     Thank you for consulting [facility] for Guy White.  If you have any questions or concerns please call [facility] Infectious Disease at [phone].     Eliseo Zayas MD  2/6/2025  3:18 PM         [1]   Allergies  Allergen Reactions    Radiology Contrast Iodinated Dyes HIVES

## 2025-02-07 NOTE — PROGRESS NOTES
02/06/25 4353   Oxygen Utilization   $ RT Standby Charge (per 15 min) 1   $ HH HF Adult Cannula Initial Yes   O2 Device High flow/High humidity   O2 Flow Rate (L/min) 40 L/min   FiO2 (%) 30 %   Heater Temperature 95 °F (35 °C)   SpO2 98 %   Humidity High   Bracey Filled     Per Dr. Daly, he want to start HHFNC for WOB. Patient is comfortable. And breath sounds were diminished.

## 2025-02-07 NOTE — PROGRESS NOTES
[facility] Emergency Department  Hospitalist Progress Note                                                                     [facility]   part of [facility]        Guy Whiet  12/26/1950    SUBJECTIVE:  Patient seen and examined.  On HF 30%.  Feeling ok.   Mild abd pain. Reviewed CT.  NAD.       OBJECTIVE:  Temp:  [98.1 °F (36.7 °C)-102.7 °F (39.3 °C)] 99.6 °F (37.6 °C)  Pulse:  [] 95  Resp:  [17-41] 23  BP: ()/(49-80) 120/61  SpO2:  [88 %-100 %] 100 %  FiO2 (%):  [30 %-35 %] 35 %  Exam  Gen: mild resp distress distress, alert and oriented x3, no focal neurologic deficits  Pulm: Lungs clear bilaterally, normal respiratory effort  CV: Heart with regular rate and rhythm, no murmur.  Normal PMI.    Abd: Abdomen soft, RLQ ttp, nondistended, no organomegaly, bowel sounds present  MSK: Full range of motion in extremities, no clubbing, no cyanosis  Skin: no rashes or lesions    Labs:   Recent Labs   Lab 02/05/25  1537 02/06/25  0015 02/06/25  0535 02/06/25 2042 02/07/25  0405   WBC 0.1* 0.1* 0.1* 0.2* 0.1*   HGB 6.1* 6.0* 7.2* 8.3* 7.4*   MCV 82.8 84.7 85.0 83.9 83.0   PLT 4.0* 20.0* 16.0* 10.0* 7.0*       Recent Labs   Lab 02/05/25  1537 02/06/25  0534 02/06/25 2059 02/07/25  0405    134* 135* 137   K 3.9 4.1 5.0 4.5    105 106 105   CO2 19.0* 14.0* 16.0* 18.0*   BUN 37* 34* 47* 55*   CREATSERUM 1.66* 1.76*  1.76* 2.11* 2.11*  2.11*   CA 9.6 8.8 8.7 8.5*   MG  --  1.2* 1.4* 2.0   * 132* 115* 114*       Recent Labs   Lab 02/05/25  1537 02/06/25  0534 02/06/25 2059 02/07/25  0405   ALT 15 20 38 120*   AST 16 22 38* 106*   ALB 4.3 3.7 3.9 3.7       No results for input(s): \"PGLU\" in the last 168 hours.    Meds:   Scheduled:    sodium chloride   Intravenous Once    sodium chloride   Intravenous Once    meropenem  1 g Intravenous Q12H    thiamine  200 mg Intravenous BID    allopurinol  100 mg Oral Daily    metoprolol succinate ER  50 mg  Oral Daily    vancomycin  15 mg/kg Intravenous Q24H     Continuous Infusions:    lactated ringers 100 mL/hr at 02/06/25 2026     PRN:   acetaminophen    melatonin    acetaminophen    polyethylene glycol (PEG 3350)    sennosides    bisacodyl    fleet enema    ondansetron    metoclopramide    benzonatate    Assessment/Plan:  Principal Problem:    Neutropenic fever    74 year old male with PMH sig for MDS/chronic pancytopenia, HTN, Gout, HL p/w fevers.     Severe Sepsis 2/2 ESBL E.coli bacteremia  Neutropenic fevers  - cont aggressive isotonic crystalloid resuscitation  - pressors for goal MAP>65  - adjusted cefepime to meropenem and vanc  - ID consulted  - await final speciation   - repeat lactic acid until improvement  - CT reviewed - check UA    Acute Hypoxic Respiratory failure  - from sepsis  - HF, wean as tolerated  - further imaging per ICU     Pancytopenia  - s/p 1u PRBCs, plts in ED  - hematology consulted  - goal Hgb>7, goal plts>10     Acute renal injury  - prerenal, infection  - isotonic crystalloids  - monitor renal parameters  - avoid nephrotoxins  - will consult nephrology      Essential HTN  - metoprolol with parameters     Gout  - allopurinol     FEN: regular diet, PT/OT  Proph: SCDs only  Code status: Full code    Dispo - ICU    Eric Wilson MD  [facility] Hospitalist  Message over [employer]  Pager: [phone]    Supplementary Documentation:   DVT Mechanical Prophylaxis:   SCDs,    DVT Pharmacologic Prophylaxis   Medication   None                Code Status: Full Code  Duarte: No urinary catheter in place  Duarte Duration (in days):   Central line:    YRIS:         **Certification      PHYSICIAN Certification of Need for Inpatient Hospitalization - Initial Certification    Patient will require inpatient services that will reasonably be expected to span two midnight's based on the clinical documentation in H+P.   Based on patients current state of illness, I anticipate  that, after discharge, patient will require TBD.

## 2025-02-07 NOTE — PLAN OF CARE
Pt received from Med/Onc at 1441. Pt alert. Oriented to self and place. Following commands. Reports of pain. MD aware. Pt placed on 5L nasal cannula for work of breathing. Tachypnea. Oxygen saturation stable. Febrile. PRN tylenol given.  Sinus rhythm on telemetry monitor. Blood pressure stable. NPO. CT of Chest, Abdomen, and Pelvis done (see Results). BM x1. Bladder scanning and straight catheterization per protocol. Family at bedside and updated on pt status and plan of care.

## 2025-02-07 NOTE — CONSULTS
[facility] - Nephrology  Inpatient Consultation    Guy White Patient Status:  Inpatient    1950 MRN [id number]   Location [facility] 4SW-A Attending Mike Wilson*   Hosp Day # 2 PCP Salvador Cohn MD     Reason for consult: ALLEN  Date of consultation: 2025     HISTORY OF PRESENT ILLNESS:     Guy White is a 74 year old male with a medical history notable for MDS, hypertension, who presents with febrile illness. Found to have severe sepsis secondary to ESBL bacteremia. Recently with port placement which was being used for infusions through Oncology. Starts on IVF and antibiotics on admission. Patient denies NSAID use, changes in urination including any burning or blood in urine. Nephrology consulted for ALLEN.     REVIEW OF SYSTEMS:     ROS as above, otherwise negative    HISTORY:     Past Medical History:    Atherosclerosis of abdominal aorta    Gout    HTN (hypertension)    Hyperlipidemia    Lumbar radiculitis    MDS (myelodysplastic syndrome) (HCC)    Osteoarthritis    right hip    Squamous cell skin cancer     Past Surgical History:   Procedure Laterality Date    Colonoscopy      Colonoscopy N/A 2016    Procedure: COLONOSCOPY;  Surgeon: Elvis Warren MD;  Location:  ENDOSCOPY    Hip replacement surgery      Other      wisdom teeth extraction     Family History   Problem Relation Age of Onset    Cancer Other     Stroke Other     Stroke Mother       reports that he has quit smoking. His smoking use included cigarettes. He has a 35 pack-year smoking history. He has never been exposed to tobacco smoke. He has never used smokeless tobacco. He reports current alcohol use. He reports that he does not use drugs.  Allergies[1]    MEDICATIONS:       Current Facility-Administered Medications:     sodium chloride 0.9% infusion, , Intravenous, Once    sodium chloride 0.9% infusion, , Intravenous, Once    [COMPLETED] meropenem (Merrem) 1 g in sodium chloride 0.9% 100  mL IVPB-MBP, 1 g, Intravenous, Once **FOLLOWED BY** meropenem (Merrem) 1 g in sodium chloride 0.9% 100 mL IVPB-MBP, 1 g, Intravenous, Q12H    acetaminophen (Ofirmev) 10 mg/mL infusion premix 1,000 mg, 1,000 mg, Intravenous, Q6H PRN    thiamine 100 mg/mL injection 200 mg, 200 mg, Intravenous, BID    melatonin tab 6 mg, 6 mg, Oral, Nightly PRN    allopurinol (Zyloprim) tab 100 mg, 100 mg, Oral, Daily    metoprolol succinate ER (Toprol XL) 24 hr tab 50 mg, 50 mg, Oral, Daily    acetaminophen (Tylenol Extra Strength) tab 500 mg, 500 mg, Oral, Q4H PRN    polyethylene glycol (PEG 3350) (Miralax) 17 g oral packet 17 g, 17 g, Oral, Daily PRN    sennosides (Senokot) tab 17.2 mg, 17.2 mg, Oral, Nightly PRN    bisacodyl (Dulcolax) 10 MG rectal suppository 10 mg, 10 mg, Rectal, Daily PRN    fleet enema (Fleet) rectal enema 133 mL, 1 enema, Rectal, Once PRN    ondansetron (Zofran) 4 MG/2ML injection 4 mg, 4 mg, Intravenous, Q6H PRN    metoclopramide (Reglan) 5 mg/mL injection 5 mg, 5 mg, Intravenous, Q8H PRN    lactated ringers infusion, , Intravenous, Continuous    benzonatate (Tessalon) cap 200 mg, 200 mg, Oral, TID PRN    vancomycin (Vancocin) 1,000 mg in sodium chloride 0.9% 250 mL IVPB-ADDV, 15 mg/kg, Intravenous, Q24H    Prescriptions Prior to Admission[2]     PHYSICAL EXAM:     Vital Signs: /61   Pulse 95   Temp 99.6 °F (37.6 °C) (Temporal)   Resp 23   Ht 6' (1.829 m)   Wt 167 lb 15.9 oz (76.2 kg)   SpO2 100%   BMI 22.78 kg/m²   Temp (24hrs), Av.8 °F (37.7 °C), Min:98.1 °F (36.7 °C), Max:102.7 °F (39.3 °C)       Intake/Output Summary (Last 24 hours) at 2025 1120  Last data filed at 2025 0919  Gross per 24 hour   Intake 6309.83 ml   Output 1275 ml   Net 5034.83 ml     Wt Readings from Last 3 Encounters:   25 167 lb 15.9 oz (76.2 kg)   25 161 lb (73 kg)   24 166 lb 3.2 oz (75.4 kg)       General: no acute distress  HEENT: normocephalic, atraumatic  CV: RRR  Respiratory: no  respiratory distress  Extremities: no edema bilaterally  Skin: warm, dry  Neuro: awake, alert    LABORATORY DATA:     Lab Results   Component Value Date     (H) 02/07/2025    BUN 55 (H) 02/07/2025    BUNCREA 19.0 05/11/2021    CREATSERUM 2.11 (H) 02/07/2025    CREATSERUM 2.11 (H) 02/07/2025    ANIONGAP 14 02/07/2025    GFR 49 (L) 08/19/2016    CA 8.5 (L) 02/07/2025    OSMOCALC 300 (H) 02/07/2025    ALKPHO 53 02/07/2025     (H) 02/07/2025     (H) 02/07/2025    BILT 1.4 (H) 02/07/2025    TP 6.0 02/07/2025    ALB 3.7 02/07/2025    GLOBULIN 2.3 02/07/2025    AGRATIO 2.1 01/15/2016     02/07/2025    K 4.5 02/07/2025     02/07/2025    CO2 18.0 (L) 02/07/2025     Lab Results   Component Value Date    WBC 0.1 (LL) 02/07/2025    RBC 2.46 (L) 02/07/2025    HGB 7.0 (L) 02/07/2025    HCT 21.4 (L) 02/07/2025    PLT 12.0 (LL) 02/07/2025    MCV 87.0 02/07/2025    MCH 28.5 02/07/2025    MCHC 32.7 02/07/2025    RDW 14.9 02/07/2025    NEPRELIM 0.01 (LL) 02/07/2025    NEUTABS 0.00 (LL) 02/06/2025    LYMPHABS 0.20 (L) 02/06/2025    EOSABS 0.00 02/06/2025    BASABS 0.00 02/06/2025    NEUT 0 02/06/2025    LYMPH 100 02/06/2025    MON 0 02/06/2025    EOS 0 02/06/2025    BASO 0 02/06/2025    NEPERCENT 40.0 02/07/2025    LYPERCENT 60.0 02/07/2025    MOPERCENT 0.0 02/07/2025    EOPERCENT 0.0 02/07/2025    BAPERCENT 0.0 02/07/2025    NE 0.02 (LL) 02/07/2025    LYMABS 0.03 (L) 02/07/2025    MOABSO 0.00 (L) 02/07/2025    EOABSO 0.00 02/07/2025    BAABSO 0.00 02/07/2025     Lab Results   Component Value Date    CREUR 63.30 05/14/2024     Lab Results   Component Value Date    COLORUR Light-Yellow 02/05/2025    CLARITY Clear 02/05/2025    SPECGRAVITY 1.014 02/05/2025    GLUUR Normal 02/05/2025    BILUR Negative 02/05/2025    KETUR Negative 02/05/2025    BLOODURINE 2+ (A) 02/05/2025    PHURINE 5.5 02/05/2025    PROUR 50 (A) 02/05/2025    UROBILINOGEN Normal 02/05/2025    NITRITE Negative 02/05/2025    LEUUR Negative  02/05/2025    NMIC Microscopic not indicated 05/14/2024    WBCUR None Seen 02/05/2025    RBCUR 6-10 (A) 02/05/2025    EPIUR None Seen 02/05/2025    BACUR None Seen 02/05/2025    HYLUR Present (A) 02/18/2017       IMAGING:     Reviewed    ASSESSMENT:     # Non-oliguric ALLEN  -Elevated creatinine (baseline 1.5-1.7) likely pre-renal/ATN in setting of severe sepsis  -Urinalysis shows hematuria, mild proteinuria  -CT with oral contrast shows dominant cyst of right kidney and stable high density focus of left renal cortex.    # HTN/Volume Status  -Home medications: metoprolol 50mg daily    # Metabolic acidosis  -Lactic acidosis    # MDS    PLAN:     -Agree with IVF, continue LR   -Maintain renal perfusion pressures  -Antibiotic management per ID, follow cultures  -Urology follow-up outpatient for cysts  -Avoid nephrotoxins and renally dose medications for creatinine clearance  -Monitor intake and output daily  -Daily weights            We will continue to follow.    Thank you for allowing us to participate in the care of this patient.         DO Winston Vale [facility] - Nephrology               [1]   Allergies  Allergen Reactions    Radiology Contrast Iodinated Dyes HIVES   [2]   Medications Prior to Admission   Medication Sig Dispense Refill Last Dose/Taking    ondansetron 8 MG Oral Tablet Dispersible TAKE 1 TABLET 1 HOUR PRIOR TO VIDAZA INJECTIONS AND EVERY 8 HOURS AS NEEDED   Past Month    Venetoclax 100 MG Oral Tab Take 400 mg by mouth daily.   Past Week    prochlorperazine (COMPAZINE) 10 mg tablet Take 1 tablet (10 mg total) by mouth every 6 (six) hours as needed.   Past Week    metoprolol succinate ER 50 MG Oral Tablet 24 Hr Take 1 tablet (50 mg total) by mouth daily.   2/4/2025    allopurinol 100 MG Oral Tab Take 1 tablet (100 mg total) by mouth daily. 90 tablet 3 2/4/2025    lidocaine-prilocaine 2.5-2.5 % External Cream Apply a small amount over port prior to acess

## 2025-02-07 NOTE — CONSULTS
Oncology f/u    MDS bacteremia febrile neutropenia      HPI:  74 year old with mds here with bacteremia febrile neutropenia    Follow with DR. Alfaro on hymomethylating agents.     E yash bacteremia found antbx. Started    Pancytopenia     Interval:  Still febrile.  Eating better      Past Medical History:    Atherosclerosis of abdominal aorta    Gout    HTN (hypertension)    Hyperlipidemia    Lumbar radiculitis    MDS (myelodysplastic syndrome) (HCC)    Osteoarthritis    right hip    Squamous cell skin cancer     Social History     Socioeconomic History    Marital status:     Number of children: 1   Occupational History    Occupation: [employer]   Tobacco Use    Smoking status: Former     Current packs/day: 1.00     Average packs/day: 1 pack/day for 35.0 years (35.0 ttl pk-yrs)     Types: Cigarettes     Passive exposure: Never    Smokeless tobacco: Never   Vaping Use    Vaping status: Never Used   Substance and Sexual Activity    Alcohol use: Yes     Alcohol/week: 0.0 standard drinks of alcohol     Comment: rarely    Drug use: No    Sexual activity: Yes     Partners: Female   Other Topics Concern     Service No    Sleep Concern No    Exercise Yes    Seat Belt Yes     Vitals:    02/07/25 1600   BP: 128/66   Pulse: 97   Resp: 18   Temp:      Nd  Rr  Nd  No edema  [place]  Normal mood  No deformity    A/P:  74 year old with mds here with bacteremia febrile neutropenia  --cont antbx.  Appreciate id consult workup for source in process  --pancytopenia transfuse if hgb <7 or plts <10k    Holding mds treatment    Will follow    Data: cbc IM note, ID note

## 2025-02-07 NOTE — PROGRESS NOTES
CRITICAL CARE            S: He is still having some abdominal pain. He is also having some shortness of breath.     Meds:   sodium chloride   Intravenous Once    sodium chloride   Intravenous Once    meropenem  1 g Intravenous Q12H    thiamine  200 mg Intravenous BID    allopurinol  100 mg Oral Daily    metoprolol succinate ER  50 mg Oral Daily    vancomycin  15 mg/kg Intravenous Q24H       Prn Meds:    acetaminophen    melatonin    acetaminophen    polyethylene glycol (PEG 3350)    sennosides    bisacodyl    fleet enema    ondansetron    metoclopramide    benzonatate    Infusions:   lactated ringers 100 mL/hr at 02/06/25 2026       OBJECTIVE:  Vitals:    02/07/25 1300 02/07/25 1400 02/07/25 1500 02/07/25 1526   BP: 116/69 128/59 123/65    Pulse: 95 96 94    Resp: 25 (!) 28 25    Temp:    99.4 °F (37.4 °C)   TempSrc:    Temporal   SpO2: 100% 100% 100%    Weight:       Height:         O2: 2LPM    Gen - Alert, oriented x 3, in no apparent distress  Lungs - CTAB  CV - tachycardic, no murmurs  Abdomen - soft, + tenderness bilaterally; no focal tenderness  Extremities - No cyanosis, clubbing, edema appreciated.        Labs:  Recent Labs   Lab 02/06/25 2042 02/07/25  0405 02/07/25  1002   WBC 0.2* 0.1* 0.1*   HGB 8.3* 7.4* 7.0*   PLT 10.0* 7.0* 12.0*     Recent Labs   Lab 02/06/25  0534 02/06/25  1202 02/06/25 2059 02/07/25  0405 02/07/25  0713 02/07/25  1002   *  --  135* 137  --   --    K 4.1  --  5.0 4.5  --   --      --  106 105  --   --    CO2 14.0*  --  16.0* 18.0*  --   --    BUN 34*  --  47* 55*  --   --    CREATSERUM 1.76*  1.76*  --  2.11* 2.11*  2.11*  --   --    *  --  115* 114*  --   --    ANIONGAP 15  --  13 14  --   --    ALB 3.7  --  3.9 3.7  --   --    CA 8.8  --  8.7 8.5*  --   --    MG 1.2*  --  1.4* 2.0  --   --    ALKPHO 66  --  55 53  --   --    AST 22  --  38* 106*  --   --    ALT 20  --  38 120*  --   --    BILT 1.3*  --  1.3* 1.4*  --   --    TP 5.8  --  6.2 6.0  --   --     LACTI  --    < >  --  3.6* 4.0* 2.5*    < > = values in this interval not displayed.       Recent Labs   Lab 02/06/25  1512   ABGPHT 7.42   XIHXPK4B 22*   FZQBD9T 110*   ABGHCO3 17.8*   ABGBE -9.2*       Recent Labs   Lab 02/05/25  1633 02/05/25 2006   COVID19 Not Detected Not Detected   INFAPCR Negative Not Detected   INFBPCR Negative Not Detected   RSV Negative  --        Imaging reviewed    ASSESSMENT AND PLAN      Sepsis  - secondary to ESBL E. Coli bacteremia in a patient with MDS and neutropenia. No obvious source on CT Scan. Lactate is downtrending. BP stable, has not required vasopressors.  -continue IV vanc and meropenem, per ID  -follow up abdominal ultrasound   Abdominal pain - ddx includes biliary pathology, typhilitis, etc. CT abdomen shows nonspecific gb thickening and fluid in pericolic gutters  -follow up ultrasound liver  MDS - with pancytopenia  -monitor labs - transfuse if hgb<7 or platelet<10  -heme consulted  ALLEN - suspect due to sepsis  -IVF  -monitor labs, urine output  -renal is following  Nutrition   -NPO pending abdominal ultrasound; advance diet prn depending on findings  Proph   -SCD only in light of thrombocytopenia   Dispo   -full code  -ICU monitoring       Raman Charles M.D.  Pulmonary/Critical Care

## 2025-02-07 NOTE — PLAN OF CARE
Pt received on 5 L NC. Tachypnea, shallow- denies shortness of breath but conversational dyspnea noted.   Denies chest pain.   Reports rlq- mid quadrant pain with palpation  + loose, brown stools  Voiding per urinal.  Plt transfusion endorsed to dayshift RN  Pt updated on plan of care.

## 2025-02-07 NOTE — PLAN OF CARE
Received pt at change of shift. Pt alert and oriented x4 on 2L NC. Tachypneic at times. Low grade temps. No complaints of pain. ABD US completed. See results. ECHO pending. Pt with multiple loose BM's today. Voiding via urinal see intake and output. Hgb 6.7 this evening. Plt 7. Reported to Intensivist dr clark. See orders. Awaiting type and screen. Will continue with plan of care.

## 2025-02-07 NOTE — PROGRESS NOTES
[facility]   part of [facility] Infectious Disease Progress Note    Guy White Patient Status:  Inpatient    1950 MRN [id number]   Location [facility] 4SW-A Attending Mike Wilson*   Hosp Day # 2 PCP Salvador Cohn MD     Subjective:  Chart reviewed, no acute events.  Pt seen bedside.  Remains weak and fatigued, intermittently confused.  Family bedside.  He states he feels the same as yesterday.  Mild abdominal pain reported.  Tmax 102.7 yesterday afternoon.     Objective:    Allergies:  Allergies[1]    Medications:    Current Facility-Administered Medications:     sodium chloride 0.9% infusion, , Intravenous, Once    sodium chloride 0.9% infusion, , Intravenous, Once    [COMPLETED] meropenem (Merrem) 1 g in sodium chloride 0.9% 100 mL IVPB-MBP, 1 g, Intravenous, Once **FOLLOWED BY** meropenem (Merrem) 1 g in sodium chloride 0.9% 100 mL IVPB-MBP, 1 g, Intravenous, Q12H    acetaminophen (Ofirmev) 10 mg/mL infusion premix 1,000 mg, 1,000 mg, Intravenous, Q6H PRN    thiamine 100 mg/mL injection 200 mg, 200 mg, Intravenous, BID    melatonin tab 6 mg, 6 mg, Oral, Nightly PRN    allopurinol (Zyloprim) tab 100 mg, 100 mg, Oral, Daily    metoprolol succinate ER (Toprol XL) 24 hr tab 50 mg, 50 mg, Oral, Daily    acetaminophen (Tylenol Extra Strength) tab 500 mg, 500 mg, Oral, Q4H PRN    polyethylene glycol (PEG 3350) (Miralax) 17 g oral packet 17 g, 17 g, Oral, Daily PRN    sennosides (Senokot) tab 17.2 mg, 17.2 mg, Oral, Nightly PRN    bisacodyl (Dulcolax) 10 MG rectal suppository 10 mg, 10 mg, Rectal, Daily PRN    fleet enema (Fleet) rectal enema 133 mL, 1 enema, Rectal, Once PRN    ondansetron (Zofran) 4 MG/2ML injection 4 mg, 4 mg, Intravenous, Q6H PRN    metoclopramide (Reglan) 5 mg/mL injection 5 mg, 5 mg, Intravenous, Q8H PRN    lactated ringers infusion, , Intravenous, Continuous    benzonatate (Tessalon) cap 200 mg, 200 mg, Oral, TID PRN    vancomycin (Vancocin)  1,000 mg in sodium chloride 0.9% 250 mL IVPB-ADDV, 15 mg/kg, Intravenous, Q24H    Physical Exam:  General: Awake, weak and fatigued,  Cooperative.  No apparent distress.  Vital Signs:  Blood pressure 120/61, pulse 95, temperature 99.6 °F (37.6 °C), temperature source Temporal, resp. rate 23, height 6' (1.829 m), weight 167 lb 15.9 oz (76.2 kg), SpO2 100%.   Temp (24hrs), Av.7 °F (37.6 °C), Min:98.1 °F (36.7 °C), Max:102.7 °F (39.3 °C)      HEENT: Exam is unremarkable.  Without scleral icterus.  Mucous membranes are moist. PERRLA.  Oropharynx is clear.  Lungs: Clear to auscultation bilaterally.  Cardiac: Regular rate and rhythm. No murmur.  Abdomen:  Soft, mild upper abdominal TTP.  No guarding.  Extremities:  No lower extremity edema noted.  Without clubbing or cyanosis.    Skin:Port in place right upper chest wall, dried blood on steri strips. No surrounding erythema, warmth or induration. No TTP around area   Neurologic: Cranial nerves are grossly intact.      Labs:  Lab Results   Component Value Date    WBC 0.1 2025    HGB 7.0 2025    HCT 21.4 2025    PLT 12.0 2025    CREATSERUM 2.11 2025    CREATSERUM 2.11 2025    BUN 55 2025     2025    K 4.5 2025     2025    CO2 18.0 2025     2025    CA 8.5 2025    ALB 3.7 2025    ALKPHO 53 2025    BILT 1.4 2025    TP 6.0 2025     2025     2025    MG 2.0 2025       Radiology:  CT c/a/p:  CONCLUSION:       1. Small right pleural effusion is noted.  Associated atelectasis is noted.      2. Cavitary regions in the right upper right middle lobe have markedly decreased since prior examination.      3. Mediastinal lymphadenopathy has mildly increased since prior exam.      4. Minimal gallbladder wall thickening may be artifactual on this noncontrast exam.  If patient has right upper quadrant pain a follow-up ultrasound may be  done.      5. Small amount of fluid in bilateral pericolic gutters and surrounding the liver is noted.  This is nonspecific.  This does surround both kidneys.  If there is concern for infection a follow-up urinalysis may be done.  There is no hydronephrosis.     Assessment/Plan:    1.  Neutropenic fevers  -with hx of MDS and pancytopenia  -Port in place, on chemo.  Port first accessed on 2/4/25 and pt reports pain when used initially with bleeding   -admitted 2/5/25 with temp to 102, chills and dyspnea with elevated LA  -2/2 blood cultures with ESBL E Coli on 2/5/25  -repeat blood cultures 2/6/25 NGTD   -UA bland and urine cultures negative   -RVP negative   -CT c/a/p with minimal GB thickening, small amount free fluid b/l pericolic gutters and around kidneys and improved cavitary regions in RU/RML  -Tmax past 24hrs 102.7   -abdominal ultrasound pending   -on IV Meropenem and Vancomycin    2. MDS  -with pancytopenia  -per oncology     3. ALLEN  -nephrology following    DISPO:  Continue IV Meropenem and Vancomycin. Follow repeat blood cultures, follow up abdominal ultrasound.  Will get echo today.   If abdominal ultrasound normal concern about port as potential source.  D/w Dr Zayas.  Will continue to follow along     If you have any questions or concerns please call [facility] at [phone].     KISHA Aviles  2/7/2025  12:36 PM         [1]   Allergies  Allergen Reactions    Radiology Contrast Iodinated Dyes HIVES

## 2025-02-08 NOTE — PROGRESS NOTES
CRITICAL CARE            S: He was given blood and platelet transfusions overnight. He is not having any new complaints. His BP has been stable. He still has some abdominal pain. He had a HIDA scan which was negative .    Meds:   sodium chloride   Intravenous Once    vancomycin  1,250 mg Intravenous Q24H    sodium chloride   Intravenous Once    meropenem  1 g Intravenous Q12H    thiamine  200 mg Intravenous BID    allopurinol  100 mg Oral Daily    metoprolol succinate ER  50 mg Oral Daily       Prn Meds:    acetaminophen    melatonin    acetaminophen    polyethylene glycol (PEG 3350)    sennosides    bisacodyl    fleet enema    ondansetron    metoclopramide    benzonatate    Infusions:   lactated ringers 100 mL/hr at 02/08/25 0839       OBJECTIVE:  Vitals:    02/08/25 0500 02/08/25 0600 02/08/25 0700 02/08/25 0800   BP: 133/67 123/67 126/62 (!) 147/92   Pulse: 92 90 85 102   Resp: (!) 29 26 26 (!) 35   Temp: 99.5 °F (37.5 °C)   98.2 °F (36.8 °C)   TempSrc: Temporal      SpO2: 100% 100% 100% 97%   Weight:       Height:         O2: 2LPM    Gen - Alert, oriented x 3, in no apparent distress  Lungs - CTAB  CV - RRR, no murmurs  Abdomen - soft, + tenderness in RUQ and LLQ; no focal tenderness  Extremities - No cyanosis, clubbing, edema appreciated.        Labs:  Recent Labs   Lab 02/07/25  1613 02/08/25  0004 02/08/25  0437   WBC 0.1* 0.1* 0.1*   HGB 6.7* 7.8* 8.4*   PLT 7.0* 24.0* 18.0*     Recent Labs   Lab 02/06/25  0534 02/06/25  1202 02/06/25  2059 02/07/25  0405 02/07/25  0713 02/07/25  1002 02/08/25  0437   *  --  135* 137  --   --  138   K 4.1  --  5.0 4.5  --   --  4.0     --  106 105  --   --  106   CO2 14.0*  --  16.0* 18.0*  --   --  19.0*   BUN 34*  --  47* 55*  --   --  56*   CREATSERUM 1.76*  1.76*  --  2.11* 2.11*  2.11*  --   --  1.63*  1.63*   *  --  115* 114*  --   --  104*   ANIONGAP 15  --  13 14  --   --  13   ALB 3.7  --  3.9 3.7  --   --   --    CA 8.8  --  8.7 8.5*  --    --  8.2*   MG 1.2*  --  1.4* 2.0  --   --   --    ALKPHO 66  --  55 53  --   --   --    AST 22  --  38* 106*  --   --   --    ALT 20  --  38 120*  --   --   --    BILT 1.3*  --  1.3* 1.4*  --   --   --    TP 5.8  --  6.2 6.0  --   --   --    LACTI  --    < >  --  3.6* 4.0* 2.5*  --     < > = values in this interval not displayed.       Recent Labs   Lab 02/06/25  1512   ABGPHT 7.42   [id number] 22*   QXCZT9B 110*   ABGHCO3 17.8*   ABGBE -9.2*       Recent Labs   Lab 02/05/25  1633 02/05/25 2006   COVID19 Not Detected Not Detected   INFAPCR Negative Not Detected   INFBPCR Negative Not Detected   RSV Negative  --        Imaging reviewed    ASSESSMENT AND PLAN      Sepsis  - secondary to ESBL E. Coli bacteremia in a patient with MDS and neutropenia. Source not yet clear; initial concern was for intra-abdominal / biliar pathology but HIDA scan was negative. Port infection is a consideration.  Lactate has down trended. BP stable, has not required vasopressors.  -continue IV vanc and meropenem, per ID  Abdominal pain - ddx includes biliary pathology, typhilitis, etc. CT abdomen showed nonspecific gb thickening and fluid in pericolic gutters. Ultrasound showed question of cholecystitis but hida was neg  -continue antibiotics and monitor  -additional management per hospitalist  MDS - with pancytopenia  -monitor labs - transfuse if hgb<7 or platelet<10  -heme is following  ALLEN - suspect due to sepsis; improved.  -monitor labs, urine output  -renal is following  Nutrition   -regular diet  Proph   -SCD only in light of thrombocytopenia   Dispo   -full code  -transfer to floor       Raman Charles M.D.  Pulmonary/Critical Care

## 2025-02-08 NOTE — PLAN OF CARE
AOx2-3, frequently needing reorientation. Drowsy through afternoon but easily arousable. Room air. Tmax 99.1F. BP stable. Very rare bursts of SVT, nonsustained. Regular diet; declined lunch. Adequate UOP - 900mL so far for shift. Moderate RUQ pain per patient. Transferring to Med Onc. Pt and daughter Bibi aware.

## 2025-02-08 NOTE — PROGRESS NOTES
[facility]  Hematology Oncology Progress Note  2025    Guy White Patient Status:  Inpatient    1950 MRN [id number]   Location [facility] 4SW-A Attending Steve, Mike Reyes*   Hosp Day # 3 PCP Salvador Cohn MD     Subjective:  Pt getting echo at bedside.  Denies complaints, no abd pain, no sob, no bleeding.    Medications:  Current Facility-Administered Medications   Medication Dose Route Frequency    sodium chloride 0.9% infusion   Intravenous Once    vancomycin (Vancocin) 1.25 g in sodium chloride 0.9% 250mL IVPB premix  1,250 mg Intravenous Q24H    sodium chloride 0.9% infusion   Intravenous Once    meropenem (Merrem) 1 g in sodium chloride 0.9% 100 mL IVPB-MBP  1 g Intravenous Q12H    acetaminophen (Ofirmev) 10 mg/mL infusion premix 1,000 mg  1,000 mg Intravenous Q6H PRN    thiamine 100 mg/mL injection 200 mg  200 mg Intravenous BID    melatonin tab 6 mg  6 mg Oral Nightly PRN    allopurinol (Zyloprim) tab 100 mg  100 mg Oral Daily    metoprolol succinate ER (Toprol XL) 24 hr tab 50 mg  50 mg Oral Daily    acetaminophen (Tylenol Extra Strength) tab 500 mg  500 mg Oral Q4H PRN    polyethylene glycol (PEG 3350) (Miralax) 17 g oral packet 17 g  17 g Oral Daily PRN    sennosides (Senokot) tab 17.2 mg  17.2 mg Oral Nightly PRN    bisacodyl (Dulcolax) 10 MG rectal suppository 10 mg  10 mg Rectal Daily PRN    fleet enema (Fleet) rectal enema 133 mL  1 enema Rectal Once PRN    ondansetron (Zofran) 4 MG/2ML injection 4 mg  4 mg Intravenous Q6H PRN    metoclopramide (Reglan) 5 mg/mL injection 5 mg  5 mg Intravenous Q8H PRN    lactated ringers infusion   Intravenous Continuous    benzonatate (Tessalon) cap 200 mg  200 mg Oral TID PRN       Objective:  Blood pressure 126/62, pulse 85, temperature 99.5 °F (37.5 °C), temperature source Temporal, resp. rate 26, height 6' (1.829 m), weight 176 lb 2.4 oz (79.9 kg), SpO2 100%.  Tm 101 on  at 8 pm  Gen: NAD, alert  CV: RRR, no m/r/g  Lungs:  CTA b/l  Abd: Normoactive bs, soft, nt/nd  Extrem: WWP, no edema  Skin: No acute changes    Labs:     Recent Labs   Lab 02/07/25  0405 02/07/25  1002 02/07/25  1613 02/08/25  0004 02/08/25  0437   RBC 2.59* 2.46* 2.32* 2.70* 2.91*   HGB 7.4* 7.0* 6.7* 7.8* 8.4*   HCT 21.5* 21.4* 19.0* 22.4* 24.4*   MCV 83.0 87.0 81.9 83.0 83.8   MCH 28.6 28.5 28.9 28.9 28.9   MCHC 34.4 32.7 35.3 34.8 34.4   RDW 14.7 14.9 14.6 14.6 14.4   NEPRELIM 0.02* 0.01* 0.01*  --   --    WBC 0.1* 0.1* 0.1* 0.1* 0.1*   PLT 7.0* 12.0* 7.0* 24.0* 18.0*         Recent Labs   Lab 02/06/25  0534 02/06/25 2059 02/07/25  0405 02/08/25  0437   * 115* 114* 104*   BUN 34* 47* 55* 56*   CREATSERUM 1.76*  1.76* 2.11* 2.11*  2.11* 1.63*  1.63*   EGFRCR 40*  40* 32* 32*  32* 44*  44*   CA 8.8 8.7 8.5* 8.2*   ALB 3.7 3.9 3.7  --    * 135* 137 138   K 4.1 5.0 4.5 4.0    106 105 106   CO2 14.0* 16.0* 18.0* 19.0*   ALKPHO 66 55 53  --    AST 22 38* 106*  --    ALT 20 38 120*  --    BILT 1.3* 1.3* 1.4*  --    TP 5.8 6.2 6.0  --      2/5/25:  Blood cultures:  + E coli, ESBL    Assessment and Plan:  73 yo male with 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  Baseline WBC 1.6 Hgb 6-7's, plts 10's.  Decitabine started 1/23/25, has received 5 doses 1/23 to 1/29.  Transfusion dependent at baseline.  Admitted on 2/5 with neutropenic fever due to ESBL E. Coli bacteremia.  Transfusions this admission:  1 unit PRBCs on 2/5, 2/6, 2/7  1 unit plts on 2/5, 2 units on 2/7     Neutropenic fever - Still febrile overnight 2/7, appreciate ID recs.  On IV vanco and meropenem. Abd US on 2/7 concerning for cholecystitis.  IR drain ordered but pt is high risk for intervention given his pancytopenia, defer to primary service, ID, and IR risks vs benefits.  If drain to be placed, will need more plt transfusion.  MDS - Hgb 8.4, plts 18, no need for transfusion today (unless plan for  intervention).    Will follow.    Please do not hesitate to contact me with any specific questions or concerns.    Amanda Weldon MD  [facility]  Department of Oncology and Hematology

## 2025-02-08 NOTE — PROGRESS NOTES
[facility]   [facility] Infectious Disease Progress Note    Guy White Patient Status:  Inpatient    1950 MRN [id number]   Location [facility] 4SW-A Attending Mike Wilson*   Hosp Day # 3 PCP Salvador Cohn MD     Subjective:  Chart reviewed, no acute events. Abdominal ultrasound yesterday with concerns for cholecystitis; HIDA done this morning  Pt seen bedside.  Family bedside and reports he is more alert and appropriate today.   Sleeping but easily awoken. He does endorse some mild upper abdominal pain to me.  No other complaints.  Groggy but answering questions appropriately.  Temp to 101 yesterday afternoon     Objective:    Allergies:  Allergies[1]    Medications:    Current Facility-Administered Medications:     sodium chloride 0.9% infusion, , Intravenous, Once    vancomycin (Vancocin) 1.25 g in sodium chloride 0.9% 250mL IVPB premix, 1,250 mg, Intravenous, Q24H    sodium chloride 0.9% infusion, , Intravenous, Once    [COMPLETED] meropenem (Merrem) 1 g in sodium chloride 0.9% 100 mL IVPB-MBP, 1 g, Intravenous, Once **FOLLOWED BY** meropenem (Merrem) 1 g in sodium chloride 0.9% 100 mL IVPB-MBP, 1 g, Intravenous, Q12H    acetaminophen (Ofirmev) 10 mg/mL infusion premix 1,000 mg, 1,000 mg, Intravenous, Q6H PRN    thiamine 100 mg/mL injection 200 mg, 200 mg, Intravenous, BID    melatonin tab 6 mg, 6 mg, Oral, Nightly PRN    allopurinol (Zyloprim) tab 100 mg, 100 mg, Oral, Daily    metoprolol succinate ER (Toprol XL) 24 hr tab 50 mg, 50 mg, Oral, Daily    acetaminophen (Tylenol Extra Strength) tab 500 mg, 500 mg, Oral, Q4H PRN    polyethylene glycol (PEG 3350) (Miralax) 17 g oral packet 17 g, 17 g, Oral, Daily PRN    sennosides (Senokot) tab 17.2 mg, 17.2 mg, Oral, Nightly PRN    bisacodyl (Dulcolax) 10 MG rectal suppository 10 mg, 10 mg, Rectal, Daily PRN    fleet enema (Fleet) rectal enema 133 mL, 1 enema, Rectal, Once PRN    ondansetron (Zofran) 4  MG/2ML injection 4 mg, 4 mg, Intravenous, Q6H PRN    metoclopramide (Reglan) 5 mg/mL injection 5 mg, 5 mg, Intravenous, Q8H PRN    lactated ringers infusion, , Intravenous, Continuous    benzonatate (Tessalon) cap 200 mg, 200 mg, Oral, TID PRN    Physical Exam:  General: Awake, weak and fatigued,  Cooperative.  No apparent distress.  Vital Signs:  Blood pressure 133/67, pulse 92, temperature 99.5 °F (37.5 °C), temperature source Temporal, resp. rate (!) 29, height 6' (1.829 m), weight 176 lb 2.4 oz (79.9 kg), SpO2 100%.   Temp (24hrs), Av °F (37.8 °C), Min:99.1 °F (37.3 °C), Max:101 °F (38.3 °C)      HEENT: Exam is unremarkable.  Without scleral icterus.  Mucous membranes are moist. PERRLA.  Oropharynx is clear.  Lungs: Clear to auscultation bilaterally.  Cardiac: Regular rate and rhythm. No murmur.  Abdomen:  Soft, mild RUQ TTP.  No guarding.  Extremities:  No lower extremity edema noted.  Without clubbing or cyanosis.    Skin:Port in place right upper chest wall, dried blood on steri strips. No surrounding erythema, warmth or induration. No TTP around area   Neurologic: Cranial nerves are grossly intact.      Labs:  Lab Results   Component Value Date    WBC 0.1 2025    HGB 8.4 2025    HCT 24.4 2025    PLT 18.0 2025    CREATSERUM 1.63 2025    CREATSERUM 1.63 2025    BUN 56 2025     2025    K 4.0 2025     2025    CO2 19.0 2025     2025    CA 8.2 2025       Radiology:  CT c/a/p:  CONCLUSION:       1. Small right pleural effusion is noted.  Associated atelectasis is noted.      2. Cavitary regions in the right upper right middle lobe have markedly decreased since prior examination.      3. Mediastinal lymphadenopathy has mildly increased since prior exam.      4. Minimal gallbladder wall thickening may be artifactual on this noncontrast exam.  If patient has right upper quadrant pain a follow-up ultrasound may be  done.      5. Small amount of fluid in bilateral pericolic gutters and surrounding the liver is noted.  This is nonspecific.  This does surround both kidneys.  If there is concern for infection a follow-up urinalysis may be done.  There is no hydronephrosis.     Abdominal ultrasound :  CONCLUSION:    Limited emergency ultrasound shows gallbladder dilated with wall thickening 4 mm, sludge, stones, gallbladder measuring 9.0 x 5.2 cm, suggestion of trace subtle pericholecystic fluid or edema.  Concern for cholecystitis.  Common bile duct   normal caliber 4 mm.  Small amount of ascites right upper quadrant seen.     Assessment/Plan:    1.  Neutropenic fevers  -with hx of MDS and pancytopenia  -Port in place, on chemo.  Port first accessed on 2/4/25 and pt reports pain when used initially with bleeding   -admitted 2/5/25 with temp to 102, chills and dyspnea with elevated LA  -2/2 blood cultures with ESBL E Coli on 2/5/25  -repeat blood cultures 2/6/25 NGTD   -UA bland and urine cultures negative   -RVP negative   -CT c/a/p with minimal GB thickening, small amount free fluid b/l pericolic gutters and around kidneys and improved cavitary regions in RU/RML  -abdominal ultrasound with dilated GB and subtle pericholecystic fluid, c/for cholecystitis  -HIDA negative for acute cholecystitis   -TTE negative for vegetations  -Tmax past 24hrs 101  -on IV Meropenem and Vancomycin    2. MDS  -with pancytopenia  -platelets remain low, WBC 0.1  -per oncology     3. ALLEN  -nephrology following    DISPO:  Continue IV Meropenem and Vancomycin. Follow repeat blood cultures, trend temps.  If repeat blood cultures turn positive or pt continues to spike temps would be concerned for port as source of bacteremia.  Will continue to follow along.     If you have any questions or concerns please call [facility] at [phone].     KISHA Aviles  2/7/2025  12:36 PM         [1]   Allergies  Allergen Reactions    Radiology Contrast Iodinated Dyes  HIVES

## 2025-02-08 NOTE — PLAN OF CARE
Assumed care of patient following shift report at 1920. Patient is alert and oriented. Receiving platelets upon beginning of shift, followed by a unit of PRBC. Patient does have complaints of pain/tenderness only to the RUQ at the beginning of the shift. On 2LNC. Febrile at the end of plt administration, APN notified. Maintained a low grade fever.         Problem: MUSCULOSKELETAL - ADULT  Goal: Return mobility to safest level of function  Description: INTERVENTIONS:  - Assess patient stability and activity tolerance for standing, transferring and ambulating w/ or w/o assistive devices  - Assist with transfers and ambulation using safe patient handling equipment as needed  - Ensure adequate protection for wounds/incisions during mobilization  - Obtain PT/OT consults as needed  - Advance activity as appropriate  - Communicate ordered activity level and limitations with patient/family  Outcome: Progressing     Problem: RISK FOR INFECTION - ADULT  Goal: Absence of fever/infection during anticipated neutropenic period  Description: INTERVENTIONS  - Monitor WBC  - Administer growth factors as ordered  - Implement neutropenic guidelines  Outcome: Progressing     Problem: RESPIRATORY - ADULT  Goal: Achieves optimal ventilation and oxygenation  Description: INTERVENTIONS:  - Assess for changes in respiratory status  - Assess for changes in mentation and behavior  - Position to facilitate oxygenation and minimize respiratory effort  - Oxygen supplementation based on oxygen saturation or ABGs  - Provide Smoking Cessation handout, if applicable  - Encourage broncho-pulmonary hygiene including cough, deep breathe, Incentive Spirometry  - Assess the need for suctioning and perform as needed  - Assess and instruct to report SOB or any respiratory difficulty  - Respiratory Therapy support as indicated  - Manage/alleviate anxiety  - Monitor for signs/symptoms of CO2 retention  Outcome: Progressing

## 2025-02-08 NOTE — PLAN OF CARE
Received from ICU via bed alert and oriented, feels tired and fatigued, vital signs are monitored, sepsis protocol garry, MD was notified. Family at bedside, requested to be connected to external catheter, declined to use urinal.  On IV Merrem and on IV fluids.  No appetite per family.   Problem: RISK FOR INFECTION - ADULT  Goal: Absence of fever/infection during anticipated neutropenic period  Description: INTERVENTIONS  - Monitor WBC  - Administer growth factors as ordered  - Implement neutropenic guidelines  Outcome: Not Progressing

## 2025-02-08 NOTE — PROGRESS NOTES
[facility]   part of [facility]    Nephrology Progress Note    Guy White Attending:  Mike Wilson*       SUBJECTIVE:     Has some right sided and mid abdominal pain.  Currently NPO for HIDA scan.  Denies SOB.  Endorses thirst.    PHYSICAL EXAM:     Vital Signs: BP (!) 147/92   Pulse 102   Temp 98.2 °F (36.8 °C)   Resp (!) 35   Ht 182.9 cm (6')   Wt 176 lb 2.4 oz (79.9 kg)   SpO2 97%   BMI 23.89 kg/m²   Temp (24hrs), Av.9 °F (37.7 °C), Min:98.2 °F (36.8 °C), Max:101 °F (38.3 °C)       Intake/Output Summary (Last 24 hours) at 2025 0927  Last data filed at 2025 0745  Gross per 24 hour   Intake 1240 ml   Output 1100 ml   Net 140 ml     Wt Readings from Last 3 Encounters:   25 176 lb 2.4 oz (79.9 kg)   25 161 lb (73 kg)   24 166 lb 3.2 oz (75.4 kg)       General: pleasant, well appearing  Skin: no visible rashes  HEENT: NCAT  Cardiac: Regular rate and rhythm, no murmur/gallop or rub  Lungs: CTAB, no wheeze, no rale, no rhonchi  Abdomen: +ttp in upper quadrants, mild distension, soft  Extremities: warm, well perfused, no leg edema  Neurologic/Psych: mentating well       LABORATORY DATA:     Lab Results   Component Value Date     (H) 2025    BUN 56 (H) 2025    BUNCREA 19.0 2021    CREATSERUM 1.63 (H) 2025    CREATSERUM 1.63 (H) 2025    ANIONGAP 13 2025    GFR 49 (L) 2016    CA 8.2 (L) 2025    OSMOCALC 302 (H) 2025    ALKPHO 53 2025     (H) 2025     (H) 2025    BILT 1.4 (H) 2025    TP 6.0 2025    ALB 3.7 2025    GLOBULIN 2.3 2025    AGRATIO 2.1 01/15/2016     2025    K 4.0 2025     2025    CO2 19.0 (L) 2025     Lab Results   Component Value Date    WBC 0.1 (LL) 2025    RBC 2.91 (L) 2025    HGB 8.4 (L) 2025    HCT 24.4 (L) 2025    PLT 18.0 (LL) 2025    MCV 83.8 2025    MCH  28.9 02/08/2025    MCHC 34.4 02/08/2025    RDW 14.4 02/08/2025    NEPRELIM 0.01 (LL) 02/07/2025    NEUTABS 0.01 (LL) 02/07/2025    LYMPHABS 0.08 (L) 02/07/2025    EOSABS 0.00 02/07/2025    BASABS 0.00 02/07/2025    NEUT 8 02/07/2025    LYMPH 83 02/07/2025    MON 8 02/07/2025    EOS 0 02/07/2025    BASO 0 02/07/2025    NEPERCENT 0.0 02/08/2025    LYPERCENT 83.3 02/08/2025    MOPERCENT 16.7 02/08/2025    EOPERCENT 0.0 02/08/2025    BAPERCENT 0.0 02/08/2025    NE 0.01 (LL) 02/07/2025    LYMABS 0.05 (L) 02/08/2025    MOABSO 0.01 (L) 02/08/2025    EOABSO 0.00 02/08/2025    BAABSO 0.00 02/08/2025     Lab Results   Component Value Date    CREUR 63.30 05/14/2024     Lab Results   Component Value Date    COLORUR Yellow 02/07/2025    CLARITY Turbid (A) 02/07/2025    SPECGRAVITY 1.019 02/07/2025    GLUUR Normal 02/07/2025    BILUR Negative 02/07/2025    KETUR Trace (A) 02/07/2025    BLOODURINE 3+ (A) 02/07/2025    PHURINE 5.5 02/07/2025    PROUR 100 (A) 02/07/2025    UROBILINOGEN Normal 02/07/2025    NITRITE Negative 02/07/2025    LEUUR Negative 02/07/2025    NMIC Microscopic not indicated 05/14/2024    WBCUR 1-5 02/07/2025    RBCUR >10 (A) 02/07/2025    EPIUR Few (A) 02/07/2025    BACUR Rare (A) 02/07/2025    HYLUR Present (A) 02/07/2025         IMAGING:     Reviewed.    MEDICATIONS:       Current Facility-Administered Medications   Medication Dose Route Frequency    sodium chloride 0.9% infusion   Intravenous Once    vancomycin (Vancocin) 1.25 g in sodium chloride 0.9% 250mL IVPB premix  1,250 mg Intravenous Q24H    sodium chloride 0.9% infusion   Intravenous Once    meropenem (Merrem) 1 g in sodium chloride 0.9% 100 mL IVPB-MBP  1 g Intravenous Q12H    acetaminophen (Ofirmev) 10 mg/mL infusion premix 1,000 mg  1,000 mg Intravenous Q6H PRN    thiamine 100 mg/mL injection 200 mg  200 mg Intravenous BID    melatonin tab 6 mg  6 mg Oral Nightly PRN    allopurinol (Zyloprim) tab 100 mg  100 mg Oral Daily    metoprolol succinate  ER (Toprol XL) 24 hr tab 50 mg  50 mg Oral Daily    acetaminophen (Tylenol Extra Strength) tab 500 mg  500 mg Oral Q4H PRN    polyethylene glycol (PEG 3350) (Miralax) 17 g oral packet 17 g  17 g Oral Daily PRN    sennosides (Senokot) tab 17.2 mg  17.2 mg Oral Nightly PRN    bisacodyl (Dulcolax) 10 MG rectal suppository 10 mg  10 mg Rectal Daily PRN    fleet enema (Fleet) rectal enema 133 mL  1 enema Rectal Once PRN    ondansetron (Zofran) 4 MG/2ML injection 4 mg  4 mg Intravenous Q6H PRN    metoclopramide (Reglan) 5 mg/mL injection 5 mg  5 mg Intravenous Q8H PRN    lactated ringers infusion   Intravenous Continuous    benzonatate (Tessalon) cap 200 mg  200 mg Oral TID PRN       ASSESSMENT/PLAN:     75 yo M with history of CKD stage 3, MDS s/p port placement and chemo, HTN, gout, HL presented with neutropenic fever in the setting of E coli bacteremia, possibly from cholecystitis. Nephrology consulted for ALLEN:    ALLEN on CKD 3: baseline creatinine ~1.3-1.5 mg/dl; pre-renal  -- continue LR at 100 ml/h  -- strict I/Os  -- maintain MAP>65  -- avoid NSAIDs, fleets, iodinated contrast unless benefit >> risk    Abn UA:  -- no pyuria, +RBC, protein, squamous cells  -- creatinine improving with IV fluids  -- repeat UA when infection improves    Pancytopenia:  -- per heme, transfuse prn    ESBL E coli bacteremia, possible cholecystitis:  -- on meropenem, vancomycin, with ID following  -- HIDA scan pending    D/w RN.      Thank you for allowing me to participate in the care of this patient. Please do not hesitate to call with questions or concerns.        Flor Coombs MD  [facility]

## 2025-02-08 NOTE — PROGRESS NOTES
[facility] Pharmacy Dosing Service      Follow Up Pharmacokinetic Consult for Vancomycin Dosing     Guy White is a 74 year old male who is receiving vancomycin therapy for neutropenic fever. Patient is on day 3 of vancomycin and is currently receiving 1000 mg IV every 24 hours. The current treatment and monitoring approach is non-AUC strategy.        Weight and Temperature:    Wt Readings from Last 1 Encounters:   25 76.2 kg (167 lb 15.9 oz)         Temp Readings from Last 1 Encounters:   25 (!) 100.7 °F (38.2 °C) (Temporal)      Labs:   Recent Labs   Lab 25  0534 25  2059 25  0405   CREATSERUM 1.76*  1.76* 2.11* 2.11*  2.11*      Estimated Creatinine Clearance: 33.1 mL/min (A) (based on SCr of 2.11 mg/dL (H)).     Recent Labs   Lab 25  0405 25  1002 25  1613   WBC 0.1* 0.1* 0.1*        Vancomycin Levels:  Lab Results   Component Value Date/Time    VANCT 9.8 (L) 2025 08:53 PM       Corresponding 24 h-AUC: N/A     The Pharmacokinetic Target is:    Trough/random 10-15 mg/L    Renal Dosing Considerations:    ALLEN/ARF     Assessment/Plan:   Maintenance Regimen: Adjust vancomycin to 1250 mg IV every 24 hours.      Monitorin) Plan for vancomycin trough to be obtained in approximately 72 hours    2) Pharmacy will order SCr as clinically indicated to assess renal function.    3) Pharmacy will monitor for toxicity and efficacy, adjust vancomycin dose and/or frequency, and order vancomycin levels as appropriate per the Pharmacy and Therapeutics Committee approved protocol until discontinuation of the medication.       We appreciate the opportunity to assist in the care of this patient.     Erlinda Hernandez, PharmD  2025  9:57 PM  [facility] Extension: [phone]

## 2025-02-08 NOTE — PROGRESS NOTES
[facility]  Hospitalist Progress Note                                                                     [facility]   part of [facility]        Guy White  12/26/1950    SUBJECTIVE:  Patient seen and examined.  Off O2.   Mild abd pain. Reviewed CT.  NAD.       OBJECTIVE:  Temp:  [98.2 °F (36.8 °C)-101 °F (38.3 °C)] 98.2 °F (36.8 °C)  Pulse:  [] 102  Resp:  [18-37] 35  BP: (112-147)/(56-92) 147/92  SpO2:  [88 %-100 %] 97 %  Exam  Gen: mild resp distress distress, alert and oriented x3, no focal neurologic deficits  Pulm: Lungs clear bilaterally, normal respiratory effort  CV: Heart with regular rate and rhythm, no murmur.  Normal PMI.    Abd: Abdomen soft, RLQ ttp, nondistended, no organomegaly, bowel sounds present  MSK: Full range of motion in extremities, no clubbing, no cyanosis  Skin: no rashes or lesions    Labs:   Recent Labs   Lab 02/07/25  0405 02/07/25  1002 02/07/25  1613 02/08/25  0004 02/08/25  0437   WBC 0.1* 0.1* 0.1* 0.1* 0.1*   HGB 7.4* 7.0* 6.7* 7.8* 8.4*   MCV 83.0 87.0 81.9 83.0 83.8   PLT 7.0* 12.0* 7.0* 24.0* 18.0*       Recent Labs   Lab 02/05/25  1537 02/06/25  0534 02/06/25  2059 02/07/25  0405 02/08/25  0437    134* 135* 137 138   K 3.9 4.1 5.0 4.5 4.0    105 106 105 106   CO2 19.0* 14.0* 16.0* 18.0* 19.0*   BUN 37* 34* 47* 55* 56*   CREATSERUM 1.66* 1.76*  1.76* 2.11* 2.11*  2.11* 1.63*  1.63*   CA 9.6 8.8 8.7 8.5* 8.2*   MG  --  1.2* 1.4* 2.0  --    * 132* 115* 114* 104*       Recent Labs   Lab 02/05/25  1537 02/06/25  0534 02/06/25 2059 02/07/25  0405   ALT 15 20 38 120*   AST 16 22 38* 106*   ALB 4.3 3.7 3.9 3.7       No results for input(s): \"PGLU\" in the last 168 hours.    Meds:   Scheduled:    sodium chloride   Intravenous Once    vancomycin  1,250 mg Intravenous Q24H    sodium chloride   Intravenous Once    meropenem  1 g Intravenous Q12H    thiamine  200 mg Intravenous BID     allopurinol  100 mg Oral Daily    metoprolol succinate ER  50 mg Oral Daily     Continuous Infusions:    lactated ringers 100 mL/hr at 02/08/25 0839     PRN:   acetaminophen    melatonin    acetaminophen    polyethylene glycol (PEG 3350)    sennosides    bisacodyl    fleet enema    ondansetron    metoclopramide    benzonatate    Assessment/Plan:  Principal Problem:    Neutropenic fever    74 year old male with PMH sig for MDS/chronic pancytopenia, HTN, Gout, HL p/w fevers.     Severe Sepsis 2/2 ESBL E.coli bacteremia, likely from cholecystitis  Neutropenic fevers  - pressors for goal MAP>65  - adjusted cefepime to meropenem and vanc  - ID following  - Cultures reviewed  - CT and US reviewed  - HIDA pending  - IR aleah tube ordered, timing TBD    Acute Hypoxic Respiratory failure - improving  - from sepsis  - HF, wean as tolerated  - further imaging per ICU     Pancytopenia  - s/p PRBCs, plts in ED  - hematology following  - goal Hgb>7, goal plts>10 - may need higher targets if IR drain needed, defer to IR     Acute renal injury - improving  - prerenal, infection  - isotonic crystalloids  - monitor renal parameters  - avoid nephrotoxins  - neprhology following     Essential HTN  - metoprolol with parameters     Gout  - allopurinol     FEN: regular diet, PT/OT  Proph: SCDs only  Code status: Full code    Dispo - ICU    Eric Wilson MD  [facility]  Message over [employer]  Pager: [phone]    Supplementary Documentation:   DVT Mechanical Prophylaxis:   SCDs,    DVT Pharmacologic Prophylaxis   Medication   None                Code Status: Full Code  Duarte: External urinary catheter in place  Duarte Duration (in days):   Central line:    YRIS:         **Certification      PHYSICIAN Certification of Need for Inpatient Hospitalization - Initial Certification    Patient will require inpatient services that will reasonably be expected to span two midnight's based on the clinical  documentation in H+P.   Based on patients current state of illness, I anticipate that, after discharge, patient will require TBD.

## 2025-02-09 NOTE — PROGRESS NOTES
[facility] [place]  Hospitalist Progress Note                                                                     [facility]   part of [facility]        Guy White  12/26/1950    SUBJECTIVE:  Patient seen and examined.  Off O2.   Mild abd pain. Reviewed CT.  NAD.       OBJECTIVE:  Temp:  [98.3 °F (36.8 °C)-99.1 °F (37.3 °C)] 98.8 °F (37.1 °C)  Pulse:  [85-98] 93  Resp:  [12-33] 32  BP: (121-137)/(60-69) 137/63  SpO2:  [99 %-100 %] 100 %  Exam  Gen: mild resp distress distress, alert and oriented x3, no focal neurologic deficits  Pulm: Lungs clear bilaterally, normal respiratory effort  CV: Heart with regular rate and rhythm, no murmur.  Normal PMI.    Abd: Abdomen soft, RLQ ttp, nondistended, no organomegaly, bowel sounds present  MSK: Full range of motion in extremities, no clubbing, no cyanosis  Skin: no rashes or lesions    Labs:   Recent Labs   Lab 02/07/25  1002 02/07/25  1613 02/08/25  0004 02/08/25  0437 02/09/25  0957   WBC 0.1* 0.1* 0.1* 0.1* 0.1*   HGB 7.0* 6.7* 7.8* 8.4* 7.0*   MCV 87.0 81.9 83.0 83.8 82.5   PLT 12.0* 7.0* 24.0* 18.0* 5.0*       Recent Labs   Lab 02/06/25  0534 02/06/25 2059 02/07/25  0405 02/08/25  0437 02/09/25  0957   * 135* 137 138 143   K 4.1 5.0 4.5 4.0 3.0*    106 105 106 110   CO2 14.0* 16.0* 18.0* 19.0* 21.0   BUN 34* 47* 55* 56* 58*   CREATSERUM 1.76*  1.76* 2.11* 2.11*  2.11* 1.63*  1.63* 1.07   CA 8.8 8.7 8.5* 8.2* 8.2*   MG 1.2* 1.4* 2.0  --  1.8   PHOS  --   --   --   --  2.5   * 115* 114* 104* 112*       Recent Labs   Lab 02/05/25  1537 02/06/25  0534 02/06/25 2059 02/07/25 0405 02/09/25  0957   ALT 15 20 38 120*  --    AST 16 22 38* 106*  --    ALB 4.3 3.7 3.9 3.7 2.9*       No results for input(s): \"PGLU\" in the last 168 hours.    Meds:   Scheduled:    potassium phosphate dibasic 15 mmol in sodium chloride 0.9% 250 mL IVPB  15 mmol Intravenous Once    Followed by    potassium chloride   40 mEq Intravenous Once    magnesium oxide  400 mg Oral Once    sodium chloride   Intravenous Once    vancomycin  1,250 mg Intravenous Q24H    sodium chloride   Intravenous Once    meropenem  1 g Intravenous Q12H    thiamine  200 mg Intravenous BID    allopurinol  100 mg Oral Daily    metoprolol succinate ER  50 mg Oral Daily     Continuous Infusions:       PRN:   heparin    acetaminophen    melatonin    acetaminophen    polyethylene glycol (PEG 3350)    sennosides    bisacodyl    fleet enema    ondansetron    metoclopramide    benzonatate    Assessment/Plan:  Principal Problem:    Neutropenic fever    74 year old male with PMH sig for MDS/chronic pancytopenia, HTN, Gout, HL p/w fevers.     Severe Sepsis 2/2 ESBL E.coli bacteremia, likely from cholecystitis  Neutropenic fevers  - adjusted cefepime to meropenem and vanc  - ID following  - Cultures reviewed  - CT and US reviewed  - HIDA negative  - follow blood cultures, may need to consider port as source   - transferred out of ICU 2/8     Acute Hypoxic Respiratory failure - resolved  - from sepsis  - HF, wean as tolerated    Pancytopenia  - s/p PRBCs, plts in ED  - hematology following  - goal Hgb>7, goal plts>10      Acute renal injury - improving  - prerenal, infection  - isotonic crystalloids per nephrology   - monitor renal parameters  - avoid nephrotoxins  - neprhology following     Essential HTN  - metoprolol with parameters     Gout  - allopurinol     FEN: regular diet, PT/OT  Proph: SCDs only  Code status: Full code    Dispo - monitor     Eric Wilson MD  [facility]  Message over [employer]/VisTracks  Pager: [phone]    Supplementary Documentation:   DVT Mechanical Prophylaxis:   SCDs,    DVT Pharmacologic Prophylaxis   Medication    heparin (Porcine) 100 Units/mL lock flush 500 Units                Code Status: Full Code  Duarte: External urinary catheter in place  Duarte Duration (in days):   Central line:    YRIS:          **Certification      PHYSICIAN Certification of Need for Inpatient Hospitalization - Initial Certification    Patient will require inpatient services that will reasonably be expected to span two midnight's based on the clinical documentation in H+P.   Based on patients current state of illness, I anticipate that, after discharge, patient will require TBD.

## 2025-02-09 NOTE — PLAN OF CARE
Received patient on bed asleep with family at bedside. On room air. Afebrile. 2230 did assessment Patient is alert and oriented x4. Denies any pain and discomforts. Scheduled meds given per MAR. Call light within reach. Safety precautions in place. All needs attended. Frequent rounds done.   [id number] Tele called patient had SVT - 166's for 3 secs, updated MD, no new orders.   Continue monitor.

## 2025-02-09 NOTE — PROGRESS NOTES
[facility]  Hematology Oncology Progress Note  2025    Guy White Patient Status:  Inpatient    1950 MRN [id number]   Location [facility] 4SW-A Attending Steve, Mike Reyes*   Hosp Day # 4 PCP Salvador Cohn MD     Subjective:  HIDA scan yesterday negative for acute cholecystitis.    Last fever 100.8 overnigh .  Denies complaints, no abd pain, no sob, no bleeding.  Plts down to 5 today.    Medications:  Current Facility-Administered Medications   Medication Dose Route Frequency    potassium phosphate dibasic 15 mmol in sodium chloride 0.9% 250 mL IVPB  15 mmol Intravenous Once    Followed by    potassium chloride 40 mEq in 250mL sodium chloride 0.9% IVPB premix  40 mEq Intravenous Once    sodium chloride 0.9% infusion   Intravenous Once    heparin (Porcine) 100 Units/mL lock flush 500 Units  5 mL Intravenous PRN    sodium chloride 0.9% infusion   Intravenous Once    vancomycin (Vancocin) 1.25 g in sodium chloride 0.9% 250mL IVPB premix  1,250 mg Intravenous Q24H    sodium chloride 0.9% infusion   Intravenous Once    meropenem (Merrem) 1 g in sodium chloride 0.9% 100 mL IVPB-MBP  1 g Intravenous Q12H    acetaminophen (Ofirmev) 10 mg/mL infusion premix 1,000 mg  1,000 mg Intravenous Q6H PRN    thiamine 100 mg/mL injection 200 mg  200 mg Intravenous BID    melatonin tab 6 mg  6 mg Oral Nightly PRN    allopurinol (Zyloprim) tab 100 mg  100 mg Oral Daily    metoprolol succinate ER (Toprol XL) 24 hr tab 50 mg  50 mg Oral Daily    acetaminophen (Tylenol Extra Strength) tab 500 mg  500 mg Oral Q4H PRN    polyethylene glycol (PEG 3350) (Miralax) 17 g oral packet 17 g  17 g Oral Daily PRN    sennosides (Senokot) tab 17.2 mg  17.2 mg Oral Nightly PRN    bisacodyl (Dulcolax) 10 MG rectal suppository 10 mg  10 mg Rectal Daily PRN    fleet enema (Fleet) rectal enema 133 mL  1 enema Rectal Once PRN    ondansetron (Zofran) 4 MG/2ML injection 4 mg  4 mg Intravenous Q6H PRN    metoclopramide  (Reglan) 5 mg/mL injection 5 mg  5 mg Intravenous Q8H PRN    benzonatate (Tessalon) cap 200 mg  200 mg Oral TID PRN       Objective:  Blood pressure 124/60, pulse 81, temperature 98.4 °F (36.9 °C), temperature source Oral, resp. rate (!) 28, height 6' (1.829 m), weight 189 lb 11.2 oz (86 kg), SpO2 100%.  Tm 101 on 2/7 at 8 pm  Gen: NAD, alert  CV: RRR, no m/r/g  Lungs: CTA b/l  Abd: Normoactive bs, soft, nt/nd  Extrem: WWP, no edema  Skin: No acute changes    Labs:     Recent Labs   Lab 02/07/25  1002 02/07/25  1613 02/08/25  0004 02/08/25  0437 02/09/25  0957   RBC 2.46* 2.32* 2.70* 2.91* 2.46*   HGB 7.0* 6.7* 7.8* 8.4* 7.0*   HCT 21.4* 19.0* 22.4* 24.4* 20.3*   MCV 87.0 81.9 83.0 83.8 82.5   MCH 28.5 28.9 28.9 28.9 28.5   MCHC 32.7 35.3 34.8 34.4 34.5   RDW 14.9 14.6 14.6 14.4 14.2   NEPRELIM 0.01* 0.01*  --   --  0.01*   WBC 0.1* 0.1* 0.1* 0.1* 0.1*   PLT 12.0* 7.0* 24.0* 18.0* 5.0*         Recent Labs   Lab 02/06/25 2059 02/07/25  0405 02/08/25  0437 02/09/25  0957   * 114* 104* 112*   BUN 47* 55* 56* 58*   CREATSERUM 2.11* 2.11*  2.11* 1.63*  1.63* 1.07   EGFRCR 32* 32*  32* 44*  44* 73   CA 8.7 8.5* 8.2* 8.2*   ALB 3.9 3.7 3.5 2.9*   * 137 138 143   K 5.0 4.5 4.0 3.0*    105 106 110   CO2 16.0* 18.0* 19.0* 21.0   ALKPHO 55 53 60  --    AST 38* 106* 76*  --    ALT 38 120* 203*  --    BILT 1.3* 1.4* 1.3*  --    TP 6.2 6.0 5.7  --      2/5/25:  Blood cultures:  + E coli, ESBL    Assessment and Plan:  73 yo male with 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  Baseline WBC 1.6 Hgb 6-7's, plts 10's.  Decitabine started 1/23/25, has received 5 doses 1/23 to 1/29.  Transfusion dependent at baseline.  Admitted on 2/5 with neutropenic fever due to ESBL E. Coli bacteremia.  Transfusions this admission:  1 unit PRBCs on 2/5, 2/6, 2/7  1 unit plts on 2/5, 2 units on 2/7     Neutropenic fever - last fever 100.8 overnight 2/7,  appreciate ID recs.  On IV vanco and meropenem. Abd US on 2/7 concerning for cholecystitis but HIDA scan negative on 2/8.  Suspect perc aleah drain by IR will be deferred.   MDS - Hgb 7, plts down to 5 today, 1 unit plt transfusion ordered.  Transfuse for goal Hgb > 7 (consider > 8 prior to discharge as he is transfusion dependent) and plts > 10.    Will follow.    Please do not hesitate to contact me with any specific questions or concerns.    Amanda Weldon MD  [facility]  Department of Oncology and Hematology

## 2025-02-09 NOTE — PROGRESS NOTES
[facility]   part of [facility] Infectious Disease Progress Note    Guy White Patient Status:  Inpatient    1950 MRN [id number]   Location [facility] 4SW-A Attending Mike Wilson*   Hosp Day # 4 PCP Salvador Cohn MD     Subjective:  Chart reviewed, no acute events. Pt transferred out of ICU.  Remains groggy/sleepy but answering questions appropriately.  Temp trend improving.      Objective:    Allergies:  Allergies[1]    Medications:    Current Facility-Administered Medications:     potassium phosphate dibasic 15 mmol in sodium chloride 0.9% 250 mL IVPB, 15 mmol, Intravenous, Once **FOLLOWED BY** potassium chloride 40 mEq in 250mL sodium chloride 0.9% IVPB premix, 40 mEq, Intravenous, Once    magnesium oxide (Mag-Ox) tab 400 mg, 400 mg, Oral, Once    heparin (Porcine) 100 Units/mL lock flush 500 Units, 5 mL, Intravenous, PRN    sodium chloride 0.9% infusion, , Intravenous, Once    vancomycin (Vancocin) 1.25 g in sodium chloride 0.9% 250mL IVPB premix, 1,250 mg, Intravenous, Q24H    sodium chloride 0.9% infusion, , Intravenous, Once    [COMPLETED] meropenem (Merrem) 1 g in sodium chloride 0.9% 100 mL IVPB-MBP, 1 g, Intravenous, Once **FOLLOWED BY** meropenem (Merrem) 1 g in sodium chloride 0.9% 100 mL IVPB-MBP, 1 g, Intravenous, Q12H    acetaminophen (Ofirmev) 10 mg/mL infusion premix 1,000 mg, 1,000 mg, Intravenous, Q6H PRN    thiamine 100 mg/mL injection 200 mg, 200 mg, Intravenous, BID    melatonin tab 6 mg, 6 mg, Oral, Nightly PRN    allopurinol (Zyloprim) tab 100 mg, 100 mg, Oral, Daily    metoprolol succinate ER (Toprol XL) 24 hr tab 50 mg, 50 mg, Oral, Daily    acetaminophen (Tylenol Extra Strength) tab 500 mg, 500 mg, Oral, Q4H PRN    polyethylene glycol (PEG 3350) (Miralax) 17 g oral packet 17 g, 17 g, Oral, Daily PRN    sennosides (Senokot) tab 17.2 mg, 17.2 mg, Oral, Nightly PRN    bisacodyl (Dulcolax) 10 MG rectal suppository 10 mg, 10 mg, Rectal,  Daily PRN    fleet enema (Fleet) rectal enema 133 mL, 1 enema, Rectal, Once PRN    ondansetron (Zofran) 4 MG/2ML injection 4 mg, 4 mg, Intravenous, Q6H PRN    metoclopramide (Reglan) 5 mg/mL injection 5 mg, 5 mg, Intravenous, Q8H PRN    benzonatate (Tessalon) cap 200 mg, 200 mg, Oral, TID PRN    Physical Exam:  General: Awake, weak and fatigued,  Cooperative.  No apparent distress.  Vital Signs:  Blood pressure 137/63, pulse 93, temperature 98.8 °F (37.1 °C), temperature source Oral, resp. rate (!) 32, height 6' (1.829 m), weight 189 lb 11.2 oz (86 kg), SpO2 100%.   Temp (24hrs), Av.6 °F (37 °C), Min:98.3 °F (36.8 °C), Max:99.1 °F (37.3 °C)      HEENT: Exam is unremarkable.  Without scleral icterus.  Mucous membranes are moist. PERRLA.  Oropharynx is clear.  Lungs: Clear to auscultation bilaterally.  Cardiac: Regular rate and rhythm. No murmur.  Abdomen:  Soft, mild RUQ TTP.  No guarding.  Extremities:  No lower extremity edema noted.  Without clubbing or cyanosis.    Skin:Port in place right upper chest wall,  No surrounding erythema, warmth or induration. No TTP around area   Neurologic: Cranial nerves are grossly intact.      Labs:  Lab Results   Component Value Date    WBC 0.1 2025    HGB 7.0 2025    HCT 20.3 2025    PLT 5.0 2025    CREATSERUM 1.07 2025    BUN 58 2025     2025    K 3.0 2025     2025    CO2 21.0 2025     2025    CA 8.2 2025    ALB 2.9 2025    MG 1.8 2025    PHOS 2.5 2025       Radiology:  CT c/a/p:  CONCLUSION:       1. Small right pleural effusion is noted.  Associated atelectasis is noted.      2. Cavitary regions in the right upper right middle lobe have markedly decreased since prior examination.      3. Mediastinal lymphadenopathy has mildly increased since prior exam.      4. Minimal gallbladder wall thickening may be artifactual on this noncontrast exam.  If patient has right  upper quadrant pain a follow-up ultrasound may be done.      5. Small amount of fluid in bilateral pericolic gutters and surrounding the liver is noted.  This is nonspecific.  This does surround both kidneys.  If there is concern for infection a follow-up urinalysis may be done.  There is no hydronephrosis.     Abdominal ultrasound :  CONCLUSION:    Limited emergency ultrasound shows gallbladder dilated with wall thickening 4 mm, sludge, stones, gallbladder measuring 9.0 x 5.2 cm, suggestion of trace subtle pericholecystic fluid or edema.  Concern for cholecystitis.  Common bile duct   normal caliber 4 mm.  Small amount of ascites right upper quadrant seen.     Assessment/Plan:    1.  Neutropenic fevers  -with hx of MDS and pancytopenia  -Port in place, on chemo.  Port first accessed on 2/4/25 and pt reports pain when used initially with bleeding   -admitted 2/5/25 with temp to 102, chills and dyspnea with elevated LA  -2/2 blood cultures with ESBL E Coli on 2/5/25  -repeat blood cultures 2/6/25 NGTD   -UA bland and urine cultures negative   -RVP negative   -MRSA screen negative   -CT c/a/p with minimal GB thickening, small amount free fluid b/l pericolic gutters and around kidneys and improved cavitary regions in RU/RML  -abdominal ultrasound with dilated GB and subtle pericholecystic fluid, c/for cholecystitis  -HIDA negative for acute cholecystitis   -TTE negative for vegetations  -temp trends improving, Tmax past 24hrs 99.1  -on IV Meropenem and Vancomycin    2. MDS  -with pancytopenia  -platelets dropping, WBC 0.1  -per oncology     3. ALLEN  -nephrology following    DISPO:  Continue IV Meropenem and Vancomycin. Follow repeat blood cultures, trend temps.  If repeat blood cultures turn positive or pt continues to spike temps would be concerned for port as source of bacteremia.  Will continue to follow along.     If you have any questions or concerns please call [facility] at [phone].     Katiana Zee  PA  2/7/2025  12:36 PM         [1]   Allergies  Allergen Reactions    Radiology Contrast Iodinated Dyes HIVES

## 2025-02-09 NOTE — PHYSICAL THERAPY NOTE
Attempted to see pt for PT, however pt declining PT at this time, reported being tired. Pt educated on importance of mobility. PT will re attempt as able/appropriate.    Abril Pickard, PT, DPT  02/09/25

## 2025-02-09 NOTE — PROGRESS NOTES
[facility]   part of [facility]    Nephrology Progress Note    Guy White Attending:  Mike Wilson*       SUBJECTIVE:     HIDA yesterday neg for acute cholecystitis.  Still thirsty.  Started eating some.  No urinary complaints.    PHYSICAL EXAM:     Vital Signs: /63 (BP Location: Left arm)   Pulse 95   Temp 98.8 °F (37.1 °C) (Oral)   Resp 22   Ht 182.9 cm (6')   Wt 176 lb 2.4 oz (79.9 kg)   SpO2 100%   BMI 23.89 kg/m²   Temp (24hrs), Av.6 °F (37 °C), Min:98.3 °F (36.8 °C), Max:99.1 °F (37.3 °C)       Intake/Output Summary (Last 24 hours) at 2025 0859  Last data filed at 2025 0845  Gross per 24 hour   Intake 1215 ml   Output 1900 ml   Net -685 ml     Wt Readings from Last 3 Encounters:   25 176 lb 2.4 oz (79.9 kg)   25 161 lb (73 kg)   24 166 lb 3.2 oz (75.4 kg)       General: appears weak  Skin: no visible rashes  HEENT: NCAT  Cardiac: Regular rate and rhythm, no murmur/gallop or rub  Lungs: CTAB, no wheeze, no rale, no rhonchi  Abdomen: +ttp in upper quadrants, mild distension, soft  Extremities: warm, well perfused, no leg edema  Neurologic/Psych: mentating well       LABORATORY DATA:     Lab Results   Component Value Date     (H) 2025    BUN 56 (H) 2025    BUNCREA 19.0 2021    CREATSERUM 1.63 (H) 2025    CREATSERUM 1.63 (H) 2025    ANIONGAP 13 2025    GFR 49 (L) 2016    CA 8.2 (L) 2025    OSMOCALC 302 (H) 2025    ALKPHO 53 2025     (H) 2025     (H) 2025    BILT 1.4 (H) 2025    TP 6.0 2025    ALB 3.7 2025    GLOBULIN 2.3 2025    AGRATIO 2.1 01/15/2016     2025    K 4.0 2025     2025    CO2 19.0 (L) 2025     Lab Results   Component Value Date    WBC 0.1 (LL) 2025    RBC 2.91 (L) 2025    HGB 8.4 (L) 2025    HCT 24.4 (L) 2025    PLT 18.0 (LL) 2025    MCV 83.8 2025     MCH 28.9 02/08/2025    MCHC 34.4 02/08/2025    RDW 14.4 02/08/2025    NEPRELIM 0.01 (LL) 02/07/2025    NEUTABS 0.01 (LL) 02/07/2025    LYMPHABS 0.08 (L) 02/07/2025    EOSABS 0.00 02/07/2025    BASABS 0.00 02/07/2025    NEUT 8 02/07/2025    LYMPH 83 02/07/2025    MON 8 02/07/2025    EOS 0 02/07/2025    BASO 0 02/07/2025    NEPERCENT 0.0 02/08/2025    LYPERCENT 83.3 02/08/2025    MOPERCENT 16.7 02/08/2025    EOPERCENT 0.0 02/08/2025    BAPERCENT 0.0 02/08/2025    NE 0.01 (LL) 02/07/2025    LYMABS 0.05 (L) 02/08/2025    MOABSO 0.01 (L) 02/08/2025    EOABSO 0.00 02/08/2025    BAABSO 0.00 02/08/2025     Lab Results   Component Value Date    CREUR 63.30 05/14/2024     Lab Results   Component Value Date    COLORUR Yellow 02/07/2025    CLARITY Turbid (A) 02/07/2025    SPECGRAVITY 1.019 02/07/2025    GLUUR Normal 02/07/2025    BILUR Negative 02/07/2025    KETUR Trace (A) 02/07/2025    BLOODURINE 3+ (A) 02/07/2025    PHURINE 5.5 02/07/2025    PROUR 100 (A) 02/07/2025    UROBILINOGEN Normal 02/07/2025    NITRITE Negative 02/07/2025    LEUUR Negative 02/07/2025    NMIC Microscopic not indicated 05/14/2024    WBCUR 1-5 02/07/2025    RBCUR >10 (A) 02/07/2025    EPIUR Few (A) 02/07/2025    BACUR Rare (A) 02/07/2025    HYLUR Present (A) 02/07/2025         IMAGING:     Reviewed.    MEDICATIONS:       Current Facility-Administered Medications   Medication Dose Route Frequency    heparin (Porcine) 100 Units/mL lock flush 500 Units  5 mL Intravenous PRN    sodium chloride 0.9% infusion   Intravenous Once    vancomycin (Vancocin) 1.25 g in sodium chloride 0.9% 250mL IVPB premix  1,250 mg Intravenous Q24H    sodium chloride 0.9% infusion   Intravenous Once    meropenem (Merrem) 1 g in sodium chloride 0.9% 100 mL IVPB-MBP  1 g Intravenous Q12H    acetaminophen (Ofirmev) 10 mg/mL infusion premix 1,000 mg  1,000 mg Intravenous Q6H PRN    thiamine 100 mg/mL injection 200 mg  200 mg Intravenous BID    melatonin tab 6 mg  6 mg Oral Nightly  PRN    allopurinol (Zyloprim) tab 100 mg  100 mg Oral Daily    metoprolol succinate ER (Toprol XL) 24 hr tab 50 mg  50 mg Oral Daily    acetaminophen (Tylenol Extra Strength) tab 500 mg  500 mg Oral Q4H PRN    polyethylene glycol (PEG 3350) (Miralax) 17 g oral packet 17 g  17 g Oral Daily PRN    sennosides (Senokot) tab 17.2 mg  17.2 mg Oral Nightly PRN    bisacodyl (Dulcolax) 10 MG rectal suppository 10 mg  10 mg Rectal Daily PRN    fleet enema (Fleet) rectal enema 133 mL  1 enema Rectal Once PRN    ondansetron (Zofran) 4 MG/2ML injection 4 mg  4 mg Intravenous Q6H PRN    metoclopramide (Reglan) 5 mg/mL injection 5 mg  5 mg Intravenous Q8H PRN    lactated ringers infusion   Intravenous Continuous    benzonatate (Tessalon) cap 200 mg  200 mg Oral TID PRN       ASSESSMENT/PLAN:       73 yo M with history of CKD stage 3, MDS s/p port placement and chemo, HTN, gout, HL presented with neutropenic fever in the setting of E coli bacteremia, possibly from cholecystitis. Nephrology consulted for ALLEN:     ALLEN on CKD 3: baseline creatinine ~1.3-1.5 mg/dl; pre-renal  -- continue LR at 100 ml/h pending AM labs  -- strict I/Os  -- maintain MAP>65  -- avoid NSAIDs, fleets, iodinated contrast unless benefit >> risk     Abn UA:  -- no pyuria, +RBC, protein, squamous cells  -- creatinine improving with IV fluids  -- repeat UA when infection improves     Pancytopenia:  -- per heme, transfuse prn     ESBL E coli bacteremia:  -- on meropenem, vancomycin, with ID following  -- HIDA scan mneg     D/w RN.         Thank you for allowing me to participate in the care of this patient. Please do not hesitate to call with questions or concerns.        Flor Coombs MD  [facility]

## 2025-02-09 NOTE — PLAN OF CARE
Patient is drowsy, arousable to verbal stimuli. Patient denies pain. IVF per MAR. Respirations 32, sepsis BPA robert Wilson MD notified, order received. Dr. Coombs notified of lab results - order to discontinue IVF and replace electrolytes per protocol. Dr. Weldon notified of critical lab values, order for unit of platelets to be transfused. Pre-medications administered per order. 1 unit platelets transfused, patient tolerated. Patient and family member updated on plan of care.

## 2025-02-10 NOTE — PROGRESS NOTES
[facility]   part of [facility]    Nephrology Progress Note    Guy White Attending:  Mike Wilson*       SUBJECTIVE:     Persistent azotemia noted.  Denies GI blood loss, steroid use, not on high protein diet.  Actually not eating much at all.    PHYSICAL EXAM:     Vital Signs: /54 (BP Location: Left arm)   Pulse 86   Temp 98 °F (36.7 °C) (Oral)   Resp 24   Ht 182.9 cm (6')   Wt 185 lb 12.8 oz (84.3 kg)   SpO2 99%   BMI 25.20 kg/m²   Temp (24hrs), Av.3 °F (36.8 °C), Min:97.1 °F (36.2 °C), Max:99.7 °F (37.6 °C)       Intake/Output Summary (Last 24 hours) at 2/10/2025 1310  Last data filed at 2/10/2025 1000  Gross per 24 hour   Intake 3264 ml   Output 1300 ml   Net 1964 ml     Wt Readings from Last 3 Encounters:   02/10/25 185 lb 12.8 oz (84.3 kg)   25 161 lb (73 kg)   24 166 lb 3.2 oz (75.4 kg)       General: appears weak  Skin: no visible rashes  HEENT: NCAT  Cardiac: Regular rate and rhythm, no murmur/gallop or rub  Lungs: CTAB, no wheeze, no rale, no rhonchi  Abdomen: soft  Extremities: warm, well perfused, no leg edema  Neurologic/Psych: mentating well    LABORATORY DATA:     Lab Results   Component Value Date     (H) 02/10/2025     (H) 02/10/2025    BUN 51 (H) 02/10/2025    BUN 51 (H) 02/10/2025    BUNCREA 19.0 2021    CREATSERUM 1.06 02/10/2025    CREATSERUM 1.06 02/10/2025    ANIONGAP 12 02/10/2025    ANIONGAP 12 02/10/2025    GFR 49 (L) 2016    CA 8.2 (L) 02/10/2025    CA 8.2 (L) 02/10/2025    OSMOCALC 309 (H) 02/10/2025    OSMOCALC 309 (H) 02/10/2025    ALKPHO 100 02/10/2025    AST 42 (H) 02/10/2025     (H) 02/10/2025    BILT 2.1 (H) 02/10/2025    TP 4.9 (L) 02/10/2025    ALB 2.9 (L) 02/10/2025    ALB 2.9 (L) 02/10/2025    GLOBULIN 2.0 02/10/2025    AGRATIO 2.1 01/15/2016     02/10/2025     02/10/2025    K 3.3 (L) 02/10/2025    K 3.3 (L) 02/10/2025     02/10/2025     02/10/2025    CO2 20.0 (L)  02/10/2025    CO2 20.0 (L) 02/10/2025     Lab Results   Component Value Date    WBC 0.2 (LL) 02/10/2025    RBC 2.59 (L) 02/10/2025    HGB 7.4 (L) 02/10/2025    HCT 21.2 (L) 02/10/2025    PLT 6.0 (LL) 02/10/2025    MCV 81.9 02/10/2025    MCH 28.6 02/10/2025    MCHC 34.9 02/10/2025    RDW 14.6 02/10/2025    NEPRELIM 0.01 (LL) 02/09/2025    NEUTABS 0.01 (LL) 02/07/2025    LYMPHABS 0.08 (L) 02/07/2025    EOSABS 0.00 02/07/2025    BASABS 0.00 02/07/2025    NEUT 8 02/07/2025    LYMPH 83 02/07/2025    MON 8 02/07/2025    EOS 0 02/07/2025    BASO 0 02/07/2025    NEPERCENT 0.0 02/10/2025    LYPERCENT 88.8 02/10/2025    MOPERCENT 5.6 02/10/2025    EOPERCENT 5.6 02/10/2025    BAPERCENT 0.0 02/10/2025    NE 0.00 (LL) 02/10/2025    LYMABS 0.16 (L) 02/10/2025    MOABSO 0.01 (L) 02/10/2025    EOABSO 0.01 02/10/2025    BAABSO 0.00 02/10/2025     Lab Results   Component Value Date    CREUR 63.30 05/14/2024     Lab Results   Component Value Date    COLORUR Yellow 02/07/2025    CLARITY Turbid (A) 02/07/2025    SPECGRAVITY 1.019 02/07/2025    GLUUR Normal 02/07/2025    BILUR Negative 02/07/2025    KETUR Trace (A) 02/07/2025    BLOODURINE 3+ (A) 02/07/2025    PHURINE 5.5 02/07/2025    PROUR 100 (A) 02/07/2025    UROBILINOGEN Normal 02/07/2025    NITRITE Negative 02/07/2025    LEUUR Negative 02/07/2025    NMIC Microscopic not indicated 05/14/2024    WBCUR 1-5 02/07/2025    RBCUR >10 (A) 02/07/2025    EPIUR Few (A) 02/07/2025    BACUR Rare (A) 02/07/2025    HYLUR Present (A) 02/07/2025         IMAGING:     Reviewed.    MEDICATIONS:       Current Facility-Administered Medications   Medication Dose Route Frequency    potassium phosphate dibasic 15 mmol in sodium chloride 0.9% 250 mL IVPB  15 mmol Intravenous Once    Followed by    potassium chloride 20 mEq/100mL IVPB premix 20 mEq  20 mEq Intravenous Once    meropenem (Merrem) 1 g in sodium chloride 0.9% 100 mL IVPB-MBP  1 g Intravenous Q8H    heparin (Porcine) 100 Units/mL lock flush 500  Units  5 mL Intravenous PRN    sodium chloride 0.9% infusion   Intravenous Once    sodium chloride 0.9% infusion   Intravenous Once    acetaminophen (Ofirmev) 10 mg/mL infusion premix 1,000 mg  1,000 mg Intravenous Q6H PRN    thiamine 100 mg/mL injection 200 mg  200 mg Intravenous BID    melatonin tab 6 mg  6 mg Oral Nightly PRN    allopurinol (Zyloprim) tab 100 mg  100 mg Oral Daily    metoprolol succinate ER (Toprol XL) 24 hr tab 50 mg  50 mg Oral Daily    acetaminophen (Tylenol Extra Strength) tab 500 mg  500 mg Oral Q4H PRN    polyethylene glycol (PEG 3350) (Miralax) 17 g oral packet 17 g  17 g Oral Daily PRN    sennosides (Senokot) tab 17.2 mg  17.2 mg Oral Nightly PRN    bisacodyl (Dulcolax) 10 MG rectal suppository 10 mg  10 mg Rectal Daily PRN    fleet enema (Fleet) rectal enema 133 mL  1 enema Rectal Once PRN    ondansetron (Zofran) 4 MG/2ML injection 4 mg  4 mg Intravenous Q6H PRN    metoclopramide (Reglan) 5 mg/mL injection 5 mg  5 mg Intravenous Q8H PRN    benzonatate (Tessalon) cap 200 mg  200 mg Oral TID PRN       ASSESSMENT/PLAN:     73 yo M with history of CKD stage 3, MDS s/p port placement and chemo, HTN, gout, HL presented with neutropenic fever in the setting of E coli bacteremia, possibly from cholecystitis. Nephrology consulted for ALLEN:     ALLEN on CKD 3: baseline creatinine ~1.3-1.5 mg/dl; pre-renal  -- creaitnine improved but he remains azotemic with limited oral intake  -- LR at 100 ml/h  -- strict I/Os  -- maintain MAP>65  -- avoid NSAIDs, fleets, iodinated contrast unless benefit >> risk     Abn UA:  -- no pyuria, +RBC, protein, squamous cells  -- creatinine improved with IV fluids  -- repeat UA when infection improves     Pancytopenia:  -- per heme, transfuse prn     ESBL E coli bacteremia:  -- on meropenem, vancomycin, with ID following  -- HIDA scan neg    Hypokalemia, hypophosphatemia:  -- replete per protocol      Thank you for allowing me to participate in the care of this patient.  Please do not hesitate to call with questions or concerns.        Flor Coombs MD  [facility]

## 2025-02-10 NOTE — PROGRESS NOTES
[facility] HEMATOLOGY ONCOLOGY  Progress Note    Guy White Patient Status:  Inpatient    1950 MRN [id number]   Location [facility]A Attending Mike Wilson*   Hosp Day # 5 PCP Salvador Cohn MD     ADMISSION DATE AND TIME: 2025  3:58 PM    ADMIT DIAGNOSIS: Neutropenic fever [D70.9, R50.81]    SUBJECTIVE:    Weak  No Bleeding   Afebrile currently     OBJECTIVE:    Vitals:    02/10/25 0019 02/10/25 0500 02/10/25 0505 02/10/25 0513   BP: 130/62 134/66     BP Location: Left arm Left arm     Pulse: 86 82     Resp: 17 (!) 45 24 23   Temp: 99.7 °F (37.6 °C) 98.3 °F (36.8 °C)     TempSrc: Oral Oral     SpO2: 100% 100%     Weight:  185 lb 12.8 oz (84.3 kg)     Height:               Blood pressure 134/66, pulse 82, temperature 98.3 °F (36.8 °C), temperature source Oral, resp. rate 23, height 6' (1.829 m), weight 185 lb 12.8 oz (84.3 kg), SpO2 100%.    PHYSICAL EXAM:  General: afebrile, weak and lethargic  HEENT: oropharynx - DRY  CV: Regular rate and rhythm  Lungs: CTA  Abdomen: soft, non distended and non tender  Extremity: no edema or cyanosis       LABS:  Recent Results (from the past 12 hours)   Vancomycin Trough, Serum    Collection Time: 02/10/25 12:24 AM   Result Value Ref Range    Vancomycin Trough 12.1 10.0 - 20.0 ug/mL   Potassium    Collection Time: 02/10/25 12:24 AM   Result Value Ref Range    Potassium 3.5 3.5 - 5.1 mmol/L   Prepare platelets Once    Collection Time: 02/10/25 12:30 AM   Result Value Ref Range    Blood Product [id number]     Unit Number [id number]     UNIT ABO O     UNIT RH POS     Product Status Presumed Transfused     Expiration Date [id number]     Blood Type Barcode 5100     Unit Volume 187 ml   Renal Function Panel    Collection Time: 02/10/25  6:31 AM   Result Value Ref Range    Glucose 123 (H) 70 - 99 mg/dL    Sodium 142 136 - 145 mmol/L    Potassium 3.3 (L) 3.5 - 5.1 mmol/L    Chloride 110 98 - 112 mmol/L    CO2 20.0 (L)  21.0 - 32.0 mmol/L    Anion Gap 12 0 - 18 mmol/L    BUN 51 (H) 9 - 23 mg/dL    Creatinine 1.06 0.70 - 1.30 mg/dL    Calcium, Total 8.2 (L) 8.7 - 10.6 mg/dL    Calculated Osmolality 309 (H) 275 - 295 mOsm/kg    eGFR-Cr 74 >=60 mL/min/1.73m2    Albumin 2.9 (L) 3.2 - 4.8 g/dL    Phosphorus 2.5 2.4 - 5.1 mg/dL   Magnesium    Collection Time: 02/10/25  6:31 AM   Result Value Ref Range    Magnesium 1.6 1.6 - 2.6 mg/dL   Phosphorus    Collection Time: 02/10/25  6:31 AM   Result Value Ref Range    Phosphorus 2.5 2.4 - 5.1 mg/dL   CBC With Differential With Platelet    Collection Time: 02/10/25  6:31 AM   Result Value Ref Range    WBC 0.2 (LL) 4.0 - 11.0 x10(3) uL    RBC 2.59 (L) 3.80 - 5.80 x10(6)uL    HGB 7.4 (L) 13.0 - 17.5 g/dL    HCT 21.2 (L) 39.0 - 53.0 %    PLT 6.0 (LL) 150.0 - 450.0 10(3)uL    Immature Platelet Fraction 3.1 0.0 - 7.0 %    MCV 81.9 80.0 - 100.0 fL    MCH 28.6 26.0 - 34.0 pg    MCHC 34.9 31.0 - 37.0 g/dL    RDW 14.6 %   Comp Metabolic Panel (14)    Collection Time: 02/10/25  6:31 AM   Result Value Ref Range    Glucose 123 (H) 70 - 99 mg/dL    Sodium 142 136 - 145 mmol/L    Potassium 3.3 (L) 3.5 - 5.1 mmol/L    Chloride 110 98 - 112 mmol/L    CO2 20.0 (L) 21.0 - 32.0 mmol/L    Anion Gap 12 0 - 18 mmol/L    BUN 51 (H) 9 - 23 mg/dL    Creatinine 1.06 0.70 - 1.30 mg/dL    Calcium, Total 8.2 (L) 8.7 - 10.6 mg/dL    Calculated Osmolality 309 (H) 275 - 295 mOsm/kg    eGFR-Cr 74 >=60 mL/min/1.73m2    AST 42 (H) <34 U/L     (H) 10 - 49 U/L    Alkaline Phosphatase 100 45 - 117 U/L    Bilirubin, Total 2.1 (H) 0.2 - 1.1 mg/dL    Total Protein 4.9 (L) 5.7 - 8.2 g/dL    Albumin 2.9 (L) 3.2 - 4.8 g/dL    Globulin  2.0 2.0 - 3.5 g/dL    A/G Ratio 1.5 1.0 - 2.0       CULTURES  Hospital Encounter on 02/05/25   1. Blood Culture     Status: None (Preliminary result)    Collection Time: 02/06/25  3:10 PM    Specimen: Blood,peripheral   Result Value Ref Range    Blood Culture Result No Growth 3 Days N/A   2. Urine  Culture, Routine     Status: None    Collection Time: 02/05/25  4:45 PM    Specimen: Urine, clean catch   Result Value Ref Range    Urine Culture No Growth at 18-24 hrs. N/A      WBC RBC Hemoglobin   Latest Ref Rng 4.0 - 11.0 x10(3) uL 3.80 - 5.80 x10(6)uL 13.0 - 17.5 g/dL   1/27/2025 1.3 (L)  2.11 (L)  6.1 (LL)    1/30/2025 1.4 (L)  2.16 (L)  6.2 (LL)    2/4/2025 0.5 (LL)  1.99 (L)  5.7 (LL)    2/5/2025 0.1 (LL)  2.09 (L)  6.1 (LL)    2/6/2025 0.2 (LL)  2.85 (L)  8.3 (L)     0.1 (LL)  2.47 (L)  7.2 (L)     0.1 (LL)  2.09 (L)  6.0 (LL)    2/7/2025 0.1 (LL)  2.32 (L)  6.7 (LL)     0.1 (LL)  2.46 (L)  7.0 (L)     0.1 (LL)  2.59 (L)  7.4 (L)    2/8/2025 0.1 (LL)  2.91 (L)  8.4 (L)     0.1 (LL)  2.70 (L)  7.8 (L)    2/9/2025 0.1 (LL)  2.46 (L)  7.0 (L)    2/10/2025 0.2 (LL) (P) 2.59 (L) (P) 7.4 (L) (P)      Hematocrit Platelet Count   Latest Ref Rng 39.0 - 53.0 % 150.0 - 450.0 10(3)uL   1/27/2025 18.5 (L)  22.0 (L)    1/30/2025 19.4 (L)  54.0 (L)    2/4/2025 17.0 (L)  15.0 (LL)    2/5/2025 17.3 (L)  4.0 (LL)    2/6/2025 23.9 (L)  10.0 (LL)     21.0 (L)  16.0 (LL)     17.7 (L)  20.0 (L)    2/7/2025 19.0 (L)  7.0 (LL)     21.4 (L)  12.0 (LL)     21.5 (L)  7.0 (LL)    2/8/2025 24.4 (L)  18.0 (LL)     22.4 (L)  24.0 (L)    2/9/2025 20.3 (L)  5.0 (LL)    2/10/2025 21.2 (L) (P) 6.0 (LL) (P)        IMAGING:    No results found.      MEDICATIONS:  Medications reviewed.     potassium phosphate dibasic 15 mmol in sodium chloride 0.9% 250 mL IVPB  15 mmol Intravenous Once    Followed by    potassium chloride  20 mEq Intravenous Once    magnesium oxide  400 mg Oral Once    sodium chloride   Intravenous Once    sodium chloride   Intravenous Once    meropenem  1 g Intravenous Q12H    thiamine  200 mg Intravenous BID    allopurinol  100 mg Oral Daily    metoprolol succinate ER  50 mg Oral Daily         heparin    acetaminophen    melatonin    acetaminophen    polyethylene glycol (PEG 3350)    sennosides    bisacodyl    fleet enema     ondansetron    metoclopramide    benzonatate    ASSESSMENT AND PLAN:     Principal Problem:    Neutropenic fever        Guy White is a 74 year old male with 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  Baseline WBC 1.6 Hgb 6-7's, plts 10's.  Decitabine started 1/23/25, has received 5 doses 1/23 to 1/29.  Transfusion dependent at baseline.      Admitted on 2/5 with neutropenic fever due to ESBL E. Coli bacteremia.    Neutropenic fever - last fever 100.8 overnight 2/7, appreciate ID recs.  On IV vanco and meropenem. Abd US on 2/7 concerning for cholecystitis but HIDA scan negative on 2/8.    ID consulted. Antibiotics per ID.  Do not envision recovery of neutropenia.    MDS history   S/p Decitabine as second like after failure of VIDAZA  Pancytopenia secondary to MDS  Significant neutropenia and transfusion dependent anemia and thrombocytopenia  Since he has failed while he is and is currently on second line decitabine, the overall prognosis is very poor.  Hemoglobin is 7.4 today with platelets of 6.  Will need transfusion of platelets today.  Will monitor blood counts      We will follow    Vel Zhao MD

## 2025-02-10 NOTE — PROGRESS NOTES
Infectious Disease Progress Note      Date of admission: 2/5/2025  3:58 PM     Reason for consult: Fever with neutropenia, ESBL E. coli sepsis    Referring physician: Mike Wilson*    Subjective: The patient continues to be overall fatigued.  He is complaining of mild diffuse abdominal pain.  No nausea or vomiting.  No diarrhea.  No shortness of breath.  No cough or sputum production.    The rest of the systems were reviewed and found to be negative except was mentioned above    Interval events: This is a 74-year-old male patient with history of MDS with increased blast diagnosed in May 2024, status post Vidaza from June through December of 2024.  Discontinued due to progression.  Decitabine was started on 1/23/2025.  The patient had received 5 doses between 1/23 and 1/29.  Now admitted with ESBL E. coli bacteremia.  Infectious workup otherwise thus far negative.  CT of the abdomen pelvis questioning cholecystitis; however, HIDA scan was negative.  CT of the chest is showing improved cavitary lung lesions in the right upper lobe and right middle lobe.  The patient does have a port that can potentially be a source of infection.  Patient is currently on IV meropenem and IV vancomycin.      Medications:    potassium phosphate dibasic 15 mmol in sodium chloride 0.9% 250 mL IVPB **FOLLOWED BY** potassium chloride    [COMPLETED] meropenem **FOLLOWED BY** meropenem    heparin    sodium chloride    sodium chloride    acetaminophen    thiamine    melatonin    allopurinol    metoprolol succinate ER    acetaminophen    polyethylene glycol (PEG 3350)    sennosides    bisacodyl    fleet enema    ondansetron    metoclopramide    benzonatate     Allergies:  Allergies[1]    Physical Exam:  Vitals:    02/10/25 0913   BP: 125/54   Pulse: 86   Resp: 24   Temp: 98.2 °F (36.8 °C)     Vitals signs and nursing note reviewed.   Constitutional:       Appearance: Normal appearance.   HENT:      Mouth: Mucous membranes are  moist.   Neck:      Musculoskeletal: Neck supple.   Cardiovascular:      Rate and Rhythm: Normal rate.      Heart sounds: Normal heart sounds. No murmur. No friction rub. No gallop.    Pulmonary:      Effort: Pulmonary effort is normal. No respiratory distress.      Breath sounds: Normal breath sounds. No stridor. No wheezing, rhonchi or rales.   Chest:      Chest wall: No tenderness.   Abdominal:      General: Abdomen is flat. There is no distension.      Palpations: Abdomen is soft. There is no mass.      Tenderness: There is mild diffuse tenderness. There is no guarding or rebound.      Hernia: No hernia is present.   Musculoskeletal:      Right lower leg: No edema.      Left lower leg: No edema.   Skin:     General: Skin is warm and dry.   Neurological:      General: No focal deficit present.      Mental Status: Alert and oriented to person, place, and time.       Laboratory data:  I have reviewed all the lab results independently.  Lab Results   Component Value Date    WBC 0.2 02/10/2025    HGB 7.4 02/10/2025    HCT 21.2 02/10/2025    PLT 6.0 02/10/2025    CREATSERUM 1.06 02/10/2025    CREATSERUM 1.06 02/10/2025    BUN 51 02/10/2025    BUN 51 02/10/2025     02/10/2025     02/10/2025    K 3.3 02/10/2025    K 3.3 02/10/2025     02/10/2025     02/10/2025    CO2 20.0 02/10/2025    CO2 20.0 02/10/2025     02/10/2025     02/10/2025    CA 8.2 02/10/2025    CA 8.2 02/10/2025    ALB 2.9 02/10/2025    ALB 2.9 02/10/2025    ALKPHO 100 02/10/2025    BILT 2.1 02/10/2025    TP 4.9 02/10/2025    AST 42 02/10/2025     02/10/2025    MG 1.6 02/10/2025    PHOS 2.5 02/10/2025    PHOS 2.5 02/10/2025      Recent Labs   Lab 02/07/25  1002 02/07/25  1613 02/09/25  0957 02/10/25  0631   RBC 2.46*   < > 2.46* 2.59*   HGB 7.0*   < > 7.0* 7.4*   HCT 21.4*   < > 20.3* 21.2*   MCV 87.0   < > 82.5 81.9   MCH 28.5   < > 28.5 28.6   MCHC 32.7   < > 34.5 34.9   RDW 14.9   < > 14.2 14.6   NEPRELIM  0.01*   < > 0.01*  --    WBC 0.1*   < > 0.1* 0.2*   PLT 12.0*   < > 5.0* 6.0*   NEUT 8  --   --   --    LYMPH 83  --   --   --    MON 8  --   --   --    EOS 0  --   --   --     < > = values in this interval not displayed.      Microbiology data:  Hospital Encounter on 02/05/25   1. Blood Culture     Status: None (Preliminary result)    Collection Time: 02/06/25  3:10 PM    Specimen: Blood,peripheral   Result Value Ref Range    Blood Culture Result No Growth 3 Days N/A   2. Urine Culture, Routine     Status: None    Collection Time: 02/05/25  4:45 PM    Specimen: Urine, clean catch   Result Value Ref Range    Urine Culture No Growth at 18-24 hrs. N/A        Radiology:  I have reviewed all imagining data available independently.   HIDA scan on 2/8/2025:  No evidence of acute cholecystitis    Abdominal ultrasound on 2/7/2025:  Gallbladder sludge, trace subtle pericholecystic fluid noted.    CT of the chest on 2/6/2025:  Small right-sided pleural effusion with associated atelectasis.  Right upper lobe and right middle lobe cavitary lung lesions that has markedly decreased since prior to examination.  Mediastinal lymphadenopathy noted.    CT of the abdomen pelvis on 2/6/2025:  Minimal gallbladder wall thickening.  No other acute findings.    Impression:  Guy White is a 74 year old male with    ESBL E. coli bacteremia/sepsis, presenting here with fever with neutropenia, with threat to life  This is in the setting of MDS, currently on decitabine  At this point, most likely source is his port as his abdominal imaging is not showing any acute infectious processes.  His chest imaging is showing improved cavitary lung lesions  His urine culture did not grow any organisms  Currently on IV meropenem and IV vancomycin  I discussed with the patient and oncology team that we can try to treat through the port to salvage it; however, if his bacteremia recurs, the port will need to be removed  TTE done on 2/8/2025 without acute  infectious findings  He cleared his bacteremia on 2/6/2025  MDS  On decitabine  Immunosuppressed  Pancytopenic  Oncology following    Recommendations:    Discontinue IV vancomycin  Continue IV meropenem for now  Plan to discharge the patient on 2 weeks of therapy, can be switched over to ertapenem at time of discharge 1 g daily through 2/19/2025 we can also plan on gentamicin locks therapy for his port after discharge when not in use  Weekly CBC with differential and CMP, sed rate and CRP.  Fax results to [facility]. Fax: [phone]. Tel: [phone].  PICC line care as per protocol.  Social work consult ordered  Continue to monitor daily labs for antibiotic toxicities  Further recommendations will depend on the above work-up and clinical progress     The plan of care was discussed with the primary hospital team, Mike Wilson*     Recommendations were also discussed with the patient; all questions were answered.     Thank you for this consultation. Please don't hesitate to call the ID team for questions or any acute changes in patient's clinical condition.    Please note that this report has been produced using speech recognition software and may contain errors related to that system including, but not limited to, errors in grammar, punctuation, and spelling, as well as words and phrases that possibly may have been recognized inappropriately.  If there are any questions or concerns, contact the dictating provider for clarification.    The 21st Century Cures Act makes medical notes like these available to patients in the interest of transparency. Please be advised this is a medical document. Medical documents are intended to carry relevant information, facts as evident, and the clinical opinion of the practitioner. The medical note is intended as peer to peer communication and may appear blunt or direct. It is written in medical language and may contain abbreviations or verbiage that are  unfamiliar.     Makeda Huerta MD  [facility]. Tel: [phone]. Fax: [phone].     Guy White : 1950 MRN: [id number] CSN: [id number]          [1]   Allergies  Allergen Reactions    Radiology Contrast Iodinated Dyes HIVES

## 2025-02-10 NOTE — PROGRESS NOTES
[facility]    Guy White Patient Status:  Inpatient    1950 MRN [id number]   Location [facility] 2NE-A Attending Brenden Whitaker,    Hosp Day # 10 PCP Salvador Cohn MD     Pulm / Critical Care Progress Note     S: pt states overall he is feeling better       Scheduled Medication:   dornase jessenia (Pulmozyme) 5 mg in sterile water for injection (PF) 30 mL (MIST2) intrapleural syringe  5 mg Intrapleural Q12H    alteplase (Activase) 10 mg in sodium chloride 0.9% 30 mL (MIST2) intrapleural syringe  10 mg Intrapleural Q12H    piperacillin-tazobactam  3.375 g Intravenous Q8H    enoxaparin  40 mg Subcutaneous Nightly    allopurinol  100 mg Oral Daily    atorvastatin  10 mg Oral After dinner    metoprolol succinate ER  50 mg Oral Daily Beta Blocker    lidocaine-menthol  1 patch Transdermal Daily     Continuous Infusing Medication:  PRN Medication:  HYDROcodone-acetaminophen    HYDROcodone-acetaminophen    acetaminophen       OBJECTIVE:  Vitals:    10/11/24 1908 10/11/24 2311 10/12/24 0428 10/12/24 0800   BP: 96/60 112/60 105/58 103/59   BP Location: Right arm Right arm Right arm Right arm   Pulse: 65 63 63 63   Resp:  20 19   Temp: 97.9 °F (36.6 °C) 98.2 °F (36.8 °C) 97.7 °F (36.5 °C) 98 °F (36.7 °C)   TempSrc: Oral Oral Oral Oral   SpO2: 95% 96% 97% 96%   Weight:       Height:         O2: ra       Wt Readings from Last 3 Encounters:   10/10/24 151 lb 4.8 oz (68.6 kg)   24 159 lb 12.8 oz (72.5 kg)   24 160 lb (72.6 kg)        I/O last 3 completed shifts:  In: 720 [P.O.:720]  Out: 550 [Chest Tube:550]  I/O this shift:  In: 240 [P.O.:240]  Out: 0      Physical Exam:   General: alert, cooperative.  No respiratory distress.   Head: Normocephalic, without obvious abnormality, atraumatic.   Lungs: ctab   Chest wall: No tenderness or deformity.   Heart: Regular rate and rhythm, normal S1S2, no murmur.   Abdomen: soft, non-tender, non-distended, positive BS.   Extremity: no edema      Recent Labs   Lab 10/10/24  0641 10/11/24  0545 10/12/24  0618   WBC 4.6 5.1 3.5*   HGB 8.2* 8.0* 7.2*   HCT 25.4* 24.6* 22.5*   .0* 156.0 143.0*     Recent Labs   Lab 10/07/24  0918 10/09/24  0827   INR 1.22* 1.15         Recent Labs   Lab 10/10/24  0719 10/11/24  0559 10/12/24  0618   * 134* 134*   K 4.3 4.7 4.5    102 104   CO2 24.0 26.0 26.0   BUN 22 34* 34*   CREATSERUM 1.23 1.70* 1.51*   * 139* 88   CA 9.4 9.7 10.1   AST 24 19 21   ALT 58* 43 38   ALKPHO 134* 121* 114   BILT 0.6 0.4 0.3   ALB 3.0* 2.9* 2.7*   TP 5.7 5.4* 5.4*       Creatinine, Serum (mg/dL)   Date Value   01/15/2016 1.20   05/14/2015 1.01   01/16/2013 1.18     Creatinine (mg/dL)   Date Value   10/12/2024 1.51 (H)   10/11/2024 1.70 (H)   10/10/2024 1.23   08/20/2021 1.42 (H)   05/17/2021 1.51 (H)   05/17/2021 1.51 (H)   ]      Ct chest reviewed; minimal residual effusion on R.      ASSESSMENT/PLAN:    Dyspnea - secondary to R pleural effusion +/- pneumonia    -continue antibiotics   -management of pleural effusion as below  Loculated right pleural effusion - thoracentesis was done 10/3 (1500cc fluid removed), 10/7 (300cc removed), followed by right sided chest tube insertion 10/9. Pleural fluid is exudative with benign cytology. Cultures ngtd.  -repeat Ct shows significant improvement   -unable to instill mist2. Given significant improvement in effusion with only minimal residual will work towards removing tube. Clamp and repeat cxr this pm.   -antibiotics as below  Pneumonia - with right middle lobe cavitary lesion  -patient was initially on ceftriaxone (10/2-10/8 ), changed to zosyn (10/8- ) given worsening cavitation.   -completed course of azithromycin  -follow up cultures  MDS  -per [facility] oncology  Proph  -LMWH  Dispo   -full code  -inpatient       Joel Miller M.D.  [facility]  Pulmonary / Critical care  10/12/2024  9:18 AM     no chills/no decreased eating/drinking/no dizziness/no fever/no nausea/no tingling/no vomiting/no weakness

## 2025-02-10 NOTE — PROGRESS NOTES
[facility] Dosing Service      Follow Up Pharmacokinetic Consult for Vancomycin Dosing     Guy White is a 74 year old male who is receiving vancomycin therapy for neutropenic fever. Patient is on day 6 of vancomycin and is currently receiving 1250 mg IV every 24 hours. The current treatment and monitoring approach is non-AUC strategy.        Weight and Temperature:    Wt Readings from Last 1 Encounters:   02/10/25 84.3 kg (185 lb 12.8 oz)         Temp Readings from Last 1 Encounters:   02/10/25 98.3 °F (36.8 °C) (Oral)      Labs:   Recent Labs   Lab 25  0405 25  0437 25  0957   CREATSERUM 2.11*  2.11* 1.63*  1.63* 1.07      Estimated Creatinine Clearance: 66.5 mL/min (based on SCr of 1.07 mg/dL).     Recent Labs   Lab 25  0004 25  0437 25  0957   WBC 0.1* 0.1* 0.1*        Vancomycin Levels:  Lab Results   Component Value Date/Time    VANCT 12.1 02/10/2025 12:24 AM    VANCT 9.8 (L) 2025 08:53 PM       Corresponding 24 h-AUC: N/A     The Pharmacokinetic Target is:      Trough/random 10-15 mg/L    Renal Dosing Considerations:      ALLEN/ARF (Resolved)     Assessment/Plan:     Maintenance Regimen: Continue vancomycin 1250 mg IP every 24 hours.    Monitorin) Plan for vancomycin trough to be obtained in 5 - 7 days    2) Pharmacy will order SCr as clinically indicated to assess renal function.    3) Pharmacy will monitor for toxicity and efficacy, adjust vancomycin dose and/or frequency, and order vancomycin levels as appropriate per the Pharmacy and Therapeutics Committee approved protocol until discontinuation of the medication.       We appreciate the opportunity to assist in the care of this patient.     Marylou Stevens, VinnyD  2/10/2025  5:58 AM  [facility]  Pharmacy Extension: [phone]

## 2025-02-10 NOTE — PLAN OF CARE
Pt a/o x4. Tmax 99.7. Other VSS on RA. No c/o pain or SOB. Tachypnea noted @ times. Meds per MAR.     Fall risk protocol followed, call light within reach.

## 2025-02-10 NOTE — CM/SW NOTE
SW reviewed RAÚL referral which had limited options for placement. SW uploaded updated clinicals including ID not indicating patient's needs for IV ABX at DC. SW will provide patient with updated RAÚL choice list tomorrow.     SW will continue to follow for plan of care changes and remain available for any additional DC needs or concerns.     Lissette Zambrano MSW, LSW  Discharge Planner   [phone]

## 2025-02-10 NOTE — PROGRESS NOTES
formerly [facility] and [facility]  Hospitalist Progress Note                                                                     [facility]   part of [facility]        Guy White  12/26/1950    SUBJECTIVE:  Patient seen and examined.  Off O2.   Abd pain improving.  Denies CP/SOB.   NAD.       OBJECTIVE:  Temp:  [97.1 °F (36.2 °C)-99.7 °F (37.6 °C)] 98.2 °F (36.8 °C)  Pulse:  [74-86] 86  Resp:  [17-45] 24  BP: (101-138)/(48-67) 125/54  SpO2:  [98 %-100 %] 100 %  Exam  Gen: mild resp distress distress, alert and oriented x3, no focal neurologic deficits  Pulm: Lungs clear bilaterally, normal respiratory effort  CV: Heart with regular rate and rhythm, no murmur.  Normal PMI.    Abd: Abdomen soft, RLQ ttp, nondistended, no organomegaly, bowel sounds present  MSK: Full range of motion in extremities, no clubbing, no cyanosis  Skin: no rashes or lesions    Labs:   Recent Labs   Lab 02/07/25  1613 02/08/25  0004 02/08/25  0437 02/09/25  0957 02/10/25  0631   WBC 0.1* 0.1* 0.1* 0.1* 0.2*   HGB 6.7* 7.8* 8.4* 7.0* 7.4*   MCV 81.9 83.0 83.8 82.5 81.9   PLT 7.0* 24.0* 18.0* 5.0* 6.0*       Recent Labs   Lab 02/06/25  0534 02/06/25  2059 02/07/25  0405 02/08/25  0437 02/09/25  0957 02/10/25  0024 02/10/25  0631   * 135* 137 138 143  --  142  142   K 4.1 5.0 4.5 4.0 3.0* 3.5 3.3*  3.3*    106 105 106 110  --  110  110   CO2 14.0* 16.0* 18.0* 19.0* 21.0  --  20.0*  20.0*   BUN 34* 47* 55* 56* 58*  --  51*  51*   CREATSERUM 1.76*  1.76* 2.11* 2.11*  2.11* 1.63*  1.63* 1.07  --  1.06  1.06   CA 8.8 8.7 8.5* 8.2* 8.2*  --  8.2*  8.2*   MG 1.2* 1.4* 2.0  --  1.8  --  1.6   PHOS  --   --   --   --  2.5  --  2.5  2.5   * 115* 114* 104* 112*  --  123*  123*       Recent Labs   Lab 02/06/25  0534 02/06/25 2059 02/07/25  0405 02/08/25  0437 02/09/25  0957 02/10/25  0631   ALT 20 38 120* 203*  --  141*   AST 22 38* 106* 76*  --  42*   ALB 3.7 3.9 3.7 3.5 2.9*  2.9*  2.9*       No results for input(s): \"PGLU\" in the last 168 hours.    Meds:   Scheduled:    potassium phosphate dibasic 15 mmol in sodium chloride 0.9% 250 mL IVPB  15 mmol Intravenous Once    Followed by    potassium chloride  20 mEq Intravenous Once    meropenem  1 g Intravenous Q8H    sodium chloride   Intravenous Once    sodium chloride   Intravenous Once    thiamine  200 mg Intravenous BID    allopurinol  100 mg Oral Daily    metoprolol succinate ER  50 mg Oral Daily     Continuous Infusions:       PRN:   heparin    acetaminophen    melatonin    acetaminophen    polyethylene glycol (PEG 3350)    sennosides    bisacodyl    fleet enema    ondansetron    metoclopramide    benzonatate    Assessment/Plan:  Principal Problem:    Neutropenic fever    74 year old male with PMH sig for MDS/chronic pancytopenia, HTN, Gout, HL p/w fevers.     Severe Sepsis 2/2 ESBL E.coli bacteremia, likely from cholecystitis  Neutropenic fevers  - adjusted cefepime to meropenem and vanc   - ID following - plan for meropenem inpatient and ertapnem until 2/19 on dc   - Cultures reviewed  - CT and US reviewed  - HIDA negative  - follow blood cultures, may need to consider port as source   - transferred out of ICU 2/8     Acute Hypoxic Respiratory failure - resolved  - from sepsis  - HF, wean as tolerated    Pancytopenia  - s/p PRBCs, plts in ED  - hematology following  - goal Hgb>7, goal plts>10   - transfuse prn   - poor prognosis overall given that he's failing on second line decitabine, may need to consider palliative care evaluation - patient not yet ready for this discussion although it was discussed      Acute renal injury - resolved  - prerenal, infection  - isotonic crystalloids per nephrology   - monitor renal parameters  - avoid nephrotoxins  - neprhology following     Essential HTN  - metoprolol with parameters     Gout  - allopurinol     FEN: regular diet, PT/OT  Proph: SCDs only  Code status: Full code    Dispo - DCP, dc  when hgb/plts stable although he has a poor prognosis overall with labile counts     Eric Wilson MD  [facility]ist  Message over [email or web address]  Pager: [phone]    Supplementary Documentation:   DVT Mechanical Prophylaxis:   SCDs,    DVT Pharmacologic Prophylaxis   Medication    heparin (Porcine) 100 Units/mL lock flush 500 Units                Code Status: Full Code  Duarte: External urinary catheter in place  Duarte Duration (in days):   Central line:    YRIS:         **Certification      PHYSICIAN Certification of Need for Inpatient Hospitalization - Initial Certification    Patient will require inpatient services that will reasonably be expected to span two midnight's based on the clinical documentation in H+P.   Based on patients current state of illness, I anticipate that, after discharge, patient will require TBD.

## 2025-02-11 PROBLEM — Z51.5 PALLIATIVE CARE ENCOUNTER: Status: ACTIVE | Noted: 2025-01-01

## 2025-02-11 PROBLEM — Z71.89 COUNSELING REGARDING ADVANCE CARE PLANNING AND GOALS OF CARE: Status: ACTIVE | Noted: 2025-01-01

## 2025-02-11 NOTE — PHYSICAL THERAPY NOTE
Attempted to see Pt this PM - RN aware of attempt.    Writer called Pt's room - Pt requested PT re-attempt \"maybe tomorrow\"  Will continue to follow.

## 2025-02-11 NOTE — PROGRESS NOTES
[facility] HEMATOLOGY ONCOLOGY  Progress Note    Guy White Patient Status:  Inpatient    1950 MRN [id number]   Location [facility] 4NW-A Attending Mike Wilson*   Hosp Day # 6 PCP Salvador Cohn MD     ADMISSION DATE AND TIME: 2025  3:58 PM    ADMIT DIAGNOSIS: Neutropenic fever [D70.9, R50.81]    SUBJECTIVE:    Continues to be weak    OBJECTIVE:    Vitals:    02/10/25 1517 02/10/25 2157 25 0140 25 0600   BP:  138/67 146/69 128/73   BP Location:  Left arm  Left arm   Pulse:  84 88    Resp: 26  24    Temp: 97.3 °F (36.3 °C) 98.1 °F (36.7 °C) 98.7 °F (37.1 °C) 98.3 °F (36.8 °C)   TempSrc: Oral Oral Oral Oral   SpO2:  100% 100% 100%   Weight:       Height:               Blood pressure 128/73, pulse 88, temperature 98.3 °F (36.8 °C), temperature source Oral, resp. rate 24, height 6' (1.829 m), weight 185 lb 12.8 oz (84.3 kg), SpO2 100%.    PHYSICAL EXAM:  General: afebrile, weak and lethargic  HEENT: oropharynx - DRY  CV: Regular rate and rhythm  Lungs: CTA  Abdomen: soft, non distended and non tender  Extremity: no edema or cyanosis       LABS:      Recent Results (from the past 12 hours)   CBC With Differential With Platelet    Collection Time: 25  1:14 PM   Result Value Ref Range    WBC 0.2 (LL) 4.0 - 11.0 x10(3) uL    RBC 2.25 (L) 3.80 - 5.80 x10(6)uL    HGB 6.6 (LL) 13.0 - 17.5 g/dL    HCT 18.5 (L) 39.0 - 53.0 %    PLT 2.0 (LL) 150.0 - 450.0 10(3)uL    Immature Platelet Fraction 5.3 0.0 - 7.0 %    MCV 82.2 80.0 - 100.0 fL    MCH 29.3 26.0 - 34.0 pg    MCHC 35.7 31.0 - 37.0 g/dL    RDW 14.4 %    Neutrophil Absolute Prelim 0.02 (LL) 1.50 - 7.70 x10 (3) uL       CULTURES  Hospital Encounter on 25   1. Blood Culture     Status: None (Preliminary result)    Collection Time: 25  3:10 PM    Specimen: Blood,peripheral   Result Value Ref Range    Blood Culture Result No Growth 4 Days N/A   2. Urine Culture, Routine     Status: None     Collection Time: 02/05/25  4:45 PM    Specimen: Urine, clean catch   Result Value Ref Range    Urine Culture No Growth at 18-24 hrs. N/A      WBC RBC Hemoglobin   Latest Ref Rng 4.0 - 11.0 x10(3) uL 3.80 - 5.80 x10(6)uL 13.0 - 17.5 g/dL   1/27/2025 1.3 (L)  2.11 (L)  6.1 (LL)    1/30/2025 1.4 (L)  2.16 (L)  6.2 (LL)    2/4/2025 0.5 (LL)  1.99 (L)  5.7 (LL)    2/5/2025 0.1 (LL)  2.09 (L)  6.1 (LL)    2/6/2025 0.2 (LL)  2.85 (L)  8.3 (L)     0.1 (LL)  2.47 (L)  7.2 (L)     0.1 (LL)  2.09 (L)  6.0 (LL)    2/7/2025 0.1 (LL)  2.32 (L)  6.7 (LL)     0.1 (LL)  2.46 (L)  7.0 (L)     0.1 (LL)  2.59 (L)  7.4 (L)    2/8/2025 0.1 (LL)  2.91 (L)  8.4 (L)     0.1 (LL)  2.70 (L)  7.8 (L)    2/9/2025 0.1 (LL)  2.46 (L)  7.0 (L)    2/10/2025 0.2 (LL) (P) 2.59 (L) (P) 7.4 (L) (P)      Hematocrit Platelet Count   Latest Ref Rng 39.0 - 53.0 % 150.0 - 450.0 10(3)uL   1/27/2025 18.5 (L)  22.0 (L)    1/30/2025 19.4 (L)  54.0 (L)    2/4/2025 17.0 (L)  15.0 (LL)    2/5/2025 17.3 (L)  4.0 (LL)    2/6/2025 23.9 (L)  10.0 (LL)     21.0 (L)  16.0 (LL)     17.7 (L)  20.0 (L)    2/7/2025 19.0 (L)  7.0 (LL)     21.4 (L)  12.0 (LL)     21.5 (L)  7.0 (LL)    2/8/2025 24.4 (L)  18.0 (LL)     22.4 (L)  24.0 (L)    2/9/2025 20.3 (L)  5.0 (LL)    2/10/2025 21.2 (L) (P) 6.0 (LL) (P)        IMAGING:    No results found.      MEDICATIONS:  Medications reviewed.     meropenem  1 g Intravenous Q8H    gentamicin (Garamycin) 5 mg in sodium chloride 0.9% 5 mL lock syringe (Non-HD Catheters)   Intracatheter Q8H    thiamine  200 mg Oral BID    sodium chloride   Intravenous Once    sodium chloride   Intravenous Once    allopurinol  100 mg Oral Daily    metoprolol succinate ER  50 mg Oral Daily      lactated ringers 100 mL/hr at 02/10/25 2030       heparin    acetaminophen    melatonin    acetaminophen    polyethylene glycol (PEG 3350)    sennosides    bisacodyl    fleet enema    ondansetron    metoclopramide    benzonatate    ASSESSMENT AND PLAN:     Principal  Problem:    Neutropenic fever        Guy White is a 74 year old male with 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  Baseline WBC 1.6 Hgb 6-7's, plts 10's.  Decitabine started 1/23/25, has received 5 doses 1/23 to 1/29.  Transfusion dependent at baseline.      Admitted on 2/5 with neutropenic fever due to ESBL E. Coli bacteremia.    Neutropenic fever - last fever 100.8 overnight 2/7, appreciate ID recs.  On IV vanco and meropenem. Abd US on 2/7 concerning for cholecystitis but HIDA scan negative on 2/8.    ID consulted. Antibiotics per ID.  Do not envision recovery of neutropenia.    MDS history   S/p Decitabine as second like after failure of VIDAZA  Pancytopenia secondary to MDS  Significant neutropenia and transfusion dependent anemia and thrombocytopenia  Since he has failed while he is and is currently on second line decitabine, the overall prognosis is very poor.     Status post platelets yesterday  CBC reviewed today  Continue to have pancytopenia  Hemoglobin 6.6 any transfusion today  Platelets 2-so far no response to transfusion but no bleeding therefore we will hold off further transfusion.  He remains high risk for bleeding.  Low white count is persistent    We will follow    Vel Zhao MD

## 2025-02-11 NOTE — PROGRESS NOTES
Alert and orientated x4.  Family visiting in evening.  Around 2200 reporting needing to void.  Unable to urinate.  Bladder scanned for 575.  Strait cath for 500 as pt was very anxious and concerned.  Primofit on.  Portacath gentamicin locked.  Afebrile and vital signs stable.  Melatonin given for sleep.

## 2025-02-11 NOTE — PLAN OF CARE
Pt adm with fevers, shaking from home. H/O MDS. Pt had low platelets and HBG this afternoon. Heme/onc made aware of critical labs. Order to transfuse PRBCS was entered. Pt was afebrile for shift. IV abx given. Plan of care discussed with family.   Problem: RESPIRATORY - ADULT  Goal: Achieves optimal ventilation and oxygenation  Description: INTERVENTIONS:  - Assess for changes in respiratory status  - Assess for changes in mentation and behavior  - Position to facilitate oxygenation and minimize respiratory effort  - Oxygen supplementation based on oxygen saturation or ABGs  - Provide Smoking Cessation handout, if applicable  - Encourage broncho-pulmonary hygiene including cough, deep breathe, Incentive Spirometry  - Assess the need for suctioning and perform as needed  - Assess and instruct to report SOB or any respiratory difficulty  - Respiratory Therapy support as indicated  - Manage/alleviate anxiety  - Monitor for signs/symptoms of CO2 retention  Outcome: Progressing

## 2025-02-11 NOTE — PLAN OF CARE
Pt fatigued, alert/oriented x 4.  Hgb 7.4, Plt 5 this today.  Notified Dr. Zhao of above.  Tranfused one unit platelets without incident.  Pt tolerated transfusion well. Vitals stable.  Lungs clear, diminished bilaterally.  98% on room air, tachypneic.  Replaced K, Phos and Mg per electrolyte protocol.  Pt complaining of unable to urinate.  External cath in place.  Kiara urine noted.  Bladder scan this evening indicated 757ml.  Straight cath per protocol with 700ml urine output.  Pt stating \"feels better.\"  External cath changed.  Family at bedside on day shift.  Seen by Dr. Zhao, Dr. Coombs and Dr. Wilson.  Updated pt re: plan of care.  Afebrile on day shift.  Needs addressed.  Repositioned for comfort.  Med per MAR.  Isolation precautions and safety measures in place.     Problem: MUSCULOSKELETAL - ADULT  Goal: Return mobility to safest level of function  Description: INTERVENTIONS:  - Assess patient stability and activity tolerance for standing, transferring and ambulating w/ or w/o assistive devices  - Assist with transfers and ambulation using safe patient handling equipment as needed  - Ensure adequate protection for wounds/incisions during mobilization  - Obtain PT/OT consults as needed  - Advance activity as appropriate  - Communicate ordered activity level and limitations with patient/family  Outcome: Not Progressing     Problem: RISK FOR INFECTION - ADULT  Goal: Absence of fever/infection during anticipated neutropenic period  Description: INTERVENTIONS  - Monitor WBC  - Administer growth factors as ordered  - Implement neutropenic guidelines  Outcome: Progressing     Problem: RESPIRATORY - ADULT  Goal: Achieves optimal ventilation and oxygenation  Description: INTERVENTIONS:  - Assess for changes in respiratory status  - Assess for changes in mentation and behavior  - Position to facilitate oxygenation and minimize respiratory effort  - Oxygen supplementation based on oxygen saturation or ABGs  -  Provide Smoking Cessation handout, if applicable  - Encourage broncho-pulmonary hygiene including cough, deep breathe, Incentive Spirometry  - Assess the need for suctioning and perform as needed  - Assess and instruct to report SOB or any respiratory difficulty  - Respiratory Therapy support as indicated  - Manage/alleviate anxiety  - Monitor for signs/symptoms of CO2 retention  Outcome: Progressing

## 2025-02-11 NOTE — PROGRESS NOTES
CC: follow-up hospital admission pancytopenia    SUBJECTIVE:  Interval History:     Overnight had issues with retention, able to urinate today  Has been in bed since admission  No nv, no bm since admission  Having flatus.  Family at   Feels weak  forgetful    OBJECTIVE:  Scheduled Meds:    meropenem  1 g Intravenous Q8H    gentamicin (Garamycin) 5 mg in sodium chloride 0.9% 5 mL lock syringe (Non-HD Catheters)   Intracatheter Q8H    thiamine  200 mg Oral BID    sodium chloride   Intravenous Once    sodium chloride   Intravenous Once    allopurinol  100 mg Oral Daily    metoprolol succinate ER  50 mg Oral Daily     Continuous Infusions:    lactated ringers 100 mL/hr at 02/10/25 2030     PRN Meds:   heparin    acetaminophen    melatonin    acetaminophen    polyethylene glycol (PEG 3350)    sennosides    bisacodyl    fleet enema    ondansetron    metoclopramide    benzonatate    PHYSICAL EXAM  Vital signs: Temp:  [97.3 °F (36.3 °C)-98.7 °F (37.1 °C)] 97.9 °F (36.6 °C)  Pulse:  [78-88] 88  Resp:  [18-30] 30  BP: (111-146)/(52-73) 134/62  SpO2:  [99 %-100 %] 100 %      GENERAL - NAD weak appearing  EYES- sclera anicteric,    HENT- normocephalic,   NECK - no JVD  CV- RRR  RESP - CTAB, normal resp effort  ABDOMEN- soft, NT/ND   EXT- no LE edema    Moving all ext, strength diffusely weak but symmetrical    Data Review:   Labs:   Recent Labs   Lab 02/07/25  1613 02/08/25  0004 02/08/25  0437 02/09/25  0957 02/10/25  0631   WBC 0.1* 0.1* 0.1* 0.1* 0.2*   HGB 6.7* 7.8* 8.4* 7.0* 7.4*   MCV 81.9 83.0 83.8 82.5 81.9   PLT 7.0* 24.0* 18.0* 5.0* 6.0*       Recent Labs   Lab 02/06/25  0534 02/06/25  2059 02/07/25  0405 02/08/25  0437 02/09/25  0957 02/10/25  0024 02/10/25  0631   * 135* 137 138 143  --  142  142   K 4.1 5.0 4.5 4.0 3.0* 3.5 3.3*  3.3*    106 105 106 110  --  110  110   CO2 14.0* 16.0* 18.0* 19.0* 21.0  --  20.0*  20.0*   BUN 34* 47* 55* 56* 58*  --  51*  51*   CREATSERUM 1.76*  1.76* 2.11* 2.11*   2.11* 1.63*  1.63* 1.07  --  1.06  1.06   CA 8.8 8.7 8.5* 8.2* 8.2*  --  8.2*  8.2*   MG 1.2* 1.4* 2.0  --  1.8  --  1.6   PHOS  --   --   --   --  2.5  --  2.5  2.5   * 115* 114* 104* 112*  --  123*  123*       Recent Labs   Lab 02/06/25  0534 02/06/25 2059 02/07/25  0405 02/08/25  0437 02/09/25  0957 02/10/25  0631   ALT 20 38 120* 203*  --  141*   AST 22 38* 106* 76*  --  42*   ALB 3.7 3.9 3.7 3.5 2.9* 2.9*  2.9*       No results for input(s): \"PGLU\" in the last 168 hours.        ASSESSMENT/PLAN:  74 year old male with PMH sig for MDS/chronic pancytopenia, HTN, Gout, HL p/w fevers.     Severe Sepsis 2/2 ESBL E.coli bacteremia,  possible from port infection  Neutropenic fevers  - adjusted cefepime to meropenem and vanc   - ID following - plan for meropenem inpatient and ertapnem until 2/19 on dc   - Cultures reviewed  - CT and US reviewed  - HIDA negative  - transferred out of ICU 2/8      Acute Hypoxic Respiratory failure - resolved  - from sepsis  - HF, wean as tolerated     Pancytopenia  - s/p PRBCs, plts in ED  - hematology following  - goal Hgb>7, goal plts>10   - transfuse prn   - poor prognosis overall given that he's failing on second line decitabine, may need to consider palliative care evaluation      Acute renal injury - resolved  - prerenal, infection  - isotonic crystalloids per nephrology   - monitor renal parameters  - avoid nephrotoxins  - neprhology following     Essential HTN  - metoprolol with parameters     Gout  - allopurinol     FEN: regular diet, PT/OT  Proph: SCDs only  Code status: Full code    Will continue to follow while hospitalized. Please page me or the on-call hospitalist with questions or concerns.    Brenden Montero Hospitalist  [phone]  Answering Service: [phone]

## 2025-02-11 NOTE — PROGRESS NOTES
[facility]   part of [facility] Infectious Disease  Progress Note    Guy White Patient Status:  Inpatient    1950 MRN [id number]   Location [facility] 4NW-A Attending Brenden Whitaker,    Hosp Day # 6 PCP Salvador Cohn MD     Subjective:  Patient seen and examined sitting up in bed. Niece present at the bedside. Still with some weakness and generalized fatigue. Notes some urinary retention yesterday, now improved. Remains afebrile. Denies n/v/d. Otherwise no new complaints.     Objective:  Blood pressure 134/62, pulse 88, temperature 97.9 °F (36.6 °C), temperature source Oral, resp. rate (!) 30, height 6' (1.829 m), weight 185 lb 12.8 oz (84.3 kg), SpO2 100%.    Intake/Output:    Intake/Output Summary (Last 24 hours) at 2025 0858  Last data filed at 2025 0843  Gross per 24 hour   Intake 1553.67 ml   Output 1200 ml   Net 353.67 ml       Physical Exam:  General: Awake, alert, NAD.  HEENT:  Oropharynx clear, trachea ML.  Heart: RRR S1S2 no murmurs.  Lungs: Essentially CTA b/l, no rhonchi, rales, wheezes.  Abdomen: Soft, NT/ND.  BS present.  No guarding or rebound.  Extremity: No edema.  Neurological: No focal deficits.  Derm:  Warm and dry.    Lab Data Review:        Cultures:  Hospital Encounter on 25   1. Blood Culture     Status: None (Preliminary result)    Collection Time: 25  3:10 PM    Specimen: Blood,peripheral   Result Value Ref Range    Blood Culture Result No Growth 4 Days N/A   2. Urine Culture, Routine     Status: None    Collection Time: 25  4:45 PM    Specimen: Urine, clean catch   Result Value Ref Range    Urine Culture No Growth at 18-24 hrs. N/A       Radiology:  NM GB HEPATOBILIARY SCAN  (CPT=78226)    Result Date: 2025  CONCLUSION:  Radiotracer accumulates within the gallbladder within 42 minutes.  No scintigraphic evidence for cystic duct obstruction or acute cholecystitis.  Correlate clinically.  ICU  nurse Dominga was notified of the findings by phone at 1207 hours on 2/8/2025.   LOCATION:  [place]   Dictated by (CST): Tino Fisher MD on 2/08/2025 at 12:03 PM     Finalized by (CST): Tino Fisher MD on 2/08/2025 at 12:07 PM          Assessment and Plan:  1.  ESBL E.coli bacteremia/sepsis presenting here with fever with neutropenia with threat to life  - This is in the setting of MDS, currently on decitabine.  - Source most likely his port as his abdominal imaging is not showing any acute infectious processes.  - Chest imaging showing improved cavitary lung lesions.   - Blood cultures 2/2 isolating ESBL E.coli.  - Repeat blood cultures, 2/6/25, NGTD.   - Urine culture, 2/5/25, negative.  - TTE, 2/8/25, without acute infectious findings.  - IV meropenem, ongoing.     2.  MDS  - On decitabine.  - Immunosuppressed.  - Pancytopenic.  - Oncology following.     3.  Recs  - Continue IV meropenem while inpatient. Plan to discharge on IV Invanz 1 g daily through 2/19/25. Can also plan on gentamicin locks for his port after discharge when not in use.  - F/u WBC and fever curve.  - Supportive care as per the primary team.  - Please draw weekly CBC with diff, CMP, CRP and ESR for duration of abx therapy and fax results to [facility] at [phone].  - Discussed plan of care with nursing.   - Discussed plan of care with patient. All questions addressed and understanding verbalized.  - Further recommendations pending clinical course.     Discussed case with ID attending/collaborating physician, Dr. Makeda Huerta, who is in agreement with the above plan of care    Please note that this report has been produced using speech recognition software and may contain errors related to that system including, but not limited to, errors in grammar, punctuation, and spelling, as well as words and phrases that possibly may have been recognized inappropriately.  If there are any questions or concerns, contact the dictating  provider for clarification.     The 21st Century Cures Act makes medical notes like these available to patients in the interest of transparency. Please be advised this is a medical document. Medical documents are intended to carry relevant information, facts as evident, and the clinical opinion of the practitioner. The medical note is intended as peer to peer communication and may appear blunt or direct. It is written in medical language and may contain abbreviations or verbiage that are unfamiliar.     If you have any questions or concerns please call [facility] Infectious Disease at [phone].     Raman Robison, [email or web address]    2/11/2025  8:58 AM

## 2025-02-11 NOTE — CONSULTS
[facility]   part of [facility]  Palliative Care Initial Consult    Guy White Patient Status:  Inpatient    1950 MRN [id number]   Location [facility] 4NW-A Attending Brenden Whitaker,    Hosp Day # 6 PCP Salvador Cohn MD   [phone]     Date of Consult: 25   Today is day #6 of hospital stay.     Palliative care consultation was requested by Dr. Whitaker for evaluation of palliative care needs and support with Goals of care discussion.    History of Present Illness:        Guy White is a 74 year old male with PMH significant for MDS w chronic pancytopenia - seen in ED day PTA for anemia w PRBC transfusion completed, CKD-3, HTN, Gout, HL in addition to the other conditions noted below, presented to ED on 2025 with CC of fever, rigors, dyspnea.  Patient is undergoing evaluation and treatment for neutropenic fever w sepsis - ESBL E Coli bacteremia, pancytopenia. ALLEN on CKD-3, HTN.  He continues to be neutropenic and transfusion dependent for anemia and thrombocytopenia and not responding so further plt transfusions on hold per oncology. Oncology notes reviewed noting overall very poor prognosis.     Therapy recommendations are RAÚL. Pt has declined to work with therapy.    This is the 2nd hospitalization in the past 4 months.  ED visits 24, 24, 24 for anemia necessitating transfusion      Medical History: obtained from [facility]  Past Medical History:    Atherosclerosis of abdominal aorta    Gout    HTN (hypertension)    Hyperlipidemia    Lumbar radiculitis    MDS (myelodysplastic syndrome) (HCC)    Osteoarthritis    right hip    Squamous cell skin cancer     Past Surgical History:   Procedure Laterality Date    Colonoscopy      Colonoscopy N/A 2016    Procedure: COLONOSCOPY;  Surgeon: Elvis Warren MD;  Location:  ENDOSCOPY    Hip replacement surgery      Other      wisdom teeth extraction       Allergies:  Allergies[1]    Medications:     Current  Facility-Administered Medications:     docusate sodium (Colace) cap 100 mg, 100 mg, Oral, BID    [COMPLETED] meropenem (Merrem) 1 g in sodium chloride 0.9% 100 mL IVPB-MBP, 1 g, Intravenous, Once **FOLLOWED BY** meropenem (Merrem) 1 g in sodium chloride 0.9% 100 mL IVPB-MBP, 1 g, Intravenous, Q8H    lactated ringers infusion, , Intravenous, Continuous    gentamicin (Garamycin) 5 mg in sodium chloride 0.9% 5 mL lock syringe (Non-HD Catheters), , Intracatheter, Q8H    thiamine (Vitamin B1) tab 200 mg, 200 mg, Oral, BID    heparin (Porcine) 100 Units/mL lock flush 500 Units, 5 mL, Intravenous, PRN    sodium chloride 0.9% infusion, , Intravenous, Once    sodium chloride 0.9% infusion, , Intravenous, Once    acetaminophen (Ofirmev) 10 mg/mL infusion premix 1,000 mg, 1,000 mg, Intravenous, Q6H PRN    melatonin tab 6 mg, 6 mg, Oral, Nightly PRN    allopurinol (Zyloprim) tab 100 mg, 100 mg, Oral, Daily    metoprolol succinate ER (Toprol XL) 24 hr tab 50 mg, 50 mg, Oral, Daily    acetaminophen (Tylenol Extra Strength) tab 500 mg, 500 mg, Oral, Q4H PRN    polyethylene glycol (PEG 3350) (Miralax) 17 g oral packet 17 g, 17 g, Oral, Daily PRN    sennosides (Senokot) tab 17.2 mg, 17.2 mg, Oral, Nightly PRN    bisacodyl (Dulcolax) 10 MG rectal suppository 10 mg, 10 mg, Rectal, Daily PRN    fleet enema (Fleet) rectal enema 133 mL, 1 enema, Rectal, Once PRN    ondansetron (Zofran) 4 MG/2ML injection 4 mg, 4 mg, Intravenous, Q6H PRN    metoclopramide (Reglan) 5 mg/mL injection 5 mg, 5 mg, Intravenous, Q8H PRN    benzonatate (Tessalon) cap 200 mg, 200 mg, Oral, TID PRN  Prior to Admission Medications   Medication Sig    ertapenem 1 g Injection Recon Soln Inject 1 g into the vein daily for 10 days. Weekly CBC with differential and CMP, sed rate and CRP.  Fax results to [facility]. Fax: [phone]. Tel: [phone].  PICC line care as per protocol.       Functional Status History:  ADLs: Requires significant  assistance  (Bathing or showering, dressing, getting in and out of bed or chair, walking, using the toilet and eating)  IADLs: Requires significant assistance  (Use the phone, shop for groceries, meal preparation, manage medicines, clean living area, use transportation by self, manage money)      Palliative Care Social History:   Marital Status:   Children: Yes  Living Situation Prior to Admit: Home  Does Patient Live Alone: No  Is Patient Confused: Yes  Occupational History:  [employer]      Social History     Socioeconomic History    Marital status:     Number of children: 1   Occupational History    Occupation: [employer] Center   Tobacco Use    Smoking status: Former     Current packs/day: 1.00     Average packs/day: 1 pack/day for 35.0 years (35.0 ttl pk-yrs)     Types: Cigarettes     Passive exposure: Never    Smokeless tobacco: Never   Vaping Use    Vaping status: Never Used   Substance and Sexual Activity    Alcohol use: Yes     Alcohol/week: 0.0 standard drinks of alcohol     Comment: rarely    Drug use: No    Sexual activity: Yes     Partners: Female       Substance History:   reports that he has quit smoking. His smoking use included cigarettes. He has a 35 pack-year smoking history. He has never been exposed to tobacco smoke. He has never used smokeless tobacco.  reports current alcohol use.  reports no history of drug use.      Spiritual Assessment:   None    HPI obtained from Epic.    SUBJECTIVE  Review of Systems - Palliative Care Symptom Assessment     I visited Guy without visitors at bedside.          Pain:  Denied  Dyspnea: denied subjectively, but iWOB  Appetite: reduced per chart and nursing  Last BM:    Bowel Movement         2/7/2025  2345             Stool Count Calculated for I/O: 1             OBJECTIVE       Medications:    Current Facility-Administered Medications:     docusate sodium (Colace) cap 100 mg, 100 mg, Oral, BID    [COMPLETED] meropenem (Merrem) 1 g in sodium chloride 0.9%  100 mL IVPB-MBP, 1 g, Intravenous, Once **FOLLOWED BY** meropenem (Merrem) 1 g in sodium chloride 0.9% 100 mL IVPB-MBP, 1 g, Intravenous, Q8H    lactated ringers infusion, , Intravenous, Continuous    gentamicin (Garamycin) 5 mg in sodium chloride 0.9% 5 mL lock syringe (Non-HD Catheters), , Intracatheter, Q8H    thiamine (Vitamin B1) tab 200 mg, 200 mg, Oral, BID    heparin (Porcine) 100 Units/mL lock flush 500 Units, 5 mL, Intravenous, PRN    sodium chloride 0.9% infusion, , Intravenous, Once    sodium chloride 0.9% infusion, , Intravenous, Once    acetaminophen (Ofirmev) 10 mg/mL infusion premix 1,000 mg, 1,000 mg, Intravenous, Q6H PRN    melatonin tab 6 mg, 6 mg, Oral, Nightly PRN    allopurinol (Zyloprim) tab 100 mg, 100 mg, Oral, Daily    metoprolol succinate ER (Toprol XL) 24 hr tab 50 mg, 50 mg, Oral, Daily    acetaminophen (Tylenol Extra Strength) tab 500 mg, 500 mg, Oral, Q4H PRN    polyethylene glycol (PEG 3350) (Miralax) 17 g oral packet 17 g, 17 g, Oral, Daily PRN    sennosides (Senokot) tab 17.2 mg, 17.2 mg, Oral, Nightly PRN    bisacodyl (Dulcolax) 10 MG rectal suppository 10 mg, 10 mg, Rectal, Daily PRN    fleet enema (Fleet) rectal enema 133 mL, 1 enema, Rectal, Once PRN    ondansetron (Zofran) 4 MG/2ML injection 4 mg, 4 mg, Intravenous, Q6H PRN    metoclopramide (Reglan) 5 mg/mL injection 5 mg, 5 mg, Intravenous, Q8H PRN    benzonatate (Tessalon) cap 200 mg, 200 mg, Oral, TID PRN    Hematology:   Lab Results   Component Value Date    WBC 0.2 (LL) 02/11/2025    HGB 6.6 (LL) 02/11/2025    HCT 18.5 (L) 02/11/2025    PLT 2.0 (LL) 02/11/2025       Chemistry:  Lab Results   Component Value Date    CREATSERUM 1.06 02/10/2025    CREATSERUM 1.06 02/10/2025    BUN 51 (H) 02/10/2025    BUN 51 (H) 02/10/2025     02/10/2025     02/10/2025    K 3.3 (L) 02/10/2025    K 3.3 (L) 02/10/2025     02/10/2025     02/10/2025    CO2 20.0 (L) 02/10/2025    CO2 20.0 (L) 02/10/2025     (H)  02/10/2025     (H) 02/10/2025    CA 8.2 (L) 02/10/2025    CA 8.2 (L) 02/10/2025    ALB 2.9 (L) 02/10/2025    ALB 2.9 (L) 02/10/2025    ALKPHO 100 02/10/2025    BILT 2.1 (H) 02/10/2025    TP 4.9 (L) 02/10/2025    AST 42 (H) 02/10/2025     (H) 02/10/2025    PSA 0.850 05/11/2021    MG 1.6 02/10/2025    PHOS 2.5 02/10/2025    PHOS 2.5 02/10/2025       Imaging:  No results found.    Vital Signs:  Blood pressure 133/64, pulse 83, temperature 98.9 °F (37.2 °C), temperature source Oral, resp. rate 26, height 6' (1.829 m), weight 185 lb 12.8 oz (84.3 kg), SpO2 100%.      Supplemental O2:       Physical Exam:       GENERAL: Alert. Appears frail, mild distress, gripping side rails.  BODY HABITUS:  Body mass index is 25.2 kg/m².  HEENT: dry oral MM  RESPIRATORY: increased effort at rest on room air, though denies subjective sx of dyspnea  NEUROLOGIC: oriented to self and place as hospital, not to time or situation.   PSYCHIATRIC:  anxious/restless.  SKIN: Warm and dry.     Pre-hospital Palliative Performance Scale: (pt/family reported/per chart review): 60%  Current Palliative Performance Scale:  40%    Palliative Performance Scale   % Ambulation Activity Level Self-Care Intake Consciousness   100 Full Normal Full   Normal Full   90 Full No disease  Normal Full Normal Full   80 Full Some disease  Normal w/effort Full Normal or  Reduced Full   70 Reduced Some disease  Can't perform job Full Normal or   Reduced Full   60 Reduced Significant disease  Can't perform hobby Occasional  Assist Normal or   Reduced Full or confused   50 Mainly sit/lie Extensive Disease  Can't do any work Partial Assist Normal or Reduced Full or confused   40 Mainly in bed Extensive Disease  Can't do any work Mainly Assist Normal or Reduced Full or confused   30 Bedbound Extensive Disease  Can't do any work Max Assist  Total Care Reduced Drowsy/confused   20 Bedbound Extensive Disease  Can't do any work Max Assist  Total Care Minimal  Drowsy/confused   10 Bedbound/coma Extensive Disease  Can't do any work  coma  Max Assist  Total Care Mouth care Drowsy or coma   0 Death     Palliative Care Assessment       Goals of Care:      Provided introduction to Palliative Care and reason for consultation to Guy's wife Zaida by phone. Discussion included the benefits of palliative care to provide extra layer of support to patient/family who wish to continue curative or restorative medical therapies. Palliative care was differentiated from hospice philosophy and model of care by reviewing the treatment-focus with palliative care, versus comfort-focused care with hospice.   We also discussed that having palliative care support does not limit medical treatment options or decisions.       Palliative Care Services:  1) Usually visit once per 2-3 weeks  2) Perform GOC discussions  3) Follow up on symptom management    Palliative Care criteria:  1) Is not effected by prognostication (can receive palliative care services if prognosis is in hours, days, months, years, decades)  2) May continue life-prolonging measures and treatments  3) Includes treatment of symptoms to improve quality of life while receiving above measures and treatments  4) Consultation services Hospice services:  1) Phone services 24/7 (they will be your 911 at all hours when symptoms flare)  2) Increase visits according to symptoms (more robust than palliative care)    Hospice criteria:  1) Requires less than 6 months prognosis of life by two physicians  2) Must forego life-prolonging measures and treatments  3) Focus on complete and total comfort care  4) Must agree to sign on hospice benefit to receive services       Diagnostic understanding and Prognostic Awareness:  Zaida stated that she was unable to recall recent oncology plan of care as he is driven to appts by facility. When asked for understanding of medical plan of care as it relates to cancer treatments, Zaida shared that they  were told he had 8 months to live, and that prognostic timeframe would have ended in January.  She noted that there was also a time they were told he had a gallbladder infection, but he did not actually have a gallbladder infection. She was unable to provide further details surrounding cancer treatment options.    Reviewed recent OP oncology notes, and concern for progression on second line treatment option as well as concern for neutropenia, and transfusion dependence for anemia and thrombocytopenia.     Zaida shared that Guy has expressed that he \"wants to fight,\" and she supports his wishes at this time. She is open to further discussions and will benefit from oncology input on implications going forward given events of this hospital stay.      Emotional support provided to Guy and Zaida.     Advance Care Planning:    Code Status:  Full Code.  A voluntary discussion surrounding resuscitation and LST took place with wifeZaida who confirms full code in line w GOC at this time.    HCPOA:  no document on file.  Healthcare Agent Appointed: Yes  Healthcare Agent's Name: zaida hanson  Healthcare Agent's Phone Number: [phone]        Problem List:       Principal Problem:    Neutropenic fever  Active Problems:    Palliative care encounter    Counseling regarding advance care planning and goals of care  MDS  Transfusion dependent anemia and thrombocytopenia  Poor PO intake    Palliative Care Recommendations / Plan:       Goals of Care: Reviewed w wife -  Reviewed briefly with wife who supports Guy who has told her he \"wants to fight.\"  Family will benefit from input from oncology service on treatment options and prognosis to support further GOC discussions.  Recommend including another family member to support Zaida vazquez information gathering and decision-making.    Code Status: Full Code.  Confirmed w wife 2/11.    HCPOA: Surrogate is wifeZaida     Psychosocial:  Emotional support  provided.    Disposition:  pending clinical course.      A total of 50 minutes were spent on this consult, which included all of the following:  Chart review, direct face to face contact, history taking, physical examination, counseling and coordinating care, and documentation.     I reviewed the above with patient's RNs and primary.    Thank you for inviting Palliative Care Service to participate in the care of Guy White and family.       Will follow.      MITCHEL Ruffin  Palliative Care    2/11/2025  2:12 PM      The 21st Century Cures Act makes medical notes like these available to patients in the interest of transparency. Please be advised this is a medical document. Medical documents are intended to carry relevant information, facts as evident, and the clinical opinion of the practitioner. The medical note is intended as a peer to peer communication, and may appear blunt or direct. It is written in medical language and may contain abbreviations or verbiage that are unfamiliar.         [1]   Allergies  Allergen Reactions    Radiology Contrast Iodinated Dyes HIVES

## 2025-02-11 NOTE — PROGRESS NOTES
[facility]   part of [facility]    Nephrology Progress Note    Guy White Attending:  Brenden Whitaker DO       SUBJECTIVE:     Had intermittent straight cath - 1200 ml out  Voiding ok today  No leg swelling  Low PO intake per family at bedside  No labs yet today      PHYSICAL EXAM:     Vital Signs: /62 (BP Location: Left arm)   Pulse 88   Temp 97.9 °F (36.6 °C) (Oral)   Resp (!) 30   Ht 182.9 cm (6')   Wt 185 lb 12.8 oz (84.3 kg)   SpO2 100%   BMI 25.20 kg/m²   Temp (24hrs), Av.1 °F (36.7 °C), Min:97.3 °F (36.3 °C), Max:98.7 °F (37.1 °C)       Intake/Output Summary (Last 24 hours) at 2025 1244  Last data filed at 2025 1040  Gross per 24 hour   Intake 1433.67 ml   Output 1850 ml   Net -416.33 ml     Wt Readings from Last 3 Encounters:   02/10/25 185 lb 12.8 oz (84.3 kg)   25 161 lb (73 kg)   24 166 lb 3.2 oz (75.4 kg)     General: appears weak  Skin: no visible rashes  HEENT: NCAT  Cardiac: Regular rate and rhythm, no murmur/gallop or rub  Lungs: CTAB, no wheeze, no rale, no rhonchi  Abdomen: soft  Extremities: warm, well perfused, no leg edema  Neurologic/Psych: mentating well    LABORATORY DATA:     Lab Results   Component Value Date     (H) 02/10/2025     (H) 02/10/2025    BUN 51 (H) 02/10/2025    BUN 51 (H) 02/10/2025    BUNCREA 19.0 2021    CREATSERUM 1.06 02/10/2025    CREATSERUM 1.06 02/10/2025    ANIONGAP 12 02/10/2025    ANIONGAP 12 02/10/2025    GFR 49 (L) 2016    CA 8.2 (L) 02/10/2025    CA 8.2 (L) 02/10/2025    OSMOCALC 309 (H) 02/10/2025    OSMOCALC 309 (H) 02/10/2025    ALKPHO 100 02/10/2025    AST 42 (H) 02/10/2025     (H) 02/10/2025    BILT 2.1 (H) 02/10/2025    TP 4.9 (L) 02/10/2025    ALB 2.9 (L) 02/10/2025    ALB 2.9 (L) 02/10/2025    GLOBULIN 2.0 02/10/2025    AGRATIO 2.1 01/15/2016     02/10/2025     02/10/2025    K 3.3 (L) 02/10/2025    K 3.3 (L) 02/10/2025     02/10/2025      02/10/2025    CO2 20.0 (L) 02/10/2025    CO2 20.0 (L) 02/10/2025     Lab Results   Component Value Date    WBC 0.2 (LL) 02/10/2025    RBC 2.59 (L) 02/10/2025    HGB 7.4 (L) 02/10/2025    HCT 21.2 (L) 02/10/2025    PLT 6.0 (LL) 02/10/2025    MCV 81.9 02/10/2025    MCH 28.6 02/10/2025    MCHC 34.9 02/10/2025    RDW 14.6 02/10/2025    NEPRELIM 0.01 (LL) 02/09/2025    NEUTABS 0.01 (LL) 02/07/2025    LYMPHABS 0.08 (L) 02/07/2025    EOSABS 0.00 02/07/2025    BASABS 0.00 02/07/2025    NEUT 8 02/07/2025    LYMPH 83 02/07/2025    MON 8 02/07/2025    EOS 0 02/07/2025    BASO 0 02/07/2025    NEPERCENT 0.0 02/10/2025    LYPERCENT 88.8 02/10/2025    MOPERCENT 5.6 02/10/2025    EOPERCENT 5.6 02/10/2025    BAPERCENT 0.0 02/10/2025    NE 0.00 (LL) 02/10/2025    LYMABS 0.16 (L) 02/10/2025    MOABSO 0.01 (L) 02/10/2025    EOABSO 0.01 02/10/2025    BAABSO 0.00 02/10/2025     Lab Results   Component Value Date    CREUR 63.30 05/14/2024     Lab Results   Component Value Date    COLORUR Yellow 02/07/2025    CLARITY Turbid (A) 02/07/2025    SPECGRAVITY 1.019 02/07/2025    GLUUR Normal 02/07/2025    BILUR Negative 02/07/2025    KETUR Trace (A) 02/07/2025    BLOODURINE 3+ (A) 02/07/2025    PHURINE 5.5 02/07/2025    PROUR 100 (A) 02/07/2025    UROBILINOGEN Normal 02/07/2025    NITRITE Negative 02/07/2025    LEUUR Negative 02/07/2025    NMIC Microscopic not indicated 05/14/2024    WBCUR 1-5 02/07/2025    RBCUR >10 (A) 02/07/2025    EPIUR Few (A) 02/07/2025    BACUR Rare (A) 02/07/2025    HYLUR Present (A) 02/07/2025         IMAGING:     Reviewed.    MEDICATIONS:       Current Facility-Administered Medications   Medication Dose Route Frequency    docusate sodium (Colace) cap 100 mg  100 mg Oral BID    meropenem (Merrem) 1 g in sodium chloride 0.9% 100 mL IVPB-MBP  1 g Intravenous Q8H    lactated ringers infusion   Intravenous Continuous    gentamicin (Garamycin) 5 mg in sodium chloride 0.9% 5 mL lock syringe (Non-HD Catheters)   Intracatheter  Q8H    thiamine (Vitamin B1) tab 200 mg  200 mg Oral BID    heparin (Porcine) 100 Units/mL lock flush 500 Units  5 mL Intravenous PRN    sodium chloride 0.9% infusion   Intravenous Once    sodium chloride 0.9% infusion   Intravenous Once    acetaminophen (Ofirmev) 10 mg/mL infusion premix 1,000 mg  1,000 mg Intravenous Q6H PRN    melatonin tab 6 mg  6 mg Oral Nightly PRN    allopurinol (Zyloprim) tab 100 mg  100 mg Oral Daily    metoprolol succinate ER (Toprol XL) 24 hr tab 50 mg  50 mg Oral Daily    acetaminophen (Tylenol Extra Strength) tab 500 mg  500 mg Oral Q4H PRN    polyethylene glycol (PEG 3350) (Miralax) 17 g oral packet 17 g  17 g Oral Daily PRN    sennosides (Senokot) tab 17.2 mg  17.2 mg Oral Nightly PRN    bisacodyl (Dulcolax) 10 MG rectal suppository 10 mg  10 mg Rectal Daily PRN    fleet enema (Fleet) rectal enema 133 mL  1 enema Rectal Once PRN    ondansetron (Zofran) 4 MG/2ML injection 4 mg  4 mg Intravenous Q6H PRN    metoclopramide (Reglan) 5 mg/mL injection 5 mg  5 mg Intravenous Q8H PRN    benzonatate (Tessalon) cap 200 mg  200 mg Oral TID PRN       ASSESSMENT/PLAN:       75 yo M with history of CKD stage 3, MDS s/p port placement and chemo, HTN, gout, HL presented with neutropenic fever in the setting of E coli bacteremia, possibly from cholecystitis. Nephrology consulted for ALLEN:     ALLEN on CKD 3: baseline creatinine ~1.3-1.5 mg/dl; pre-renal  -- creaitnine improved but he remains azotemic with limited oral intake; awaiting AM labs  -- LR at 100 ml/h for now with reduced oral intake  -- strict I/Os, straight cath prn  -- maintain MAP>65  -- avoid NSAIDs, fleets, iodinated contrast unless benefit >> risk     Abn UA:  -- no pyuria, +RBC, protein, squamous cells  -- creatinine improved with IV fluids  -- repeat UA when infection improves     Pancytopenia:  -- per heme, transfuse prn     ESBL E coli bacteremia:  -- on meropenem with ID following  -- HIDA scan neg     Hypokalemia,  hypophosphatemia:  -- replete per protocol     Will follow.      Thank you for allowing me to participate in the care of this patient. Please do not hesitate to call with questions or concerns.        Flor Coombs MD  [facility]

## 2025-02-12 NOTE — CM/SW NOTE
SW left a message for the patient's spouse Zaida to schedule a meeting for hospice.  Awaiting return call.    [facility]  [phone]

## 2025-02-12 NOTE — OCCUPATIONAL THERAPY NOTE
OCCUPATIONAL THERAPY TREATMENT NOTE - INPATIENT     Room Number: 433/433-A  Session: 1   Number of Visits to Meet Established Goals: 5    Presenting Problem: neutropenic fever    ASSESSMENT   Patient demonstrates fair progress this session, goals remain in progress.    Patient continues to function below baseline with toileting, upper body dressing, lower body dressing, grooming, bed mobility, transfers, static sitting balance, dynamic sitting balance, static standing balance, dynamic standing balance, and maintaining seated position.   Contributing factors to remaining limitations include decreased functional strength, decreased functional reach, decreased endurance, impaired coordination, impaired motor planning, and decreased muscular endurance.  Next session anticipate patient to progress toileting, upper body dressing, lower body dressing, grooming, bed mobility, transfers, static sitting balance, dynamic sitting balance, static standing balance, dynamic standing balance, maintaining seated position, and functional standing tolerance.  Occupational Therapy will continue to follow patient for duration of hospitalization.    Patient continues to benefit from continued skilled OT services: to promote return to prior level of function and safety with continuous assistance and gradual rehabilitative therapy.     History: Patient is a 74 year old male admitted on 2/5/2025 with Presenting Problem: neutropenic fever. Co-Morbidities : h/o MDS     WEIGHT BEARING RESTRICTION       Recommendations for nursing staff:   Transfers: Max A x1 to pivot  Toileting location: commode    TREATMENT SESSION:  Patient Start of Session: supine in bed for session    FUNCTIONAL TRANSFER ASSESSMENT  Sit to Stand: Edge of Bed  Edge of Bed: Maximum Assist (Max A on 1st attempt then Mod A on subsequent attempts)    BED MOBILITY  Rolling: Minimal Assist  Supine to Sit : Moderate Assist  Sit to Supine (OT): Moderate Assist  Scooting: Min A to  EOB, Max A up towards HOB    BALANCE ASSESSMENT  Static Sitting: Minimal Assist  Static Standing: Moderate Assist (to stand and attempt side steps)    FUNCTIONAL ADL ASSESSMENT  Grooming Seated: Not Tested  LB Dressing Seated: Maximum Assist (for socks, declined further ADLs)    ACTIVITY TOLERANCE: vitals stable                         O2 SATURATIONS       EDUCATION PROVIDED  Patient Education : Role of Occupational Therapy; Plan of Care  Patient's Response to Education: Verbalized Understanding; Returned Demonstration    Equipment used: RW  Demonstrates functional use, Would benefit from additional trial      Exercises:    Exercises Repetitions Comments   Scapular elevation     Scapular retraction     Shoulder rolls     Shoulder flexion     Shoulder abduction     Shoulder internal/external rotation     Forward punch     Elbow flexion     Elbow extension     Forearm pronation/supination     Wrist flexion/extension     Gross grasp/fist pumps     Ankle pumps     Knee extension     Marching         Therapist comments: Pt reported fatigue at home, pt educated on work simplification and energy conservation for at home to assist with independence with fatigue at home.   Patient End of Session: In bed;With  staff;Needs met;Call light within reach;RN aware of session/findings;Hospital anti-slip socks;All patient questions and concerns addressed;Family present    SUBJECTIVE  Pt stated, \"I cannot do any more.\"    PAIN ASSESSMENT  Ratin  Location: no pain at this time        OBJECTIVE  Precautions: Bed/chair alarm    AM-PAC ‘6-Clicks’ Inpatient Daily Activity Short Form  -   Putting on and taking off regular lower body clothing?: A Lot  -   Bathing (including washing, rinsing, drying)?: A Lot  -   Toileting, which includes using toilet, bedpan or urinal? : A Lot  -   Putting on and taking off regular upper body clothing?: A Little  -   Taking care of personal grooming such as brushing teeth?: A Little  -   Eating meals?:  A Little    AM-PAC Score:  Score: 15  Approx Degree of Impairment: 56.46%  Standardized Score (AM-PAC Scale): 34.69    PLAN     OT Treatment Plan: Balance activities;Energy conservation/work simplification techniques;ADL training;IADL training;Continued evaluation;Compensatory technique education;Equipment eval/education;Patient/Family training;Patient/Family education;Endurance training;UE strengthening/ROM;Functional transfer training  Rehab Potential : Good  Frequency: 3-5x/week    OT Goals:   ADL Goals   Patient will perform upper body dressing:  with supervision  Patient will perform lower body dressing:  with supervision  Patient will perform toileting: with supervision     Functional Transfer Goals  Patient will transfer from supine to sit:  with supervision  Patient will transfer from sit to stand:  with supervision  Patient will transfer to toilet:  with supervision     UE Exercise Program Goal  Patient will be supervision with bilateral AROM HEP (home exercise program).     Additional Goals:  Pt will verbalize at least 3 energy conservation techniques  Pt will stand at sink for 5 minutes to complete grooming routine    OT Session Time: 25 minutes  Self-Care Home Management: 10 minutes  Therapeutic Activity: 15 minutes  Neuromuscular Re-education: 0 minutes  Therapeutic Exercise: 0 minutes  Cognitive Skills: 0 minutes  Sensory Integrative: 0 minutes  Orthotic Management and Trainin minutes  Can add/delete any of these

## 2025-02-12 NOTE — PROGRESS NOTES
[facility]   part of [facility]  Palliative Care Follow Up    Guy White Patient Status:  Inpatient    1950 MRN [id number]   Location [facility] 4NW-A Attending Brenden Whitaker,    Hosp Day # 7 PCP Salvador Cohn MD   433/433-A    Date of visit:  2025  Day 7 of hospitalization.     Palliative care consultation was requested by Dr. Whitaker for evaluation of palliative care needs and support with Goals of care discussion.      Summary:         Guy White is a 74 year old male with PMH significant for MDS w chronic pancytopenia - seen in ED the day PTA for anemia w PRBC transfusion completed, CKD-3, HTN, Gout, HL in addition to the other conditions noted below, presented to ED on 2025 with CC of fever, rigors, dyspnea.  Patient is undergoing evaluation and treatment for neutropenic fever w sepsis - ESBL E Coli bacteremia, pancytopenia. ALLEN on CKD-3, HTN.  He continues to be neutropenic and transfusion dependent for anemia and thrombocytopenia and not responding so further plt transfusions on hold per oncology. Oncology notes reviewed noting overall very poor prognosis.      Therapy recommendations are RAÚL. Pt has declined to work with therapy.     This is the 2nd hospitalization in the past 4 months.  ED visits 24, 24, 24 for anemia necessitating transfusion      Interval Events:  Hgb 8.1, Plt 8 (following transfusions yesterday for Hgb 6.6, Plt 2). Oncology input reviewed, comfort measures were d/w niece this AM.     SUBJECTIVE  Review of Systems - Palliative Care Symptom Assessment     I visited Guy while he rested in bed. Visitor had just stepped away. He is unable to tell me POC.  Denies pain, dyspnea, nausea.  Reports lack of appetite, not interested in eating today.      Last BM:    Bowel Movement         2025  0455             Stool Count Calculated for I/O: 1                OBJECTIVE     Hematology:   Lab Results   Component Value Date     WBC 0.2 (LL) 02/11/2025    HGB 6.6 (LL) 02/11/2025    HCT 18.5 (L) 02/11/2025    PLT 2.0 (LL) 02/11/2025       Chemistry:  Lab Results   Component Value Date    CREATSERUM 0.94 02/12/2025    BUN 38 (H) 02/12/2025     02/12/2025    K 4.0 02/12/2025     02/12/2025    CO2 20.0 (L) 02/12/2025     (H) 02/12/2025    CA 8.5 (L) 02/12/2025    ALB 2.5 (L) 02/12/2025    ALKPHO 100 02/10/2025    BILT 2.1 (H) 02/10/2025    TP 4.9 (L) 02/10/2025    AST 42 (H) 02/10/2025     (H) 02/10/2025    PSA 0.850 05/11/2021    MG 1.6 02/12/2025    PHOS 3.3 02/12/2025         Vital Signs:  Blood pressure (!) 163/73, pulse 89, temperature 97.9 °F (36.6 °C), temperature source Oral, resp. rate 24, height 6' (1.829 m), weight 190 lb (86.2 kg), SpO2 100%.  Body mass index is 25.77 kg/m².      Physical Exam:     GENERAL: Alert. Appears weak and frail. Mild distress, gripping side rails. Difficulty keeping eyes open.  HEENT: No focal deficits.   RESPIRATORY: increased effort at rest.  NEUROLOGIC: oriented to self.  PSYCHIATRIC:  withdrawn    Palliative Performance Scale: 40%   Palliative Performance Scale   % Ambulation Activity Level Self-Care Intake Consciousness   100 Full Normal Full   Normal Full   90 Full No disease  Normal Full Normal Full   80 Full Some disease  Normal w/effort Full Normal or  Reduced Full   70 Reduced Some disease  Can't perform job Full Normal or   Reduced Full   60 Reduced Significant disease  Can't perform hobby Occasional  Assist Normal or   Reduced Full or confused   50 Mainly sit/lie Extensive Disease  Can't do any work Partial Assist Normal or Reduced Full or confused   40 Mainly in bed Extensive Disease  Can't do any work Mainly Assist Normal or Reduced Full or confused   30 Bedbound Extensive Disease  Can't do any work Max Assist  Total Care Reduced Drowsy/confused   20 Bedbound Extensive Disease  Can't do any work Max Assist  Total Care Minimal Drowsy/confused   10 Bedbound/coma  Extensive Disease  Can't do any work  coma  Max Assist  Total Care Mouth care Drowsy or coma   0 Death       Palliative Care Assessment:     Goals of Care Discussion:     Guy is unable to participate in GOC discussion d/t inability to recall POC.     I called Zaida at home number on file LVM [id number].    Encountered wife and daughters at bedside. They are informed by niece who spoke w oncology this AM. They are aware no further tx options for MDS, and inquired about hospice. They are open to hospice evaluation. Indicated that home with hospice would not be possible, so would need to explore placement if qualifies for routine.    When asked for his input, Guy stated, \"I don't know what to say.\" He is very weak, reporting generalized discomfort, and continues to  side rails. Increased WOB.      Advance Care Planning Counseling / Discussion:    Code Status: Full Code.  Addressed with family at bedside. They are overwhelmed at this time. Informed that limits can be set any time, and that if hospice is pursued, this can be revisited with GOC.      Problem List:       Principal Problem:    Neutropenic fever  Active Problems:    Palliative care encounter    Counseling regarding advance care planning and goals of care  Transfusion-dependent MDS  Anemia  Thrombocytopenia    Palliative Care Recommendations/Plan:         Goals of Care:    Discussed w family at bedside. They understand no further tx options for MDS. Open to hospice eval.  Hospice consult placed. If pursued, and if routine LOS, then would need support w placement.    Code Status: Full Code.   Encouraged family to revisit. They are overwhelmed and need time to consider    HCPOA: Wife, Zaida.    Disposition:  Pending clinical course, hospice eval.      A total of 50 minutes were spent on this visit, which included all of the following:  Chart review, direct face to face contact, history taking, physical examination, counseling and coordinating care, and  documentation.        Reviewed the above with patient's RN, MEILY, primary.    Thank you for inviting Palliative Care to participate in the care of Guy White and family.       Will follow.      MITCHEL Ruffin  Palliative Care    2/12/2025  2:33 PM    The 21st Century Cures Act makes medical notes like these available to patients in the interest of transparency. Please be advised this is a medical document. Medical documents are intended to carry relevant information, facts as evident, and the clinical opinion of the practitioner. The medical note is intended as a peer to peer communication, and may appear blunt or direct. It is written in medical language and may contain abbreviations or verbiage that are unfamiliar.

## 2025-02-12 NOTE — DIETARY MALNUTRITION NOTE
[facility]   part of [facility]    NUTRITION ASSESSMENT    Pt meets moderate malnutrition criteria at this time.    CRITERIA FOR MALNUTRITION DIAGNOSIS:  Criteria for non-severe malnutrition diagnosis: acute illness/injury related to energy intake less than 75% for greater than 7 days and fluid accumulation mild    NUTRITION INTERVENTION:    RD nutrition Care Plan- Liberalized diet, Encouraged increased PO intake, and Initiated ONS (oral nutritional supplements)  Meal and Snacks - Monitor and encourage adequate PO intake.   Medical Food Supplements - Ensure Plus High Protein TID. Rationale/use for oral supplements discussed.    PATIENT STATUS: 02/12/25 74 year old male with a history of myelodysplastic syndrome, hypertension, hyperlipidemia, who presented 2/5 with fevers and dyspnea and admitted for neutropenic fever. Chart reviewed for LOS. Pt and family report very poor po since admission. Denies trouble chewing (despite missing teeth), dry mouth or trouble swallowing. Denies recent wt loss. Agreeable to ONS.     ANTHROPOMETRICS:  Ht: 182.9 cm (6')  Wt: 86.2 kg (190 lb).   BMI: Body mass index is 25.77 kg/m².  IBW: 80.9 kg    WEIGHT HISTORY:   Weight loss: no      Wt Readings from Last 10 Encounters:   02/12/25 86.2 kg (190 lb)   01/27/25 73 kg (161 lb)   12/19/24 75.4 kg (166 lb 3.2 oz)   11/20/24 72 kg (158 lb 12.8 oz)   10/15/24 66.8 kg (147 lb 4.3 oz)   09/27/24 72.5 kg (159 lb 12.8 oz)   09/09/24 72.6 kg (160 lb)   08/14/24 76.1 kg (167 lb 12.8 oz)   07/28/24 79.4 kg (175 lb)   07/18/24 78.7 kg (173 lb 8 oz)        NUTRITION:  Diet:       Procedures    Regular/General diet Is Patient on Accuchecks? No      Food Allergies: No  Cultural/Ethnic/Gnosticist Preferences Addressed: Yes    Percent Meals Eaten (last 3 days)       Date/Time Percent Meals Eaten (%)    02/09/25 0845 --     Percent Meals Eaten (%): Offered breakfast, refused at 02/09/25 0845    02/11/25 0843 0 %     Percent Meals Eaten (%):  refused to order breakfast at 02/11/25 0843    02/11/25 1309 0 %            GI system review: WNL Last BM Date: 02/11/25  Skin and wounds: intact    NUTRITION RELATED PHYSICAL FINDINGS:     1. Body Fat/Muscle Mass: unable to assess     2. Fluid Accumulation: R/L LE, LLE, scotal  : non-pitting    NUTRITION PRESCRIPTION:  86.2 kg  Actual Body Weight  Calories: 2703-2483 calories/day (22-25 kcal/kg)  Protein:  grams protein/day (1.0-1.5 grams protein per kg)  Fluid: ~1 ml/kcal or per MD discretion    NUTRITION DIAGNOSIS/PROBLEM:  Malnutrition related to insufficient appetite resulting in inadequate nutrition intake as evidenced by documented/reported insufficient oral intake, diagnosis consistent with elevated nutritional needs, and fluid accumulation      MONITOR AND EVALUATE/NUTRITION GOALS:  PO intake of 75% of meals TID - New  PO intake of 75% of oral nutrition supplement/s - New  Weight stable within 1 to 2 lbs during admission - New      MEDICATIONS:  LR@ 100mL, colace, B1    LABS:  Reviewed    Pt is at High nutrition risk      Lauren Gibson RD, LDN  Clinical Nutrition  [id number]

## 2025-02-12 NOTE — HOSPICE RN NOTE
[facility] RN met with patient, wife and daughters to discuss comfort care. Informational meeting complete. Hospice philosophy, Medicare benefit, and levels of care discussed. All questions answered. Guy did not have full conversations but did answer yes and no to simple questions. He denies pain and nausea. When asked if he was anxious or scared about the situation he said, \"yes.\" He appeared mildly anxious during the conversation, but seemed to fall asleep before this RN left the room. He does not meet GIP criteria at this time, but he will be evaluated daily should there be a change in condition. He received one dose of extra strength Tylenol today for pain. The family would like to obtain a facility list so they can explore placement options. Private caregiver list also given to daughters. They would like to explore their options for placement and care. Spouse Zaida also has care needs at home. Care Companion Book given to Bibi. Referrals sent out via [employer] and the list of accepting facilities can be provided to family when available. Lissette DIAZ updated on plan. [facility] will follow up Thursday with patient and family.    Lupe Gold RN BSN  [facility] RN Liaison  [phone] (After-hours)

## 2025-02-12 NOTE — PLAN OF CARE
No bleeding noted. Afebrile overnight. Received 1 unit 1 prbc overnight. Poor appetite. Had a bottle of ensure and 1 cup of ice cream for dinner. Encouraged intakes. Repositioned in bed. T. Max 98.2 overnight.   Problem: RISK FOR INFECTION - ADULT  Goal: Absence of fever/infection during anticipated neutropenic period  Description: INTERVENTIONS  - Monitor WBC  - Administer growth factors as ordered  - Implement neutropenic guidelines  Outcome: Progressing

## 2025-02-12 NOTE — PROGRESS NOTES
[facility] HEMATOLOGY ONCOLOGY  Progress Note    Guy White Patient Status:  Inpatient    1950 MRN [id number]   Location [facility] 4NW-A Attending Mike Wilson*   Hosp Day # 7 PCP Salvador Cohn MD     ADMISSION DATE AND TIME: 2025  3:58 PM    ADMIT DIAGNOSIS: Neutropenic fever [D70.9, R50.81]    SUBJECTIVE:    Continues to be weak  Niece at bedside       OBJECTIVE:    Vitals:    25 2115 25 2234 25 0455 25 0545   BP: (!) 166/61 153/63 (!) 163/73    BP Location:  Left arm Left arm    Pulse: 81 78 89    Resp:  24 24    Temp: 98.2 °F (36.8 °C) 98.2 °F (36.8 °C) 97.9 °F (36.6 °C)    TempSrc:   Oral    SpO2:  100% 100%    Weight:    190 lb (86.2 kg)   Height:               Blood pressure (!) 163/73, pulse 89, temperature 97.9 °F (36.6 °C), temperature source Oral, resp. rate 24, height 6' (1.829 m), weight 190 lb (86.2 kg), SpO2 100%.    PHYSICAL EXAM:  General: afebrile, weak and lethargic  HEENT: oropharynx - DRY  CV: Regular rate and rhythm  Lungs: CTA  Abdomen: soft, non distended and non tender  Extremity: no edema or cyanosis       LABS:      Recent Results (from the past 12 hours)   Renal Function Panel    Collection Time: 25  6:00 AM   Result Value Ref Range    Glucose 124 (H) 70 - 99 mg/dL    Sodium 141 136 - 145 mmol/L    Potassium 4.0 3.5 - 5.1 mmol/L    Chloride 110 98 - 112 mmol/L    CO2 20.0 (L) 21.0 - 32.0 mmol/L    Anion Gap 11 0 - 18 mmol/L    BUN 38 (H) 9 - 23 mg/dL    Creatinine 0.94 0.70 - 1.30 mg/dL    Calcium, Total 8.5 (L) 8.7 - 10.6 mg/dL    Calculated Osmolality 302 (H) 275 - 295 mOsm/kg    eGFR-Cr 85 >=60 mL/min/1.73m2    Albumin 2.5 (L) 3.2 - 4.8 g/dL    Phosphorus 3.3 2.4 - 5.1 mg/dL   Magnesium    Collection Time: 25  6:00 AM   Result Value Ref Range    Magnesium 1.6 1.6 - 2.6 mg/dL       CULTURES  Hospital Encounter on 25   1. Blood Culture     Status: None    Collection Time: 25  3:10  PM    Specimen: Blood,peripheral   Result Value Ref Range    Blood Culture Result No Growth 5 Days N/A   2. Urine Culture, Routine     Status: None    Collection Time: 02/05/25  4:45 PM    Specimen: Urine, clean catch   Result Value Ref Range    Urine Culture No Growth at 18-24 hrs. N/A      WBC RBC Hemoglobin   Latest Ref Rng 4.0 - 11.0 x10(3) uL 3.80 - 5.80 x10(6)uL 13.0 - 17.5 g/dL   1/27/2025 1.3 (L)  2.11 (L)  6.1 (LL)    1/30/2025 1.4 (L)  2.16 (L)  6.2 (LL)    2/4/2025 0.5 (LL)  1.99 (L)  5.7 (LL)    2/5/2025 0.1 (LL)  2.09 (L)  6.1 (LL)    2/6/2025 0.2 (LL)  2.85 (L)  8.3 (L)     0.1 (LL)  2.47 (L)  7.2 (L)     0.1 (LL)  2.09 (L)  6.0 (LL)    2/7/2025 0.1 (LL)  2.32 (L)  6.7 (LL)     0.1 (LL)  2.46 (L)  7.0 (L)     0.1 (LL)  2.59 (L)  7.4 (L)    2/8/2025 0.1 (LL)  2.91 (L)  8.4 (L)     0.1 (LL)  2.70 (L)  7.8 (L)    2/9/2025 0.1 (LL)  2.46 (L)  7.0 (L)    2/10/2025 0.2 (LL) (P) 2.59 (L) (P) 7.4 (L) (P)      Hematocrit Platelet Count   Latest Ref Rng 39.0 - 53.0 % 150.0 - 450.0 10(3)uL   1/27/2025 18.5 (L)  22.0 (L)    1/30/2025 19.4 (L)  54.0 (L)    2/4/2025 17.0 (L)  15.0 (LL)    2/5/2025 17.3 (L)  4.0 (LL)    2/6/2025 23.9 (L)  10.0 (LL)     21.0 (L)  16.0 (LL)     17.7 (L)  20.0 (L)    2/7/2025 19.0 (L)  7.0 (LL)     21.4 (L)  12.0 (LL)     21.5 (L)  7.0 (LL)    2/8/2025 24.4 (L)  18.0 (LL)     22.4 (L)  24.0 (L)    2/9/2025 20.3 (L)  5.0 (LL)    2/10/2025 21.2 (L) (P) 6.0 (LL) (P)        IMAGING:    No results found.      MEDICATIONS:  Medications reviewed.     docusate sodium  100 mg Oral BID    meropenem  1 g Intravenous Q8H    gentamicin (Garamycin) 5 mg in sodium chloride 0.9% 5 mL lock syringe (Non-HD Catheters)   Intracatheter Q8H    thiamine  200 mg Oral BID    sodium chloride   Intravenous Once    sodium chloride   Intravenous Once    allopurinol  100 mg Oral Daily    metoprolol succinate ER  50 mg Oral Daily      lactated ringers 100 mL/hr at 02/10/25 2030       heparin    acetaminophen     melatonin    acetaminophen    polyethylene glycol (PEG 3350)    sennosides    bisacodyl    fleet enema    ondansetron    metoclopramide    benzonatate    ASSESSMENT AND PLAN:     Principal Problem:    Neutropenic fever  Active Problems:    Palliative care encounter    Counseling regarding advance care planning and goals of care        Guy White is a 74 year old male with 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  Baseline WBC 1.6 Hgb 6-7's, plts 10's.  Decitabine started 1/23/25, has received 5 doses 1/23 to 1/29.  Transfusion dependent at baseline.      Admitted on 2/5 with neutropenic fever due to ESBL E. Coli bacteremia.    Neutropenic fever - last fever 100.8 overnight 2/7, appreciate ID recs.  On IV vanco and meropenem. Abd US on 2/7 concerning for cholecystitis but HIDA scan negative on 2/8.    ID consulted. Antibiotics per ID.  Do not envision recovery of neutropenia.    MDS history   S/p Decitabine as second like after failure of VIDAZA  Pancytopenia secondary to MDS  Significant neutropenia and transfusion dependent anemia and thrombocytopenia  Since he has failed while he is and is currently on second line decitabine, the overall prognosis is very poor.       S.p PRBC  Continue to have pancytopenia  CBC pending   Platelets 2-so far no response to transfusion but no bleeding therefore we will hold off further transfusion.  He remains high risk for bleeding.  Low white count is persistent    Long discussion with the patient and niece   Explained the incurable nature of the MDS and the poor prognosis with persistent pancytopenia.  There is no effective treatment of worsening MDS  Discussed comfort measures with hospice today    We will follow       Vel Zhao MD

## 2025-02-12 NOTE — PHYSICAL THERAPY NOTE
PHYSICAL THERAPY TREATMENT NOTE - INPATIENT    Room Number: 433/433-A     Session: 1     Number of Visits to Meet Established Goals: 3    Presenting Problem: neutropenic fever  Co-Morbidities : h/o MDS    PHYSICAL THERAPY ASSESSMENT   Patient demonstrates limited progress this session, goals  updated to reflect patient performance.      Patient is requiring maximum assist as a result of the following impairments: decreased functional strength, decreased endurance/aerobic capacity, pain, impaired   balance, impaired motor planning, decreased muscular endurance, and medical status.     Patient continues to function below baseline with bed mobility, transfers, and gait.  Next session anticipate patient to progress bed mobility, transfers, and gait.  Physical Therapy will continue to follow patient for duration of hospitalization.    Patient continues to benefit from continued skilled PT services: to promote return to prior level of function and safety with continuous assistance and gradual rehabilitative therapy .    PLAN DURING HOSPITALIZATION  Nursing Mobility Recommendation : 1 Assist     PT Treatment Plan: Bed mobility;Body mechanics;Endurance;Energy conservation;Patient education;Family education;Gait training;Neuromuscular re-educate;Range of motion;Strengthening;Transfer training;Stair training;Balance training  Frequency (Obs):  (2-3x/week)     CURRENT GOALS     Goal #1 Patient is able to demonstrate supine - sit EOB @ level: supervision     Goal #2 Patient is able to demonstrate transfers Sit to/from Stand at assistance level: supervision     Goal #3 Patient is able to ambulate 50 feet with assist device: walker - rolling at assistance level: supervision     Goal #4 Patient will negotiate 7 stairs with supervision to ensure safety at home.   Goal #5    Goal #6    Goal Comments: Goals established on 2/6/2025 2/12/2025 all goals ongoing.    SUBJECTIVE  \" I don't know.\"   Patient's answer to all questions.      OBJECTIVE  Precautions: Bed/chair alarm    WEIGHT BEARING RESTRICTION     PAIN ASSESSMENT   Ratin          BALANCE                                                                                                                       Static Sitting: Good  Dynamic Sitting: Good           Static Standing: Poor +  Dynamic Standing: Poor    ACTIVITY TOLERANCE                         O2 WALK       AM-PAC '6-Clicks' INPATIENT SHORT FORM - BASIC MOBILITY  How much difficulty does the patient currently have...  Patient Difficulty: Turning over in bed (including adjusting bedclothes, sheets and blankets)?: A Lot   Patient Difficulty: Sitting down on and standing up from a chair with arms (e.g., wheelchair, bedside commode, etc.): A Lot   Patient Difficulty: Moving from lying on back to sitting on the side of the bed?: A Lot   How much help from another person does the patient currently need...   Help from Another: Moving to and from a bed to a chair (including a wheelchair)?: Total   Help from Another: Need to walk in hospital room?: Total   Help from Another: Climbing 3-5 steps with a railing?: Total     AM-PAC Score:  Raw Score: 9   Approx Degree of Impairment: 81.38%   Standardized Score (AM-PAC Scale): 30.55   CMS Modifier (G-Code): CM    FUNCTIONAL ABILITY STATUS  Gait Assessment   Functional Mobility/Gait Assessment  Gait Assistance: Not tested  Distance (ft): 0  Assistive Device: Rolling walker  Pattern: Shuffle    Skilled Therapy Provided    Bed Mobility:  Rolling: Mod a   Supine<>Sit: Max Ax2   Sit<>Supine: Max A x 2     Transfer Mobility:  Sit<>Stand: Max A x 2 first trial. Second trial mod A x 2   Stand<>Sit: Mod a x 2, poor eccentric control.    Gait: one side step but unable to maintain balance with Mod A x 2 and RW.     Therapist's Comments: patient exhibits poor responses to questions, decreased initiation to tasks, dec motor planning resulting in significant assist for all tasks. Patient fatigues  easily, dec standing tolerance with dec knee ext noted. BP monitored in sitting, .      THERAPEUTIC EXERCISES  Lower Extremity Gentle AAROM     Upper Extremity      Position Supine       Patient End of Session: In bed;Needs met;Call light within reach;RN aware of session/findings;All patient questions and concerns addressed;Alarm set    PT Session Time: 23 minutes  Gait Trainin minutes  Therapeutic Activity: 15 minutes  Therapeutic Exercise: 0 minutes   Neuromuscular Re-education: 8 minutes

## 2025-02-12 NOTE — PROGRESS NOTES
CC: follow-up hospital admission pancytopenia    SUBJECTIVE:  Interval History:     Seen and examined at bedside.  Continues to have issues with urinary retention.  Needed straight cath a few times.  No family present at bedside during my evaluation.    OBJECTIVE:  Scheduled Meds:    ertapenem  1 g Intravenous Q24H    gentamicin (Garamycin) 5 mg in sodium chloride 0.9% 5 mL lock syringe (Non-HD Catheters)   Intracatheter Q24H    tamsulosin  0.4 mg Oral Daily    docusate sodium  100 mg Oral BID    thiamine  200 mg Oral BID    sodium chloride   Intravenous Once    sodium chloride   Intravenous Once    allopurinol  100 mg Oral Daily    metoprolol succinate ER  50 mg Oral Daily     Continuous Infusions:       PRN Meds:   HYDROcodone-acetaminophen    heparin    melatonin    acetaminophen    polyethylene glycol (PEG 3350)    sennosides    bisacodyl    fleet enema    ondansetron    metoclopramide    benzonatate    PHYSICAL EXAM  Vital signs: Temp:  [97.5 °F (36.4 °C)-99.6 °F (37.6 °C)] 97.5 °F (36.4 °C)  Pulse:  [78-90] 84  Resp:  [15-24] 23  BP: (131-166)/(60-73) 150/68  SpO2:  [99 %-100 %] 100 %      GENERAL - NAD weak appearing, some distress noted, glazed look, shows poor insight into his medical condition  EYES- sclera anicteric,    HENT- normocephalic,   NECK - no JVD  CV- RRR  RESP - CTAB, normal resp effort  ABDOMEN- soft, NT/ND   EXT- no LE edema    Moving all ext, strength diffusely weak but symmetrical    Data Review:   Labs:   Recent Labs   Lab 02/08/25  0437 02/09/25  0957 02/10/25  0631 02/11/25  1314 02/12/25  0759   WBC 0.1* 0.1* 0.2* 0.2* 0.4*   HGB 8.4* 7.0* 7.4* 6.6* 8.1*   MCV 83.8 82.5 81.9 82.2 85.4   PLT 18.0* 5.0* 6.0* 2.0* 8.0*       Recent Labs   Lab 02/07/25  0405 02/08/25  0437 02/09/25  0957 02/10/25  0024 02/10/25  0631 02/11/25  1503 02/12/25  0600    138 143  --  142  142 142 141   K 4.5 4.0 3.0* 3.5 3.3*  3.3* 3.1* 4.0    106 110  --  110  110 109 110   CO2 18.0* 19.0* 21.0   --  20.0*  20.0* 23.0 20.0*   BUN 55* 56* 58*  --  51*  51* 44* 38*   CREATSERUM 2.11*  2.11* 1.63*  1.63* 1.07  --  1.06  1.06 0.89 0.94   CA 8.5* 8.2* 8.2*  --  8.2*  8.2* 7.7* 8.5*   MG 2.0  --  1.8  --  1.6 1.4* 1.6   PHOS  --   --  2.5  --  2.5  2.5 2.4 3.3   * 104* 112*  --  123*  123* 128* 124*       Recent Labs   Lab 02/06/25  0534 02/06/25  2059 02/07/25  0405 02/08/25  0437 02/09/25  0957 02/10/25  0631 02/11/25  1503 02/12/25  0600   ALT 20 38 120* 203*  --  141*  --   --    AST 22 38* 106* 76*  --  42*  --   --    ALB 3.7 3.9 3.7 3.5 2.9* 2.9*  2.9* 2.4* 2.5*       No results for input(s): \"PGLU\" in the last 168 hours.        ASSESSMENT/PLAN:  74 year old male with PMH sig for MDS/chronic pancytopenia, HTN, Gout, HL p/w fevers.     Severe Sepsis 2/2 ESBL E.coli bacteremia,  possible from port infection  Neutropenic fevers  - adjusted cefepime to meropenem and vanc   - ID following - plan for meropenem inpatient and ertapnem until 2/19 on dc   - Cultures reviewed  - CT and US reviewed  - HIDA negative  - transferred out of ICU 2/8      Acute Hypoxic Respiratory failure - resolved  - from sepsis  - HF, wean as tolerated     Pancytopenia  - s/p PRBCs, plts in ED  - hematology following-recommend hospice; see notes for details  - goal Hgb>7, goal plts>10   - transfuse prn   - poor prognosis overall given that he's failing on second line decitabine, may need to consider palliative care evaluation      Acute renal injury - resolved  - prerenal, infection  - isotonic crystalloids per nephrology   - monitor renal parameters  - avoid nephrotoxins  - neprhology following     Essential HTN  - metoprolol with parameters     Gout  - allopurinol     Urinary retention  - Bladder scan every 6 hours, place a Duarte if persistent retention given severe thrombocytopenia, would want to avoid risk of iatrogenic injury to the bladder and urethra  - Start Flomax  - urology consult if needed    Advance care  planning  - Unfortunately patient with multiple comorbidities, advancing MDS with persistent pancytopenia requiring transfusions with lack of options.  Oncology following, patient does not have very many options for treatment of MDS at this point, recommend hospice.  - Discussed with palliative care, family agreeable to speaking with hospice.  Hospice consult is placed. Patient is still full code.  Family shows understanding per palliative care however overall, very emotional and not ready to make her decisions yet.  It has been discussed with patient and family that his condition is tenuous and that he is at a very high risk of decline.  It is not recommended to pursue chest compressions with patient's platelets of 8.  Recommend DNR/comfort.  Will await for family's decision  - Increase pain medications given discomfort, added Norco as needed.    FEN: regular diet, PT/OT  Proph: SCDs only  Code status: Full code    Will continue to follow while hospitalized. Please page me or the on-call hospitalist with questions or concerns.    Kyara Garcia,   Hospitalist  [facility]

## 2025-02-12 NOTE — PROGRESS NOTES
Infectious Disease Progress Note      Date of admission: 2/5/2025  3:58 PM     Reason for consult: Fever with neutropenia, ESBL E. coli sepsis    Referring physician: Brenden Whitaker DO    Subjective: The patient is about the same.  Continues to complains of diffuse abdominal pain that is unchanged.  No appetite.  Remains afebrile.    The rest of the systems were reviewed and found to be negative except was mentioned above    Interval events: This is a 74-year-old male patient with history of MDS with increased blast diagnosed in May 2024, status post Vidaza from June through December of 2024.  Discontinued due to progression.  Decitabine was started on 1/23/2025.  The patient had received 5 doses between 1/23 and 1/29.  Now admitted with ESBL E. coli bacteremia.  Infectious workup otherwise thus far negative.  CT of the abdomen pelvis questioning cholecystitis; however, HIDA scan was negative.  CT of the chest is showing improved cavitary lung lesions in the right upper lobe and right middle lobe.  The patient does have a port that can potentially be a source of infection.  Patient is currently on IV meropenem.  He is also getting gentamicin locks and chest port.    Medications:    ertapenem    gentamicin (Garamycin) 5 mg in sodium chloride 0.9% 5 mL lock syringe (Non-HD Catheters)    docusate sodium    lactated ringers    thiamine    heparin    sodium chloride    sodium chloride    acetaminophen    melatonin    allopurinol    metoprolol succinate ER    acetaminophen    polyethylene glycol (PEG 3350)    sennosides    bisacodyl    fleet enema    ondansetron    metoclopramide    benzonatate     Allergies:  Allergies[1]    Physical Exam:  Vitals:    02/12/25 0900   BP: 150/68   Pulse: 84   Resp: 23   Temp: 97.5 °F (36.4 °C)     Vitals signs and nursing note reviewed.   Constitutional:       Appearance: Normal appearance.   HENT:      Mouth: Mucous membranes are moist.   Neck:      Musculoskeletal: Neck supple.    Cardiovascular:      Rate and Rhythm: Normal rate.      Heart sounds: Normal heart sounds. No murmur. No friction rub. No gallop.    Pulmonary:      Effort: Pulmonary effort is normal. No respiratory distress.      Breath sounds: Normal breath sounds. No stridor. No wheezing, rhonchi or rales.   Chest:      Chest wall: No tenderness.   Abdominal:      General: Abdomen is flat. There is no distension.      Palpations: Abdomen is soft. There is no mass.      Tenderness: There is mild diffuse tenderness. There is no guarding or rebound.      Hernia: No hernia is present.   Musculoskeletal:      Right lower leg: No edema.      Left lower leg: No edema.   Skin:     General: Skin is warm and dry.   Neurological:      General: No focal deficit present.      Mental Status: Alert and oriented to person, place, and time.       Laboratory data:  I have reviewed all the lab results independently.  Lab Results   Component Value Date    WBC 0.4 02/12/2025    HGB 8.1 02/12/2025    HCT 24.6 02/12/2025    PLT 8.0 02/12/2025    CREATSERUM 0.94 02/12/2025    BUN 38 02/12/2025     02/12/2025    K 4.0 02/12/2025     02/12/2025    CO2 20.0 02/12/2025     02/12/2025    CA 8.5 02/12/2025    ALB 2.5 02/12/2025    MG 1.6 02/12/2025    PHOS 3.3 02/12/2025      Recent Labs   Lab 02/12/25  0759   RBC 2.88*   HGB 8.1*   HCT 24.6*   MCV 85.4   MCH 28.1   MCHC 32.9   RDW 15.4   NEPRELIM 0.03*   WBC 0.4*   PLT 8.0*   NEUT 11   LYMPH 89   MON 0   EOS 0      Microbiology data:  Hospital Encounter on 02/05/25   1. Blood Culture     Status: None    Collection Time: 02/06/25  3:10 PM    Specimen: Blood,peripheral   Result Value Ref Range    Blood Culture Result No Growth 5 Days N/A   2. Urine Culture, Routine     Status: None    Collection Time: 02/05/25  4:45 PM    Specimen: Urine, clean catch   Result Value Ref Range    Urine Culture No Growth at 18-24 hrs. N/A        Radiology:  I have reviewed all imagining data available  independently.   HIDA scan on 2/8/2025:  No evidence of acute cholecystitis    Abdominal ultrasound on 2/7/2025:  Gallbladder sludge, trace subtle pericholecystic fluid noted.    CT of the chest on 2/6/2025:  Small right-sided pleural effusion with associated atelectasis.  Right upper lobe and right middle lobe cavitary lung lesions that has markedly decreased since prior to examination.  Mediastinal lymphadenopathy noted.    CT of the abdomen pelvis on 2/6/2025:  Minimal gallbladder wall thickening.  No other acute findings.    Impression:  Guy White is a 74 year old male with    ESBL E. coli bacteremia/sepsis, presenting here with fever with neutropenia, with threat to life  This is in the setting of MDS, currently on decitabine  At this point, most likely source is his port as his abdominal imaging is not showing any acute infectious processes.  His chest imaging is showing improved cavitary lung lesions  His urine culture did not grow any organisms  Currently on IV meropenem and gentamicin locks  I discussed with the patient and oncology team that we can try to treat through the port to salvage it; however, if his bacteremia recurs, the port will need to be removed  TTE done on 2/8/2025 without acute infectious findings  He cleared his bacteremia on 2/6/2025  MDS  On decitabine  Immunosuppressed  Pancytopenic  Oncology following  Prognosis poor given lack of response to therapy    Recommendations:    Discussed case with pharmacy, to reduce the number of times his port is being accessed, we will switch him to ertapenem 1 g daily  Continue gentamicin lock  Plan to discharge the patient on 2 weeks of ertapenem 1 g daily through 2/19/2025 with gentamicin locks therapy for his port after discharge when not in use  Weekly CBC with differential and CMP, sed rate and CRP.  Fax results to [facility]. Fax: [phone]. Tel: [phone].  PICC line care as per protocol.  Continue to monitor daily labs  for antibiotic toxicities  Further recommendations will depend on the above work-up and clinical progress     The plan of care was discussed with the primary hospital team, Brenden Whitaker DO     Recommendations were also discussed with the patient; all questions were answered.     Thank you for this consultation. Please don't hesitate to call the ID team for questions or any acute changes in patient's clinical condition.    Please note that this report has been produced using speech recognition software and may contain errors related to that system including, but not limited to, errors in grammar, punctuation, and spelling, as well as words and phrases that possibly may have been recognized inappropriately.  If there are any questions or concerns, contact the dictating provider for clarification.    The  [employer] Cures Act makes medical notes like these available to patients in the interest of transparency. Please be advised this is a medical document. Medical documents are intended to carry relevant information, facts as evident, and the clinical opinion of the practitioner. The medical note is intended as peer to peer communication and may appear blunt or direct. It is written in medical language and may contain abbreviations or verbiage that are unfamiliar.     Makeda Huerta MD  [facility]. Tel: [phone]. Fax: [phone].     Guy White : 1950 MRN: [id number] [facility]: [id number]          [1]   Allergies  Allergen Reactions    Radiology Contrast Iodinated Dyes HIVES

## 2025-02-12 NOTE — PROGRESS NOTES
[facility]   part of [facility]    Nephrology Progress Note    Guy White Attending:  Brenden Whitaker DO       SUBJECTIVE:     Denies cp, sob, urinary complaints.  Says he is eating a little.    PHYSICAL EXAM:     Vital Signs: /68 (BP Location: Left arm)   Pulse 84   Temp 97.5 °F (36.4 °C) (Oral)   Resp 23   Ht 182.9 cm (6')   Wt 190 lb (86.2 kg)   SpO2 100%   BMI 25.77 kg/m²   Temp (24hrs), Av.4 °F (36.9 °C), Min:97.5 °F (36.4 °C), Max:99.6 °F (37.6 °C)       Intake/Output Summary (Last 24 hours) at 2025 1243  Last data filed at 2025 1100  Gross per 24 hour   Intake 2305.33 ml   Output 2200 ml   Net 105.33 ml     Wt Readings from Last 3 Encounters:   25 190 lb (86.2 kg)   25 161 lb (73 kg)   24 166 lb 3.2 oz (75.4 kg)     General: appears weak  Skin: no visible rashes  HEENT: NCAT  Cardiac: Regular rate and rhythm, no murmur/gallop or rub  Lungs: CTAB, no wheeze, no rale, no rhonchi  Abdomen: soft  Extremities: warm, well perfused, trace dependent leg edema  Neurologic/Psych: mentating well    LABORATORY DATA:     Lab Results   Component Value Date     (H) 2025    BUN 38 (H) 2025    BUNCREA 19.0 2021    CREATSERUM 0.94 2025    ANIONGAP 11 2025    GFR 49 (L) 2016    CA 8.5 (L) 2025    OSMOCALC 302 (H) 2025    ALKPHO 100 02/10/2025    AST 42 (H) 02/10/2025     (H) 02/10/2025    BILT 2.1 (H) 02/10/2025    TP 4.9 (L) 02/10/2025    ALB 2.5 (L) 2025    GLOBULIN 2.0 02/10/2025    AGRATIO 2.1 01/15/2016     2025    K 4.0 2025     2025    CO2 20.0 (L) 2025     Lab Results   Component Value Date    WBC 0.4 (LL) 2025    RBC 2.88 (L) 2025    HGB 8.1 (L) 2025    HCT 24.6 (L) 2025    PLT 8.0 (LL) 2025    MCV 85.4 2025    MCH 28.1 2025    MCHC 32.9 2025    RDW 15.4 2025    NEPRELIM 0.03 (LL) 2025    NEUTABS  0.04 (LL) 02/12/2025    LYMPHABS 0.36 (L) 02/12/2025    EOSABS 0.00 02/12/2025    BASABS 0.00 02/12/2025    NEUT 11 02/12/2025    LYMPH 89 02/12/2025    MON 0 02/12/2025    EOS 0 02/12/2025    BASO 0 02/12/2025    NEPERCENT 8.3 02/11/2025    LYPERCENT 66.7 02/11/2025    MOPERCENT 25.0 02/11/2025    EOPERCENT 0.0 02/11/2025    BAPERCENT 0.0 02/11/2025    NE 0.02 (LL) 02/11/2025    LYMABS 0.16 (L) 02/11/2025    MOABSO 0.06 (L) 02/11/2025    EOABSO 0.00 02/11/2025    BAABSO 0.00 02/11/2025     Lab Results   Component Value Date    CREUR 63.30 05/14/2024     Lab Results   Component Value Date    COLORUR Yellow 02/07/2025    CLARITY Turbid (A) 02/07/2025    SPECGRAVITY 1.019 02/07/2025    GLUUR Normal 02/07/2025    BILUR Negative 02/07/2025    KETUR Trace (A) 02/07/2025    BLOODURINE 3+ (A) 02/07/2025    PHURINE 5.5 02/07/2025    PROUR 100 (A) 02/07/2025    UROBILINOGEN Normal 02/07/2025    NITRITE Negative 02/07/2025    LEUUR Negative 02/07/2025    NMIC Microscopic not indicated 05/14/2024    WBCUR 1-5 02/07/2025    RBCUR >10 (A) 02/07/2025    EPIUR Few (A) 02/07/2025    BACUR Rare (A) 02/07/2025    HYLUR Present (A) 02/07/2025         IMAGING:     Reviewed.    MEDICATIONS:       Current Facility-Administered Medications   Medication Dose Route Frequency    ertapenem (Invanz) 1 g in sodium chloride 0.9% 100 mL IVPB-MBP  1 g Intravenous Q24H    gentamicin (Garamycin) 5 mg in sodium chloride 0.9% 5 mL lock syringe (Non-HD Catheters)   Intracatheter Q24H    tamsulosin (Flomax) cap 0.4 mg  0.4 mg Oral Daily    docusate sodium (Colace) cap 100 mg  100 mg Oral BID    thiamine (Vitamin B1) tab 200 mg  200 mg Oral BID    heparin (Porcine) 100 Units/mL lock flush 500 Units  5 mL Intravenous PRN    sodium chloride 0.9% infusion   Intravenous Once    sodium chloride 0.9% infusion   Intravenous Once    acetaminophen (Ofirmev) 10 mg/mL infusion premix 1,000 mg  1,000 mg Intravenous Q6H PRN    melatonin tab 6 mg  6 mg Oral Nightly  PRN    allopurinol (Zyloprim) tab 100 mg  100 mg Oral Daily    metoprolol succinate ER (Toprol XL) 24 hr tab 50 mg  50 mg Oral Daily    acetaminophen (Tylenol Extra Strength) tab 500 mg  500 mg Oral Q4H PRN    polyethylene glycol (PEG 3350) (Miralax) 17 g oral packet 17 g  17 g Oral Daily PRN    sennosides (Senokot) tab 17.2 mg  17.2 mg Oral Nightly PRN    bisacodyl (Dulcolax) 10 MG rectal suppository 10 mg  10 mg Rectal Daily PRN    fleet enema (Fleet) rectal enema 133 mL  1 enema Rectal Once PRN    ondansetron (Zofran) 4 MG/2ML injection 4 mg  4 mg Intravenous Q6H PRN    metoclopramide (Reglan) 5 mg/mL injection 5 mg  5 mg Intravenous Q8H PRN    benzonatate (Tessalon) cap 200 mg  200 mg Oral TID PRN       ASSESSMENT/PLAN:       73 yo M with history of CKD stage 3, MDS s/p port placement and chemo, HTN, gout, HL presented with neutropenic fever in the setting of E coli bacteremia, possibly from cholecystitis. Nephrology consulted for ALLEN:     ALLEN on CKD 3: baseline creatinine ~1.3-1.5 mg/dl; pre-renal  -- creatinine improved  -- trial off IV fluids, encourage oral itnake  -- strict I/Os, straight cath prn  -- maintain MAP>65  -- avoid NSAIDs, fleets, iodinated contrast unless benefit >> risk     Abn UA:  -- no pyuria, +RBC, protein, squamous cells  -- creatinine improved with IV fluids  -- repeat UA when infection improves     Pancytopenia:  -- per heme, transfuse prn     ESBL E coli bacteremia:  -- on meropenem with ID following  -- HIDA scan neg     Hypokalemia, hypophosphatemia:  -- replete per protocol     Will follow.       Thank you for allowing me to participate in the care of this patient. Please do not hesitate to call with questions or concerns.        Flor Coombs MD  [facility]

## 2025-02-12 NOTE — CM/SW NOTE
SW discussed care plan with hospitalist. DC planning on hold until patient clinical status is improved.     SW will continue to follow for plan of care changes and remain available for any additional DC needs or concerns.     Lissette Zambrano MSW, LSW  Discharge Planner   [id number]

## 2025-02-12 NOTE — CM/SW NOTE
SW noted hospice consult placed for informational meeting. SW called [facility] and notified them of consult order and requested info meeting be scheduled. Family at bedside.     SW will continue to follow for plan of care changes and remain available for any additional DC needs or concerns.     Lissette Zambrano MSW, LSW  Discharge Planner   [id number]

## 2025-02-13 NOTE — HOSPICE RN NOTE
[facility] follow up: Pt visited at bedside. Pt was sleeping but woke easily to visit. Pt reported he was feeling \"okay.\" Denied pain and did report mild SOB at rest. Pt is on room air and was not hypoxic. Daughter Bibi was called to discuss plans at discharge. No answer, M with callback number. Will update hospital team. Please reach out to  for any further needs/questions.    Karlee Ramirez RN, BSN  [facility]  Transitional Nurse Liaison  [phone] or After hours: [phone]

## 2025-02-13 NOTE — PROGRESS NOTES
[facility]   part of [facility]    Nephrology Progress Note    Guy White Attending:  Brenden Whitaker DO       SUBJECTIVE:     Castro placed, blood tinged urine noted.  Sleepy. Per RN got benadryl before starting IVIG today for low platelets.  Has not been eating very much  He has leg and arm swelling    PHYSICAL EXAM:     Vital Signs: /58   Pulse 84   Temp 98.3 °F (36.8 °C) (Oral)   Resp (!) 8   Ht 182.9 cm (6')   Wt 192 lb 1.6 oz (87.1 kg)   SpO2 100%   BMI 26.05 kg/m²   Temp (24hrs), Av.5 °F (36.9 °C), Min:97.8 °F (36.6 °C), Max:99.4 °F (37.4 °C)       Intake/Output Summary (Last 24 hours) at 2025 1553  Last data filed at 2025 1145  Gross per 24 hour   Intake 448.17 ml   Output 2513 ml   Net -2064.83 ml     Wt Readings from Last 3 Encounters:   25 192 lb 1.6 oz (87.1 kg)   25 161 lb (73 kg)   24 166 lb 3.2 oz (75.4 kg)     General: appears weak  Skin: no visible rashes  HEENT: NCAT  Cardiac: Regular rate and rhythm, no murmur/gallop or rub  Lungs: CTAB, no wheeze, no rale, no rhonchi  Abdomen: soft  Extremities: warm, well perfused, trace dependent leg edema  Neurologic/Psych: mentating well   - castro, blood tinged urine       LABORATORY DATA:     Lab Results   Component Value Date     (H) 2025    BUN 33 (H) 2025    BUNCREA 19.0 2021    CREATSERUM 0.88 2025    ANIONGAP 8 2025    GFR 49 (L) 2016    CA 8.0 (L) 2025    OSMOCALC 299 (H) 2025    ALKPHO 100 02/10/2025    AST 42 (H) 02/10/2025     (H) 02/10/2025    BILT 2.1 (H) 02/10/2025    TP 4.9 (L) 02/10/2025    ALB 2.3 (L) 2025    GLOBULIN 2.0 02/10/2025    AGRATIO 2.1 01/15/2016     2025    K 3.7 2025     2025    CO2 21.0 2025     Lab Results   Component Value Date    WBC 0.3 (LL) 2025    RBC 2.72 (L) 2025    HGB 7.9 (L) 2025    HCT 22.6 (L) 2025    PLT <2.0 (LL) 2025     MCV 83.1 02/13/2025    MCH 29.0 02/13/2025    MCHC 35.0 02/13/2025    RDW 15.0 02/13/2025    NEPRELIM 0.02 (LL) 02/13/2025    NEUTABS 0.04 (LL) 02/12/2025    LYMPHABS 0.36 (L) 02/12/2025    EOSABS 0.00 02/12/2025    BASABS 0.00 02/12/2025    NEUT 11 02/12/2025    LYMPH 89 02/12/2025    MON 0 02/12/2025    EOS 0 02/12/2025    BASO 0 02/12/2025    NEPERCENT 6.1 02/13/2025    LYPERCENT 72.7 02/13/2025    MOPERCENT 21.2 02/13/2025    EOPERCENT 0.0 02/13/2025    BAPERCENT 0.0 02/13/2025    NE 0.02 (LL) 02/13/2025    LYMABS 0.24 (L) 02/13/2025    MOABSO 0.07 (L) 02/13/2025    EOABSO 0.00 02/13/2025    BAABSO 0.00 02/13/2025     Lab Results   Component Value Date    CREUR 63.30 05/14/2024     Lab Results   Component Value Date    COLORUR Yellow 02/07/2025    CLARITY Turbid (A) 02/07/2025    SPECGRAVITY 1.019 02/07/2025    GLUUR Normal 02/07/2025    BILUR Negative 02/07/2025    KETUR Trace (A) 02/07/2025    BLOODURINE 3+ (A) 02/07/2025    PHURINE 5.5 02/07/2025    PROUR 100 (A) 02/07/2025    UROBILINOGEN Normal 02/07/2025    NITRITE Negative 02/07/2025    LEUUR Negative 02/07/2025    NMIC Microscopic not indicated 05/14/2024    WBCUR 1-5 02/07/2025    RBCUR >10 (A) 02/07/2025    EPIUR Few (A) 02/07/2025    BACUR Rare (A) 02/07/2025    HYLUR Present (A) 02/07/2025         IMAGING:     Reviewed.    MEDICATIONS:       Current Facility-Administered Medications   Medication Dose Route Frequency    ertapenem (Invanz) 1 g in sodium chloride 0.9% 100 mL IVPB-MBP  1 g Intravenous Q24H    gentamicin (Garamycin) 5 mg in sodium chloride 0.9% 5 mL lock syringe (Non-HD Catheters)   Intracatheter Q24H    tamsulosin (Flomax) cap 0.4 mg  0.4 mg Oral Daily    HYDROcodone-acetaminophen (Norco) 5-325 MG per tab 1 tablet  1 tablet Oral Q6H PRN    docusate sodium (Colace) cap 100 mg  100 mg Oral BID    thiamine (Vitamin B1) tab 200 mg  200 mg Oral BID    heparin (Porcine) 100 Units/mL lock flush 500 Units  5 mL Intravenous PRN    sodium chloride  0.9% infusion   Intravenous Once    sodium chloride 0.9% infusion   Intravenous Once    melatonin tab 6 mg  6 mg Oral Nightly PRN    allopurinol (Zyloprim) tab 100 mg  100 mg Oral Daily    metoprolol succinate ER (Toprol XL) 24 hr tab 50 mg  50 mg Oral Daily    acetaminophen (Tylenol Extra Strength) tab 500 mg  500 mg Oral Q4H PRN    polyethylene glycol (PEG 3350) (Miralax) 17 g oral packet 17 g  17 g Oral Daily PRN    sennosides (Senokot) tab 17.2 mg  17.2 mg Oral Nightly PRN    bisacodyl (Dulcolax) 10 MG rectal suppository 10 mg  10 mg Rectal Daily PRN    fleet enema (Fleet) rectal enema 133 mL  1 enema Rectal Once PRN    ondansetron (Zofran) 4 MG/2ML injection 4 mg  4 mg Intravenous Q6H PRN    metoclopramide (Reglan) 5 mg/mL injection 5 mg  5 mg Intravenous Q8H PRN    benzonatate (Tessalon) cap 200 mg  200 mg Oral TID PRN       ASSESSMENT/PLAN:     73 yo M with history of CKD stage 3, MDS s/p port placement and chemo, HTN, gout, HL presented with neutropenic fever in the setting of E coli bacteremia, possibly from cholecystitis. Nephrology consulted for ALLEN:     ALLEN on CKD 3: baseline creatinine ~1.3-1.5 mg/dl; pre-renal  -- creatinine improved  -- currently off IV fluids; will hold off on restarting given edema and consider restarting if low BP or change in renal indices  -- strict I/Os, now with castro  -- maintain MAP>65  -- avoid NSAIDs, fleets, iodinated contrast unless benefit >> risk     Abn UA:  -- no pyuria, +RBC, protein, squamous cells  -- creatinine improved with IV fluids  -- now gross hematuria; will defer repeat UA    Gross hematuria: possibly in the setting of thrombocytopenia  -- flush castro for clots  -- if persistent, then will consider urology input if in line with goals of care     ESBL E coli bacteremia:  -- on ertapenem with ID following  -- HIDA scan neg     Hypokalemia, hypophosphatemia:  -- replete per protocol    Hypomagnesemia:  -- mag sulfate 2g    Pancytopenia/MDS:  -- per heme,  transfuse platelets, start IVIG  -- goals of are discussions started     D/w RN.      Thank you for allowing me to participate in the care of this patient. Please do not hesitate to call with questions or concerns.        Flor Coombs MD  [facility]

## 2025-02-13 NOTE — PROGRESS NOTES
[facility] HEMATOLOGY ONCOLOGY  Progress Note    Guy White Patient Status:  Inpatient    1950 MRN [id number]   Location [facility] 4NW-A Attending Mike Wilson*   Hosp Day # 8 PCP Salvador Cohn MD     ADMISSION DATE AND TIME: 2025  3:58 PM    ADMIT DIAGNOSIS: Neutropenic fever [D70.9, R50.81]    SUBJECTIVE:    Weak      OBJECTIVE:    Vitals:    25 2041 25 0125 25 0151 25 0615   BP: 136/54 132/56  142/61   BP Location: Left arm      Pulse: 87 90 88 91   Resp: 26 (!) 30 23 24   Temp: 97.8 °F (36.6 °C) 99.4 °F (37.4 °C) 98.5 °F (36.9 °C) 98.5 °F (36.9 °C)   TempSrc: Oral Axillary Oral Oral   SpO2: 98% 99% 97% 97%   Weight:       Height:               Blood pressure 142/61, pulse 91, temperature 98.5 °F (36.9 °C), temperature source Oral, resp. rate 24, height 6' (1.829 m), weight 190 lb (86.2 kg), SpO2 97%.    PHYSICAL EXAM:  General: afebrile, weak and lethargic  HEENT: oropharynx - DRY  CV: Regular rate and rhythm  Lungs: CTA  Abdomen: soft, non distended and non tender  Extremity: no edema or cyanosis       LABS:      Recent Results (from the past 12 hours)   Prepare RBC Once    Collection Time: 25 12:30 AM   Result Value Ref Range    Blood Product [id number]     Unit Number [id number]     UNIT ABO O     UNIT RH NEG     Product Status Presumed Transfused     Expiration Date [id number]     Blood Type Barcode 9500     Unit Volume 350 ml   Renal Function Panel    Collection Time: 25  6:46 AM   Result Value Ref Range    Glucose 100 (H) 70 - 99 mg/dL    Sodium 141 136 - 145 mmol/L    Potassium 3.7 3.5 - 5.1 mmol/L    Chloride 112 98 - 112 mmol/L    CO2 21.0 21.0 - 32.0 mmol/L    Anion Gap 8 0 - 18 mmol/L    BUN 33 (H) 9 - 23 mg/dL    Creatinine 0.88 0.70 - 1.30 mg/dL    Calcium, Total 8.0 (L) 8.7 - 10.6 mg/dL    Calculated Osmolality 299 (H) 275 - 295 mOsm/kg    eGFR-Cr 90 >=60 mL/min/1.73m2    Albumin 2.3 (L) 3.2 - 4.8  g/dL    Phosphorus 2.9 2.4 - 5.1 mg/dL   Magnesium    Collection Time: 02/13/25  6:46 AM   Result Value Ref Range    Magnesium 1.5 (L) 1.6 - 2.6 mg/dL   CBC With Differential With Platelet    Collection Time: 02/13/25  6:46 AM   Result Value Ref Range    WBC 0.3 (LL) 4.0 - 11.0 x10(3) uL    RBC 2.72 (L) 3.80 - 5.80 x10(6)uL    HGB 7.9 (L) 13.0 - 17.5 g/dL    HCT 22.6 (L) 39.0 - 53.0 %    PLT <2.0 (LL) 150.0 - 450.0 10(3)uL    Immature Platelet Fraction 0.0 0.0 - 7.0 %    MCV 83.1 80.0 - 100.0 fL    MCH 29.0 26.0 - 34.0 pg    MCHC 35.0 31.0 - 37.0 g/dL    RDW 15.0 %    Neutrophil Absolute Prelim 0.02 (LL) 1.50 - 7.70 x10 (3) uL     Lab Results   Component Value Date    HGB 7.9 (L) 02/13/2025    HGB 8.1 (L) 02/12/2025    HGB 6.6 (LL) 02/11/2025    HGB 7.4 (L) 02/10/2025    HGB 7.0 (L) 02/09/2025       CULTURES  Hospital Encounter on 02/05/25   1. Blood Culture     Status: None    Collection Time: 02/06/25  3:10 PM    Specimen: Blood,peripheral   Result Value Ref Range    Blood Culture Result No Growth 5 Days N/A   2. Urine Culture, Routine     Status: None    Collection Time: 02/05/25  4:45 PM    Specimen: Urine, clean catch   Result Value Ref Range    Urine Culture No Growth at 18-24 hrs. N/A     IMAGING:    No results found.      MEDICATIONS:  Medications reviewed.     ertapenem  1 g Intravenous Q24H    gentamicin (Garamycin) 5 mg in sodium chloride 0.9% 5 mL lock syringe (Non-HD Catheters)   Intracatheter Q24H    tamsulosin  0.4 mg Oral Daily    docusate sodium  100 mg Oral BID    thiamine  200 mg Oral BID    sodium chloride   Intravenous Once    sodium chloride   Intravenous Once    allopurinol  100 mg Oral Daily    metoprolol succinate ER  50 mg Oral Daily           HYDROcodone-acetaminophen    heparin    melatonin    acetaminophen    polyethylene glycol (PEG 3350)    sennosides    bisacodyl    fleet enema    ondansetron    metoclopramide    benzonatate    ASSESSMENT AND PLAN:     Principal Problem:     Neutropenic fever  Active Problems:    Palliative care encounter    Counseling regarding advance care planning and goals of care        Guy White is a 74 year old male with 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  Baseline WBC 1.6 Hgb 6-7's, plts 10's.  Decitabine started 1/23/25, has received 5 doses 1/23 to 1/29.  Transfusion dependent at baseline.      Admitted on 2/5 with neutropenic fever due to ESBL E. Coli bacteremia.    Neutropenic fever - last fever 100.8 overnight 2/7, appreciate ID recs.  On IV vanco and meropenem. Abd US on 2/7 concerning for cholecystitis but HIDA scan negative on 2/8.    ID consulted. Antibiotics per ID.  Do not envision recovery of neutropenia.    MDS history   S/p Decitabine as second like after failure of VIDAZA  Pancytopenia secondary to MDS  Significant neutropenia and transfusion dependent anemia and thrombocytopenia  Since he has failed while he is and is currently on second line decitabine, the overall prognosis is very poor.       Status post PRBC and hemoglobin improved to 7.9  Platelets are less than 2 but no bleeding  Will be planned IVIG and then platelet transfusion hopefully this combination will maintain a decent platelet count    Long discussion with the patient and niece yesterday  Explained the incurable nature of the MDS and the poor prognosis with persistent pancytopenia.  There is no effective treatment of worsening MDS  They have met with hospice and more meetings are in plan    We will follow       Vel Zhao MD

## 2025-02-13 NOTE — CM/SW NOTE
SW attempted to discuss DC planning with patient however he is currently off the floor at US per RN. SW called patient's daughter Bibi to assist with DC planning but was unable to reach. SW left a voicemail.     SW will continue to follow for plan of care changes and remain available for any additional DC needs or concerns.     Lissette Zambrano MSW, LSW  Discharge Planner   [id number]

## 2025-02-13 NOTE — PLAN OF CARE
Received patient on bed alert and oriented. Afebrile. Denies any pain. Dyspnea with exertion, on room air. Scheduled meds given and tolerated. Call light within reach. Safety precautions in place. All needs attended. Continue monitor.     Problem: MUSCULOSKELETAL - ADULT  Goal: Return mobility to safest level of function  Description: INTERVENTIONS:  - Assess patient stability and activity tolerance for standing, transferring and ambulating w/ or w/o assistive devices  - Assist with transfers and ambulation using safe patient handling equipment as needed  - Ensure adequate protection for wounds/incisions during mobilization  - Obtain PT/OT consults as needed  - Advance activity as appropriate  - Communicate ordered activity level and limitations with patient/family  Outcome: Progressing     Problem: RISK FOR INFECTION - ADULT  Goal: Absence of fever/infection during anticipated neutropenic period  Description: INTERVENTIONS  - Monitor WBC  - Administer growth factors as ordered  - Implement neutropenic guidelines  Outcome: Progressing     Problem: RESPIRATORY - ADULT  Goal: Achieves optimal ventilation and oxygenation  Description: INTERVENTIONS:  - Assess for changes in respiratory status  - Assess for changes in mentation and behavior  - Position to facilitate oxygenation and minimize respiratory effort  - Oxygen supplementation based on oxygen saturation or ABGs  - Provide Smoking Cessation handout, if applicable  - Encourage broncho-pulmonary hygiene including cough, deep breathe, Incentive Spirometry  - Assess the need for suctioning and perform as needed  - Assess and instruct to report SOB or any respiratory difficulty  - Respiratory Therapy support as indicated  - Manage/alleviate anxiety  - Monitor for signs/symptoms of CO2 retention  Outcome: Progressing

## 2025-02-13 NOTE — PROGRESS NOTES
[facility]   part of [facility] Infectious Disease  Progress Note    Guy White Patient Status:  Inpatient    1950 MRN [id number]   Location [facility] 4NW-A Attending Brenden Whitaker, DO   Hosp Day # 8 PCP Salvador Cohn MD     Subjective:  Patient seen and examined sitting up in bed. Nursing present at the bedside. Feeling about the same today. Remains afebrile. Denies n/v/d. Otherwise no new complaints.     Objective:  Blood pressure 136/63, pulse 91, temperature 98.8 °F (37.1 °C), temperature source Oral, resp. rate 22, height 6' (1.829 m), weight 190 lb (86.2 kg), SpO2 100%.    Intake/Output:    Intake/Output Summary (Last 24 hours) at 2025 0840  Last data filed at 2025 0615  Gross per 24 hour   Intake 120 ml   Output 3413 ml   Net -3293 ml       Physical Exam:  General: Awake, alert, non-tox, NAD.  HEENT:  Oropharynx clear, trachea ML.  Heart: RRR S1S2 no murmurs.  Lungs: Essentially CTA b/l, no rhonchi, rales, wheezes.  Abdomen: Soft and non-distended with mild generalized lower abdominal tenderness. BS present.  No guarding or rebound.  Extremity: No edema.  Neurological: No focal deficits.  Derm:  Warm and dry.    Lab Data Review:  Lab Results   Component Value Date    WBC 0.3 2025    HGB 7.9 2025    HCT 22.6 2025    PLT <2.0 2025    CREATSERUM 0.88 2025    BUN 33 2025     2025    K 3.7 2025     2025    CO2 21.0 2025     2025    CA 8.0 2025    ALB 2.3 2025    MG 1.5 2025    PHOS 2.9 2025        Cultures:  Hospital Encounter on 25   1. Blood Culture     Status: None    Collection Time: 25  3:10 PM    Specimen: Blood,peripheral   Result Value Ref Range    Blood Culture Result No Growth 5 Days N/A   2. Urine Culture, Routine     Status: None    Collection Time: 25  4:45 PM    Specimen: Urine, clean catch   Result  Value Ref Range    Urine Culture No Growth at 18-24 hrs. N/A       Radiology:  No results found.      Assessment and Plan:  1.  ESBL E.coli bacteremia/sepsis presenting here with fever with neutropenia with threat to life  - This is in the setting of MDS, currently on decitabine.  - Source most likely his port as his abdominal imaging is not showing any acute infectious processes.  - Chest imaging showing improved cavitary lung lesions.   - Blood cultures 2/2 isolating ESBL E.coli.  - Repeat blood cultures, 2/6/25, NGTD.   - Urine culture, 2/5/25, negative.  - TTE, 2/8/25, without acute infectious findings.  - IV Invanz, ongoing.     2.  MDS  - On decitabine.  - Immunosuppressed.  - Pancytopenic.  - Oncology following.     3.  Recs  - Continue IV Invanz. Plan to discharge on same, IV Invanz 1 g daily, through 2/19/25. Can also plan on gentamicin locks for his port after discharge when not in use.  - F/u WBC and fever curve.  - Supportive care as per the primary team.  - Please draw weekly CBC with diff, CMP, CRP and ESR for duration of abx therapy and fax results to [facility] at [phone].  - Discussed plan of care with nursing.   - Discussed plan of care with patient. All questions addressed and understanding verbalized.  - Further recommendations pending clinical course.     Discussed case with ID attending/collaborating physician, Dr. Makeda Huerta, who is in agreement with the above plan of care    Please note that this report has been produced using speech recognition software and may contain errors related to that system including, but not limited to, errors in grammar, punctuation, and spelling, as well as words and phrases that possibly may have been recognized inappropriately.  If there are any questions or concerns, contact the dictating provider for clarification.     The 21st Century Cures Act makes medical notes like these available to patients in the interest of transparency. Please be advised  this is a medical document. Medical documents are intended to carry relevant information, facts as evident, and the clinical opinion of the practitioner. The medical note is intended as peer to peer communication and may appear blunt or direct. It is written in medical language and may contain abbreviations or verbiage that are unfamiliar.     If you have any questions or concerns please call [facility] Infectious Disease at [phone].     Raman Robison, MITCHEL    2/13/2025  7:55 AM

## 2025-02-13 NOTE — PLAN OF CARE
Pt awake, alert/oriented.  WBC 0.4, Platelet 8.0,  Hgb 8.1.  Lungs diminished bilaterally.  Dyspneic with exertion.  100% on room air.  Nonpitting edema noted to upper extremities, 1+ pitting to hips and lower extremities.  Complaints of generalized pain, and of urinary retention.  PVR >700.  Dr. Garcia informed of above.  Duarte placed with 750ml urine output.  Urine orange and hazy.  Flomax started.  Up with PT/OT today, dangled at bedside and was able to stand up twice using walker for stability.  Generalized weakness.  IV antibiotic with gentamycin lock per MAR.  No blood return noted from port prior to 1400 dose of abx.  Notified ID pharmacist and Dr. Huerta.  Continue w gentamicin lock er ID.  Ultrasound of right upper extremity ordered, still to be done.  Family visiting on day shift.  Hospice meeting today.  Updated pt and family re: plan of care. Meds per MAR. Repositioned for comfort.  Vitals stable, afebrile on day shift.   Needs addressed, support rendered.  Isolation precautions and safety measures in place.     Problem: MUSCULOSKELETAL - ADULT  Goal: Return mobility to safest level of function  Description: INTERVENTIONS:  - Assess patient stability and activity tolerance for standing, transferring and ambulating w/ or w/o assistive devices  - Assist with transfers and ambulation using safe patient handling equipment as needed  - Ensure adequate protection for wounds/incisions during mobilization  - Obtain PT/OT consults as needed  - Advance activity as appropriate  - Communicate ordered activity level and limitations with patient/family  Outcome: Not Progressing     Problem: RISK FOR INFECTION - ADULT  Goal: Absence of fever/infection during anticipated neutropenic period  Description: INTERVENTIONS  - Monitor WBC  - Administer growth factors as ordered  - Implement neutropenic guidelines  Outcome: Progressing     Problem: RESPIRATORY - ADULT  Goal: Achieves optimal ventilation and  oxygenation  Description: INTERVENTIONS:  - Assess for changes in respiratory status  - Assess for changes in mentation and behavior  - Position to facilitate oxygenation and minimize respiratory effort  - Oxygen supplementation based on oxygen saturation or ABGs  - Provide Smoking Cessation handout, if applicable  - Encourage broncho-pulmonary hygiene including cough, deep breathe, Incentive Spirometry  - Assess the need for suctioning and perform as needed  - Assess and instruct to report SOB or any respiratory difficulty  - Respiratory Therapy support as indicated  - Manage/alleviate anxiety  - Monitor for signs/symptoms of CO2 retention  Outcome: Progressing

## 2025-02-13 NOTE — PROGRESS NOTES
[facility] received a call back from daughter Bibi and did discuss the list of facilities willing to assist family with medicaid application. Daughter to discuss further with family today, [facility] RN will follow up again this afternoon once Bibi arrives to [facility] after 4p for any additional questions. No hospice consents signed at this time or discharge planning. Jemma DIAZ updated.

## 2025-02-13 NOTE — PROGRESS NOTES
CC: follow-up hospital admission pancytopenia    SUBJECTIVE:  Interval History:     Seen and examined at bedside.  He is resting in bed.  Denies any complaints.  States in a little bit of pain, agreeable to taking some Norco.  No family at bedside during my evaluation.    OBJECTIVE:  Scheduled Meds:    ertapenem  1 g Intravenous Q24H    gentamicin (Garamycin) 5 mg in sodium chloride 0.9% 5 mL lock syringe (Non-HD Catheters)   Intracatheter Q24H    tamsulosin  0.4 mg Oral Daily    docusate sodium  100 mg Oral BID    thiamine  200 mg Oral BID    sodium chloride   Intravenous Once    sodium chloride   Intravenous Once    allopurinol  100 mg Oral Daily    metoprolol succinate ER  50 mg Oral Daily     Continuous Infusions:       PRN Meds:   HYDROcodone-acetaminophen    heparin    melatonin    acetaminophen    polyethylene glycol (PEG 3350)    sennosides    bisacodyl    fleet enema    ondansetron    metoclopramide    benzonatate    PHYSICAL EXAM  Vital signs: Temp:  [97.8 °F (36.6 °C)-99.4 °F (37.4 °C)] 98.3 °F (36.8 °C)  Pulse:  [84-91] 84  Resp:  [7-30] 8  BP: (113-142)/(51-63) 142/58  SpO2:  [97 %-100 %] 100 %      GENERAL - NAD weak appearing, glazed look, shows poor insight into his medical condition, cachectic appearing  EYES- sclera anicteric,    HENT- normocephalic,   NECK - no JVD  CV- RRR  RESP -Limited exam, unable to sit up due to extreme weakness, supplemental oxygen, diminished at the bases  ABDOMEN- soft, NT/ND   EXT- no LE edema    Moving all ext, extremely weak bilaterally    Data Review:   Labs:   Recent Labs   Lab 02/09/25  0957 02/10/25  0631 02/11/25  1314 02/12/25  0759 02/13/25  0646   WBC 0.1* 0.2* 0.2* 0.4* 0.3*   HGB 7.0* 7.4* 6.6* 8.1* 7.9*   MCV 82.5 81.9 82.2 85.4 83.1   PLT 5.0* 6.0* 2.0* 8.0* <2.0*       Recent Labs   Lab 02/09/25  0957 02/10/25  0024 02/10/25  0631 02/11/25  1503 02/12/25  0600 02/13/25  0646     --  142  142 142 141 141   K 3.0* 3.5 3.3*  3.3* 3.1* 4.0 3.7      --  110  110 109 110 112   CO2 21.0  --  20.0*  20.0* 23.0 20.0* 21.0   BUN 58*  --  51*  51* 44* 38* 33*   CREATSERUM 1.07  --  1.06  1.06 0.89 0.94 0.88   CA 8.2*  --  8.2*  8.2* 7.7* 8.5* 8.0*   MG 1.8  --  1.6 1.4* 1.6 1.5*   PHOS 2.5  --  2.5  2.5 2.4 3.3 2.9   *  --  123*  123* 128* 124* 100*       Recent Labs   Lab 02/06/25  2059 02/07/25  0405 02/08/25  0437 02/09/25  0957 02/10/25  0631 02/11/25  1503 02/12/25  0600 02/13/25  0646   ALT 38 120* 203*  --  141*  --   --   --    AST 38* 106* 76*  --  42*  --   --   --    ALB 3.9 3.7 3.5 2.9* 2.9*  2.9* 2.4* 2.5* 2.3*       No results for input(s): \"PGLU\" in the last 168 hours.        ASSESSMENT/PLAN:  74 year old male with PMH sig for MDS/chronic pancytopenia, HTN, Gout, HL p/w fevers.     Severe Sepsis 2/2 ESBL E.coli bacteremia,  possible from port infection  Neutropenic fevers  - adjusted cefepime to meropenem and vanc   - ID following - plan for meropenem inpatient and ertapnem until 2/19 on dc   - Cultures reviewed  - CT and US reviewed  - HIDA negative  - transferred out of ICU 2/8      Acute Hypoxic Respiratory failure - resolved  - from sepsis  - HF, wean as tolerated     Pancytopenia  - s/p PRBCs, plts   - goal Hgb>7, goal plts>10  - IVIG and then platelets transfusion per oncology  - transfuse prn   - poor prognosis overall given that he's failing on second line decitabine, palliative care and hospice following  - Case discussed with oncology- hospice recommended, discussed    Acute renal injury - resolved  - prerenal, infection  - isotonic crystalloids per nephrology   - monitor renal parameters  - avoid nephrotoxins  - neprhology following     Essential HTN  - metoprolol with parameters     Gout  - allopurinol     Urinary retention  - Bladder scan every 6 hours, place a Duarte if persistent retention given severe thrombocytopenia, would want to avoid risk of iatrogenic injury to the bladder and urethra  - Start Flomax  - urology  consult if needed    Advance care planning  - Unfortunately patient with multiple comorbidities, advancing MDS with persistent pancytopenia requiring transfusions with lack of options.  Oncology following, patient does not have very many options for treatment of MDS at this point, recommend hospice.  - Discussed with palliative care, family agreeable to speaking with hospice.  Hospice consult is placed. Patient is still full code.  Family shows understanding per palliative care however overall, very emotional and not ready to make her decisions yet.  It has been discussed with patient and family that his condition is tenuous and that he is at a very high risk of decline.  It is not recommended to pursue chest compressions with patient's platelets of 8.  Recommend DNR/comfort.  Will await for family's decision  - Hospice evaluation and family meeting @1600 2/13  - Increase pain medications given discomfort, added Norco as needed.    FEN: regular diet, PT/OT  Proph: SCDs only  Code status: Full code    Will continue to follow while hospitalized. Please page me or the on-call hospitalist with questions or concerns.    Kyara Garcia,   Hospitalist  [facility] [place]

## 2025-02-13 NOTE — HOSPICE RN NOTE
[facility] nursing visit for follow up on hospice meeting. Spouse Zaida and daughter Melia just arrived. Daughter Bibi will be coming at some point. Provided Melia with information on accepting facilities for placement. Melia will relay this information to Bibi. No decisions made at this time. Hospice will follow up with this family again tomorrow. Please call [facility] with any questions or concerns.    Blank Ferraro RN, [facility]  [facility] Liaison  [phone] after hours

## 2025-02-13 NOTE — PLAN OF CARE
Patient is A/O x3. VSS. Room air. Complaints of mild to moderate pain; PRN norco given with good relief. Patient not able to tolerate medications this am. Pt spit them out. Crushed meds with apple sauce for remaining pills. Critical results from lab this morning ( see results) MD and Onc notified ( see new orders in MAR). Electrolytes replaced per orders. 1 Unit of platelets infused. Indwelling catheter is patent, draining clear osmani urine. Patient is on a regular diet; very poor appetite. Medications given per MAR. PIV to left hand to KVO/abx. Port locked. Safety precautions in place. Call light within reach. Family at bedside.  Problem: MUSCULOSKELETAL - ADULT  Goal: Return mobility to safest level of function  Description: INTERVENTIONS:  - Assess patient stability and activity tolerance for standing, transferring and ambulating w/ or w/o assistive devices  - Assist with transfers and ambulation using safe patient handling equipment as needed  - Ensure adequate protection for wounds/incisions during mobilization  - Obtain PT/OT consults as needed  - Advance activity as appropriate  - Communicate ordered activity level and limitations with patient/family  Outcome: Progressing     Problem: RISK FOR INFECTION - ADULT  Goal: Absence of fever/infection during anticipated neutropenic period  Description: INTERVENTIONS  - Monitor WBC  - Administer growth factors as ordered  - Implement neutropenic guidelines  Outcome: Progressing     Problem: RESPIRATORY - ADULT  Goal: Achieves optimal ventilation and oxygenation  Description: INTERVENTIONS:  - Assess for changes in respiratory status  - Assess for changes in mentation and behavior  - Position to facilitate oxygenation and minimize respiratory effort  - Oxygen supplementation based on oxygen saturation or ABGs  - Provide Smoking Cessation handout, if applicable  - Encourage broncho-pulmonary hygiene including cough, deep breathe, Incentive Spirometry  - Assess the need  for suctioning and perform as needed  - Assess and instruct to report SOB or any respiratory difficulty  - Respiratory Therapy support as indicated  - Manage/alleviate anxiety  - Monitor for signs/symptoms of CO2 retention  Outcome: Progressing

## 2025-02-14 PROBLEM — D46.9 MYELODYSPLASTIC SYNDROME (HCC): Status: ACTIVE | Noted: 2025-01-01

## 2025-02-14 NOTE — PLAN OF CARE
Received patient on bed lethargic, oriented to person, follows command but delayed response. Scheduled meds crushed with apple sauce but not tolerated. Call light with in reach. Safety precautions in place. Family to stay at bedside. All needs attended. Frequent rounds done. Continue monitor.   1147 - Patient family at bedside request for pain meds, Patient is moaning and grimacing in pain. Updated MD, See MAR for orders.      Problem: RISK FOR INFECTION - ADULT  Goal: Absence of fever/infection during anticipated neutropenic period  Description: INTERVENTIONS  - Monitor WBC  - Administer growth factors as ordered  - Implement neutropenic guidelines  Outcome: Progressing     Problem: MUSCULOSKELETAL - ADULT  Goal: Return mobility to safest level of function  Description: INTERVENTIONS:  - Assess patient stability and activity tolerance for standing, transferring and ambulating w/ or w/o assistive devices  - Assist with transfers and ambulation using safe patient handling equipment as needed  - Ensure adequate protection for wounds/incisions during mobilization  - Obtain PT/OT consults as needed  - Advance activity as appropriate  - Communicate ordered activity level and limitations with patient/family  Outcome: Progressing     Problem: RESPIRATORY - ADULT  Goal: Achieves optimal ventilation and oxygenation  Description: INTERVENTIONS:  - Assess for changes in respiratory status  - Assess for changes in mentation and behavior  - Position to facilitate oxygenation and minimize respiratory effort  - Oxygen supplementation based on oxygen saturation or ABGs  - Provide Smoking Cessation handout, if applicable  - Encourage broncho-pulmonary hygiene including cough, deep breathe, Incentive Spirometry  - Assess the need for suctioning and perform as needed  - Assess and instruct to report SOB or any respiratory difficulty  - Respiratory Therapy support as indicated  - Manage/alleviate anxiety  - Monitor for signs/symptoms  of CO2 retention  Outcome: Progressing

## 2025-02-14 NOTE — PROGRESS NOTES
Assumed care at 0730  Patient AOX1, lethargic, responds to touch, moans and grimacing in pain, Morphine prn given   Unable to give morning medication, patient lethargic, unable to swallow  Patient DNR/Comfort care  [place] catheter intact  Will continue to monitor patient

## 2025-02-14 NOTE — HOSPICE RN NOTE
[facility] inpatient nursing rounds. Spouse and daughters remain at bedside. Patient unresponsive to verbal/tactile stimulus. Breathing appears labored with RR of 24. Discussed patient status with hospital nurse Víctor HILL. Patient will be given IVP morphine and IVP lorazepam now for comfort. Patient has required IVP glycopyrrolate 0.4 mg IVP, lorazepam 1 mg IVP and morphine 1 mg IVP x2 since admission to hospice this afternoon. Remains appropriate for [facility] inpatient level of hospice care for management of uncontrolled pain, labored breathing and anxiety. Will continue to assess the need for continuous morphine gtt. Hospice will continue to follow this patient closely for EOL symptom management and to support family. Please call [facility] with any questions or concerns.    Blank Ferraro RN, [facility]  [facility] Liaison  [phone] after hours

## 2025-02-14 NOTE — CM/SW NOTE
EMILY and Hospice RN [facility] met with the patient and niece at bedside.  EMILY spoke with daughter Bibi.  They do not have any funds to transfer the patient out of the hospital to a nursing home.  RN evaluating to see if the patient will be GIP.  [facility] has also been consulted to see if he would qualify for their inpatient unit if we cannot admit here.  RN speaking with MD as well to discuss code status.  Hospice will follow up shortly when family arrives.    [facility]  [phone]    ADDENDUM:  Patient is being admitted GIP.  Nursing Home referrals sent in [facility] as a backup plan with request to assist in medicaid application.  Awaiting responses.  Unit EMILY Lissette has been updated.  Family is aware that GIP is re-evaluated daily and that we are are still required to pursue a dc plan.

## 2025-02-14 NOTE — PROGRESS NOTES
[facility] HEMATOLOGY ONCOLOGY  Progress Note    Guy White Patient Status:  Inpatient    1950 MRN [id number]   Location [facility] 4NW-A Attending Mike Wilson*   Hosp Day # 9 PCP Salvador Cohn MD     ADMISSION DATE AND TIME: 2025  3:58 PM    ADMIT DIAGNOSIS: Neutropenic fever [D70.9, R50.81]    SUBJECTIVE:    Very weak and barely responding   Niece at Bedside       OBJECTIVE:    Vitals:    25 0035 25 0248 25 0518 25 0739   BP: 121/48 124/48 132/59 136/60   BP Location: Left arm Left arm Left arm Left arm   Pulse: 93 93 90 86   Resp: 21  18   Temp:  98.6 °F (37 °C) 98.2 °F (36.8 °C) 99.5 °F (37.5 °C)   TempSrc:  Axillary Axillary Axillary   SpO2: 97% 96% 97% 99%   Weight:       Height:               Blood pressure 136/60, pulse 86, temperature 99.5 °F (37.5 °C), temperature source Axillary, resp. rate 18, height 6' (1.829 m), weight 192 lb 1.6 oz (87.1 kg), SpO2 99%.    PHYSICAL EXAM:  General: afebrile, weak and very lethargic  HEENT: oropharynx - DRY  CV: Regular rate and rhythm  Lungs: CTA  Abdomen: soft, non distended and non tender  Extremity: no edema or cyanosis       LABS:      Recent Results (from the past 12 hours)   Prepare platelets Once    Collection Time: 25 12:30 AM   Result Value Ref Range    Blood Product [id number]     Unit Number [id number]     UNIT ABO O     UNIT RH POS     Product Status Presumed Transfused     Expiration Date [id number]     Blood Type Barcode 5100     Unit Volume 192 ml   Renal Function Panel    Collection Time: 25  7:38 AM   Result Value Ref Range    Glucose 89 70 - 99 mg/dL    Sodium 144 136 - 145 mmol/L    Potassium 3.7 3.5 - 5.1 mmol/L    Chloride 114 (H) 98 - 112 mmol/L    CO2 22.0 21.0 - 32.0 mmol/L    Anion Gap 8 0 - 18 mmol/L    BUN 45 (H) 9 - 23 mg/dL    Creatinine 1.00 0.70 - 1.30 mg/dL    Calcium, Total 7.9 (L) 8.7 - 10.6 mg/dL    Calculated Osmolality 309 (H)  275 - 295 mOsm/kg    eGFR-Cr 79 >=60 mL/min/1.73m2    Albumin 1.9 (L) 3.2 - 4.8 g/dL    Phosphorus 3.4 2.4 - 5.1 mg/dL   Magnesium    Collection Time: 02/14/25  7:38 AM   Result Value Ref Range    Magnesium 1.8 1.6 - 2.6 mg/dL   CBC With Differential With Platelet    Collection Time: 02/14/25  7:38 AM   Result Value Ref Range    WBC 0.2 (LL) 4.0 - 11.0 x10(3) uL    RBC 1.91 (L) 3.80 - 5.80 x10(6)uL    HGB 5.6 (LL) 13.0 - 17.5 g/dL    HCT 16.1 (L) 39.0 - 53.0 %    PLT 5.0 (LL) 150.0 - 450.0 10(3)uL    Immature Platelet Fraction 2.9 0.0 - 7.0 %    MCV 84.3 80.0 - 100.0 fL    MCH 29.3 26.0 - 34.0 pg    MCHC 34.8 31.0 - 37.0 g/dL    RDW 14.8 %    Neutrophil Absolute Prelim 0.04 (LL) 1.50 - 7.70 x10 (3) uL     Lab Results   Component Value Date    HGB 5.6 (LL) 02/14/2025    HGB 7.9 (L) 02/13/2025    HGB 8.1 (L) 02/12/2025    HGB 6.6 (LL) 02/11/2025    HGB 7.4 (L) 02/10/2025       CULTURES  Hospital Encounter on 02/05/25   1. Blood Culture     Status: None    Collection Time: 02/06/25  3:10 PM    Specimen: Blood,peripheral   Result Value Ref Range    Blood Culture Result No Growth 5 Days N/A   2. Urine Culture, Routine     Status: None    Collection Time: 02/05/25  4:45 PM    Specimen: Urine, clean catch   Result Value Ref Range    Urine Culture No Growth at 18-24 hrs. N/A     IMAGING:    US VENOUS DOPPLER ARM RIGHT - DIAG IMG (CPT=93971)    Result Date: 2/13/2025  CONCLUSION:  No DEEP vein thrombus, but positive for SUPERFICIAL vein thrombus cephalic vein.   LOCATION:  [id number]   Dictated by (CST): Femi Finney MD on 2/13/2025 at 1:42 PM     Finalized by (CST): Femi Finney MD on 2/13/2025 at 1:45 PM          MEDICATIONS:  Medications reviewed.     ertapenem  1 g Intravenous Q24H    gentamicin (Garamycin) 5 mg in sodium chloride 0.9% 5 mL lock syringe (Non-HD Catheters)   Intracatheter Q24H    tamsulosin  0.4 mg Oral Daily    docusate sodium  100 mg Oral BID    thiamine  200 mg Oral BID    sodium chloride    Intravenous Once    sodium chloride   Intravenous Once    allopurinol  100 mg Oral Daily    metoprolol succinate ER  50 mg Oral Daily           morphINE    HYDROcodone-acetaminophen    heparin    melatonin    acetaminophen    polyethylene glycol (PEG 3350)    sennosides    bisacodyl    fleet enema    ondansetron    metoclopramide    benzonatate    ASSESSMENT AND PLAN:     Principal Problem:    Neutropenic fever  Active Problems:    Palliative care encounter    Counseling regarding advance care planning and goals of care        Guy White is a 74 year old male with 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  Baseline WBC 1.6 Hgb 6-7's, plts 10's.  Decitabine started 1/23/25, has received 5 doses 1/23 to 1/29.  Transfusion dependent at baseline.      Admitted on 2/5 with neutropenic fever due to ESBL E. Coli bacteremia.    Neutropenic fever - last fever 100.8 overnight 2/7, appreciate ID recs.  On IV vanco and meropenem. Abd US on 2/7 concerning for cholecystitis but HIDA scan negative on 2/8.    ID consulted. Antibiotics per ID.  Do not envision recovery of neutropenia.    MDS history   S/p Decitabine as second like after failure of VIDAZA  Pancytopenia secondary to MDS  Significant neutropenia and transfusion dependent anemia and thrombocytopenia  Since he has failed while he is and is currently on second line decitabine, the overall prognosis is very poor.       Status post PRBC and Platelets   Hemoglobin 5+, platelets are 5        Discussed with POA  Explained the incurable nature of the MDS and the poor prognosis with persistent pancytopenia.  There is no effective treatment of worsening MDS    He is DNAR    Family considering hospice      Vel Zhao MD

## 2025-02-14 NOTE — SPIRITUAL CARE NOTE
Spiritual Care Visit Note    Patient Name: Guy White Date of Spiritual Care Visit: 25   : 1950 Primary Dx: <principal problem not specified>       Referred By: Referral From: Nurse    Spiritual Care Taxonomy:    Intended Effects: Demonstrate caring and concern    Methods: Collaborate with care team member;Explore spiritual/Taoism beliefs    Interventions: Active listening;Ask guided questions;Explain  role;[place]    Visit Type/Summary:     - Spiritual Care: Consulted with RN prior to visit. Offered empathic listening and emotional support. Patient and family expressed appreciation for  visit. Provided information regarding how to contact Spiritual Care and left a Spiritual Care information card.  remains available as needed for follow up. Spoke with the nurse prior to the visit, upon entering the patient's room I observed the patient sitting up in bed family is sitting at bedside. Patient's wife shared stories about how they met and her an guy has been  for 43 years. Patient's wife stated that they attend [employer]. Provided spiritual and emotional support to the patient and family. Also  offered anointing for the patient and family declined at this time. Patient and family expressed their gratitude for the  visit.     Spiritual Care support can be requested via an Epic consult. For urgent/immediate needs, please contact the On Call  at: [place]: ext [phone]    Chaplain Resident Amanda Mendez MA

## 2025-02-14 NOTE — PROGRESS NOTES
Assumed care at 0730  Patient AOX1, lethargic, responds to touch, moans and grimacing in pain, Morphine prn given   Unable to give morning medication, patient lethargic, unable to swallow  Patient DNR/Comfort care  Condom catheter intact  Inpatient Hospice Care started  Suction at bedside   Family at bedside  Will continue to monitor patient

## 2025-02-14 NOTE — CM/SW NOTE
SW spoke with [facility] to discuss patient admission to possible GIP. They continue to evaluate patient for GIP criteria. Referral expanded with family permission to [facility] for their inpatient hospice unit. Per family, they are in debt and would not be able to afford placement in a nursing home for patient.     SW will continue to follow for plan of care changes and remain available for any additional DC needs or concerns.     Lissette Zambrano MSW, LSW  Discharge Planner   [phone]

## 2025-02-14 NOTE — PROGRESS NOTES
Palliative Care  Chart reviewed and update provided by hospice team regarding plan for hospice care however discharge planning has been a barrier. Appreciate support by CM and hospice SW. Note change in code status to DNAR/comfort focused treatment.     As goals of care have been established, Palliative Care will sign off. Team notified.     MITCHEL Zhao  Palliative Care   Phone: [phone]     2/14/2025  11:33 AM

## 2025-02-14 NOTE — HOSPICE RN NOTE
[facility] nursing/MSW visit for follow up on hospice consult. Niece at bedside. MSW Miranda spoke with daughter Bibi by phone. Hector will be coming to the hospital. Bibi has decided to make patient DNR/Comfort per family wishes. . Patient Hgb is 5.6, WBC 0.2, Platelets 5.0. Transfusions ordered but family declined. Patient is moaning in pain. Breathing labored with RR of 28. He has required 4 doses IVP morphine 1 mg since midnight. Appears appropriate for hospice with dx of MS and appropriate for [facility] inpatient hospice for management of uncontrolled pain, respiratory distress, anxiety and EOL symptoms. Family at bedside, in agreement. Hospice consents and POLST signed by spouse Zaida White. Contacted Dr. Kyara Garcia and [facility] medical director Dr. Víctor Sierra for initial hospice orders. Patient will be transitioned to [facility] inpatient hospice bed. Patient requires continued frequent nursing assessments for titration of IV comfort medications. Patient unable to take medications by mouth. Hospice will continue to follow this patient closely for EOL symptom management and to support family. Please call [facility] with any questions or concerns.    Blank Ferraro RN, [facility]  [facility] Liaison  [phone] after hours

## 2025-02-14 NOTE — PROGRESS NOTES
CC: follow-up hospital admission pancytopenia    SUBJECTIVE:  Interval History:     Seen and examined at bedside.  Overnight, patient required IV pain medications.  His niece is at bedside.  Patient appears very uncomfortable, complains of being in pain and restless    Reports that family is also undergoing financial constraints.    OBJECTIVE:  Scheduled Meds:    scopolamine  1 patch Transdermal Q72H     Continuous Infusions:    morphINE in sodium chloride 0.9%         PRN Meds:   acetaminophen    morphINE    morphINE in sodium chloride 0.9%    haloperidol lactate **OR** haloperidol lactate    LORazepam **OR** LORazepam    atropine    furosemide    glycopyrrolate    bisacodyl    ondansetron    glycerin-hypromellose-    sodium chloride 0.9%    PHYSICAL EXAM  Vital signs: Temp:  [98.2 °F (36.8 °C)-99.6 °F (37.6 °C)] 99.6 °F (37.6 °C)  Pulse:  [86-93] 88  Resp:  [18-28] 20  BP: (121-136)/(48-61) 132/61  SpO2:  [96 %-100 %] 100 %      GENERAL - NAD weak appearing, extremely malnourished, somnolent, moderate distress, answers yes or no questions  CV- RRR  RESP -tachypnea, Limited exam, unable to sit up due to extreme weakness, supplemental oxygen, diminished at the bases  ABDOMEN- soft, NT/ND   EXT- no LE edema    Moving all ext, restless    Data Review:   Labs:   Recent Labs   Lab 02/10/25  0631 02/11/25  1314 02/12/25  0759 02/13/25  0646 02/14/25  0738   WBC 0.2* 0.2* 0.4* 0.3* 0.2*   HGB 7.4* 6.6* 8.1* 7.9* 5.6*   MCV 81.9 82.2 85.4 83.1 84.3   PLT 6.0* 2.0* 8.0* <2.0* 5.0*       Recent Labs   Lab 02/10/25  0631 02/11/25  1503 02/12/25  0600 02/13/25  0646 02/14/25  0738     142 142 141 141 144   K 3.3*  3.3* 3.1* 4.0 3.7 3.7     110 109 110 112 114*   CO2 20.0*  20.0* 23.0 20.0* 21.0 22.0   BUN 51*  51* 44* 38* 33* 45*   CREATSERUM 1.06  1.06 0.89 0.94 0.88 1.00   CA 8.2*  8.2* 7.7* 8.5* 8.0* 7.9*   MG 1.6 1.4* 1.6 1.5* 1.8   PHOS 2.5  2.5 2.4 3.3 2.9 3.4   *  123* 128* 124* 100*  89       Recent Labs   Lab 02/08/25  0437 02/09/25  0957 02/10/25  0631 02/11/25  1503 02/12/25  0600 02/13/25  0646 02/14/25  0738   *  --  141*  --   --   --   --    AST 76*  --  42*  --   --   --   --    ALB 3.5   < > 2.9*  2.9* 2.4* 2.5* 2.3* 1.9*    < > = values in this interval not displayed.       No results for input(s): \"PGLU\" in the last 168 hours.        ASSESSMENT/PLAN:  74 year old male with PMH sig for MDS/chronic pancytopenia, HTN, Gout, HL p/w fevers.     Severe Sepsis 2/2 ESBL E.coli bacteremia,  possible from port infection  Neutropenic fevers  - adjusted cefepime to meropenem and vanc   - ID following - plan for meropenem inpatient and ertapnem until 2/19 on dc   - Cultures reviewed  - CT and US reviewed  - HIDA negative  - transferred out of ICU 2/8      Acute Hypoxic Respiratory failure - resolved  - from sepsis  - HF, wean as tolerated     Pancytopenia  - s/p PRBCs, plts   - goal Hgb>7, goal plts>10  - Has received IVIG; continues to require transfusion of platelets ordered PRBCs daily  - poor prognosis overall given that he's failing on second line decitabine, palliative care and hospice following  - Case discussed with oncology- hospice recommended, discussed    Acute renal injury - resolved  - prerenal, infection  - isotonic crystalloids per nephrology   - monitor renal parameters  - avoid nephrotoxins  - neprhology following     Essential HTN  - metoprolol with parameters     Gout  - allopurinol     Urinary retention  - Bladder scan every 6 hours, place a Duarte if persistent retention given severe thrombocytopenia, would want to avoid risk of iatrogenic injury to the bladder and urethra  - Start Flomax  - urology consult if needed    Advance care planning  - Unfortunately patient with multiple comorbidities, advancing MDS with persistent pancytopenia requiring transfusions with lack of options.  Oncology following, patient does not have very many options for treatment of MDS at this  point, recommend hospice.  - Discussed with palliative care, family agreeable to speaking with hospice.  Hospice consult is placed. Patient is still full code.  Family shows understanding per palliative care however overall, very emotional and not ready to make her decisions yet.  It has been discussed with patient and family that his condition is tenuous and that he is at a very high risk of decline.  It is not recommended to pursue chest compressions with patient's platelets of 8.  Recommend DNR/comfort.  Will await for family's decision  - Hospice evaluation and family meeting @1600 2/13  - Increase pain medications given discomfort, added Norco as needed.    -2/14-had a long conversation with patient's niece at bedside.  Discussed overall clinical picture.  Patient's niece is aware of patient's medical condition as she has been taking patient's to all his appointments. she understands that patient is declining that hospice might be the only option.  However patient's wife who is the POA and patient's daughter: Meeting with hospice later today.  I discussed that with declining blood count, critically low platelets, resuscitation is not recommended .given patient's clinical picture today, patient is a very high risk arrest and a sudden decline.  Niece expressed that family is undergoing difficult time with financial constraints.  I discussed with social work to offer resources.  Social work to reconnect with hospice.  Later I spoke with hospice RN and advocated for patient to be inpatient hospice given that patient is rapidly declining in the setting of his severe pancytopenia is also at a high risk of cardiac arrest.  Hospice met with family, patient's CODE STATUS has been changed to DNR/comfort.  Plan to consider inpatient hospice for family's wishes.   Time spent to discuss advance care planning 16 minutes.      FEN: regular diet, PT/OT  Proph: SCDs only  Code status: Full code    Will continue to follow while  hospitalized. Please page me or the on-call hospitalist with questions or concerns.    Kyara Garcia, DO  Hospitalist  [facility], [place]

## 2025-02-14 NOTE — PROGRESS NOTES
Infectious Disease Progress Note      Date of admission: 2/5/2025  3:58 PM     Reason for consult: Fever with neutropenia, ESBL E. coli sepsis    Referring physician: Kyara Garcia DO    Subjective: The patient is about the same.  Continues to complains of diffuse abdominal pain that is unchanged.  No appetite.  Remains afebrile.    The rest of the systems were reviewed and found to be negative except was mentioned above    Interval events: This is a 74-year-old male patient with history of MDS with increased blast diagnosed in May 2024, status post Vidaza from June through December of 2024.  Discontinued due to progression.  Decitabine was started on 1/23/2025.  The patient had received 5 doses between 1/23 and 1/29.  Now admitted with ESBL E. coli bacteremia.  Infectious workup otherwise thus far negative.  CT of the abdomen pelvis questioning cholecystitis; however, HIDA scan was negative.  CT of the chest is showing improved cavitary lung lesions in the right upper lobe and right middle lobe.  The patient does have a port that can potentially be a source of infection.  Patient is currently on IV ertapenem.  He is also getting gentamicin locks and chest port.    Medications:    morphINE    ertapenem    gentamicin (Garamycin) 5 mg in sodium chloride 0.9% 5 mL lock syringe (Non-HD Catheters)    tamsulosin    HYDROcodone-acetaminophen    docusate sodium    thiamine    heparin    sodium chloride    sodium chloride    melatonin    allopurinol    metoprolol succinate ER    acetaminophen    polyethylene glycol (PEG 3350)    sennosides    bisacodyl    fleet enema    ondansetron    metoclopramide    benzonatate     Allergies:  Allergies[1]    Physical Exam:  Vitals:    02/14/25 0919   BP:    Pulse:    Resp: (!) 28   Temp:      Vitals signs and nursing note reviewed.   Constitutional:       Appearance: Normal appearance.   HENT:      Mouth: Mucous membranes are moist.   Neck:      Musculoskeletal: Neck supple.    Cardiovascular:      Rate and Rhythm: Normal rate.      Heart sounds: Normal heart sounds. No murmur. No friction rub. No gallop.    Pulmonary:      Effort: Pulmonary effort is normal. No respiratory distress.      Breath sounds: Normal breath sounds. No stridor. No wheezing, rhonchi or rales.   Chest:      Chest wall: No tenderness.   Abdominal:      General: Abdomen is flat. There is no distension.      Palpations: Abdomen is soft. There is no mass.      Tenderness: There is mild diffuse tenderness. There is no guarding or rebound.      Hernia: No hernia is present.   Musculoskeletal:      Right lower leg: No edema.      Left lower leg: No edema.   Skin:     General: Skin is warm and dry.   Neurological:      General: No focal deficit present.      Mental Status: Alert and oriented to person, place, and time.       Laboratory data:  I have reviewed all the lab results independently.  Lab Results   Component Value Date    WBC 0.2 02/14/2025    HGB 5.6 02/14/2025    HCT 16.1 02/14/2025    PLT 5.0 02/14/2025    CREATSERUM 1.00 02/14/2025    BUN 45 02/14/2025     02/14/2025    K 3.7 02/14/2025     02/14/2025    CO2 22.0 02/14/2025    GLU 89 02/14/2025    CA 7.9 02/14/2025    ALB 1.9 02/14/2025    MG 1.8 02/14/2025    PHOS 3.4 02/14/2025      Recent Labs   Lab 02/12/25  0759 02/13/25  0646 02/14/25  0738   RBC 2.88*   < > 1.91*   HGB 8.1*   < > 5.6*   HCT 24.6*   < > 16.1*   MCV 85.4   < > 84.3   MCH 28.1   < > 29.3   MCHC 32.9   < > 34.8   RDW 15.4   < > 14.8   NEPRELIM 0.03*   < > 0.04*   WBC 0.4*   < > 0.2*   PLT 8.0*   < > 5.0*   NEUT 11  --   --    LYMPH 89  --   --    MON 0  --   --    EOS 0  --   --     < > = values in this interval not displayed.      Microbiology data:  Hospital Encounter on 02/05/25   1. Blood Culture     Status: None    Collection Time: 02/06/25  3:10 PM    Specimen: Blood,peripheral   Result Value Ref Range    Blood Culture Result No Growth 5 Days N/A   2. Urine Culture,  Routine     Status: None    Collection Time: 02/05/25  4:45 PM    Specimen: Urine, clean catch   Result Value Ref Range    Urine Culture No Growth at 18-24 hrs. N/A        Radiology:  I have reviewed all imagining data available independently.   HIDA scan on 2/8/2025:  No evidence of acute cholecystitis    Abdominal ultrasound on 2/7/2025:  Gallbladder sludge, trace subtle pericholecystic fluid noted.    CT of the chest on 2/6/2025:  Small right-sided pleural effusion with associated atelectasis.  Right upper lobe and right middle lobe cavitary lung lesions that has markedly decreased since prior to examination.  Mediastinal lymphadenopathy noted.    CT of the abdomen pelvis on 2/6/2025:  Minimal gallbladder wall thickening.  No other acute findings.    Impression:  Guy White is a 74 year old male with    ESBL E. coli bacteremia/sepsis, presenting here with fever with neutropenia, with threat to life  This is in the setting of MDS, currently on decitabine  At this point, most likely source is his port as his abdominal imaging is not showing any acute infectious processes.  His chest imaging is showing improved cavitary lung lesions  His urine culture did not grow any organisms  Currently on IV meropenem and gentamicin locks  I discussed with the patient and oncology team that we can try to treat through the port to salvage it; however, if his bacteremia recurs, the port will need to be removed  TTE done on 2/8/2025 without acute infectious findings  He cleared his bacteremia on 2/6/2025  MDS  On decitabine  Immunosuppressed  Pancytopenic  Oncology following  Prognosis poor given lack of response to therapy    Recommendations:    Continue ertapenem 1 g daily  Continue gentamicin lock  Plan to discharge the patient on 2 weeks of ertapenem 1 g daily through 2/19/2025 with gentamicin locks therapy for his port after discharge when not in use  Weekly CBC with differential and CMP, sed rate and CRP.  Fax results to  [facility]. Fax: [phone]. Tel: [phone].  PICC line care as per protocol.  Agree with goals of care discussion   ID will sign off, please call us with any questions or change of status. Thank you for this consultation.     The plan of care was discussed with the primary hospital team, Kyara Garcia DO     Recommendations were also discussed with the patient; all questions were answered.     Thank you for this consultation. Please don't hesitate to call the ID team for questions or any acute changes in patient's clinical condition.    Please note that this report has been produced using speech recognition software and may contain errors related to that system including, but not limited to, errors in grammar, punctuation, and spelling, as well as words and phrases that possibly may have been recognized inappropriately.  If there are any questions or concerns, contact the dictating provider for clarification.    The  [employer] Cures Act makes medical notes like these available to patients in the interest of transparency. Please be advised this is a medical document. Medical documents are intended to carry relevant information, facts as evident, and the clinical opinion of the practitioner. The medical note is intended as peer to peer communication and may appear blunt or direct. It is written in medical language and may contain abbreviations or verbiage that are unfamiliar.     MD [facility]. Tel: [phone]. Fax: [phone].     Guy White : 1950 MRN: [id number] [facility]: [id number]          [1]   Allergies  Allergen Reactions    Radiology Contrast Iodinated Dyes HIVES

## 2025-02-14 NOTE — PROGRESS NOTES
[facility]   part of [facility]    Nephrology Progress Note    Guy White Attending:  Kyara Garcia DO       SUBJECTIVE:     Sleepy  Not eating  Family at bedside    PHYSICAL EXAM:     Vital Signs: /60 (BP Location: Left arm)   Pulse 86   Temp 99.5 °F (37.5 °C) (Axillary)   Resp (!) 28   Ht 182.9 cm (6')   Wt 192 lb 1.6 oz (87.1 kg)   SpO2 99%   BMI 26.05 kg/m²   Temp (24hrs), Av.6 °F (37 °C), Min:97.9 °F (36.6 °C), Max:99.5 °F (37.5 °C)       Intake/Output Summary (Last 24 hours) at 2025 1030  Last data filed at 2025 0553  Gross per 24 hour   Intake 2377.17 ml   Output 1900 ml   Net 477.17 ml     Wt Readings from Last 3 Encounters:   25 192 lb 1.6 oz (87.1 kg)   25 161 lb (73 kg)   24 166 lb 3.2 oz (75.4 kg)       General: appears weak, sleepy  Skin: no visible rashes  HEENT: NCAT  Cardiac: Regular rate and rhythm, no murmur/gallop or rub  Lungs: CTAB, no wheeze, no rale, no rhonchi  Abdomen: soft  Extremities: warm, well perfused,+ arm and leg swelling  Neurologic/Psych: mentating well   - castro, dark urine       LABORATORY DATA:     Lab Results   Component Value Date    GLU 89 2025    BUN 45 (H) 2025    BUNCREA 19.0 2021    CREATSERUM 1.00 2025    ANIONGAP 8 2025    GFR 49 (L) 2016    CA 7.9 (L) 2025    OSMOCALC 309 (H) 2025    ALKPHO 100 02/10/2025    AST 42 (H) 02/10/2025     (H) 02/10/2025    BILT 2.1 (H) 02/10/2025    TP 4.9 (L) 02/10/2025    ALB 1.9 (L) 2025    GLOBULIN 2.0 02/10/2025    AGRATIO 2.1 01/15/2016     2025    K 3.7 2025     (H) 2025    CO2 22.0 2025     Lab Results   Component Value Date    WBC 0.2 (LL) 2025    RBC 1.91 (L) 2025    HGB 5.6 (LL) 2025    HCT 16.1 (L) 2025    PLT 5.0 (LL) 2025    MCV 84.3 2025    MCH 29.3 2025    MCHC 34.8 2025    RDW 14.8 2025    NEPRELIM 0.04 (LL)  02/14/2025    NEUTABS 0.04 (LL) 02/12/2025    LYMPHABS 0.36 (L) 02/12/2025    EOSABS 0.00 02/12/2025    BASABS 0.00 02/12/2025    NEUT 11 02/12/2025    LYMPH 89 02/12/2025    MON 0 02/12/2025    EOS 0 02/12/2025    BASO 0 02/12/2025    NEPERCENT 6.1 02/13/2025    LYPERCENT 72.7 02/13/2025    MOPERCENT 21.2 02/13/2025    EOPERCENT 0.0 02/13/2025    BAPERCENT 0.0 02/13/2025    NE 0.02 (LL) 02/13/2025    LYMABS 0.24 (L) 02/13/2025    MOABSO 0.07 (L) 02/13/2025    EOABSO 0.00 02/13/2025    BAABSO 0.00 02/13/2025     Lab Results   Component Value Date    CREUR 63.30 05/14/2024     Lab Results   Component Value Date    COLORUR Yellow 02/07/2025    CLARITY Turbid (A) 02/07/2025    SPECGRAVITY 1.019 02/07/2025    GLUUR Normal 02/07/2025    BILUR Negative 02/07/2025    KETUR Trace (A) 02/07/2025    BLOODURINE 3+ (A) 02/07/2025    PHURINE 5.5 02/07/2025    PROUR 100 (A) 02/07/2025    UROBILINOGEN Normal 02/07/2025    NITRITE Negative 02/07/2025    LEUUR Negative 02/07/2025    NMIC Microscopic not indicated 05/14/2024    WBCUR 1-5 02/07/2025    RBCUR >10 (A) 02/07/2025    EPIUR Few (A) 02/07/2025    BACUR Rare (A) 02/07/2025    HYLUR Present (A) 02/07/2025         IMAGING:     Reviewed.    MEDICATIONS:       Current Facility-Administered Medications   Medication Dose Route Frequency    morphINE PF 2 MG/ML injection 1 mg  1 mg Intravenous Q2H PRN    albumin human (Albumin) 25% injection 25 g  25 g Intravenous Q6H    dextrose 5%-sodium chloride 0.45% infusion   Intravenous Continuous    ertapenem (Invanz) 1 g in sodium chloride 0.9% 100 mL IVPB-MBP  1 g Intravenous Q24H    gentamicin (Garamycin) 5 mg in sodium chloride 0.9% 5 mL lock syringe (Non-HD Catheters)   Intracatheter Q24H    tamsulosin (Flomax) cap 0.4 mg  0.4 mg Oral Daily    HYDROcodone-acetaminophen (Norco) 5-325 MG per tab 1 tablet  1 tablet Oral Q6H PRN    docusate sodium (Colace) cap 100 mg  100 mg Oral BID    thiamine (Vitamin B1) tab 200 mg  200 mg Oral BID     heparin (Porcine) 100 Units/mL lock flush 500 Units  5 mL Intravenous PRN    sodium chloride 0.9% infusion   Intravenous Once    sodium chloride 0.9% infusion   Intravenous Once    melatonin tab 6 mg  6 mg Oral Nightly PRN    allopurinol (Zyloprim) tab 100 mg  100 mg Oral Daily    metoprolol succinate ER (Toprol XL) 24 hr tab 50 mg  50 mg Oral Daily    acetaminophen (Tylenol Extra Strength) tab 500 mg  500 mg Oral Q4H PRN    polyethylene glycol (PEG 3350) (Miralax) 17 g oral packet 17 g  17 g Oral Daily PRN    sennosides (Senokot) tab 17.2 mg  17.2 mg Oral Nightly PRN    bisacodyl (Dulcolax) 10 MG rectal suppository 10 mg  10 mg Rectal Daily PRN    fleet enema (Fleet) rectal enema 133 mL  1 enema Rectal Once PRN    ondansetron (Zofran) 4 MG/2ML injection 4 mg  4 mg Intravenous Q6H PRN    metoclopramide (Reglan) 5 mg/mL injection 5 mg  5 mg Intravenous Q8H PRN    benzonatate (Tessalon) cap 200 mg  200 mg Oral TID PRN       ASSESSMENT/PLAN:     75 yo M with history of CKD stage 3, MDS s/p port placement and chemo, HTN, gout, HL presented with neutropenic fever in the setting of E coli bacteremia, possibly from cholecystitis. Nephrology consulted for ALLEN:     ALLEN on CKD 3: baseline creatinine ~1.3-1.5 mg/dl; pre-renal  -- creatinine improved  -- still with inadequate oral intake, azotemia and increasing sodium level; will start D5 + 0.45% at 60 ml/h  -- strict I/Os, now with castro  -- maintain MAP>65  -- avoid NSAIDs, fleets, iodinated contrast unless benefit >> risk     Abn UA:  -- no pyuria, +RBC, protein, squamous cells  -- creatinine improved with IV fluids  -- now gross hematuria; will defer repeat UA     Gross hematuria: possibly in the setting of thrombocytopenia  -- flush castro for clots  -- if persistent, then will consider urology input if in line with goals of care     ESBL E coli bacteremia:  -- on ertapenem with ID following  -- HIDA scan neg     Hypokalemia, hypophosphatemia:  -- replete per protocol      Hypomagnesemia:  -- replaced     Pancytopenia/MDS:  -- per heme, transfuse platelets, start IVIG  -- goals of are discussions started    Hypoalbuminemia with third spacing:  -- 25% albumin q6 x 4 doses     D/w family at bedside.        Thank you for allowing me to participate in the care of this patient. Please do not hesitate to call with questions or concerns.        Flor Coombs MD  [facility]

## 2025-02-15 NOTE — PLAN OF CARE
Inpatient hospice. Unresponsive. Labored breathing. PRN ativan and morphine given. Scopolamine patch behind L ear. No signs of pain observed. Duarte intact and draining. Family members at bedside and updated on plan of care.     Pt passed away 2/15 0600. MD notified, family notified, gift of hope notified.

## 2025-02-15 NOTE — DISCHARGE SUMMARY
[facility] Discharge Summary   Patient ID:  Guy White  [id number]  74 year old  1950    Admit date: 2025    Discharge date and time: 2/15/2025     Attending Physician: Fransisco Taylor MD    Primary Care Physician: Salvador Cohn MD     Admit Dx: MDS (myelodysplastic syndrome) (HCC) [D46.9]    Hospital Discharge Diagnoses:  MDS    Disposition:       Hospital Course: inpt hospice      Day of discharge Exam   Vitals:    02/15/25 0555   BP:    Pulse:    Resp: (!) 6   Temp:        Exam on day of discharge:  Not compatible c life       Discharge meds     Medication List        ASK your doctor about these medications      allopurinol 100 MG Tabs  Commonly known as: Zyloprim  Take 1 tablet (100 mg total) by mouth daily.     ertapenem 1 g Solr  Commonly known as: Invanz  Inject 1 g into the vein daily for 10 days. Weekly CBC with differential and CMP, sed rate and CRP.  Fax results to [facility]. Fax: [phone]. Tel: [phone].  PICC line care as per protocol.     lidocaine-prilocaine 2.5-2.5 % Crea  Commonly known as: Emla     metoprolol succinate ER 50 MG Tb24  Commonly known as: Toprol XL     ondansetron 8 MG Tbdp  Commonly known as: Zofran-ODT     prochlorperazine 10 mg tablet  Commonly known as: Compazine     Venetoclax 100 MG Tabs              Consults: IP CONSULT TO SPIRITUAL CARE    Radiology: US VENOUS DOPPLER ARM RIGHT - DIAG IMG (CPT=93971)    Result Date: 2025  PROCEDURE:  US VENOUS DOPPLER ARM RIGHT - DIAG IMG (CPT=93971)  COMPARISON:  None.  INDICATIONS:  Right upper extremity swelling.  TECHNIQUE:  Real time, grey scale, and duplex ultrasound was used to evaluate the upper extremity venous system. B-mode two-dimensional images of the vascular structures, Doppler spectral analysis, and color flow.  Doppler imaging were performed.  The following veins were imaged:  Subclavian, Jugular, Axillary, Brachial, Basilic, Cephalic, and the  contralateral Subclavian and Jugular.  PATIENT STATED HISTORY: (As transcribed by Technologist)  Right arm swelling.    FINDINGS:  No sign of DEEP vein thrombus with no clot seen within the deep veins, but there is SUPERFICIAL vein thrombus with occlusive clot in the mid and distal cephalic vein measuring 10 cm in length.            CONCLUSION:  No DEEP vein thrombus, but positive for SUPERFICIAL vein thrombus cephalic vein.   LOCATION:  [id number]   Dictated by (CST): Femi Finney MD on 2025 at 1:42 PM     Finalized by (CST): Femi Finney MD on 2025 at 1:45 PM       CARD ECHO LIMITED (CPT=93308/53094/15120)    Result Date: 2025  Transthoracic Echocardiogram Name:Guy White Date: 2025 :  1950 Ht:  (72in)  BP: 147 / 92 MRN:  [id number]     Age:  74years    Wt:  (175lb) HR: 91bpm Loc:  [facility]       Gndr: M          BSA: 2.01m^2 Sonographer: Radha ALARCON Ordering:    Katiana Zee Consulting:  Godfrey Deluna ---------------------------------------------------------------------------- History/Indications:  Bactermia. Severe Sepsis. ---------------------------------------------------------------------------- Procedure information:  A transthoracic echocardiogram, limited study was performed. Additional evaluation included M-mode and limited 2D.  Patient status:  Inpatient.  Location:  ICU    Comparison was made to the study of 10/02/2024.    This was a routine study. Transthoracic echocardiography for ventricular function evaluation and endocarditis evaluation. Image quality was adequate. ECG rhythm:   Normal sinus ---------------------------------------------------------------------------- Conclusions: 1. Left ventricle: The cavity size was normal. Wall thickness was normal.    Systolic function was at the lower limits of normal. The estimated    ejection fraction was 50-55%, by 3D assessment. 2. Left ventricle: There is mild hypokinesis of the basal-mid inferior wall. 3. Left atrium: The  left atrial volume was mildly increased. 4. Pericardium, extracardiac: There were bilateral pleural effusions    present. * ---------------------------------------------------------------------------- * Findings: Left ventricle:  The cavity size was normal. Wall thickness was normal. Systolic function was at the lower limits of normal. The estimated ejection fraction was 50-55%, by 3D assessment. Regional wall motion:   There is mild hypokinesis of the basal-mid inferior wall. Left atrium:  The left atrial volume was mildly increased. Right ventricle:  The cavity size was normal. Systolic function was normal. Mitral valve:  The valve was structurally normal. Aortic valve:   The valve was trileaflet. The leaflets were mildly calcified. Tricuspid valve:  The valve is structurally normal. Pericardium:   There was no pericardial effusion. Pleura:  There were bilateral pleural effusions present. Aorta: Aortic root: The aortic root was normal. Pulmonary arteries: The main pulmonary artery was normal-sized. Systemic veins:  Central venous respirophasic diameter changes are blunted (< 50%). Inferior vena cava: The IVC was normal-sized. ---------------------------------------------------------------------------- Measurements  Left ventricle         Value        Ref       10/02/2024  LV end-diastolic       198   ml     --------- ----------  volume, 3D  LV end-systolic        97    ml     --------- ----------  volume, 3D  LV ejection        (L) 51    %      52 - 72   ----------  fraction, 3D  LV end-diastolic   (H) 98    ml/m^2 <=79      ----------  volume/bsa, 3D  LV end-systolic    (H) 48    ml/m^2 <=32      ----------  volume/bsa, 3D  LV wall mass, 3D       167   g      --------- ----------  LV wall mass/bsa,      83    g/m^2  --------- ----------  3D  IVS thickness, ED,     0.6   cm     0.6 - 1.0 0.7  PLAX  LV ID, ED, PLAX        5.4   cm     4.2 - 5.8 5.4  LV ID, ES, PLAX        3.9   cm     2.5 - 4.0 3.6  LV PW  thickness,       0.9   cm     0.6 - 1.0 0.7  ED, PLAX  IVS/LV PW ratio,       0.66         --------- 0.96  ED, PLAX  LV PW/LV ID ratio,     0.17         --------- 0.14  ED, PLAX  LV ejection            54    %      52 - 72   60  fraction  Aortic root            Value        Ref       10/02/2024  Aortic root ID, ED     3.1   cm     2.7 - 4.1 3.1  Left atrium            Value        Ref       10/02/2024  LA ID, A-P, ES     (H) 4.6   cm     3.0 - 4.0 4.2  LA volume, S       (H) 77    ml     18 - 58   64  LA volume/bsa, S   (H) 38    ml/m^2 16 - 34   33  LA volume, ES, 1-p (H) 73    ml     18 - 58   60  A4C  LA volume, ES, 1-p (H) 82    ml     18 - 58   68  A2C  LA volume, ES, A/L     81    ml     --------- ----------  LA volume/bsa, ES, (H) 40    ml/m^2 16 - 34   ----------  A/L  LA/aortic root         1.48         --------- 1.35  ratio  LA volume, ES, 3D  (H) 93    ml     18 - 58   ----------  LA volume/bsa, ES,     46    ml/m^2 --------- ----------  3D  Inferior vena cava     Value        Ref       10/02/2024  ID                     2.0   cm     <=2.1     ----------  Right ventricle        Value        Ref       10/02/2024  TAPSE, 2D              1.90  cm     >=1.70    ---------- Legend: (L)  and  (H)  perez values outside specified reference range. ---------------------------------------------------------------------------- Prepared and electronically signed by Lisa Gonzalez 02/08/2025 12:37     NM GB HEPATOBILIARY SCAN  (CPT=78226)    Result Date: 2/8/2025  PROCEDURE:  NM GB HEPATOBILIARY SCAN  (CPT=78226)  COMPARISON:  [facility] , CT, CT CHEST+ABDOMEN+PELVIS(CPT=71250/62372), 2/06/2025, 6:00 PM.  INDICATIONS:  Abdominal pain.  Gallbladder wall thickening.  TECHNIQUE:  Tc99m labeled mebrofenin was injected IV, and dynamic images of the abdomen were obtained in the anterior projection for one hour.  RADIOPHARMACEUTICAL(S):  6.0 mCi Tc-99m mebrofenin.  FINDINGS:  There is prompt homogeneous uptake in the liver.  There is  normal uptake within common hepatic common bile duct and small bowel.  There is uptake within the gallbladder within 42 minutes which is within normal limits.  There is no evidence for  cystic duct obstruction or scintigraphic evidence for acute cholecystitis.            CONCLUSION:  Radiotracer accumulates within the gallbladder within 42 minutes.  No scintigraphic evidence for cystic duct obstruction or acute cholecystitis.  Correlate clinically.  ICU nurse Dominga was notified of the findings by phone at 1207 hours on 2/8/2025.   LOCATION:  [facility]   Dictated by (CST): Tino Fisher MD on 2/08/2025 at 12:03 PM     Finalized by (CST): Tino Fisher MD on 2/08/2025 at 12:07 PM       US ABDOMEN LIMITED (CPT=76705)    Result Date: 2/7/2025  PROCEDURE:  US ABDOMEN LIMITED (CPT=76705)  COMPARISON:  None.  INDICATIONS:  abd pain  PATIENT STATED HISTORY: (As transcribed by Technologist)               CONCLUSION:    Limited emergency ultrasound shows gallbladder dilated with wall thickening 4 mm, sludge, stones, gallbladder measuring 9.0 x 5.2 cm, suggestion of trace subtle pericholecystic fluid or edema.  Concern for cholecystitis.  Common bile duct normal caliber 4 mm.  Small amount of ascites right upper quadrant seen.   LOCATION:  [id number]   Dictated by (CST): Femi Finney MD on 2/07/2025 at 4:04 PM     Finalized by (CST): Femi Finney MD on 2/07/2025 at 4:06 PM       CT CHEST+ABDOMEN+PELVIS(CPT=71250/26973)    Result Date: 2/6/2025  PROCEDURE:  CT CHEST+ABDOMEN+PELVIS(CPT=71250/43233)  COMPARISON:  [facility] , XR, XR CHEST AP PORTABLE  (CPT=71045), 2/05/2025, 4:49 PM.  [facility] , CT, CT CHEST (CPT=71250), 10/11/2024, 5:46 PM.  INDICATIONS:  Bacteremia, febrile, sepsis, RLQ pain  TECHNIQUE:  Following oral contrast administration, unenhanced multislice CT scanning is performed through the chest, abdomen, and pelvis.  Dose reduction techniques were used. Dose information is transmitted to the ACR (American  College of Radiology) NRDR (National Radiology Data Registry) which includes the Dose Index Registry.  PATIENT STATED HISTORY: (As transcribed by Technologist)  Patient with bacteremia, febrile, sepsis, right lower quadrant pain.    FINDINGS:   CHEST:  LUNGS:  Mild centrilobular emphysematous changes.  Previously noted cavitary consolidation in the right upper lobe adjacent to the horizontal fissure has decreased since prior examination.  Atelectasis in this region of bronchiectasis is noted.  There is  also nodular opacity in the right middle lobe that is decreased since prior examination with associated adjacent atelectasis.  Atelectasis in the right lower lobe is related to adjacent right pleural effusion. MEDIASTINUM:  1.2 x 1.0 cm paratracheal lymph node was 0.9 x 0.7 cm.  Precarinal 2.0 x 0.7 cm lymph node was 1.4 x 0.5 cm.  Representative AP window lymph node measures 1.1 x 0.6 cm was 0.8 x 0.3 cm. BELINDA:  No mass or adenopathy.  CARDIAC:  Coronary arterial calcifications are noted. PLEURA:  Small right pleural effusion is noted. CHEST WALL:  Right-sided Port-A-Cath with tip at the SVC/right atrial junction is noted. AORTA:  No aneurysm or dissection.  VASCULATURE:  Pulmonary vessels are unremarkable within the limits of a noncontrast CT.   ABDOMEN/PELVIS: LIVER:  No enlargement, atrophy, abnormal density, or significant focal lesion.  BILIARY:  Minimal gallbladder wall thickening is suggested.  Small amount of free fluid adjacent to the right hepatic lobe extending into the right pericolic gutter. PANCREAS:  No lesion, fluid collection, ductal dilatation, or atrophy.  SPLEEN:  No enlargement or focal lesion.  KIDNEYS:  Dominant cyst in the lower pole of the right kidney measures 8.5 x 7.6 cm.  Stable high density foci in the left renal cortex.  No hydronephrosis. ADRENALS:  Thickening of the adrenal glands is noted. AORTA:  Vascular calcifications are noted. RETROPERITONEUM:  No mass or adenopathy.   BOWEL/MESENTERY:  Diverticulosis of the colon without evidence of acute diverticulitis.  There is a small amount of free fluid left side abdomen adjacent left kidney as well. ABDOMINAL WALL:  No mass or hernia.  URINARY BLADDER:  No visible focal wall thickening, lesion, or calculus.  PELVIC NODES:  No adenopathy.  PELVIC ORGANS:  No visible mass.  Pelvic organs appropriate for patient age.  BONES:  No bony lesion or fracture.              CONCLUSION:   1. Small right pleural effusion is noted.  Associated atelectasis is noted.  2. Cavitary regions in the right upper right middle lobe have markedly decreased since prior examination.  3. Mediastinal lymphadenopathy has mildly increased since prior exam.  4. Minimal gallbladder wall thickening may be artifactual on this noncontrast exam.  If patient has right upper quadrant pain a follow-up ultrasound may be done.  5. Small amount of fluid in bilateral pericolic gutters and surrounding the liver is noted.  This is nonspecific.  This does surround both kidneys.  If there is concern for infection a follow-up urinalysis may be done.  There is no hydronephrosis.     LOCATION:  [facility]    Dictated by (CST): Gilbert Barrientos MD on 2/06/2025 at 6:39 PM     Finalized by (CST): Gilbert Barrientos MD on 2/06/2025 at 6:45 PM       XR CHEST AP PORTABLE  (CPT=71045)    Result Date: 2/5/2025  PROCEDURE:  XR CHEST AP PORTABLE  (CPT=71045)  TECHNIQUE:  AP chest radiograph was obtained.  COMPARISON:  [facility] , XR, XR CHEST AP PORTABLE  (CPT=71045), 10/14/2024, 2:08 PM.  [facility] , XR, XR CHEST AP PORTABLE  (CPT=71045), 10/13/2024, 2:07 PM.  INDICATIONS:  recent port placed on last friday, fever spiked last night.  PATIENT STATED HISTORY: (As transcribed by Technologist)  recent port placed on last friday, fever spiked last night.    FINDINGS:  Right chest port catheter is in place with tip terminating at the cavoatrial junction.  There is a small right pleural effusion, with associated  atelectasis which is grossly similar in appearance to 10/14/2024.  Correlate clinically for superimposed infection/inflammation.  No left pleural effusion.  No measurable pneumothorax.  Cardiomediastinal silhouette is stable from prior.  Atherosclerotic calcifications within the aorta.  No new osseous findings.            CONCLUSION:  See above   LOCATION:  [facility]      Dictated by (CST): Hunter Gambino MD on 2/05/2025 at 4:43 PM     Finalized by (CST): Hunter Gambino MD on 2/05/2025 at 4:44 PM          Operative Procedures:      Code Status: DNAR/Comfort Care    Sent msg to PCP     Fransisco Taylor MD  [facility]  [phone]  2/15/2025  5:07 PM

## 2025-02-15 NOTE — PROGRESS NOTES
All paperwork completed, including designation of  home. Signed by RN and family. [facility] aware of passing. All pt's belongings taken by family except the clothes the family would like for the pt to be cremated in. Pt's body, cremation clothes, and paperwork taken by transport to [facility].

## 2025-02-15 NOTE — H&P
General Medicine H&P     No chief complaint on file.       PCP: Salvador Cohn MD    History of Present Illness:     Inpt hospice  From prior:         74 year old male with PMH sig for MDS/chronic pancytopenia, HTN, Gout, HL p/w fevers. He was seen in the ED just yesterday for anemia, given a transfusion of PRBC's and discharged with plans for OP oncology f/u. Recently had a port placed last Wednesday for chemotherapy treatments. Wife reports that he's been shaking, having rigors and reporting dyspnea. Also had a fever of 102 at home.   In the ED tmax 99.1, tachycardic, tachypneic. Labs with cr 1.66 with acidosis, neutropenic, anemic and plts of 4. One unit prbc and plts ordered along with cefepime and IVF. Oncology consulted. Will be admitted for further evaluation and treatment.          Past Medical History:    Atherosclerosis of abdominal aorta    Gout    HTN (hypertension)    Hyperlipidemia    Lumbar radiculitis    MDS (myelodysplastic syndrome) (HCC)    Osteoarthritis    right hip    Squamous cell skin cancer      Past Surgical History:   Procedure Laterality Date    Colonoscopy      Colonoscopy N/A 6/27/2016    Procedure: COLONOSCOPY;  Surgeon: Elvis Warren MD;  Location:  ENDOSCOPY    Hip replacement surgery      Other      wisdom teeth extraction        ALL:  Allergies[1]         Social History     Tobacco Use    Smoking status: Former     Current packs/day: 1.00     Average packs/day: 1 pack/day for 35.0 years (35.0 ttl pk-yrs)     Types: Cigarettes     Passive exposure: Never    Smokeless tobacco: Never   Substance Use Topics    Alcohol use: Yes     Alcohol/week: 0.0 standard drinks of alcohol     Comment: rarely        Fam Hx  Family History   Problem Relation Age of Onset    Cancer Other     Stroke Other     Stroke Mother        Review of Systems  Comprehensive ROS reviewed and negative except for what's stated above. \    OBJECTIVE:  /52 (BP Location: Left arm)   Pulse 99   Temp (!)  101.6 °F (38.7 °C) (Axillary)   Resp (!) 6   SpO2 96%        GENERAL - NAD weak appearing, extremely malnourished, somnolent, moderate distress, answers yes or no questions  CV- RRR  RESP -tachypnea, Limited exam, unable to sit up due to extreme weakness, supplemental oxygen, diminished at the bases  ABDOMEN- soft, NT/ND   EXT- no LE edema    Moving all ext, restless      LABS:        Radiology: US VENOUS DOPPLER ARM RIGHT - DIAG IMG (CPT=93971)    Result Date: 2025  PROCEDURE:  US VENOUS DOPPLER ARM RIGHT - DIAG IMG (CPT=93971)  COMPARISON:  None.  INDICATIONS:  Right upper extremity swelling.  TECHNIQUE:  Real time, grey scale, and duplex ultrasound was used to evaluate the upper extremity venous system. B-mode two-dimensional images of the vascular structures, Doppler spectral analysis, and color flow.  Doppler imaging were performed.  The following veins were imaged:  Subclavian, Jugular, Axillary, Brachial, Basilic, Cephalic, and the contralateral Subclavian and Jugular.  PATIENT STATED HISTORY: (As transcribed by Technologist)  Right arm swelling.    FINDINGS:  No sign of DEEP vein thrombus with no clot seen within the deep veins, but there is SUPERFICIAL vein thrombus with occlusive clot in the mid and distal cephalic vein measuring 10 cm in length.            CONCLUSION:  No DEEP vein thrombus, but positive for SUPERFICIAL vein thrombus cephalic vein.   LOCATION:  [id number]   Dictated by (CST): Femi Finney MD on 2025 at 1:42 PM     Finalized by (CST): Femi Finney MD on 2025 at 1:45 PM       CARD ECHO DRESSBOOM (CPT=93308/71897/45748)    Result Date: 2025  Transthoracic Echocardiogram Name:Guy White Date: 2025 :  1950 Ht:  (72in)  BP: 147 / 92 MRN:  [id number]     Age:  74years    Wt:  (175lb) HR: 91bpm Loc:  [facility]       Gndr: M          BSA: 2.01m^2 Sonographer: Radha ALARCON Ordering:    Katiana Zee Consulting:  Godfrey Deluna  ---------------------------------------------------------------------------- History/Indications:  Bactermia. Severe Sepsis. ---------------------------------------------------------------------------- Procedure information:  A transthoracic echocardiogram, limited study was performed. Additional evaluation included M-mode and limited 2D.  Patient status:  Inpatient.  Location:  ICU    Comparison was made to the study of 10/02/2024.    This was a routine study. Transthoracic echocardiography for ventricular function evaluation and endocarditis evaluation. Image quality was adequate. ECG rhythm:   Normal sinus ---------------------------------------------------------------------------- Conclusions: 1. Left ventricle: The cavity size was normal. Wall thickness was normal.    Systolic function was at the lower limits of normal. The estimated    ejection fraction was 50-55%, by 3D assessment. 2. Left ventricle: There is mild hypokinesis of the basal-mid inferior wall. 3. Left atrium: The left atrial volume was mildly increased. 4. Pericardium, extracardiac: There were bilateral pleural effusions    present. * ---------------------------------------------------------------------------- * Findings: Left ventricle:  The cavity size was normal. Wall thickness was normal. Systolic function was at the lower limits of normal. The estimated ejection fraction was 50-55%, by 3D assessment. Regional wall motion:   There is mild hypokinesis of the basal-mid inferior wall. Left atrium:  The left atrial volume was mildly increased. Right ventricle:  The cavity size was normal. Systolic function was normal. Mitral valve:  The valve was structurally normal. Aortic valve:   The valve was trileaflet. The leaflets were mildly calcified. Tricuspid valve:  The valve is structurally normal. Pericardium:   There was no pericardial effusion. Pleura:  There were bilateral pleural effusions present. Aorta: Aortic root: The aortic root was  normal. Pulmonary arteries: The main pulmonary artery was normal-sized. Systemic veins:  Central venous respirophasic diameter changes are blunted (< 50%). Inferior vena cava: The IVC was normal-sized. ---------------------------------------------------------------------------- Measurements  Left ventricle         Value        Ref       10/02/2024  LV end-diastolic       198   ml     --------- ----------  volume, 3D  LV end-systolic        97    ml     --------- ----------  volume, 3D  LV ejection        (L) 51    %      52 - 72   ----------  fraction, 3D  LV end-diastolic   (H) 98    ml/m^2 <=79      ----------  volume/bsa, 3D  LV end-systolic    (H) 48    ml/m^2 <=32      ----------  volume/bsa, 3D  LV wall mass, 3D       167   g      --------- ----------  LV wall mass/bsa,      83    g/m^2  --------- ----------  3D  IVS thickness, ED,     0.6   cm     0.6 - 1.0 0.7  PLAX  LV ID, ED, PLAX        5.4   cm     4.2 - 5.8 5.4  LV ID, ES, PLAX        3.9   cm     2.5 - 4.0 3.6  LV PW thickness,       0.9   cm     0.6 - 1.0 0.7  ED, PLAX  IVS/LV PW ratio,       0.66         --------- 0.96  ED, PLAX  LV PW/LV ID ratio,     0.17         --------- 0.14  ED, PLAX  LV ejection            54    %      52 - 72   60  fraction  Aortic root            Value        Ref       10/02/2024  Aortic root ID, ED     3.1   cm     2.7 - 4.1 3.1  Left atrium            Value        Ref       10/02/2024  LA ID, A-P, ES     (H) 4.6   cm     3.0 - 4.0 4.2  LA volume, S       (H) 77    ml     18 - 58   64  LA volume/bsa, S   (H) 38    ml/m^2 16 - 34   33  LA volume, ES, 1-p (H) 73    ml     18 - 58   60  A4C  LA volume, ES, 1-p (H) 82    ml     18 - 58   68  A2C  LA volume, ES, A/L     81    ml     --------- ----------  LA volume/bsa, ES, (H) 40    ml/m^2 16 - 34   ----------  A/L  LA/aortic root         1.48         --------- 1.35  ratio  LA volume, ES, 3D  (H) 93    ml     18 - 58   ----------  LA volume/bsa, ES,     46    ml/m^2 ---------  ----------  3D  Inferior vena cava     Value        Ref       10/02/2024  ID                     2.0   cm     <=2.1     ----------  Right ventricle        Value        Ref       10/02/2024  TAPSE, 2D              1.90  cm     >=1.70    ---------- Legend: (L)  and  (H)  perez values outside specified reference range. ---------------------------------------------------------------------------- Prepared and electronically signed by Lisa Gonzalez 02/08/2025 12:37     NM GB HEPATOBILIARY SCAN  (CPT=78226)    Result Date: 2/8/2025  PROCEDURE:  NM GB HEPATOBILIARY SCAN  (CPT=78226)  COMPARISON:  [facility] , CT, CT CHEST+ABDOMEN+PELVIS(CPT=71250/73407), 2/06/2025, 6:00 PM.  INDICATIONS:  Abdominal pain.  Gallbladder wall thickening.  TECHNIQUE:  Tc99m labeled mebrofenin was injected IV, and dynamic images of the abdomen were obtained in the anterior projection for one hour.  RADIOPHARMACEUTICAL(S):  6.0 mCi Tc-99m mebrofenin.  FINDINGS:  There is prompt homogeneous uptake in the liver.  There is normal uptake within common hepatic common bile duct and small bowel.  There is uptake within the gallbladder within 42 minutes which is within normal limits.  There is no evidence for  cystic duct obstruction or scintigraphic evidence for acute cholecystitis.            CONCLUSION:  Radiotracer accumulates within the gallbladder within 42 minutes.  No scintigraphic evidence for cystic duct obstruction or acute cholecystitis.  Correlate clinically.  ICU nurse Dominga was notified of the findings by phone at 1207 hours on 2/8/2025.   LOCATION:  [facility]   Dictated by (CST): Tino Fisher MD on 2/08/2025 at 12:03 PM     Finalized by (CST): Tino Fisher MD on 2/08/2025 at 12:07 PM       US ABDOMEN LIMITED (CPT=76705)    Result Date: 2/7/2025  PROCEDURE:  US ABDOMEN LIMITED (CPT=76705)  COMPARISON:  None.  INDICATIONS:  abd pain  PATIENT STATED HISTORY: (As transcribed by Technologist)               CONCLUSION:    Limited emergency  ultrasound shows gallbladder dilated with wall thickening 4 mm, sludge, stones, gallbladder measuring 9.0 x 5.2 cm, suggestion of trace subtle pericholecystic fluid or edema.  Concern for cholecystitis.  Common bile duct normal caliber 4 mm.  Small amount of ascites right upper quadrant seen.   LOCATION:  [id number]   Dictated by (CST): Femi Finney MD on 2/07/2025 at 4:04 PM     Finalized by (CST): Femi Finney MD on 2/07/2025 at 4:06 PM       CT CHEST+ABDOMEN+PELVIS(CPT=71250/29318)    Result Date: 2/6/2025  PROCEDURE:  CT CHEST+ABDOMEN+PELVIS(CPT=71250/67987)  COMPARISON:  [facility] , XR, XR CHEST AP PORTABLE  (CPT=71045), 2/05/2025, 4:49 PM.  [facility] , CT, CT CHEST (CPT=71250), 10/11/2024, 5:46 PM.  INDICATIONS:  Bacteremia, febrile, sepsis, RLQ pain  TECHNIQUE:  Following oral contrast administration, unenhanced multislice CT scanning is performed through the chest, abdomen, and pelvis.  Dose reduction techniques were used. Dose information is transmitted to the ACR (American College of Radiology) NRDR (National Radiology Data Registry) which includes the Dose Index Registry.  PATIENT STATED HISTORY: (As transcribed by Technologist)  Patient with bacteremia, febrile, sepsis, right lower quadrant pain.    FINDINGS:   CHEST:  LUNGS:  Mild centrilobular emphysematous changes.  Previously noted cavitary consolidation in the right upper lobe adjacent to the horizontal fissure has decreased since prior examination.  Atelectasis in this region of bronchiectasis is noted.  There is  also nodular opacity in the right middle lobe that is decreased since prior examination with associated adjacent atelectasis.  Atelectasis in the right lower lobe is related to adjacent right pleural effusion. MEDIASTINUM:  1.2 x 1.0 cm paratracheal lymph node was 0.9 x 0.7 cm.  Precarinal 2.0 x 0.7 cm lymph node was 1.4 x 0.5 cm.  Representative AP window lymph node measures 1.1 x 0.6 cm was 0.8 x 0.3 cm. BELINDA:  No mass or adenopathy.   CARDIAC:  Coronary arterial calcifications are noted. PLEURA:  Small right pleural effusion is noted. CHEST WALL:  Right-sided Port-A-Cath with tip at the SVC/right atrial junction is noted. AORTA:  No aneurysm or dissection.  VASCULATURE:  Pulmonary vessels are unremarkable within the limits of a noncontrast CT.   ABDOMEN/PELVIS: LIVER:  No enlargement, atrophy, abnormal density, or significant focal lesion.  BILIARY:  Minimal gallbladder wall thickening is suggested.  Small amount of free fluid adjacent to the right hepatic lobe extending into the right pericolic gutter. PANCREAS:  No lesion, fluid collection, ductal dilatation, or atrophy.  SPLEEN:  No enlargement or focal lesion.  KIDNEYS:  Dominant cyst in the lower pole of the right kidney measures 8.5 x 7.6 cm.  Stable high density foci in the left renal cortex.  No hydronephrosis. ADRENALS:  Thickening of the adrenal glands is noted. AORTA:  Vascular calcifications are noted. RETROPERITONEUM:  No mass or adenopathy.  BOWEL/MESENTERY:  Diverticulosis of the colon without evidence of acute diverticulitis.  There is a small amount of free fluid left side abdomen adjacent left kidney as well. ABDOMINAL WALL:  No mass or hernia.  URINARY BLADDER:  No visible focal wall thickening, lesion, or calculus.  PELVIC NODES:  No adenopathy.  PELVIC ORGANS:  No visible mass.  Pelvic organs appropriate for patient age.  BONES:  No bony lesion or fracture.              CONCLUSION:   1. Small right pleural effusion is noted.  Associated atelectasis is noted.  2. Cavitary regions in the right upper right middle lobe have markedly decreased since prior examination.  3. Mediastinal lymphadenopathy has mildly increased since prior exam.  4. Minimal gallbladder wall thickening may be artifactual on this noncontrast exam.  If patient has right upper quadrant pain a follow-up ultrasound may be done.  5. Small amount of fluid in bilateral pericolic gutters and surrounding the liver  is noted.  This is nonspecific.  This does surround both kidneys.  If there is concern for infection a follow-up urinalysis may be done.  There is no hydronephrosis.     LOCATION:  [facility]    Dictated by (CST): Gilbert Barrientos MD on 2/06/2025 at 6:39 PM     Finalized by (CST): Gilbert Barrientos MD on 2/06/2025 at 6:45 PM       XR CHEST AP PORTABLE  (CPT=71045)    Result Date: 2/5/2025  PROCEDURE:  XR CHEST AP PORTABLE  (CPT=71045)  TECHNIQUE:  AP chest radiograph was obtained.  COMPARISON:  [facility] , XR, XR CHEST AP PORTABLE  (CPT=71045), 10/14/2024, 2:08 PM.  [facility] , XR, XR CHEST AP PORTABLE  (CPT=71045), 10/13/2024, 2:07 PM.  INDICATIONS:  recent port placed on last friday, fever spiked last night.  PATIENT STATED HISTORY: (As transcribed by Technologist)  recent port placed on last friday, fever spiked last night.    FINDINGS:  Right chest port catheter is in place with tip terminating at the cavoatrial junction.  There is a small right pleural effusion, with associated atelectasis which is grossly similar in appearance to 10/14/2024.  Correlate clinically for superimposed infection/inflammation.  No left pleural effusion.  No measurable pneumothorax.  Cardiomediastinal silhouette is stable from prior.  Atherosclerotic calcifications within the aorta.  No new osseous findings.            CONCLUSION:  See above   LOCATION:  [facility]      Dictated by (CST): Hunter Gambino MD on 2/05/2025 at 4:43 PM     Finalized by (CST): Hunter Gambino MD on 2/05/2025 at 4:44 PM            ASSESSMENT / PLAN:    74 year old male with PMH sig for MDS/chronic pancytopenia, HTN, Gout, HL p/w fevers.    Now INPT HOSPICE    From prior:     Severe Sepsis 2/2 ESBL E.coli bacteremia,  possible from port infection  Neutropenic fevers  - adjusted cefepime to meropenem and vanc   - ID following - plan for meropenem inpatient and ertapnem until 2/19 on dc   - Cultures reviewed  - CT and US reviewed  - HIDA negative  - transferred out of ICU  2/8      Acute Hypoxic Respiratory failure - resolved  - from sepsis  - HF, wean as tolerated     Pancytopenia  - s/p PRBCs, plts   - goal Hgb>7, goal plts>10  - Has received IVIG; continues to require transfusion of platelets ordered PRBCs daily  - poor prognosis overall given that he's failing on second line decitabine, palliative care and hospice following  - Case discussed with oncology- hospice recommended, discussed     Acute renal injury - resolved  - prerenal, infection  - isotonic crystalloids per nephrology   - monitor renal parameters  - avoid nephrotoxins  - neprhology following     Essential HTN  - metoprolol with parameters     Gout  - allopurinol     Urinary retention  - Bladder scan every 6 hours, place a Duarte if persistent retention given severe thrombocytopenia, would want to avoid risk of iatrogenic injury to the bladder and urethra  - Start Flomax  - urology consult if needed     Advance care planning  - Unfortunately patient with multiple comorbidities, advancing MDS with persistent pancytopenia requiring transfusions with lack of options.  Oncology following, patient does not have very many options for treatment of MDS at this point, recommend hospice.  - Discussed with palliative care, family agreeable to speaking with hospice.  Hospice consult is placed. Patient is still full code.  Family shows understanding per palliative care however overall, very emotional and not ready to make her decisions yet.  It has been discussed with patient and family that his condition is tenuous and that he is at a very high risk of decline.  It is not recommended to pursue chest compressions with patient's platelets of 8.  Recommend DNR/comfort.  Will await for family's decision  - Hospice evaluation and family meeting @1600 2/13  - Increase pain medications given discomfort, added Norco as needed.     -2/14-had a long conversation with patient's niece at bedside.  Discussed overall clinical picture.   Patient's niece is aware of patient's medical condition as she has been taking patient's to all his appointments. she understands that patient is declining that hospice might be the only option.  However patient's wife who is the POA and patient's daughter: Meeting with hospice later today.  I discussed that with declining blood count, critically low platelets, resuscitation is not recommended .given patient's clinical picture today, patient is a very high risk arrest and a sudden decline.  Niece expressed that family is undergoing difficult time with financial constraints.  I discussed with social work to offer resources.  Social work to reconnect with hospice.  Later I spoke with hospice RN and advocated for patient to be inpatient hospice given that patient is rapidly declining in the setting of his severe pancytopenia is also at a high risk of cardiac arrest.  Hospice met with family, patient's CODE STATUS has been changed to DNR/comfort.  Plan to consider inpatient hospice for family's wishes.         FEN: regular diet, PT/OT  Proph: SCDs only  Code status: Full code      Outpatient records or previous hospital records reviewed. [facility] hospitalist to continue to follow patient while in house. A total of 75 minutes taken.  Greater than 50% face to face encounter.      Fransisco Taylor MD  [facility] Hospitalist  Pager: [phone]  2/15/2025  5:08 PM               [1]   Allergies  Allergen Reactions    Radiology Contrast Iodinated Dyes HIVES

## 2025-07-10 NOTE — PROCEDURES
[facility]    Guy White Patient Status:  Inpatient    1950 MRN [id number]   Location [facility] 2NE-A Attending Raman Hurtado, DO   Hosp Day # 1 PCP Salvador Cohn MD         Procedure Report    Pre-Operative Diagnosis: Symptomatic Right  Pleural Effusion.  MDS, CHF, Pneumonia    Post-Operative Diagnosis: Same as above.    Procedure Performed: Ultrasound guided right thoracentesis    Anesthesia: 1% lidocaine    EBL: <1cc    Complications: None    Summary of Case: 1500cc of yellow colored fluid aspirated from the right pleural space.  Fluid was sent for routine studies as directed by the ordering physician. Patient tolerated procedure well without immediate complication.  Post thoracentesis chest x-ray to be obtained.  Full report to follow in PACS.    Tino Fisher       No

## (undated) NOTE — LETTER
Patient Name: Guy White        : 1950       Medical Record #: [id number]    CONSENT FOR PROCEDURES/SEDATION    Date: 10/9/2024       Time: 10:44 AM        1. I authorize the performance upon Guy White the following:    Image guided thoracentesis with tube placement - right    2. I authorize Dr. JUANA Harrison (and whomever is designated as the doctor’s assistant), to perform the above mentioned procedures.    3. If any unforeseen conditions arise during this procedure calling for additional procedures, operations, or medications (including anesthesia and blood transfusion), I  further request and authorize the doctor to do whatever he/she deems advisable in my interest.    4. I consent to the taking and reproduction of any photographs in the course of this procedure for professional purposes.    5. I consent to the administration of such sedation as may be considered necessary or advisable by the physician responsible for this service, with the exception of  _____________________________.    6. I have been informed by my doctor of the nature and purpose of this procedure/sedation, possible alternative methods of treatment, risk involved and possible complications.      Signature of Patient:  ___________________________    Signature of person authorized to consent for patient: Relationship to patient:  ___________________________    ___________________    Witness: ____________________     Date: ______________    Provider: ____________________     Date: ______________

## (undated) NOTE — LETTER
[facility]NE-A  [address] [place] [address]  [phone]    Blood Transfusion Consent    In the course of your treatment, it may become necessary to administer a transfusion of blood or blood components. This form provides basic information concerning this procedure and, if signed by you, authorizes its administration. By signing this form, you agree that all of your questions about the administration of blood or blood products have been answered by the ordering medical professional or designee.    Description of Procedure  Blood is introduced into one of your veins, commonly in the arm, using a sterilized disposable needle. The amount of blood transfused, and whether the transfusion will be of blood or blood components is a judgement the physician will make based on your particular needs.    Risks  The transfusion is a common procedure of low risk.  MINOR AND TEMPORARY REACTIONS ARE NOT UNCOMMON, including a slight bruise, swelling or local reaction in the area where the needle pierces your skin, or a nonserious reaction to the transfused material itself, including headache, fever or mild skin reaction, such as rash.  Serious reactions are possible, though very unlikely, and include severe allergic reaction (shock) and destruction (hemolysis) of transfused blood cells.  Infectious diseases which are known to be transmitted by blood transfusion include certain types of viral Hepatitis(liver infection from a virus), Human Immunodeficiency Virus (HIV-1,2) infection, a viral infection known to cause Acquired Immunodeficiency Syndrome (AIDS), as well as certain other bacterial, viral, and parasitic diseases. While a minimal risk of acquiring an infectious disease from transfused blood exists, in accordance with the Federal and State law, all due care has been taken in donor selection and testing to avoid transmission of disease.    Alternatives  If loss of blood poses serious threats during your  treatment, THERE IS NO EFFECTIVE ALTERNATIVE TO BLOOD TRANSFUSION. However, if you have any further questions on this matter, your provider will fully explain the alternatives to you if it has not already been done.    I, ______________________________, have read/had read to me the above. I understand the matters bearing on the decision whether or not to authorize a transfusion of blood or blood components. I have no questions which have not been answered to my full satisfaction. I hereby consent to such transfusion as my physician may deem necessary or advisable in the course of my treatment.    ______________________________________________                    ___________________________  (Signature of Patient or Responsible party in case of minor,                 (Printed Name of Patient or incompetent, or unconscious patient)              Responsible Party)    ___________________________               _____________________  (Relationship to Patient if not self)                                    (Date and Time)    __________________________                                                           ______________________              (Signature of Witness)               (Printed Name of Witness)     Language line ()    Telephone/Verbal/Video Consent    __________________________                     ____________________  (Signature of 2nd Witness           (Printed Name of 2nd  Telephone/Verbal/Video Consent)           Witness)    Patient Name: Guy White     : 1950                 Printed: 2024     Medical Record #: [id number]      Rev: 2023

## (undated) NOTE — LETTER
[facility]NE-A  [address] [address]  [phone]    Blood Transfusion Consent    In the course of your treatment, it may become necessary to administer a transfusion of blood or blood components. This form provides basic information concerning this procedure and, if signed by you, authorizes its administration. By signing this form, you agree that all of your questions about the administration of blood or blood products have been answered by the ordering medical professional or designee.    Description of Procedure  Blood is introduced into one of your veins, commonly in the arm, using a sterilized disposable needle. The amount of blood transfused, and whether the transfusion will be of blood or blood components is a judgement the physician will make based on your particular needs.    Risks  The transfusion is a common procedure of low risk.  MINOR AND TEMPORARY REACTIONS ARE NOT UNCOMMON, including a slight bruise, swelling or local reaction in the area where the needle pierces your skin, or a nonserious reaction to the transfused material itself, including headache, fever or mild skin reaction, such as rash.  Serious reactions are possible, though very unlikely, and include severe allergic reaction (shock) and destruction (hemolysis) of transfused blood cells.  Infectious diseases which are known to be transmitted by blood transfusion include certain types of viral Hepatitis(liver infection from a virus), Human Immunodeficiency Virus (HIV-1,2) infection, a viral infection known to cause Acquired Immunodeficiency Syndrome (AIDS), as well as certain other bacterial, viral, and parasitic diseases. While a minimal risk of acquiring an infectious disease from transfused blood exists, in accordance with the Federal and State law, all due care has been taken in donor selection and testing to avoid transmission of disease.    Alternatives  If loss of blood poses serious threats during your  treatment, THERE IS NO EFFECTIVE ALTERNATIVE TO BLOOD TRANSFUSION. However, if you have any further questions on this matter, your provider will fully explain the alternatives to you if it has not already been done.    I, ______________________________, have read/had read to me the above. I understand the matters bearing on the decision whether or not to authorize a transfusion of blood or blood components. I have no questions which have not been answered to my full satisfaction. I hereby consent to such transfusion as my physician may deem necessary or advisable in the course of my treatment.    ______________________________________________                    ___________________________  (Signature of Patient or Responsible party in case of minor,                 (Printed Name of Patient or incompetent, or unconscious patient)              Responsible Party)    ___________________________               _____________________  (Relationship to Patient if not self)                                    (Date and Time)    __________________________                                                           ______________________              (Signature of Witness)               (Printed Name of Witness)     Language line ()    Telephone/Verbal/Video Consent    __________________________                     ____________________  (Signature of 2nd Witness           (Printed Name of 2nd  Telephone/Verbal/Video Consent)           Witness)    Patient Name: Guy White     : 1950                 Printed: October 3, 2024     Medical Record #: [id number]      Rev: 2023

## (undated) NOTE — LETTER
[facility]SW-A  [address] [address]  [phone]    Blood Transfusion Consent    In the course of your treatment, it may become necessary to administer a transfusion of blood or blood components. This form provides basic information concerning this procedure and, if signed by you, authorizes its administration. By signing this form, you agree that all of your questions about the administration of blood or blood products have been answered by the ordering medical professional or designee.    Description of Procedure  Blood is introduced into one of your veins, commonly in the arm, using a sterilized disposable needle. The amount of blood transfused, and whether the transfusion will be of blood or blood components is a judgement the physician will make based on your particular needs.    Risks  The transfusion is a common procedure of low risk.  MINOR AND TEMPORARY REACTIONS ARE NOT UNCOMMON, including a slight bruise, swelling or local reaction in the area where the needle pierces your skin, or a nonserious reaction to the transfused material itself, including headache, fever or mild skin reaction, such as rash.  Serious reactions are possible, though very unlikely, and include severe allergic reaction (shock) and destruction (hemolysis) of transfused blood cells.  Infectious diseases which are known to be transmitted by blood transfusion include certain types of viral Hepatitis(liver infection from a virus), Human Immunodeficiency Virus (HIV-1,2) infection, a viral infection known to cause Acquired Immunodeficiency Syndrome (AIDS), as well as certain other bacterial, viral, and parasitic diseases. While a minimal risk of acquiring an infectious disease from transfused blood exists, in accordance with the Federal and State law, all due care has been taken in donor selection and testing to avoid transmission of disease.    Alternatives  If loss of blood poses serious threats during your  treatment, THERE IS NO EFFECTIVE ALTERNATIVE TO BLOOD TRANSFUSION. However, if you have any further questions on this matter, your provider will fully explain the alternatives to you if it has not already been done.    I, ______________________________, have read/had read to me the above. I understand the matters bearing on the decision whether or not to authorize a transfusion of blood or blood components. I have no questions which have not been answered to my full satisfaction. I hereby consent to such transfusion as my physician may deem necessary or advisable in the course of my treatment.    ______________________________________________                    ___________________________  (Signature of Patient or Responsible party in case of minor,                 (Printed Name of Patient or incompetent, or unconscious patient)              Responsible Party)    ___________________________               _____________________  (Relationship to Patient if not self)                                    (Date and Time)    __________________________                                                           ______________________              (Signature of Witness)               (Printed Name of Witness)     Language line ()    Telephone/Verbal/Video Consent    __________________________                     ____________________  (Signature of 2nd Witness           (Printed Name of 2nd  Telephone/Verbal/Video Consent)           Witness)    Patient Name: Guy White     : 1950                 Printed: 2025     Medical Record #: [id number]      Rev: 2023

## (undated) NOTE — LETTER
[facility]NE-A  [address] [address]  [phone]    Blood Transfusion Consent    In the course of your treatment, it may become necessary to administer a transfusion of blood or blood components. This form provides basic information concerning this procedure and, if signed by you, authorizes its administration. By signing this form, you agree that all of your questions about the administration of blood or blood products have been answered by the ordering medical professional or designee.    Description of Procedure  Blood is introduced into one of your veins, commonly in the arm, using a sterilized disposable needle. The amount of blood transfused, and whether the transfusion will be of blood or blood components is a judgement the physician will make based on your particular needs.    Risks  The transfusion is a common procedure of low risk.  MINOR AND TEMPORARY REACTIONS ARE NOT UNCOMMON, including a slight bruise, swelling or local reaction in the area where the needle pierces your skin, or a nonserious reaction to the transfused material itself, including headache, fever or mild skin reaction, such as rash.  Serious reactions are possible, though very unlikely, and include severe allergic reaction (shock) and destruction (hemolysis) of transfused blood cells.  Infectious diseases which are known to be transmitted by blood transfusion include certain types of viral Hepatitis(liver infection from a virus), Human Immunodeficiency Virus (HIV-1,2) infection, a viral infection known to cause Acquired Immunodeficiency Syndrome (AIDS), as well as certain other bacterial, viral, and parasitic diseases. While a minimal risk of acquiring an infectious disease from transfused blood exists, in accordance with the Federal and State law, all due care has been taken in donor selection and testing to avoid transmission of disease.    Alternatives  If loss of blood poses serious threats during your  treatment, THERE IS NO EFFECTIVE ALTERNATIVE TO BLOOD TRANSFUSION. However, if you have any further questions on this matter, your provider will fully explain the alternatives to you if it has not already been done.    I, ______________________________, have read/had read to me the above. I understand the matters bearing on the decision whether or not to authorize a transfusion of blood or blood components. I have no questions which have not been answered to my full satisfaction. I hereby consent to such transfusion as my physician may deem necessary or advisable in the course of my treatment.    ______________________________________________                    ___________________________  (Signature of Patient or Responsible party in case of minor,                 (Printed Name of Patient or incompetent, or unconscious patient)              Responsible Party)    ___________________________               _____________________  (Relationship to Patient if not self)                                    (Date and Time)    __________________________                                                           ______________________              (Signature of Witness)               (Printed Name of Witness)     Language line ()    Telephone/Verbal/Video Consent    __________________________                     ____________________  (Signature of 2nd Witness           (Printed Name of 2nd  Telephone/Verbal/Video Consent)           Witness)    Patient Name: Guy White     : 1950                 Printed: 2024     Medical Record #: [id number]      Rev: 2023